# Patient Record
Sex: FEMALE | Race: WHITE | NOT HISPANIC OR LATINO | Employment: UNEMPLOYED | URBAN - NONMETROPOLITAN AREA
[De-identification: names, ages, dates, MRNs, and addresses within clinical notes are randomized per-mention and may not be internally consistent; named-entity substitution may affect disease eponyms.]

---

## 2017-01-04 ENCOUNTER — OFFICE VISIT (OUTPATIENT)
Dept: INTERNAL MEDICINE | Facility: CLINIC | Age: 67
End: 2017-01-04

## 2017-01-04 VITALS
SYSTOLIC BLOOD PRESSURE: 152 MMHG | RESPIRATION RATE: 16 BRPM | TEMPERATURE: 97.8 F | HEIGHT: 64 IN | HEART RATE: 84 BPM | OXYGEN SATURATION: 93 % | DIASTOLIC BLOOD PRESSURE: 92 MMHG | WEIGHT: 156.25 LBS | BODY MASS INDEX: 26.67 KG/M2

## 2017-01-04 DIAGNOSIS — J44.1 CHRONIC OBSTRUCTIVE PULMONARY DISEASE WITH ACUTE EXACERBATION (HCC): Primary | ICD-10-CM

## 2017-01-04 DIAGNOSIS — E11.649 TYPE 2 DIABETES MELLITUS WITH HYPOGLYCEMIA WITHOUT COMA, WITH LONG-TERM CURRENT USE OF INSULIN (HCC): ICD-10-CM

## 2017-01-04 DIAGNOSIS — Z79.4 TYPE 2 DIABETES MELLITUS WITH HYPOGLYCEMIA WITHOUT COMA, WITH LONG-TERM CURRENT USE OF INSULIN (HCC): ICD-10-CM

## 2017-01-04 PROCEDURE — 99213 OFFICE O/P EST LOW 20 MIN: CPT | Performed by: PHYSICIAN ASSISTANT

## 2017-01-04 RX ORDER — AZITHROMYCIN 250 MG/1
TABLET, FILM COATED ORAL
Qty: 6 TABLET | Refills: 0 | Status: SHIPPED | OUTPATIENT
Start: 2017-01-04 | End: 2017-01-20

## 2017-01-04 RX ORDER — IPRATROPIUM BROMIDE AND ALBUTEROL SULFATE 2.5; .5 MG/3ML; MG/3ML
3 SOLUTION RESPIRATORY (INHALATION) EVERY 4 HOURS PRN
Qty: 120 VIAL | Refills: 2 | Status: SHIPPED | OUTPATIENT
Start: 2017-01-04

## 2017-01-04 NOTE — MR AVS SNAPSHOT
"                        Breann Gallegos   1/4/2017 4:30 PM   Office Visit    Provider:  FORD Dubon   Department:  [facility] PRIMARY CARE   Dept Phone:  [phone]                Your Full Care Plan              Today's Medication Changes          These changes are accurate as of: 1/4/17  5:12 PM.  If you have any questions, ask your nurse or doctor.               New Medication(s)Ordered:     azithromycin 250 MG tablet   Commonly known as:  ZITHROMAX Z-SEKOU   Take 2 tablets the first day, then 1 tablet daily for 4 days.       ipratropium-albuterol 0.5-2.5 mg/mL nebulizer   Commonly known as:  DUO-NEB   Take 3 mL by nebulization Every 4 (Four) Hours As Needed for wheezing or shortness of air.            Where to Get Your Medications      These medications were sent to [address], [address] [phone]  - [phone] [address] [address]     Phone:  [phone]     azithromycin 250 MG tablet    ipratropium-albuterol 0.5-2.5 mg/mL nebulizer                  Your Updated Medication List          This list is accurate as of: 1/4/17  5:12 PM.  Always use your most recent med list.                albuterol 108 (90 BASE) MCG/ACT inhaler   Commonly known as:  PROAIR RESPICLICK   Inhale 1 puff every 4 (four) hours as needed for wheezing or shortness of air.       alendronate 70 MG tablet   Commonly known as:  FOSAMAX   Take 1 tablet by mouth every 7 days. Pt is to take on an empty stomach with plenty of water. Stand for 1 hour after taking med do not sit       amLODIPine 5 MG tablet   Commonly known as:  NORVASC   TAKE 1 TABLET BY MOUTH AT BEDTIME       ASPIRIN LOW DOSE 81 MG EC tablet   Generic drug:  aspirin       azithromycin 250 MG tablet   Commonly known as:  ZITHROMAX Z-SEKOU   Take 2 tablets the first day, then 1 tablet daily for 4 days.       * B-D INS SYR ULTRAFINE 1CC/30G 30G X 1/2\" 1 ML misc   Generic " "drug:  Insulin Syringe-Needle U-100       * B-D INS SYR ULTRAFINE .5CC/30G 30G X 1/2\" 0.5 ML misc   Generic drug:  Insulin Syringe-Needle U-100       BREO ELLIPTA 200-25 MCG/INH aerosol powder    Generic drug:  Fluticasone Furoate-Vilanterol   Inhale 1 puff Daily.       buPROPion  MG 24 hr tablet   Commonly known as:  WELLBUTRIN XL   take 1 tablet by mouth once daily       clotrimazole-betamethasone 1-0.05 % cream   Commonly known as:  LOTRISONE   Apply topically twice daily as direted.       colestipol 1 G tablet   Commonly known as:  COLESTID   Take 2 tablets by mouth 2 (Two) Times a Day.       cyclobenzaprine 10 MG tablet   Commonly known as:  FLEXERIL       diphenoxylate-atropine 2.5-0.025 MG per tablet   Commonly known as:  LOMOTIL       flunisolide 25 MCG/ACT (0.025%) solution nasal spray   Commonly known as:  NASALIDE   Inhale 2 sprays Every 12 (Twelve) Hours.       gabapentin 300 MG capsule   Commonly known as:  NEURONTIN   take 1 capsule by mouth twice a day if needed       ipratropium-albuterol 0.5-2.5 mg/mL nebulizer   Commonly known as:  DUO-NEB   Take 3 mL by nebulization Every 4 (Four) Hours As Needed for wheezing or shortness of air.       LANTUS 100 UNIT/ML injection   Generic drug:  insulin glargine       lisinopril 20 MG tablet   Commonly known as:  PRINIVIL,ZESTRIL   take 1 tablet by mouth once daily       metFORMIN 500 MG tablet   Commonly known as:  GLUCOPHAGE   TAKE 2 TABLETS IN THE MORNING, 1 TABLET AT LUNCH AND 2 TABLETS EVERY EVENING       ONE TOUCH ULTRA TEST test strip   Generic drug:  glucose blood   TEST once daily to twice a day       onetouch ultrasoft lancets   USE AS DIRECTED TWICE A DAY       pantoprazole 40 MG EC tablet   Commonly known as:  PROTONIX   take 1 tablet by mouth once daily       sertraline 100 MG tablet   Commonly known as:  ZOLOFT   TAKE 1 1/2 TABLETS BY MOUTH ONCE DAILY       simvastatin 40 MG tablet   Commonly known as:  ZOCOR   TAKE 1 TABLET BY MOUTH ONCE " DAILY       TRADJENTA 5 MG tablet tablet   Generic drug:  linagliptin       traZODone 50 MG tablet   Commonly known as:  DESYREL   TAKE 1 AND 1/2 TABLETS AT BEDTIME       VIGAMOX 0.5 % ophthalmic solution   Generic drug:  moxifloxacin       Vitamin D3 12752 UNITS capsule       * Notice:  This list has 2 medication(s) that are the same as other medications prescribed for you. Read the directions carefully, and ask your doctor or other care provider to review them with you.            You Were Diagnosed With        Codes Comments    Chronic obstructive pulmonary disease with acute exacerbation    -  Primary ICD-10-CM: J44.1  ICD-9-CM: 491.21     Type 2 diabetes mellitus with hypoglycemia without coma, with long-term current use of insulin     ICD-10-CM: E11.649, Z79.4  ICD-9-CM: 250.80, 251.2, V58.67       Medications to be Given to You by a Medical Professional     Due       Frequency    9/21/2016 clotrimazole-betamethasone (LOTRISONE) 1-0.05 % lotion  Every 12 Hours Scheduled      Instructions     None    Patient Instructions History      MyChart Signup     Our records indicate that you have declined [employer] signup. If you would like to sign up for [employer], please email [email or web address] or call [phone] to obtain an activation code.             Other Info from Your Visit           Your Appointments     Jan 20, 2017 10:30 AM EST   Follow Up with Harry Avery MD   [facility] PRIMARY CARE (--)    [address]  [facility] [place] [facility] [place] [address] [phone]           Arrive 15 minutes prior to appointment.            Feb 08, 2017  3:00 PM EST   Follow Up with Molina Dumont MD   [facility] GASTROENTEROLOGY (--)    [address]  [facility] [place] [facility] [place] [address] [phone]           Please bring medications or medication list to appointment along with insurance cards and a photo ID.            Apr 06, 2017  1:30 PM EDT   Follow Up with  "MINISTERIO Leary   [facility] GROUP PULMONARY CRITICAL CARE AND SLEEP (--)    [address] [facility] [address] [address] [phone]           Arrive 15 minutes prior to appointment.              Allergies     Codeine      Penicillins      Terbinafine        Reason for Visit     Fatigue weakness    Fall x 3 , since Jan. 2 (evening), check thumbnail     Cough taking expectorant, temp. 99.8 and 93 o2 this morning.       Vital Signs     Blood Pressure Pulse Temperature Respirations Height Weight    152/92 84 97.8 °F (36.6 °C) 16 64\" (162.6 cm) 156 lb 4 oz (70.9 kg)    Last Menstrual Period Oxygen Saturation Body Mass Index Smoking Status          (Approximate) 93% 26.82 kg/m2 Former Smoker        Problems and Diagnoses Noted     Chronic airway obstruction    Diabetes      "

## 2017-01-04 NOTE — PROGRESS NOTES
Breann Gallegos is a 66 y.o. female.     Subjective   History of Present Illness   Here today with complaint of 2 days of coughing, right ear pain, postnasal drip, rhinorrhea, low grade fever, SOA, headache and fatigue. Has also fallen 3 times in the last 2 days, states she is getting off balance.  Taking Mucinex which is not helping. Chest aching when coughing. Has not been using her inhalers in the last few days. Has not been checking blood sugar the last 2 days but prior to illness had been running in 60-70s fasting. Not eating much since symptom onset.        The following portions of the patient's history were reviewed and updated as appropriate: allergies, current medications, past family history, past medical history, past social history, past surgical history and problem list.    Review of Systems    Constitutional: Fever, fatigue, appetite change. Negative for chills and unexpected weight change.   HENT: postnasal drip, rhinorrhea, congestion, right ear pain.  Negative for hearing loss, nosebleeds, sore throat, tinnitus and trouble swallowing.    Eyes: Negative for photophobia, discharge and visual disturbance.   Respiratory: Cough, chest tightness. Negative for chest tightness and wheezing.    Cardiovascular: Negative for chest pain, palpitations and leg swelling.   Gastrointestinal: Negative for abdominal distention, abdominal pain, blood in stool, constipation, diarrhea, nausea and vomiting.   Endocrine: Negative for cold intolerance, heat intolerance, polydipsia, polyphagia and polyuria.   Musculoskeletal: Negative for arthralgias, back pain, joint swelling, myalgias, neck pain and neck stiffness.   Skin: Negative for color change, pallor, rash and wound.   Allergic/Immunologic: Negative for environmental allergies, food allergies and immunocompromised state.   Neurological: 3 falls in 2 days. Negative for dizziness, tremors, seizures, weakness, numbness and headaches.   Hematological: Negative for  "adenopathy. Does not bruise/bleed easily.   Psychiatric/Behavioral: Negative for agitation, behavioral problems, confusion, hallucinations, self-injury and suicidal ideas. The patient is not nervous/anxious.      Objective    Physical Exam  Constitutional: Oriented to person, place, and time. Appears well-developed and well-nourished.   HENT: right TM erythematous and bulging with effusion.  Head: No sinus tenderness. Normocephalic and atraumatic.   Eyes: EOM are normal. Pupils are equal, round, and reactive to light.   Neck: Normal range of motion. Neck supple.   Cardiovascular: Normal rate, regular rhythm and normal heart sounds.    Pulmonary/Chest: Decreased breath sounds throughout., Scattered rhonchi and wheezing. Effort normal.  No respiratory distress.  Has no rales. Exhibits no chest wall tenderness.   Abdominal: Soft. Bowel sounds are normal. Exhibits no distension and no mass. There is no tenderness.   Musculoskeletal: Normal range of motion. Exhibits no tenderness.   Neurological: Alert and oriented to person, place, and time.   Skin: Skin is warm and dry.   Psychiatric: Has a normal mood and affect. Behavior is normal. Judgment and thought content normal.       Visit Vitals   • /92   • Pulse 84   • Temp 97.8 °F (36.6 °C)   • Resp 16   • Ht 64\" (162.6 cm)   • Wt 156 lb 4 oz (70.9 kg)   • LMP  (Approximate)   • SpO2 93%   • BMI 26.82 kg/m2       Nursing note and vitals reviewed.        Assessment/Plan   Breann was seen today for fatigue, fall and cough.    Diagnoses and all orders for this visit:    Chronic obstructive pulmonary disease with acute exacerbation  -     ipratropium-albuterol (DUO-NEB) 0.5-2.5 mg/mL nebulizer; Take 3 mL by nebulization Every 4 (Four) Hours As Needed for wheezing or shortness of air.  -     azithromycin (ZITHROMAX Z-SEKOU) 250 MG tablet; Take 2 tablets the first day, then 1 tablet daily for 4 days.    Type 2 diabetes mellitus with hypoglycemia without coma, with long-term " current use of insulin         Call in 2 days if not improving. Monitor glucose closely to ensure not overtreating with insulin during decreased appetite.

## 2017-01-17 ENCOUNTER — TELEPHONE (OUTPATIENT)
Dept: INTERNAL MEDICINE | Facility: CLINIC | Age: 67
End: 2017-01-17

## 2017-01-20 ENCOUNTER — OFFICE VISIT (OUTPATIENT)
Dept: INTERNAL MEDICINE | Facility: CLINIC | Age: 67
End: 2017-01-20

## 2017-01-20 VITALS
HEART RATE: 78 BPM | WEIGHT: 157 LBS | BODY MASS INDEX: 26.8 KG/M2 | OXYGEN SATURATION: 98 % | DIASTOLIC BLOOD PRESSURE: 70 MMHG | TEMPERATURE: 97.6 F | RESPIRATION RATE: 14 BRPM | HEIGHT: 64 IN | SYSTOLIC BLOOD PRESSURE: 122 MMHG

## 2017-01-20 DIAGNOSIS — K21.9 GASTROESOPHAGEAL REFLUX DISEASE WITHOUT ESOPHAGITIS: ICD-10-CM

## 2017-01-20 DIAGNOSIS — M17.9 OSTEOARTHRITIS OF KNEE, UNSPECIFIED LATERALITY, UNSPECIFIED OSTEOARTHRITIS TYPE: ICD-10-CM

## 2017-01-20 DIAGNOSIS — J44.1 CHRONIC OBSTRUCTIVE PULMONARY DISEASE WITH ACUTE EXACERBATION (HCC): ICD-10-CM

## 2017-01-20 DIAGNOSIS — R26.81 UNSTEADY GAIT: ICD-10-CM

## 2017-01-20 DIAGNOSIS — Z79.4 TYPE 2 DIABETES MELLITUS WITH HYPOGLYCEMIA WITHOUT COMA, WITH LONG-TERM CURRENT USE OF INSULIN (HCC): ICD-10-CM

## 2017-01-20 DIAGNOSIS — K58.9 IRRITABLE BOWEL SYNDROME WITHOUT DIARRHEA: ICD-10-CM

## 2017-01-20 DIAGNOSIS — R51.9 HEADACHE, UNSPECIFIED HEADACHE TYPE: ICD-10-CM

## 2017-01-20 DIAGNOSIS — I77.9 PERIPHERAL ARTERIAL OCCLUSIVE DISEASE (HCC): ICD-10-CM

## 2017-01-20 DIAGNOSIS — E78.5 HYPERLIPIDEMIA, UNSPECIFIED HYPERLIPIDEMIA TYPE: Primary | ICD-10-CM

## 2017-01-20 DIAGNOSIS — I10 ESSENTIAL HYPERTENSION: ICD-10-CM

## 2017-01-20 DIAGNOSIS — E55.9 VITAMIN D DEFICIENCY: ICD-10-CM

## 2017-01-20 DIAGNOSIS — G25.81 RESTLESS LEGS SYNDROME: ICD-10-CM

## 2017-01-20 DIAGNOSIS — E11.649 TYPE 2 DIABETES MELLITUS WITH HYPOGLYCEMIA WITHOUT COMA, WITH LONG-TERM CURRENT USE OF INSULIN (HCC): ICD-10-CM

## 2017-01-20 DIAGNOSIS — G44.209 TENSION HEADACHE: ICD-10-CM

## 2017-01-20 PROCEDURE — 99214 OFFICE O/P EST MOD 30 MIN: CPT | Performed by: INTERNAL MEDICINE

## 2017-01-20 NOTE — MR AVS SNAPSHOT
"                        Breann Gallegos   1/20/2017 10:30 AM   Office Visit    Provider:  Harry Avery MD   Department:  [facility] PRIMARY CARE   Dept Phone:  [phone]                Your Full Care Plan              Today's Medication Changes          These changes are accurate as of: 1/20/17 11:09 AM.  If you have any questions, ask your nurse or doctor.               Stop taking medication(s)listed here:     azithromycin 250 MG tablet   Commonly known as:  ZITHROMAX Z-SEKOU   Stopped by:  Harry Avery MD                      Your Updated Medication List          This list is accurate as of: 1/20/17 11:09 AM.  Always use your most recent med list.                albuterol 108 (90 BASE) MCG/ACT inhaler   Commonly known as:  PROAIR RESPICLICK   Inhale 1 puff every 4 (four) hours as needed for wheezing or shortness of air.       alendronate 70 MG tablet   Commonly known as:  FOSAMAX   Take 1 tablet by mouth every 7 days. Pt is to take on an empty stomach with plenty of water. Stand for 1 hour after taking med do not sit       amLODIPine 5 MG tablet   Commonly known as:  NORVASC   TAKE 1 TABLET BY MOUTH AT BEDTIME       ASPIRIN LOW DOSE 81 MG EC tablet   Generic drug:  aspirin       * B-D INS SYR ULTRAFINE 1CC/30G 30G X 1/2\" 1 ML misc   Generic drug:  Insulin Syringe-Needle U-100       * B-D INS SYR ULTRAFINE .5CC/30G 30G X 1/2\" 0.5 ML misc   Generic drug:  Insulin Syringe-Needle U-100       BREO ELLIPTA 200-25 MCG/INH aerosol powder    Generic drug:  Fluticasone Furoate-Vilanterol   Inhale 1 puff Daily.       buPROPion  MG 24 hr tablet   Commonly known as:  WELLBUTRIN XL   take 1 tablet by mouth once daily       clotrimazole-betamethasone 1-0.05 % cream   Commonly known as:  LOTRISONE   Apply topically twice daily as direted.       colestipol 1 G tablet   Commonly known as:  COLESTID   Take 2 tablets by mouth 2 (Two) Times a Day.       cyclobenzaprine 10 MG tablet   Commonly known as:  " FLEXERIL       diphenoxylate-atropine 2.5-0.025 MG per tablet   Commonly known as:  LOMOTIL       flunisolide 25 MCG/ACT (0.025%) solution nasal spray   Commonly known as:  NASALIDE   Inhale 2 sprays Every 12 (Twelve) Hours.       gabapentin 300 MG capsule   Commonly known as:  NEURONTIN   take 1 capsule by mouth twice a day if needed       ipratropium-albuterol 0.5-2.5 mg/mL nebulizer   Commonly known as:  DUO-NEB   Take 3 mL by nebulization Every 4 (Four) Hours As Needed for wheezing or shortness of air.       LANTUS 100 UNIT/ML injection   Generic drug:  insulin glargine       lisinopril 20 MG tablet   Commonly known as:  PRINIVIL,ZESTRIL   take 1 tablet by mouth once daily       metFORMIN 500 MG tablet   Commonly known as:  GLUCOPHAGE   TAKE 2 TABLETS IN THE MORNING, 1 TABLET AT LUNCH AND 2 TABLETS EVERY EVENING       ONE TOUCH ULTRA TEST test strip   Generic drug:  glucose blood   TEST once daily to twice a day       onetouch ultrasoft lancets   USE AS DIRECTED TWICE A DAY       pantoprazole 40 MG EC tablet   Commonly known as:  PROTONIX   take 1 tablet by mouth once daily       sertraline 100 MG tablet   Commonly known as:  ZOLOFT   TAKE 1 1/2 TABLETS BY MOUTH ONCE DAILY       simvastatin 40 MG tablet   Commonly known as:  ZOCOR   TAKE 1 TABLET BY MOUTH ONCE DAILY       TRADJENTA 5 MG tablet tablet   Generic drug:  linagliptin       traZODone 50 MG tablet   Commonly known as:  DESYREL   TAKE 1 AND 1/2 TABLETS AT BEDTIME       VIGAMOX 0.5 % ophthalmic solution   Generic drug:  moxifloxacin       Vitamin D3 68547 UNITS capsule       * Notice:  This list has 2 medication(s) that are the same as other medications prescribed for you. Read the directions carefully, and ask your doctor or other care provider to review them with you.            We Performed the Following     Ambulatory Referral to Physical Therapy     Ambulatory Referral to Podiatry       You Were Diagnosed With        Codes Comments     Hyperlipidemia, unspecified hyperlipidemia type    -  Primary ICD-10-CM: E78.5  ICD-9-CM: 272.4     Essential hypertension     ICD-10-CM: I10  ICD-9-CM: 401.9     Peripheral arterial occlusive disease     ICD-10-CM: I77.9  ICD-9-CM: 444.22     Chronic obstructive pulmonary disease with acute exacerbation     ICD-10-CM: J44.1  ICD-9-CM: 491.21     Gastroesophageal reflux disease without esophagitis     ICD-10-CM: K21.9  ICD-9-CM: 530.81     Irritable bowel syndrome without diarrhea     ICD-10-CM: K58.9  ICD-9-CM: 564.1     Vitamin D deficiency     ICD-10-CM: E55.9  ICD-9-CM: 268.9     Type 2 diabetes mellitus with hypoglycemia without coma, with long-term current use of insulin     ICD-10-CM: E11.649, Z79.4  ICD-9-CM: 250.80, 251.2, V58.67     Osteoarthritis of knee, unspecified laterality, unspecified osteoarthritis type     ICD-10-CM: M17.9  ICD-9-CM: 715.36     Restless legs syndrome     ICD-10-CM: G25.81  ICD-9-CM: 333.94     Tension headache     ICD-10-CM: G44.209  ICD-9-CM: 307.81     Unsteady gait     ICD-10-CM: R26.81  ICD-9-CM: 781.2     Headache, unspecified headache type     ICD-10-CM: R51  ICD-9-CM: 784.0       Medications to be Given to You by a Medical Professional     Due       Frequency    9/21/2016 clotrimazole-betamethasone (LOTRISONE) 1-0.05 % lotion  Every 12 Hours Scheduled      Instructions     None    Patient Instructions History      MyChart Signup     Our records indicate that you have declined [employer] signup. If you would like to sign up for [employer], please email [email or web address] or call [phone] to obtain an activation code.             Other Info from Your Visit           Your Appointments     Feb 08, 2017  3:00 PM EST   Follow Up with Molina Dumont MD   [facility] MEDICAL GROUP GASTROENTEROLOGY (--)    [address] [facility] [address] [phone]           Please bring medications or medication list to appointment along with  "insurance cards and a photo ID.            Apr 06, 2017  1:30 PM EDT   Follow Up with MINISTERIO Leary   [facility] MEDICAL GROUP PULMONARY CRITICAL CARE AND SLEEP (--)    [address] [facility] [address] [address] [phone]           Arrive 15 minutes prior to appointment.              Allergies     Codeine      Penicillins      Terbinafine        Reason for Visit     Hypertension Here for follow up      Vital Signs     Blood Pressure Pulse Temperature Respirations Height Weight    122/70 78 97.6 °F (36.4 °C) 14 64\" (162.6 cm) 157 lb (71.2 kg)    Last Menstrual Period Oxygen Saturation Body Mass Index Smoking Status          (Approximate) 98% 26.95 kg/m2 Former Smoker        Problems and Diagnoses Noted     Chronic airway obstruction    Diabetes    Acid reflux disease    Headache    High cholesterol or triglycerides    High blood pressure    Spasmodic colon    Degenerative arthritis of knee    Peripheral arterial occlusive disease    Restless legs syndrome    Tension headache    Unsteady gait    Vitamin D deficiency      "

## 2017-01-20 NOTE — PROGRESS NOTES
Subjective   Breann Gallegos is a 66 y.o. female.     Chief Complaint   Patient presents with   • Hypertension     Here for follow up       History of Present Illness   Low back pain.improved on medicine. left knee joint pain off and on still, seeing ortho. Diabetes is stable on medication. Anxiety stable on medication. Restless leg syndrome stable on medication. Hypertension stable on medication. COPD stable on medication, fatigue and daytime sleepy, dysphagia resolved after dilalation, complain lightheaded and stagger when standing up. HA off and on    Current Outpatient Prescriptions:   •  albuterol (PROAIR RESPICLICK) 108 (90 BASE) MCG/ACT inhaler, Inhale 1 puff every 4 (four) hours as needed for wheezing or shortness of air., Disp: 1 inhaler, Rfl: 0  •  alendronate (FOSAMAX) 70 MG tablet, Take 1 tablet by mouth every 7 days. Pt is to take on an empty stomach with plenty of water. Stand for 1 hour after taking med do not sit, Disp: 12 tablet, Rfl: 3  •  amLODIPine (NORVASC) 5 MG tablet, TAKE 1 TABLET BY MOUTH AT BEDTIME, Disp: 30 tablet, Rfl: 5  •  aspirin (ASPIRIN LOW DOSE) 81 MG EC tablet, Take  by mouth., Disp: , Rfl:   •  BREO ELLIPTA 200-25 MCG/INH aerosol powder , Inhale 1 puff Daily., Disp: 1 each, Rfl: 6  •  buPROPion XL (WELLBUTRIN XL) 150 MG 24 hr tablet, take 1 tablet by mouth once daily, Disp: 30 tablet, Rfl: 4  •  Cholecalciferol (VITAMIN D3) 83459 UNITS capsule, Take 1 capsule by mouth 1 (one) time per week., Disp: , Rfl:   •  clotrimazole-betamethasone (LOTRISONE) 1-0.05 % cream, Apply topically twice daily as direted., Disp: 45 g, Rfl: 0  •  colestipol (COLESTID) 1 G tablet, Take 2 tablets by mouth 2 (Two) Times a Day., Disp: 120 tablet, Rfl: 6  •  cyclobenzaprine (FLEXERIL) 10 MG tablet, Take 1 tablet by mouth at night as needed., Disp: , Rfl:   •  diphenoxylate-atropine (LOMOTIL) 2.5-0.025 MG per tablet, TAKE 1 TABLET BY MOUTH 3 TO 4 TIMES DAILY USE AS DIRECTED, Disp: , Rfl: 0  •  flunisolide  "(NASALIDE) 25 MCG/ACT (0.025%) solution nasal spray, Inhale 2 sprays Every 12 (Twelve) Hours., Disp: 1 bottle, Rfl: 5  •  gabapentin (NEURONTIN) 300 MG capsule, take 1 capsule by mouth twice a day if needed, Disp: 60 capsule, Rfl: 3  •  insulin glargine (LANTUS) 100 UNIT/ML injection, Inject 45 Units under the skin Daily. Inject 90 units twice daily., Disp: , Rfl:   •  Insulin Syringe-Needle U-100 (B-D INS SYR ULTRAFINE .5CC/30G) 30G X 1/2\" 0.5 ML misc, , Disp: , Rfl:   •  Insulin Syringe-Needle U-100 (B-D INS SYR ULTRAFINE 1CC/30G) 30G X 1/2\" 1 ML misc, , Disp: , Rfl:   •  ipratropium-albuterol (DUO-NEB) 0.5-2.5 mg/mL nebulizer, Take 3 mL by nebulization Every 4 (Four) Hours As Needed for wheezing or shortness of air., Disp: 120 vial, Rfl: 2  •  Lancets (ONETOUCH ULTRASOFT) lancets, USE AS DIRECTED TWICE A DAY, Disp: 100 each, Rfl: 5  •  linagliptin (TRADJENTA) 5 MG tablet tablet, Take 1 tablet by mouth 1 (one) time daily., Disp: , Rfl:   •  lisinopril (PRINIVIL,ZESTRIL) 20 MG tablet, take 1 tablet by mouth once daily, Disp: 30 tablet, Rfl: 5  •  metFORMIN (GLUCOPHAGE) 500 MG tablet, TAKE 2 TABLETS IN THE MORNING, 1 TABLET AT LUNCH AND 2 TABLETS EVERY EVENING, Disp: 150 tablet, Rfl: 5  •  moxifloxacin (VIGAMOX) 0.5 % ophthalmic solution, Apply  to eye., Disp: , Rfl:   •  ONE TOUCH ULTRA TEST test strip, TEST once daily to twice a day, Disp: 100 each, Rfl: 5  •  pantoprazole (PROTONIX) 40 MG EC tablet, take 1 tablet by mouth once daily, Disp: 30 tablet, Rfl: 1  •  sertraline (ZOLOFT) 100 MG tablet, TAKE 1 1/2 TABLETS BY MOUTH ONCE DAILY, Disp: 45 tablet, Rfl: 1  •  simvastatin (ZOCOR) 40 MG tablet, TAKE 1 TABLET BY MOUTH ONCE DAILY, Disp: 30 tablet, Rfl: 11  •  traZODone (DESYREL) 50 MG tablet, TAKE 1 AND 1/2 TABLETS AT BEDTIME, Disp: 45 tablet, Rfl: 2    Current Facility-Administered Medications:   •  clotrimazole-betamethasone (LOTRISONE) 1-0.05 % lotion, , Topical, Q12H, Harry Avery MD    The following portions " of the patient's history were reviewed and updated as appropriate: allergies, current medications, past family history, past medical history, past social history, past surgical history and problem list.    Review of Systems   Constitutional: Negative.    Respiratory: Negative.    Cardiovascular: Negative.    Gastrointestinal: Negative.    Musculoskeletal: Negative.    Skin: Negative.    Neurological:        HA  Unsteady gait   Psychiatric/Behavioral: Negative.        Objective   Physical Exam   Constitutional: She is oriented to person, place, and time. She appears well-developed and well-nourished.   Eyes: EOM are normal. Pupils are equal, round, and reactive to light.   Neck: Normal range of motion. Neck supple.   Cardiovascular: Normal rate, regular rhythm and normal heart sounds.    Pulmonary/Chest: Effort normal and breath sounds normal.   Abdominal: Bowel sounds are normal.   Neurological: She is alert and oriented to person, place, and time.   Skin: Skin is warm.   Psychiatric: She has a normal mood and affect.       All tests have been reviewed.    Assessment/Plan   There are no diagnoses linked to this encounter.          Anxiety depression continue Wellbutrin  vaginal angioma reassurance given and continue to watch  Mammogram pending patient to schedule  HA tension type. flexeril avoid computer games.  CT showed right thyroid goiter only, seen by ENT   dysphagia resolved after dilation  knee OA s/p Steroid injection, gel injection and follow up with orhto pending replacement  Lower back pain sacral pain s/p fall on the tub XR mild arthritis, PT helped  allergic to lamisil  Diabetic gastroparesis vs post cholecytectomy. DIARRHEA, helps with questran. Lomotil and LIBRAX no help.   Diabetic neuropathy continue Neurontin to 300 mg 3 times a day  DM 6.9 a1c CONTINUE MEDICINE lantus 45u   hyperlipidemia CONTINUE MEDICINE  Vitamin D , continue vitD3 2000u daily   vitB12 low , vitB12 1mg daily  colonoscopy  :divertic ,polyps and hemorroid  [id number] and pneumonia shot done .decline zostavax, tdap done, prevnar done  Osteoporosis on bone density scan continue fosamax  Restless leg syndrome continue medicine  Hypertension continue medication   rate dependent tacky, stress test normal  COPD continue medication  anisocoria, seen by eye doctor  Bilateral lower extremity claudication negative ultrasound for peripheral vascular disease  MCV low watch  Erythrasma continue clotrimazole   IBS resolved,  seeing GI continue meds now  Daytime sleepy and fatigue , sleep study negative sleep apnea  MIRTA no pap needed  Unsteady gait, do CT, ?relate to neuropathy, need DM shoes  4 mo after lab

## 2017-01-31 ENCOUNTER — HOSPITAL ENCOUNTER (OUTPATIENT)
Dept: MRI IMAGING | Facility: HOSPITAL | Age: 67
Discharge: HOME | End: 2017-01-31
Admitting: INTERNAL MEDICINE

## 2017-01-31 DIAGNOSIS — R26.81 UNSTEADY GAIT: ICD-10-CM

## 2017-01-31 DIAGNOSIS — R51.9 HEADACHE, UNSPECIFIED HEADACHE TYPE: ICD-10-CM

## 2017-01-31 PROCEDURE — 70551 MRI BRAIN STEM W/O DYE: CPT

## 2017-02-14 ENCOUNTER — LAB (OUTPATIENT)
Dept: INTERNAL MEDICINE | Facility: CLINIC | Age: 67
End: 2017-02-14

## 2017-02-14 DIAGNOSIS — E11.649 TYPE 2 DIABETES MELLITUS WITH HYPOGLYCEMIA WITHOUT COMA, WITH LONG-TERM CURRENT USE OF INSULIN (HCC): ICD-10-CM

## 2017-02-14 DIAGNOSIS — Z79.4 TYPE 2 DIABETES MELLITUS WITH HYPOGLYCEMIA WITHOUT COMA, WITH LONG-TERM CURRENT USE OF INSULIN (HCC): ICD-10-CM

## 2017-02-14 DIAGNOSIS — E55.9 VITAMIN D DEFICIENCY: ICD-10-CM

## 2017-02-14 LAB
25(OH)D3 SERPL-MCNC: 15.4 NG/ML
ALBUMIN SERPL-MCNC: 4.4 G/DL (ref 3.2–4.8)
ALBUMIN/GLOB SERPL: 1.7 G/DL (ref 1.5–2.5)
ALP SERPL-CCNC: 60 U/L (ref 25–100)
ALT SERPL W P-5'-P-CCNC: 29 U/L (ref 7–40)
ANION GAP SERPL CALCULATED.3IONS-SCNC: 9 MMOL/L (ref 3–11)
ARTICHOKE IGE QN: 49 MG/DL (ref 0–130)
AST SERPL-CCNC: 34 U/L (ref 0–33)
BASOPHILS # BLD AUTO: 0.03 10*3/MM3 (ref 0–0.2)
BASOPHILS NFR BLD AUTO: 0.5 % (ref 0–1)
BILIRUB SERPL-MCNC: 0.5 MG/DL (ref 0.3–1.2)
BUN BLD-MCNC: 9 MG/DL (ref 9–23)
BUN/CREAT SERPL: 15 (ref 7–25)
CALCIUM SPEC-SCNC: 9.5 MG/DL (ref 8.7–10.4)
CHLORIDE SERPL-SCNC: 104 MMOL/L (ref 99–109)
CHOLEST SERPL-MCNC: 106 MG/DL (ref 0–200)
CK SERPL-CCNC: 104 U/L (ref 26–174)
CO2 SERPL-SCNC: 28 MMOL/L (ref 20–31)
CREAT BLD-MCNC: 0.6 MG/DL (ref 0.6–1.3)
DEPRECATED RDW RBC AUTO: 45.2 FL (ref 37–54)
EOSINOPHIL # BLD AUTO: 0.14 10*3/MM3 (ref 0.1–0.3)
EOSINOPHIL NFR BLD AUTO: 2.2 % (ref 0–3)
ERYTHROCYTE [DISTWIDTH] IN BLOOD BY AUTOMATED COUNT: 14.3 % (ref 11.3–14.5)
GFR SERPL CREATININE-BSD FRML MDRD: 100 ML/MIN/1.73
GLOBULIN UR ELPH-MCNC: 2.6 GM/DL
GLUCOSE BLD-MCNC: 87 MG/DL (ref 70–100)
HBA1C MFR BLD: 7 % (ref 4.8–5.6)
HCT VFR BLD AUTO: 42.8 % (ref 34.5–44)
HDLC SERPL-MCNC: 39 MG/DL (ref 40–60)
HGB BLD-MCNC: 13.9 G/DL (ref 11.5–15.5)
IMM GRANULOCYTES # BLD: 0.01 10*3/MM3 (ref 0–0.03)
IMM GRANULOCYTES NFR BLD: 0.2 % (ref 0–0.6)
LYMPHOCYTES # BLD AUTO: 2.23 10*3/MM3 (ref 0.6–4.8)
LYMPHOCYTES NFR BLD AUTO: 35.6 % (ref 24–44)
MCH RBC QN AUTO: 27.8 PG (ref 27–31)
MCHC RBC AUTO-ENTMCNC: 32.5 G/DL (ref 32–36)
MCV RBC AUTO: 85.6 FL (ref 80–99)
MONOCYTES # BLD AUTO: 0.48 10*3/MM3 (ref 0–1)
MONOCYTES NFR BLD AUTO: 7.7 % (ref 0–12)
NEUTROPHILS # BLD AUTO: 3.37 10*3/MM3 (ref 1.5–8.3)
NEUTROPHILS NFR BLD AUTO: 53.8 % (ref 41–71)
PLATELET # BLD AUTO: 212 10*3/MM3 (ref 150–450)
PMV BLD AUTO: 9.6 FL (ref 6–12)
POTASSIUM BLD-SCNC: 3.9 MMOL/L (ref 3.5–5.5)
PROT SERPL-MCNC: 7 G/DL (ref 5.7–8.2)
RBC # BLD AUTO: 5 10*6/MM3 (ref 3.89–5.14)
SODIUM BLD-SCNC: 141 MMOL/L (ref 132–146)
TRIGL SERPL-MCNC: 183 MG/DL (ref 0–150)
WBC NRBC COR # BLD: 6.26 10*3/MM3 (ref 3.5–10.8)

## 2017-02-14 PROCEDURE — 80053 COMPREHEN METABOLIC PANEL: CPT | Performed by: INTERNAL MEDICINE

## 2017-02-14 PROCEDURE — 82550 ASSAY OF CK (CPK): CPT | Performed by: INTERNAL MEDICINE

## 2017-02-14 PROCEDURE — 80061 LIPID PANEL: CPT | Performed by: INTERNAL MEDICINE

## 2017-02-14 PROCEDURE — 85025 COMPLETE CBC W/AUTO DIFF WBC: CPT | Performed by: INTERNAL MEDICINE

## 2017-02-14 PROCEDURE — 82306 VITAMIN D 25 HYDROXY: CPT | Performed by: INTERNAL MEDICINE

## 2017-02-14 PROCEDURE — 83036 HEMOGLOBIN GLYCOSYLATED A1C: CPT | Performed by: INTERNAL MEDICINE

## 2017-02-15 RX ORDER — INSULIN GLARGINE 100 [IU]/ML
INJECTION, SOLUTION SUBCUTANEOUS
Qty: 15 EACH | Refills: 5 | Status: SHIPPED | OUTPATIENT
Start: 2017-02-15 | End: 2018-04-18 | Stop reason: SDUPTHER

## 2017-02-16 ENCOUNTER — OFFICE VISIT (OUTPATIENT)
Dept: INTERNAL MEDICINE | Facility: CLINIC | Age: 67
End: 2017-02-16

## 2017-02-16 VITALS
WEIGHT: 157 LBS | SYSTOLIC BLOOD PRESSURE: 122 MMHG | RESPIRATION RATE: 14 BRPM | BODY MASS INDEX: 26.8 KG/M2 | HEIGHT: 64 IN | HEART RATE: 88 BPM | TEMPERATURE: 98.5 F | DIASTOLIC BLOOD PRESSURE: 70 MMHG | OXYGEN SATURATION: 96 %

## 2017-02-16 DIAGNOSIS — G44.209 TENSION HEADACHE: ICD-10-CM

## 2017-02-16 DIAGNOSIS — K21.9 GASTROESOPHAGEAL REFLUX DISEASE WITHOUT ESOPHAGITIS: ICD-10-CM

## 2017-02-16 DIAGNOSIS — K22.4 ESOPHAGEAL DYSMOTILITY: ICD-10-CM

## 2017-02-16 DIAGNOSIS — E78.5 HYPERLIPIDEMIA, UNSPECIFIED HYPERLIPIDEMIA TYPE: ICD-10-CM

## 2017-02-16 DIAGNOSIS — Z79.4 TYPE 2 DIABETES MELLITUS WITH HYPOGLYCEMIA WITHOUT COMA, WITH LONG-TERM CURRENT USE OF INSULIN (HCC): ICD-10-CM

## 2017-02-16 DIAGNOSIS — J44.1 CHRONIC OBSTRUCTIVE PULMONARY DISEASE WITH ACUTE EXACERBATION (HCC): ICD-10-CM

## 2017-02-16 DIAGNOSIS — M54.6 ACUTE THORACIC BACK PAIN, UNSPECIFIED BACK PAIN LATERALITY: Primary | ICD-10-CM

## 2017-02-16 DIAGNOSIS — R26.81 UNSTEADY GAIT: ICD-10-CM

## 2017-02-16 DIAGNOSIS — F41.9 ANXIETY: ICD-10-CM

## 2017-02-16 DIAGNOSIS — F32.A DEPRESSION, UNSPECIFIED DEPRESSION TYPE: ICD-10-CM

## 2017-02-16 DIAGNOSIS — E11.649 TYPE 2 DIABETES MELLITUS WITH HYPOGLYCEMIA WITHOUT COMA, WITH LONG-TERM CURRENT USE OF INSULIN (HCC): ICD-10-CM

## 2017-02-16 DIAGNOSIS — I10 ESSENTIAL HYPERTENSION: ICD-10-CM

## 2017-02-16 DIAGNOSIS — E55.9 VITAMIN D DEFICIENCY: ICD-10-CM

## 2017-02-16 PROCEDURE — 99215 OFFICE O/P EST HI 40 MIN: CPT | Performed by: INTERNAL MEDICINE

## 2017-02-16 RX ORDER — AZITHROMYCIN 250 MG/1
TABLET, FILM COATED ORAL
Qty: 6 TABLET | Refills: 0 | Status: SHIPPED | OUTPATIENT
Start: 2017-02-16 | End: 2017-03-07

## 2017-02-27 RX ORDER — BUPROPION HYDROCHLORIDE 150 MG/1
TABLET ORAL
Qty: 30 TABLET | Refills: 4 | Status: SHIPPED | OUTPATIENT
Start: 2017-02-27 | End: 2017-07-18 | Stop reason: SDUPTHER

## 2017-03-06 RX ORDER — SERTRALINE HYDROCHLORIDE 100 MG/1
TABLET, FILM COATED ORAL
Qty: 45 TABLET | Refills: 1 | Status: SHIPPED | OUTPATIENT
Start: 2017-03-06 | End: 2017-05-17 | Stop reason: SDUPTHER

## 2017-03-07 ENCOUNTER — OFFICE VISIT (OUTPATIENT)
Dept: GASTROENTEROLOGY | Facility: CLINIC | Age: 67
End: 2017-03-07

## 2017-03-07 VITALS
TEMPERATURE: 97 F | HEIGHT: 64 IN | RESPIRATION RATE: 14 BRPM | BODY MASS INDEX: 27.31 KG/M2 | WEIGHT: 160 LBS | SYSTOLIC BLOOD PRESSURE: 157 MMHG | DIASTOLIC BLOOD PRESSURE: 82 MMHG | HEART RATE: 84 BPM

## 2017-03-07 DIAGNOSIS — R11.0 NAUSEA: Primary | ICD-10-CM

## 2017-03-07 DIAGNOSIS — R19.7 DIARRHEA, UNSPECIFIED TYPE: ICD-10-CM

## 2017-03-07 DIAGNOSIS — R12 HEARTBURN: ICD-10-CM

## 2017-03-07 PROCEDURE — 99214 OFFICE O/P EST MOD 30 MIN: CPT | Performed by: INTERNAL MEDICINE

## 2017-03-07 RX ORDER — ONDANSETRON 4 MG/1
4 TABLET, FILM COATED ORAL EVERY 8 HOURS PRN
Qty: 30 TABLET | Refills: 1 | Status: SHIPPED | OUTPATIENT
Start: 2017-03-07 | End: 2018-01-09

## 2017-03-07 NOTE — PROGRESS NOTES
[id number]  [facility] [place] [address]    (H) [phone]  (W)     Chief Complaint   Patient presents with   • Follow-up       History of Present Illness     The patient came back for follow visit today.  The patient has history of diarrhea for the last several years.  Severity is described as moderately severe frequency of bowel movements being 4-5 times per day.  Stools are generally watery with a nocturnal element.  There is history of tenesmus.  The patient also has history of occasional fecal incontinence.  The patient has been having these symptoms for the last 40 years.  Currently the patient feels much better.  Now the patient has 2 and occasionally 3 formed stools a day.  She does not get up at night with diarrhea.  There is no history of associated hematochezia.  About a week or so ago the patient took azithromycin for URI symptoms which led to some loose stools that lasted for a day or so.  Otherwise she is quite happy to have significant improvement of her diarrhea and inability to have formed stools for the first time in the last 40 years.    There is history of reflux off and on for the last several years.  Reflux is moderately severe.  Symptoms are described as burning sensation and indigestion.  There is history of occasional regurgitative symptoms in the past.  Frequency being several times per week.  The symptoms are worse at night.  The patient takes acid suppressive therapy with reasonable control of her symptoms.  Lately the patient has some worsening of her symptoms specially regurgitative symptoms.  No associated dysphagia or odynophagia.  The patient unfortunately has gained some weight lately.  Furthermore she drinks soda beverages especially when she is out.  Lately the patient has been traveling and drinking soda beverages more frequently.  There is history of some nausea off and on for the last few weeks.  The nausea is relatively mild and occurs perhaps once a month.  There is no abdominal  pain.  She denies hematemesis, melena, or hematochezia.  There is no dysphagia or odynophagia.  She denies fever or chills.  There is no recent weight loss.  In fact the patient has gained about 3 pounds over the last one month.    Review of Systems   Constitutional: Negative for appetite change, chills, fatigue, fever and unexpected weight change.   HENT: Negative for mouth sores, nosebleeds and trouble swallowing.    Eyes: Negative for discharge and redness.   Respiratory: Negative for apnea, cough and shortness of breath.    Cardiovascular: Negative for chest pain, palpitations and leg swelling.   Gastrointestinal: Negative for abdominal distention, abdominal pain, anal bleeding, blood in stool, constipation, diarrhea, nausea and vomiting.   Endocrine: Negative for cold intolerance, heat intolerance and polydipsia.   Genitourinary: Negative for dysuria, hematuria and urgency.   Musculoskeletal: Positive for arthralgias. Negative for joint swelling and myalgias.   Skin: Negative for rash.   Allergic/Immunologic: Negative for food allergies and immunocompromised state.   Neurological: Negative for dizziness, seizures, syncope and headaches.   Hematological: Negative for adenopathy. Does not bruise/bleed easily.   Psychiatric/Behavioral: Negative for dysphoric mood. The patient is not nervous/anxious and is not hyperactive.      Patient Active Problem List   Diagnosis   • Dysphagia   • Abnormal urinalysis   • Anxiety   • Chronic obstructive pulmonary disease   • Depression   • Diabetes mellitus   • Diabetic peripheral neuropathy   • Diarrhea   • Esophageal dysmotility   • Foot pain   • Gastroesophageal reflux disease without esophagitis   • Hyperlipidemia   • Hypertension   • Insomnia   • Irritable bowel syndrome   • Localized swelling, mass and lump, lower limb   • Low back pain   • Abnormal mammogram   • Enlargement of neck   • Neck pain   • Nontoxic uninodular goiter   • Osteoarthritis of knee   • Osteoarthritis  "  • Osteopenia   • Peripheral arterial occlusive disease   • Nerve root disorder   • Restless legs syndrome   • Tension headache   • Vitamin D deficiency   • Erythrasma   • Unsteady gait   • Headache     Past Medical History   Diagnosis Date   • COPD (chronic obstructive pulmonary disease)    • Diabetes mellitus    • Eustachian tube dysfunction    • GERD (gastroesophageal reflux disease)    • High cholesterol    • Hyperlipidemia    • Hypertension    • Sinus problem    • Skin abscess    • Vision problem    • Vitamin B12 deficiency    • Vitamin D deficiency      Past Surgical History   Procedure Laterality Date   • Other surgical history       Bladder procedures   • Breast surgery     • Cataract extraction     • Hand surgery     • Tonsillectomy     • Appendectomy  1976   • Cholecystectomy  1976   • Hysterectomy  1979     Family History   Problem Relation Age of Onset   • Diabetes Other    • COPD Mother    • Pneumonia Mother    • Kidney failure Father    • Prostate cancer Father    • Colon cancer Neg Hx      Social History   Substance Use Topics   • Smoking status: Former Smoker     Packs/day: 1.00     Years: 30.00     Quit date: 01/2010   • Smokeless tobacco: Never Used   • Alcohol use No       Current Outpatient Prescriptions:   •  albuterol (PROAIR RESPICLICK) 108 (90 BASE) MCG/ACT inhaler, Inhale 1 puff every 4 (four) hours as needed for wheezing or shortness of air., Disp: 1 inhaler, Rfl: 0  •  amLODIPine (NORVASC) 5 MG tablet, TAKE 1 TABLET BY MOUTH AT BEDTIME, Disp: 30 tablet, Rfl: 5  •  aspirin (ASPIRIN LOW DOSE) 81 MG EC tablet, Take  by mouth., Disp: , Rfl:   •  B-D INS SYR ULTRAFINE 1CC/31G 31G X 5/16\" 1 ML misc, , Disp: , Rfl: 0  •  BREO ELLIPTA 200-25 MCG/INH aerosol powder , Inhale 1 puff Daily., Disp: 1 each, Rfl: 6  •  buPROPion XL (WELLBUTRIN XL) 150 MG 24 hr tablet, take 1 tablet by mouth once daily, Disp: 30 tablet, Rfl: 4  •  Cholecalciferol (VITAMIN D3) 95999 UNITS capsule, Take 1 capsule by mouth " "1 (one) time per week., Disp: , Rfl:   •  clotrimazole-betamethasone (LOTRISONE) 1-0.05 % cream, Apply topically twice daily as direted., Disp: 45 g, Rfl: 0  •  colestipol (COLESTID) 1 G tablet, Take 2 tablets by mouth 2 (Two) Times a Day., Disp: 120 tablet, Rfl: 6  •  cyclobenzaprine (FLEXERIL) 10 MG tablet, Take 1 tablet by mouth at night as needed., Disp: , Rfl:   •  diphenoxylate-atropine (LOMOTIL) 2.5-0.025 MG per tablet, TAKE 1 TABLET BY MOUTH 3 TO 4 TIMES DAILY USE AS DIRECTED, Disp: , Rfl: 0  •  flunisolide (NASALIDE) 25 MCG/ACT (0.025%) solution nasal spray, Inhale 2 sprays Every 12 (Twelve) Hours., Disp: 1 bottle, Rfl: 5  •  gabapentin (NEURONTIN) 300 MG capsule, take 1 capsule by mouth twice a day if needed, Disp: 60 capsule, Rfl: 3  •  Insulin Syringe-Needle U-100 (B-D INS SYR ULTRAFINE .5CC/30G) 30G X 1/2\" 0.5 ML misc, , Disp: , Rfl:   •  Insulin Syringe-Needle U-100 (B-D INS SYR ULTRAFINE 1CC/30G) 30G X 1/2\" 1 ML misc, , Disp: , Rfl:   •  ipratropium-albuterol (DUO-NEB) 0.5-2.5 mg/mL nebulizer, Take 3 mL by nebulization Every 4 (Four) Hours As Needed for wheezing or shortness of air., Disp: 120 vial, Rfl: 2  •  Lancets (ONETOUCH ULTRASOFT) lancets, USE AS DIRECTED TWICE A DAY, Disp: 100 each, Rfl: 5  •  LANTUS 100 UNIT/ML injection, INJECT 90 UNITS TWICE DAILY, Disp: 15 each, Rfl: 5  •  linagliptin (TRADJENTA) 5 MG tablet tablet, Take 1 tablet by mouth 1 (one) time daily., Disp: , Rfl:   •  lisinopril (PRINIVIL,ZESTRIL) 20 MG tablet, take 1 tablet by mouth once daily, Disp: 30 tablet, Rfl: 5  •  metFORMIN (GLUCOPHAGE) 500 MG tablet, TAKE 2 TABLETS IN THE MORNING, 1 TABLET AT LUNCH AND 2 TABLETS EVERY EVENING, Disp: 150 tablet, Rfl: 5  •  moxifloxacin (VIGAMOX) 0.5 % ophthalmic solution, Apply  to eye., Disp: , Rfl:   •  ONE TOUCH ULTRA TEST test strip, TEST once daily to twice a day, Disp: 100 each, Rfl: 5  •  pantoprazole (PROTONIX) 40 MG EC tablet, take 1 tablet by mouth once daily, Disp: 30 tablet, " "Rfl: 1  •  sertraline (ZOLOFT) 100 MG tablet, TAKE 1 1/2 TABLETS BY MOUTH ONCE DAILY, Disp: 45 tablet, Rfl: 1  •  simvastatin (ZOCOR) 40 MG tablet, TAKE 1 TABLET BY MOUTH ONCE DAILY, Disp: 30 tablet, Rfl: 11  •  traZODone (DESYREL) 50 MG tablet, TAKE 1 AND 1/2 TABLETS AT BEDTIME, Disp: 45 tablet, Rfl: 2  •  ondansetron (ZOFRAN) 4 MG tablet, Take 1 tablet by mouth Every 8 (Eight) Hours As Needed for Nausea or Vomiting., Disp: 30 tablet, Rfl: 1    Current Facility-Administered Medications:   •  clotrimazole-betamethasone (LOTRISONE) 1-0.05 % lotion, , Topical, Q12H, Harry Avery MD  Allergies   Allergen Reactions   • Codeine    • Penicillins    • Terbinafine      Visit Vitals   • /82   • Pulse 84   • Temp 97 °F (36.1 °C)   • Resp 14   • Ht 64\" (162.6 cm)   • Wt 160 lb (72.6 kg)   • LMP  (Approximate)   • BMI 27.46 kg/m2     Physical Exam   Constitutional: She is oriented to person, place, and time. She appears well-developed and well-nourished. No distress.   HENT:   Head: Normocephalic and atraumatic.   Right Ear: Hearing and external ear normal.   Left Ear: Hearing and external ear normal.   Nose: Nose normal.   Mouth/Throat: Oropharynx is clear and moist and mucous membranes are normal. Mucous membranes are not pale, not dry and not cyanotic. No oral lesions. No oropharyngeal exudate.   Eyes: Conjunctivae and EOM are normal. Right eye exhibits no discharge. Left eye exhibits no discharge. No scleral icterus.   Neck: Trachea normal. Neck supple. No JVD present. No edema present. No thyroid mass and no thyromegaly present.   Cardiovascular: Normal rate, regular rhythm, S2 normal and normal heart sounds.  Exam reveals no gallop, no S3 and no friction rub.    No murmur heard.  Pulmonary/Chest: Effort normal and breath sounds normal. No respiratory distress. She has no wheezes. She has no rales. She exhibits no tenderness.   Abdominal: Soft. Normal appearance and bowel sounds are normal. She exhibits no " distension, no ascites and no mass. There is no splenomegaly or hepatomegaly. There is no tenderness. There is no rigidity, no rebound and no guarding. No hernia.   Musculoskeletal: She exhibits no tenderness or deformity.       Vascular Status -  Her exam exhibits no right foot edema. Her exam exhibits no left foot edema.  Lymphadenopathy:     She has no cervical adenopathy.        Left: No supraclavicular adenopathy present.   Neurological: She is alert and oriented to person, place, and time. She has normal strength. No cranial nerve deficit or sensory deficit. She exhibits normal muscle tone. Coordination normal.   Skin: No rash noted. She is not diaphoretic. No cyanosis. No pallor. Nails show no clubbing.   Psychiatric: She has a normal mood and affect. Her behavior is normal. Judgment and thought content normal.   Nursing note and vitals reviewed.      Laboratory Tests:    Upon review of medical records:    Dated February 14, 2017 sodium 141 potassium 3.9 chloride 104 CO2 28 BUN 9 serum creatinine 0.60 and glucose 87.  Calcium 9.5.  Albumin 4.40.  T bili 0.5 AST 34 ALT 29 alkaline phosphatase 60.  Total cholesterol 106.  Triglycerides 183.  WBC 6.26 hemoglobin 13.9 hematocrit 42.8 MCV 85.6 and platelet count 212.    Procedures:  Upon review of medical records:    Dated February 4, 2014 the patient underwent a colonoscopy to terminal ileum. She was found to have scant left-sided diverticulosis with an occasional diverticulum in the right colon, multiple colon polyps, internal and small external hemorrhoids. No endoscopic evidence of ileitis or colitis was seen. Random biopsies were obtained from the colon upon withdrawal of scope.     Dated June 27, 2016 and upper endoscopy revealed proximal tortuosity and stenosis which was dilated up to 15 mm without difficulty. No Mares’s esophagus was noted. Patient was found to have proximal esophageal stenosis, small sliding hiatal hernia less than 3 cm, and  erythematous gastritis with scant erosions. Biopsies from the colon polyps revealed tubular adenomata. Random biopsies from the colon did not reveal microscopic colitis.     Assessment and Plan:      Breann was seen today for follow-up.    Diagnoses and all orders for this visit:    Nausea  Comments:  Likely secondary to an element of underlying diabetes associated delayed gastric emptying.  Orders:  -     ondansetron (ZOFRAN) 4 MG tablet; Take 1 tablet by mouth Every 8 (Eight) Hours As Needed for Nausea or Vomiting.    Diarrhea, unspecified type  Comments:  Significantly improved.  The patient is very happy to have normal bowel movements for the first time in the last 40 years.    Heartburn  Comments:  Recent worsening of symptoms.  This is related to recent weight gain, as well as significant amount of soda intake.          Discussion:       Plan     Patient Instructions   1. Anti-reflex measures.  2. Low-fat-low lactose diet.  3. Weight reduction.  4. Pantoprazole 40 mg 1 by mouth every morning one half hour before breakfast.  5. Lomotil 2.5-0.25 mg 1 orally  Po twice a day.  6. Colestid.  1 tablet by mouth daily at bedtime.    7. Head end elevation by putting 4-6 inch blocks under the head end.  8.  Avoid soda beverages.  9.  Although the patient may benefit from a short course of low-dose Reglan however this is best held in view of history of significant diarrhea.  A trial of Zofran 4 mg tablet.  1-to 3 times a day by mouth as needed for nausea.  10.  Follow-up: 6 months.  11.Discussed with the patient in detail.  Opportunity was given to ask questions.        Patient Care Team:  Harry Avery MD as PCP - General  Harry Avery MD as PCP - Family Medicine    Molina Dumont MD

## 2017-03-07 NOTE — PATIENT INSTRUCTIONS
1. Anti-reflex measures.  2. Low-fat-low lactose diet.  3. Weight reduction.  4. Pantoprazole 40 mg 1 by mouth every morning one half hour before breakfast.  5. Lomotil 2.5-0.25 mg 1 orally  Po twice a day.  6. Colestid.  1 tablet by mouth daily at bedtime.    7. Head end elevation by putting 4-6 inch blocks under the head end.  8.  Avoid soda beverages.  9.  Although the patient may benefit from a short course of low-dose Reglan however this is best held in view of history of significant diarrhea.  A trial of Zofran 4 mg tablet.  1-to 3 times a day by mouth as needed for nausea.  10.  Follow-up: 6 months.  11.Discussed with the patient in detail.  Opportunity was given to ask questions.

## 2017-03-09 ENCOUNTER — OFFICE VISIT (OUTPATIENT)
Dept: INTERNAL MEDICINE | Facility: CLINIC | Age: 67
End: 2017-03-09

## 2017-03-09 VITALS
BODY MASS INDEX: 26.98 KG/M2 | DIASTOLIC BLOOD PRESSURE: 70 MMHG | HEART RATE: 64 BPM | OXYGEN SATURATION: 98 % | HEIGHT: 64 IN | SYSTOLIC BLOOD PRESSURE: 120 MMHG | WEIGHT: 158 LBS | TEMPERATURE: 98.1 F | RESPIRATION RATE: 14 BRPM

## 2017-03-09 DIAGNOSIS — L08.1 ERYTHRASMA: ICD-10-CM

## 2017-03-09 DIAGNOSIS — I77.9 PERIPHERAL ARTERIAL OCCLUSIVE DISEASE (HCC): ICD-10-CM

## 2017-03-09 DIAGNOSIS — F32.A DEPRESSION, UNSPECIFIED DEPRESSION TYPE: ICD-10-CM

## 2017-03-09 DIAGNOSIS — E11.649 TYPE 2 DIABETES MELLITUS WITH HYPOGLYCEMIA WITHOUT COMA, WITH LONG-TERM CURRENT USE OF INSULIN (HCC): ICD-10-CM

## 2017-03-09 DIAGNOSIS — E55.9 VITAMIN D DEFICIENCY: ICD-10-CM

## 2017-03-09 DIAGNOSIS — F41.9 ANXIETY: ICD-10-CM

## 2017-03-09 DIAGNOSIS — R13.10 DYSPHAGIA, UNSPECIFIED TYPE: ICD-10-CM

## 2017-03-09 DIAGNOSIS — K58.9 IRRITABLE BOWEL SYNDROME WITHOUT DIARRHEA: ICD-10-CM

## 2017-03-09 DIAGNOSIS — Z79.4 TYPE 2 DIABETES MELLITUS WITH HYPOGLYCEMIA WITHOUT COMA, WITH LONG-TERM CURRENT USE OF INSULIN (HCC): ICD-10-CM

## 2017-03-09 DIAGNOSIS — I10 ESSENTIAL HYPERTENSION: ICD-10-CM

## 2017-03-09 DIAGNOSIS — J44.1 CHRONIC OBSTRUCTIVE PULMONARY DISEASE WITH ACUTE EXACERBATION (HCC): ICD-10-CM

## 2017-03-09 DIAGNOSIS — G44.209 TENSION HEADACHE: ICD-10-CM

## 2017-03-09 DIAGNOSIS — K21.9 GASTROESOPHAGEAL REFLUX DISEASE WITHOUT ESOPHAGITIS: ICD-10-CM

## 2017-03-09 DIAGNOSIS — R26.81 UNSTEADY GAIT: ICD-10-CM

## 2017-03-09 DIAGNOSIS — E78.5 HYPERLIPIDEMIA, UNSPECIFIED HYPERLIPIDEMIA TYPE: Primary | ICD-10-CM

## 2017-03-09 PROCEDURE — 99214 OFFICE O/P EST MOD 30 MIN: CPT | Performed by: INTERNAL MEDICINE

## 2017-03-09 RX ORDER — CLOTRIMAZOLE AND BETAMETHASONE DIPROPIONATE 10; .64 MG/G; MG/G
CREAM TOPICAL
Qty: 45 G | Refills: 1 | Status: SHIPPED | OUTPATIENT
Start: 2017-03-09 | End: 2017-12-06 | Stop reason: SDUPTHER

## 2017-03-09 NOTE — PROGRESS NOTES
"Subjective   Breann Gallegos is a 66 y.o. female.     Chief Complaint   Patient presents with   • Diabetes     HERE FOR FOLLOW UP        History of Present Illness   Anxiety depression stable medication.  Headache is still some time.  Dysphagia resolved GERD patient was seen by gastroenterologist on medications.  Diabetes stable on medication A1c 7.0.  Hyperlipidemia stable medication.  Vitamin D low patient is not taking vitamin D3 religiously.  Hypertension stable medication.  COPD stable on medication.  Rash needs refill medication.  IBS follow up with  gastroenterologist    Current Outpatient Prescriptions:   •  albuterol (PROAIR RESPICLICK) 108 (90 BASE) MCG/ACT inhaler, Inhale 1 puff every 4 (four) hours as needed for wheezing or shortness of air., Disp: 1 inhaler, Rfl: 0  •  amLODIPine (NORVASC) 5 MG tablet, TAKE 1 TABLET BY MOUTH AT BEDTIME, Disp: 30 tablet, Rfl: 5  •  aspirin (ASPIRIN LOW DOSE) 81 MG EC tablet, Take  by mouth., Disp: , Rfl:   •  B-D INS SYR ULTRAFINE 1CC/31G 31G X 5/16\" 1 ML misc, , Disp: , Rfl: 0  •  BREO ELLIPTA 200-25 MCG/INH aerosol powder , Inhale 1 puff Daily., Disp: 1 each, Rfl: 6  •  buPROPion XL (WELLBUTRIN XL) 150 MG 24 hr tablet, take 1 tablet by mouth once daily, Disp: 30 tablet, Rfl: 4  •  Cholecalciferol (VITAMIN D3) 85290 UNITS capsule, Take 1 capsule by mouth 1 (one) time per week., Disp: , Rfl:   •  clotrimazole-betamethasone (LOTRISONE) 1-0.05 % cream, Apply topically twice daily as direted., Disp: 45 g, Rfl: 1  •  colestipol (COLESTID) 1 G tablet, Take 2 tablets by mouth 2 (Two) Times a Day., Disp: 120 tablet, Rfl: 6  •  cyclobenzaprine (FLEXERIL) 10 MG tablet, Take 1 tablet by mouth at night as needed., Disp: , Rfl:   •  diphenoxylate-atropine (LOMOTIL) 2.5-0.025 MG per tablet, TAKE 1 TABLET BY MOUTH 3 TO 4 TIMES DAILY USE AS DIRECTED, Disp: , Rfl: 0  •  flunisolide (NASALIDE) 25 MCG/ACT (0.025%) solution nasal spray, Inhale 2 sprays Every 12 (Twelve) Hours., Disp: 1 " "bottle, Rfl: 5  •  gabapentin (NEURONTIN) 300 MG capsule, take 1 capsule by mouth twice a day if needed, Disp: 60 capsule, Rfl: 3  •  Insulin Syringe-Needle U-100 (B-D INS SYR ULTRAFINE .5CC/30G) 30G X 1/2\" 0.5 ML misc, , Disp: , Rfl:   •  Insulin Syringe-Needle U-100 (B-D INS SYR ULTRAFINE 1CC/30G) 30G X 1/2\" 1 ML misc, , Disp: , Rfl:   •  ipratropium-albuterol (DUO-NEB) 0.5-2.5 mg/mL nebulizer, Take 3 mL by nebulization Every 4 (Four) Hours As Needed for wheezing or shortness of air., Disp: 120 vial, Rfl: 2  •  Lancets (ONETOUCH ULTRASOFT) lancets, USE AS DIRECTED TWICE A DAY, Disp: 100 each, Rfl: 5  •  LANTUS 100 UNIT/ML injection, INJECT 90 UNITS TWICE DAILY, Disp: 15 each, Rfl: 5  •  linagliptin (TRADJENTA) 5 MG tablet tablet, Take 1 tablet by mouth 1 (one) time daily., Disp: , Rfl:   •  lisinopril (PRINIVIL,ZESTRIL) 20 MG tablet, take 1 tablet by mouth once daily, Disp: 30 tablet, Rfl: 5  •  metFORMIN (GLUCOPHAGE) 500 MG tablet, TAKE 2 TABLETS IN THE MORNING, 1 TABLET AT LUNCH AND 2 TABLETS EVERY EVENING, Disp: 150 tablet, Rfl: 5  •  moxifloxacin (VIGAMOX) 0.5 % ophthalmic solution, Apply  to eye., Disp: , Rfl:   •  ondansetron (ZOFRAN) 4 MG tablet, Take 1 tablet by mouth Every 8 (Eight) Hours As Needed for Nausea or Vomiting., Disp: 30 tablet, Rfl: 1  •  ONE TOUCH ULTRA TEST test strip, TEST once daily to twice a day, Disp: 100 each, Rfl: 5  •  pantoprazole (PROTONIX) 40 MG EC tablet, take 1 tablet by mouth once daily, Disp: 30 tablet, Rfl: 1  •  sertraline (ZOLOFT) 100 MG tablet, TAKE 1 1/2 TABLETS BY MOUTH ONCE DAILY, Disp: 45 tablet, Rfl: 1  •  simvastatin (ZOCOR) 40 MG tablet, TAKE 1 TABLET BY MOUTH ONCE DAILY, Disp: 30 tablet, Rfl: 11  •  traZODone (DESYREL) 50 MG tablet, TAKE 1 AND 1/2 TABLETS AT BEDTIME, Disp: 45 tablet, Rfl: 2    Current Facility-Administered Medications:   •  clotrimazole-betamethasone (LOTRISONE) 1-0.05 % lotion, , Topical, Q12H, Harry Avery MD    The following portions of the " patient's history were reviewed and updated as appropriate: allergies, current medications, past family history, past medical history, past social history, past surgical history and problem list.    Review of Systems   Constitutional: Negative.    Respiratory: Negative.    Cardiovascular: Negative.    Gastrointestinal: Negative.    Musculoskeletal: Negative.    Skin: Negative.    Neurological: Negative.    Psychiatric/Behavioral: Negative.        Objective   Physical Exam   Constitutional: She is oriented to person, place, and time. She appears well-nourished.   Neck: Neck supple.   Cardiovascular: Normal rate, regular rhythm and normal heart sounds.    Pulmonary/Chest: Effort normal and breath sounds normal.   Abdominal: Bowel sounds are normal.   Neurological: She is alert and oriented to person, place, and time.   Skin: Skin is warm.   Psychiatric: She has a normal mood and affect.       All tests have been reviewed.    Assessment/Plan   There are no diagnoses linked to this encounter.             Anxiety depression continue Wellbutrin  vaginal angioma reassurance given and continue to watch  Mammogram pending patient to schedule--  HA tension type. flexeril avoid computer games.--  CT showed right thyroid goiter only, seen by ENT   dysphagia resolved after dilation, GERD, FOLLOW GI CONTINUE MEDS  knee OA s/p Steroid injection, gel injection and follow up with orhto pending replacement  Lower back pain sacral pain s/p fall on the tub XR mild arthritis, PT helped  allergic to lamisil  Diabetic gastroparesis vs post cholecytectomy. DIARRHEA, helps with questran. Lomotil and LIBRAX no help.   Diabetic neuropathy continue Neurontin to 300 mg 3 times a day  DM 7.0 a1c CONTINUE MEDICINE lantus 45u   hyperlipidemia CONTINUE MEDICINE  Vitamin D , STILL continue vitD3 2000u daily COMPLIANCE  vitB12 low , vitB12 1mg daily  colonoscopy :divertic ,polyps and hemorroid  [id number] and pneumonia shot done .decline zostavax, tdap  done, prevnar done  Osteoporosis on bone density scan continue fosamax  Restless leg syndrome continue medicine  Hypertension continue med  rate dependent tacky, stress test normal  COPD continue medication  anisocoria, seen by eye doctor   Bilateral lower extremity claudication negative ultrasound for peripheral vascular disease  MCV low watch  Erythrasma continue clotrimazole REFILL  IBS resolved, seeing GI continue meds now  Daytime sleepy and fatigue , sleep study negative sleep apnea  MIRTA no pap needed  Unsteady gait, MRI showed ischemic changes,PT continue unable to take ASA ?relate to neuropathy, FILL THE FORM FORM FOR  DM shoes  3 MO AFTER LABS

## 2017-04-05 ENCOUNTER — HOSPITAL ENCOUNTER (OUTPATIENT)
Dept: CT IMAGING | Facility: HOSPITAL | Age: 67
Discharge: HOME | End: 2017-04-05
Attending: INTERNAL MEDICINE | Admitting: INTERNAL MEDICINE

## 2017-04-05 DIAGNOSIS — Z87.891 PERSONAL HISTORY OF TOBACCO USE, PRESENTING HAZARDS TO HEALTH: ICD-10-CM

## 2017-04-05 PROCEDURE — G0297 LDCT FOR LUNG CA SCREEN: HCPCS

## 2017-04-26 RX ORDER — AMLODIPINE BESYLATE 5 MG/1
TABLET ORAL
Qty: 30 TABLET | Refills: 5 | Status: SHIPPED | OUTPATIENT
Start: 2017-04-26 | End: 2017-10-23 | Stop reason: SDUPTHER

## 2017-04-26 RX ORDER — GABAPENTIN 300 MG/1
CAPSULE ORAL
Qty: 60 CAPSULE | Refills: 3 | Status: SHIPPED | OUTPATIENT
Start: 2017-04-26 | End: 2017-09-16 | Stop reason: SDUPTHER

## 2017-05-04 RX ORDER — DIPHENOXYLATE HYDROCHLORIDE AND ATROPINE SULFATE 2.5; .025 MG/1; MG/1
TABLET ORAL
Qty: 120 TABLET | Refills: 3 | OUTPATIENT
Start: 2017-05-04

## 2017-05-17 RX ORDER — TRAZODONE HYDROCHLORIDE 50 MG/1
TABLET ORAL
Qty: 45 TABLET | Refills: 2 | Status: SHIPPED | OUTPATIENT
Start: 2017-05-17 | End: 2017-07-25

## 2017-05-17 RX ORDER — SERTRALINE HYDROCHLORIDE 100 MG/1
TABLET, FILM COATED ORAL
Qty: 45 TABLET | Refills: 1 | Status: SHIPPED | OUTPATIENT
Start: 2017-05-17 | End: 2017-10-27 | Stop reason: SDUPTHER

## 2017-05-25 ENCOUNTER — OFFICE VISIT (OUTPATIENT)
Dept: PULMONOLOGY | Facility: CLINIC | Age: 67
End: 2017-05-25

## 2017-05-25 VITALS
BODY MASS INDEX: 25.57 KG/M2 | OXYGEN SATURATION: 93 % | RESPIRATION RATE: 16 BRPM | HEART RATE: 82 BPM | DIASTOLIC BLOOD PRESSURE: 80 MMHG | SYSTOLIC BLOOD PRESSURE: 122 MMHG | WEIGHT: 149.8 LBS | HEIGHT: 64 IN

## 2017-05-25 DIAGNOSIS — J44.9 CHRONIC OBSTRUCTIVE PULMONARY DISEASE, UNSPECIFIED COPD TYPE (HCC): ICD-10-CM

## 2017-05-25 DIAGNOSIS — R06.02 SHORTNESS OF BREATH: ICD-10-CM

## 2017-05-25 DIAGNOSIS — J30.89 OTHER ALLERGIC RHINITIS: ICD-10-CM

## 2017-05-25 DIAGNOSIS — J44.9 CHRONIC OBSTRUCTIVE PULMONARY DISEASE, UNSPECIFIED COPD TYPE (HCC): Primary | ICD-10-CM

## 2017-05-25 DIAGNOSIS — R91.8 ABNORMAL CT LUNG SCREENING: ICD-10-CM

## 2017-05-25 PROCEDURE — 94060 EVALUATION OF WHEEZING: CPT | Performed by: INTERNAL MEDICINE

## 2017-05-25 PROCEDURE — 94726 PLETHYSMOGRAPHY LUNG VOLUMES: CPT | Performed by: INTERNAL MEDICINE

## 2017-05-25 PROCEDURE — 99214 OFFICE O/P EST MOD 30 MIN: CPT | Performed by: NURSE PRACTITIONER

## 2017-05-25 PROCEDURE — 94729 DIFFUSING CAPACITY: CPT | Performed by: INTERNAL MEDICINE

## 2017-05-25 RX ORDER — BUDESONIDE 0.5 MG/2ML
0.5 INHALANT ORAL
Qty: 2 ML | Refills: 2 | Status: SHIPPED | OUTPATIENT
Start: 2017-05-25 | End: 2017-06-24

## 2017-05-25 RX ORDER — BUDESONIDE 0.5 MG/2ML
0.5 INHALANT ORAL
Start: 2017-05-25 | End: 2017-05-25 | Stop reason: SDUPTHER

## 2017-05-30 RX ORDER — SIMVASTATIN 40 MG
TABLET ORAL
Qty: 30 TABLET | Refills: 11 | Status: SHIPPED | OUTPATIENT
Start: 2017-05-30 | End: 2018-05-13 | Stop reason: SDUPTHER

## 2017-06-12 ENCOUNTER — OFFICE VISIT (OUTPATIENT)
Dept: INTERNAL MEDICINE | Facility: CLINIC | Age: 67
End: 2017-06-12

## 2017-06-12 VITALS
HEART RATE: 84 BPM | TEMPERATURE: 98.4 F | RESPIRATION RATE: 14 BRPM | SYSTOLIC BLOOD PRESSURE: 144 MMHG | HEIGHT: 64 IN | DIASTOLIC BLOOD PRESSURE: 80 MMHG | WEIGHT: 150 LBS | BODY MASS INDEX: 25.61 KG/M2 | OXYGEN SATURATION: 97 %

## 2017-06-12 DIAGNOSIS — F41.9 ANXIETY: ICD-10-CM

## 2017-06-12 DIAGNOSIS — E11.649 TYPE 2 DIABETES MELLITUS WITH HYPOGLYCEMIA WITHOUT COMA, WITH LONG-TERM CURRENT USE OF INSULIN (HCC): ICD-10-CM

## 2017-06-12 DIAGNOSIS — M85.80 OSTEOPENIA: ICD-10-CM

## 2017-06-12 DIAGNOSIS — E55.9 VITAMIN D DEFICIENCY DISEASE: ICD-10-CM

## 2017-06-12 DIAGNOSIS — G25.81 RESTLESS LEGS SYNDROME: ICD-10-CM

## 2017-06-12 DIAGNOSIS — I10 ESSENTIAL HYPERTENSION: ICD-10-CM

## 2017-06-12 DIAGNOSIS — M25.512 CHRONIC LEFT SHOULDER PAIN: ICD-10-CM

## 2017-06-12 DIAGNOSIS — J44.1 CHRONIC OBSTRUCTIVE PULMONARY DISEASE WITH ACUTE EXACERBATION (HCC): ICD-10-CM

## 2017-06-12 DIAGNOSIS — G89.29 CHRONIC LEFT SHOULDER PAIN: ICD-10-CM

## 2017-06-12 DIAGNOSIS — E78.5 HYPERLIPIDEMIA, UNSPECIFIED HYPERLIPIDEMIA TYPE: Primary | ICD-10-CM

## 2017-06-12 DIAGNOSIS — K21.9 GASTROESOPHAGEAL REFLUX DISEASE WITHOUT ESOPHAGITIS: ICD-10-CM

## 2017-06-12 DIAGNOSIS — Z79.4 TYPE 2 DIABETES MELLITUS WITH HYPOGLYCEMIA WITHOUT COMA, WITH LONG-TERM CURRENT USE OF INSULIN (HCC): ICD-10-CM

## 2017-06-12 DIAGNOSIS — E11.42 DIABETIC PERIPHERAL NEUROPATHY (HCC): ICD-10-CM

## 2017-06-12 PROCEDURE — 99214 OFFICE O/P EST MOD 30 MIN: CPT | Performed by: INTERNAL MEDICINE

## 2017-06-12 RX ORDER — CLOPIDOGREL BISULFATE 75 MG/1
75 TABLET ORAL DAILY
Qty: 30 TABLET | Refills: 6 | Status: SHIPPED | OUTPATIENT
Start: 2017-06-12 | End: 2017-11-29 | Stop reason: HOSPADM

## 2017-06-12 NOTE — PROGRESS NOTES
"Subjective   Breann Gallegos is a 67 y.o. female.     Chief Complaint   Patient presents with   • Diabetes     Here for follow up        History of Present Illness   Patient here for follow-up.  Anxiety depression stable medication.  Complaining left shoulder pain for 2-3 weeks achy in nature certain position worse especially abduction.OTC med no help.  Hypertension stable medication.  Diabetes is stable medication.    Current Outpatient Prescriptions:   •  albuterol (PROAIR RESPICLICK) 108 (90 BASE) MCG/ACT inhaler, Inhale 1 puff every 4 (four) hours as needed for wheezing or shortness of air., Disp: 1 inhaler, Rfl: 0  •  amLODIPine (NORVASC) 5 MG tablet, TAKE 1 TABLET BY MOUTH AT BEDTIME, Disp: 30 tablet, Rfl: 5  •  aspirin (ASPIRIN LOW DOSE) 81 MG EC tablet, Take  by mouth., Disp: , Rfl:   •  B-D INS SYR ULTRAFINE 1CC/31G 31G X 5/16\" 1 ML misc, use as directed, Disp: 100 each, Rfl: 0  •  budesonide (PULMICORT) 0.5 MG/2ML nebulizer solution, Take 2 mL by nebulization 2 (Two) Times a Day for 30 days., Disp: 2 mL, Rfl: 2  •  buPROPion XL (WELLBUTRIN XL) 150 MG 24 hr tablet, take 1 tablet by mouth once daily, Disp: 30 tablet, Rfl: 4  •  Cholecalciferol (VITAMIN D3) 38743 UNITS capsule, Take 1 capsule by mouth 1 (one) time per week., Disp: , Rfl:   •  clotrimazole-betamethasone (LOTRISONE) 1-0.05 % cream, Apply topically twice daily as direted., Disp: 45 g, Rfl: 1  •  colestipol (COLESTID) 1 G tablet, Take 2 tablets by mouth 2 (Two) Times a Day., Disp: 120 tablet, Rfl: 6  •  cyclobenzaprine (FLEXERIL) 10 MG tablet, Take 1 tablet by mouth at night as needed., Disp: , Rfl:   •  diphenoxylate-atropine (LOMOTIL) 2.5-0.025 MG per tablet, TAKE 1 TABLET BY MOUTH 3 TO 4 TIMES DAILY USE AS DIRECTED, Disp: , Rfl: 0  •  flunisolide (NASALIDE) 25 MCG/ACT (0.025%) solution nasal spray, Inhale 2 sprays Every 12 (Twelve) Hours., Disp: 1 bottle, Rfl: 5  •  gabapentin (NEURONTIN) 300 MG capsule, take 1 capsule by mouth twice a day, " "Disp: 60 capsule, Rfl: 3  •  Insulin Syringe-Needle U-100 (B-D INS SYR ULTRAFINE .5CC/30G) 30G X 1/2\" 0.5 ML misc, , Disp: , Rfl:   •  Insulin Syringe-Needle U-100 (B-D INS SYR ULTRAFINE 1CC/30G) 30G X 1/2\" 1 ML misc, , Disp: , Rfl:   •  ipratropium-albuterol (DUO-NEB) 0.5-2.5 mg/mL nebulizer, Take 3 mL by nebulization Every 4 (Four) Hours As Needed for wheezing or shortness of air., Disp: 120 vial, Rfl: 2  •  Lancets (ONETOUCH ULTRASOFT) lancets, USE AS DIRECTED TWICE A DAY, Disp: 100 each, Rfl: 5  •  LANTUS 100 UNIT/ML injection, INJECT 90 UNITS TWICE DAILY, Disp: 15 each, Rfl: 5  •  linagliptin (TRADJENTA) 5 MG tablet tablet, Take 1 tablet by mouth 1 (one) time daily., Disp: , Rfl:   •  lisinopril (PRINIVIL,ZESTRIL) 20 MG tablet, take 1 tablet by mouth once daily, Disp: 30 tablet, Rfl: 5  •  metFORMIN (GLUCOPHAGE) 500 MG tablet, TAKE 2 TABLETS IN THE MORNING, 1 TABLET AT LUNCH, AND 2 TABLETS EVERY EVENING, Disp: 150 tablet, Rfl: 5  •  moxifloxacin (VIGAMOX) 0.5 % ophthalmic solution, Apply  to eye., Disp: , Rfl:   •  ondansetron (ZOFRAN) 4 MG tablet, Take 1 tablet by mouth Every 8 (Eight) Hours As Needed for Nausea or Vomiting., Disp: 30 tablet, Rfl: 1  •  ONE TOUCH ULTRA TEST test strip, TEST once daily to twice a day, Disp: 100 each, Rfl: 5  •  pantoprazole (PROTONIX) 40 MG EC tablet, take 1 tablet by mouth once daily, Disp: 30 tablet, Rfl: 1  •  sertraline (ZOLOFT) 100 MG tablet, TAKE 1 1/2 TABLETS BY MOUTH ONCE DAILY, Disp: 45 tablet, Rfl: 1  •  simvastatin (ZOCOR) 40 MG tablet, take 1 tablet by mouth once daily, Disp: 30 tablet, Rfl: 11  •  traZODone (DESYREL) 50 MG tablet, TAKE 1 1/2 TABLETS BY MOUTH AT BEDTIME, Disp: 45 tablet, Rfl: 2    Current Facility-Administered Medications:   •  clotrimazole-betamethasone (LOTRISONE) 1-0.05 % lotion, , Topical, Q12H, Harry Avery MD    The following portions of the patient's history were reviewed and updated as appropriate: allergies, current medications, past " family history, past medical history, past social history, past surgical history and problem list.    Review of Systems   Constitutional: Negative.    Respiratory: Negative.    Cardiovascular: Negative.    Gastrointestinal: Negative.    Musculoskeletal: Negative.    Skin: Negative.    Neurological: Negative.    Psychiatric/Behavioral: Negative.        Objective   Physical Exam   Constitutional: She is oriented to person, place, and time. She appears well-nourished.   Neck: Neck supple.   Cardiovascular: Normal rate, regular rhythm and normal heart sounds.    Pulmonary/Chest: Effort normal and breath sounds normal.   Abdominal: Bowel sounds are normal.   Neurological: She is alert and oriented to person, place, and time.   Skin: Skin is warm.   Psychiatric: She has a normal mood and affect.       All tests have been reviewed.    Assessment/Plan   There are no diagnoses linked to this encounter.          Anxiety depression continue Wellbutrin  vaginal angioma reassurance given and continue to watch  Mammogram pending patient to schedule--  HA tension type. flexeril avoid computer games.--  CT showed right thyroid goiter only, seen by ENT   dysphagia resolved after dilation, GERD, FOLLOW GI CONTINUE MEDS  knee OA s/p Steroid injection, gel injection and follow up with orhto pending replacement  Lower back pain sacral pain s/p fall on the tub XR mild arthritis, PT helped  allergic to lamisil  Diabetic gastroparesis vs post cholecytectomy. DIARRHEA, helps with questran. Lomotil and LIBRAX no help.   Diabetic neuropathy continue Neurontin to 300 mg 3 times a day  DM 7.0 a1c CONTINUE MEDICINE lantus 45u   hyperlipidemia CONTINUE MEDICINE  Vitamin D , STILL continue vitD3 2000u daily COMPLIANCE  vitB12 low , vitB12 1mg daily  colonoscopy :divertic ,polyps and hemorroid  [id number] and pneumonia shot done .decline zostavax, tdap done, prevnar done  Osteoporosis on bone density scan continue fosamax  Restless leg syndrome  continue medicine  Hypertension continue med  rate dependent tacky, stress test normal  COPD continue medication  anisocoria, seen by eye doctor   Bilateral lower extremity claudication negative ultrasound for peripheral vascular disease  MCV low watch  Erythrasma continue clotrimazole REFILL for prn  IBS resolved, seeing GI continue meds now  Daytime sleepy and fatigue , sleep study negative sleep apnea  MIRTA no pap needed  Unsteady gait, MRI showed ischemic changes,PT continue unable to take ASA ?relate to neuropathy, trial of plavix  1 MO AFTER LABS wellnexx

## 2017-06-19 ENCOUNTER — RESULTS ENCOUNTER (OUTPATIENT)
Dept: INTERNAL MEDICINE | Facility: CLINIC | Age: 67
End: 2017-06-19

## 2017-06-19 DIAGNOSIS — E78.5 HYPERLIPIDEMIA, UNSPECIFIED HYPERLIPIDEMIA TYPE: ICD-10-CM

## 2017-06-19 DIAGNOSIS — I10 ESSENTIAL HYPERTENSION: ICD-10-CM

## 2017-06-19 DIAGNOSIS — E11.649 TYPE 2 DIABETES MELLITUS WITH HYPOGLYCEMIA WITHOUT COMA, WITH LONG-TERM CURRENT USE OF INSULIN (HCC): ICD-10-CM

## 2017-06-19 DIAGNOSIS — Z79.4 TYPE 2 DIABETES MELLITUS WITH HYPOGLYCEMIA WITHOUT COMA, WITH LONG-TERM CURRENT USE OF INSULIN (HCC): ICD-10-CM

## 2017-06-19 DIAGNOSIS — E55.9 VITAMIN D DEFICIENCY DISEASE: ICD-10-CM

## 2017-07-10 RX ORDER — LISINOPRIL 20 MG/1
TABLET ORAL
Qty: 30 TABLET | Refills: 5 | Status: SHIPPED | OUTPATIENT
Start: 2017-07-10 | End: 2017-07-18 | Stop reason: SDUPTHER

## 2017-07-13 ENCOUNTER — TELEPHONE (OUTPATIENT)
Dept: PULMONOLOGY | Facility: CLINIC | Age: 67
End: 2017-07-13

## 2017-07-13 NOTE — TELEPHONE ENCOUNTER
I called patient to remind her to call central scheduling to reschedule her ct scan before her follow up visit with Dr Tse.

## 2017-07-18 RX ORDER — BUPROPION HYDROCHLORIDE 150 MG/1
TABLET ORAL
Qty: 30 TABLET | Refills: 4 | Status: SHIPPED | OUTPATIENT
Start: 2017-07-18 | End: 2017-12-19 | Stop reason: SDUPTHER

## 2017-07-18 RX ORDER — LISINOPRIL 20 MG/1
TABLET ORAL
Qty: 30 TABLET | Refills: 5 | Status: SHIPPED | OUTPATIENT
Start: 2017-07-18 | End: 2018-01-14 | Stop reason: SDUPTHER

## 2017-07-21 ENCOUNTER — TELEPHONE (OUTPATIENT)
Dept: PULMONOLOGY | Facility: CLINIC | Age: 67
End: 2017-07-21

## 2017-07-21 DIAGNOSIS — R19.7 DIARRHEA, UNSPECIFIED TYPE: ICD-10-CM

## 2017-07-24 ENCOUNTER — HOSPITAL ENCOUNTER (OUTPATIENT)
Dept: CT IMAGING | Facility: HOSPITAL | Age: 67
Discharge: HOME | End: 2017-07-24
Admitting: NURSE PRACTITIONER

## 2017-07-24 ENCOUNTER — OFFICE VISIT (OUTPATIENT)
Dept: PULMONOLOGY | Facility: CLINIC | Age: 67
End: 2017-07-24

## 2017-07-24 VITALS
RESPIRATION RATE: 16 BRPM | WEIGHT: 151 LBS | HEIGHT: 64 IN | BODY MASS INDEX: 25.78 KG/M2 | SYSTOLIC BLOOD PRESSURE: 120 MMHG | OXYGEN SATURATION: 90 % | HEART RATE: 97 BPM | DIASTOLIC BLOOD PRESSURE: 72 MMHG

## 2017-07-24 DIAGNOSIS — J42 CHRONIC BRONCHITIS, UNSPECIFIED CHRONIC BRONCHITIS TYPE (HCC): ICD-10-CM

## 2017-07-24 DIAGNOSIS — R59.0 MEDIASTINAL LYMPHADENOPATHY: ICD-10-CM

## 2017-07-24 DIAGNOSIS — J44.9 CHRONIC OBSTRUCTIVE PULMONARY DISEASE, UNSPECIFIED COPD TYPE (HCC): ICD-10-CM

## 2017-07-24 DIAGNOSIS — R06.02 SHORTNESS OF BREATH: Primary | ICD-10-CM

## 2017-07-24 DIAGNOSIS — R91.8 ABNORMAL CT LUNG SCREENING: ICD-10-CM

## 2017-07-24 DIAGNOSIS — C38.3: ICD-10-CM

## 2017-07-24 LAB
CREAT BLD-MCNC: 0.8 MG/DL (ref 0.6–1.3)
GFR SERPL CREATININE-BSD FRML MDRD: 72 ML/MIN/1.73

## 2017-07-24 PROCEDURE — 0 IOPAMIDOL 61 % SOLUTION: Performed by: RADIOLOGY

## 2017-07-24 PROCEDURE — 82565 ASSAY OF CREATININE: CPT | Performed by: RADIOLOGY

## 2017-07-24 PROCEDURE — 71260 CT THORAX DX C+: CPT

## 2017-07-24 PROCEDURE — 99214 OFFICE O/P EST MOD 30 MIN: CPT | Performed by: INTERNAL MEDICINE

## 2017-07-24 RX ORDER — MONTELUKAST SODIUM 4 MG/1
TABLET, CHEWABLE ORAL
Qty: 120 TABLET | Refills: 3 | Status: SHIPPED | OUTPATIENT
Start: 2017-07-24 | End: 2017-11-21 | Stop reason: SDUPTHER

## 2017-07-24 RX ADMIN — IOPAMIDOL 100 ML: 612 INJECTION, SOLUTION INTRAVENOUS at 13:45

## 2017-07-24 NOTE — PROGRESS NOTES
"Chief Complaint   Patient presents with   • Follow-up   • Shortness of Breath         Subjective   Breann Gallegos is a 67 y.o. female.     History of Present Illness   Patient comes in today to follow-up on shortness of breath, COPD and abnormal CT of the chest.  Upon questioning the patient says that she has lost about 15 pounds and although she feels that most of it is intentional, she also says that her appetite is not as good as it used to be.    The patient used to smoke having quit 7 years ago.    She is using Pulmicort and Brovana as prescribed and seems to have done fairly well with definite improvement in her cough, phlegm production and shortness of breath.    The following portions of the patient's history were reviewed and updated as appropriate: allergies, current medications, past family history, past medical history, past social history and past surgical history.    Review of Systems   Constitutional: Positive for fatigue. Negative for chills and fever.   HENT: Positive for postnasal drip, sinus pressure, sneezing and trouble swallowing. Negative for congestion, sore throat and voice change.    Respiratory: Negative for cough, chest tightness, shortness of breath and wheezing.        Objective   Visit Vitals   • /72   • Pulse 97   • Resp 16   • Ht 64\" (162.6 cm)   • Wt 151 lb (68.5 kg)   • LMP  (Approximate)   • SpO2 90%   • BMI 25.92 kg/m2       Physical Exam   Constitutional: She is oriented to person, place, and time. She appears well-developed and well-nourished.   HENT:   Head: Atraumatic.   Eyes: EOM are normal.   Neck: Neck supple.   Cardiovascular: Normal rate and regular rhythm.    Pulmonary/Chest: Effort normal. No respiratory distress.   Minimal hyperresonant to percussion  Somewhat decreased A/E with out wheezing noted.   Musculoskeletal: She exhibits no edema.   Neurological: She is alert and oriented to person, place, and time.   Skin: Skin is warm and dry.   Psychiatric: She " has a normal mood and affect.   Vitals reviewed.      Assessment/Plan   Breann was seen today for follow-up and shortness of breath.    Diagnoses and all orders for this visit:    Shortness of breath    Chronic obstructive pulmonary disease, unspecified COPD type    Mediastinal lymphadenopathy  -     NM Pet Tumor Skull To Mid Thigh; Future    Chronic bronchitis, unspecified chronic bronchitis type    Malignant neoplasm of mediastinum   -     NM Pet Tumor Skull To Mid Thigh; Future         Return in about 3 weeks (around 8/14/2017) for [employer].    DISCUSSION (if any):  Her last carbon dioxide in the serum was 32.    I reviewed the CT images with the patient personally and compared them to the last CT scan, which was actually without contrast.    Have spent 25-30 minutes detailing the findings on the CT scan including the mediastinal lymphadenopathy.  I told the patient that the lymph nodes are not accessible via regular bronchoscopy and only option would be either to do a PET scan, especially given the high suspicion of malignancy or the possibility of endobronchial ultrasound-guided needle biopsy.    Given her history of smoking and the fact that she has lost 15 pounds, albeit somewhat intentionally, she will definitely need this pursued further.  After much discussion, the patient has decided to go with PET scan.    I told the patient that if the PET scan is positive, then further workup will definitely be necessary.    I've advised her to continue her current medications.       Errors in dictation may reflect use of voice recognition software and not all errors in transcription may have been detected prior to signing    This document was electronically signed by Yamilex Tse MD July 24, 2017  3:37 PM

## 2017-07-25 ENCOUNTER — OFFICE VISIT (OUTPATIENT)
Dept: INTERNAL MEDICINE | Facility: CLINIC | Age: 67
End: 2017-07-25

## 2017-07-25 VITALS
HEART RATE: 90 BPM | SYSTOLIC BLOOD PRESSURE: 128 MMHG | RESPIRATION RATE: 14 BRPM | BODY MASS INDEX: 25.78 KG/M2 | WEIGHT: 151 LBS | OXYGEN SATURATION: 96 % | DIASTOLIC BLOOD PRESSURE: 80 MMHG | HEIGHT: 64 IN | TEMPERATURE: 97.8 F

## 2017-07-25 DIAGNOSIS — G44.209 TENSION HEADACHE: ICD-10-CM

## 2017-07-25 DIAGNOSIS — Z79.4 TYPE 2 DIABETES MELLITUS WITH HYPOGLYCEMIA WITHOUT COMA, WITH LONG-TERM CURRENT USE OF INSULIN (HCC): ICD-10-CM

## 2017-07-25 DIAGNOSIS — K21.9 GASTROESOPHAGEAL REFLUX DISEASE WITHOUT ESOPHAGITIS: ICD-10-CM

## 2017-07-25 DIAGNOSIS — E55.9 VITAMIN D DEFICIENCY: ICD-10-CM

## 2017-07-25 DIAGNOSIS — J44.1 CHRONIC OBSTRUCTIVE PULMONARY DISEASE WITH ACUTE EXACERBATION (HCC): ICD-10-CM

## 2017-07-25 DIAGNOSIS — G25.81 RESTLESS LEGS SYNDROME: ICD-10-CM

## 2017-07-25 DIAGNOSIS — E11.42 DIABETIC PERIPHERAL NEUROPATHY (HCC): ICD-10-CM

## 2017-07-25 DIAGNOSIS — I10 ESSENTIAL HYPERTENSION: ICD-10-CM

## 2017-07-25 DIAGNOSIS — M85.80 OSTEOPENIA: ICD-10-CM

## 2017-07-25 DIAGNOSIS — Z23 NEED FOR PNEUMOCOCCAL VACCINE: ICD-10-CM

## 2017-07-25 DIAGNOSIS — E11.649 TYPE 2 DIABETES MELLITUS WITH HYPOGLYCEMIA WITHOUT COMA, WITH LONG-TERM CURRENT USE OF INSULIN (HCC): ICD-10-CM

## 2017-07-25 DIAGNOSIS — E78.5 HYPERLIPIDEMIA, UNSPECIFIED HYPERLIPIDEMIA TYPE: Primary | ICD-10-CM

## 2017-07-25 DIAGNOSIS — R26.81 UNSTEADY GAIT: ICD-10-CM

## 2017-07-25 DIAGNOSIS — M17.9 OSTEOARTHRITIS OF KNEE, UNSPECIFIED LATERALITY, UNSPECIFIED OSTEOARTHRITIS TYPE: ICD-10-CM

## 2017-07-25 LAB
25(OH)D3+25(OH)D2 SERPL-MCNC: 22.4 NG/ML
ALBUMIN SERPL-MCNC: 4 G/DL (ref 3.5–5)
ALBUMIN/GLOB SERPL: 1.4 G/DL (ref 1–2)
ALP SERPL-CCNC: 58 U/L (ref 38–126)
ALT SERPL-CCNC: 34 U/L (ref 13–69)
APPEARANCE UR: CLEAR
AST SERPL-CCNC: 29 U/L (ref 15–46)
BACTERIA #/AREA URNS HPF: ABNORMAL /HPF
BASOPHILS # BLD AUTO: 0.06 10*3/MM3 (ref 0–0.2)
BASOPHILS NFR BLD AUTO: 0.9 % (ref 0–2.5)
BILIRUB SERPL-MCNC: 0.7 MG/DL (ref 0.2–1.3)
BILIRUB UR QL STRIP: NEGATIVE
BUN SERPL-MCNC: 9 MG/DL (ref 7–20)
BUN/CREAT SERPL: 11.3 (ref 7.1–23.5)
CALCIUM SERPL-MCNC: 9.3 MG/DL (ref 8.4–10.2)
CHLORIDE SERPL-SCNC: 101 MMOL/L (ref 98–107)
CHOLEST SERPL-MCNC: 93 MG/DL (ref 0–199)
CK SERPL-CCNC: 59 U/L (ref 30–170)
CO2 SERPL-SCNC: 32 MMOL/L (ref 26–30)
COLOR UR: YELLOW
CREAT SERPL-MCNC: 0.8 MG/DL (ref 0.6–1.3)
EOSINOPHIL # BLD AUTO: 0.2 10*3/MM3 (ref 0–0.7)
EOSINOPHIL NFR BLD AUTO: 2.9 % (ref 0–7)
EPI CELLS #/AREA URNS HPF: ABNORMAL /HPF
ERYTHROCYTE [DISTWIDTH] IN BLOOD BY AUTOMATED COUNT: 15.5 % (ref 11.5–14.5)
GLOBULIN SER CALC-MCNC: 2.8 GM/DL
GLUCOSE SERPL-MCNC: 107 MG/DL (ref 74–98)
GLUCOSE UR QL: NEGATIVE
HBA1C MFR BLD: 6.5 %
HCT VFR BLD AUTO: 44.3 % (ref 37–47)
HDLC SERPL-MCNC: 51 MG/DL (ref 40–60)
HGB BLD-MCNC: 14.1 G/DL (ref 12–16)
HGB UR QL STRIP: NEGATIVE
IMM GRANULOCYTES # BLD: 0.02 10*3/MM3 (ref 0–0.06)
IMM GRANULOCYTES NFR BLD: 0.3 % (ref 0–0.6)
KETONES UR QL STRIP: NEGATIVE
LDLC SERPL CALC-MCNC: 19 MG/DL (ref 0–99)
LEUKOCYTE ESTERASE UR QL STRIP: ABNORMAL
LYMPHOCYTES # BLD AUTO: 2.8 10*3/MM3 (ref 0.6–3.4)
LYMPHOCYTES NFR BLD AUTO: 40.6 % (ref 10–50)
MCH RBC QN AUTO: 26.8 PG (ref 27–31)
MCHC RBC AUTO-ENTMCNC: 31.8 G/DL (ref 30–37)
MCV RBC AUTO: 84.2 FL (ref 81–99)
MICROALBUMIN UR-MCNC: <3 UG/ML
MONOCYTES # BLD AUTO: 0.59 10*3/MM3 (ref 0–0.9)
MONOCYTES NFR BLD AUTO: 8.6 % (ref 0–12)
NEUTROPHILS # BLD AUTO: 3.23 10*3/MM3 (ref 2–6.9)
NEUTROPHILS NFR BLD AUTO: 46.7 % (ref 37–80)
NITRITE UR QL STRIP: NEGATIVE
NRBC BLD AUTO-RTO: 0 /100 WBC (ref 0–0)
PH UR STRIP: 6.5 [PH] (ref 5–8)
PLATELET # BLD AUTO: 225 10*3/MM3 (ref 130–400)
POTASSIUM SERPL-SCNC: 4.1 MMOL/L (ref 3.5–5.1)
PROT SERPL-MCNC: 6.8 G/DL (ref 6.3–8.2)
PROT UR QL STRIP: NEGATIVE
RBC # BLD AUTO: 5.26 10*6/MM3 (ref 4.2–5.4)
RBC #/AREA URNS HPF: ABNORMAL /HPF
SODIUM SERPL-SCNC: 143 MMOL/L (ref 137–145)
SP GR UR: 1.01 (ref 1–1.03)
TRIGL SERPL-MCNC: 117 MG/DL
TSH SERPL DL<=0.005 MIU/L-ACNC: 1.39 MIU/ML (ref 0.47–4.68)
UROBILINOGEN UR STRIP-MCNC: ABNORMAL MG/DL
VLDLC SERPL CALC-MCNC: 23.4 MG/DL
WBC # BLD AUTO: 6.9 10*3/MM3 (ref 4.8–10.8)
WBC #/AREA URNS HPF: ABNORMAL /HPF

## 2017-07-25 PROCEDURE — G0439 PPPS, SUBSEQ VISIT: HCPCS | Performed by: INTERNAL MEDICINE

## 2017-07-25 PROCEDURE — 99213 OFFICE O/P EST LOW 20 MIN: CPT | Performed by: INTERNAL MEDICINE

## 2017-07-25 NOTE — PROGRESS NOTES
QUICK REFERENCE INFORMATION:  The ABCs of the Annual Wellness Visit    Subsequent Medicare Wellness Visit    HEALTH RISK ASSESSMENT    1950    Recent Hospitalizations:  No hospitalization(s) within the last year..        Current Medical Providers:  Patient Care Team:  Harry Avery MD as PCP - General  Harry Avery MD as PCP - Family Medicine  Harry Avery MD as PCP - Claims Attributed        Smoking Status:  History   Smoking Status   • Former Smoker   • Packs/day: 1.00   • Years: 30.00   • Quit date: 01/2010   Smokeless Tobacco   • Never Used       Alcohol Consumption:  History   Alcohol Use No       Depression Screen:   No flowsheet data found.    Health Habits and Functional and Cognitive Screening:  No flowsheet data found.           Does the patient have evidence of cognitive impairment? No    Aspirin use counseling: Does not need ASA (and currently is not on it)      Recent Lab Results:  CMP:  Lab Results   Component Value Date     (H) 07/24/2017    BUN 9 07/24/2017    CREATININE 0.80 07/24/2017    EGFRIFNONA 72 07/24/2017    EGFRIFAFRI 87 07/24/2017    BCR 11.3 07/24/2017     07/24/2017    K 4.1 07/24/2017    CO2 32.0 (H) 07/24/2017    CALCIUM 9.3 07/24/2017    PROTENTOTREF 6.8 07/24/2017    ALBUMIN 4.00 07/24/2017    LABGLOBREF 2.8 07/24/2017    LABIL2 1.4 07/24/2017    BILITOT 0.7 07/24/2017    ALKPHOS 58 07/24/2017    AST 29 07/24/2017    ALT 34 07/24/2017     Lipid Panel:  Lab Results   Component Value Date    CHOL 106 02/14/2017    TRIG 117 07/24/2017    HDL 51 07/24/2017    VLDL 23.4 07/24/2017    LDLDIRECT 49 02/14/2017     HbA1c:  Lab Results   Component Value Date    HGBA1C 6.50 07/24/2017       Visual Acuity:  No exam data present    Age-appropriate Screening Schedule:  Refer to the list below for future screening recommendations based on patient's age, sex and/or medical conditions. Orders for these recommended tests are listed in the plan section. The patient has been provided  with a written plan.    Health Maintenance   Topic Date Due   • DIABETIC FOOT EXAM  07/21/2016   • DIABETIC EYE EXAM  07/21/2016   • PNEUMOCOCCAL VACCINES (65+ LOW/MEDIUM RISK) (2 of 2 - PPSV23) 11/24/2016   • MAMMOGRAM  03/12/2017   • INFLUENZA VACCINE  08/01/2017   • HEMOGLOBIN A1C  01/24/2018   • DXA SCAN  05/04/2018   • LIPID PANEL  07/24/2018   • URINE MICROALBUMIN  07/24/2018   • TDAP/TD VACCINES (2 - Td) 12/17/2023   • COLONOSCOPY  02/04/2024   • ZOSTER VACCINE  Completed        Subjective   History of Present Illness    Breann Gallegos is a 67 y.o. female who presents for an Subsequent Wellness Visit.Patient here for annual checkup also has medical problems to be followed up.  Complaining dizziness unsteady gait especially when raising up from bending over position.  MRI in the past that showed no mass only ischemic changes.  Patient is taking Plavix now.  Anxiety depression stable medication.  Diabetes is stable medication.  Patient does have diabetic neuropathy on medication stable.  Patient still has knee joint pain history of a steroid shot helped.  Pain is achy and on certain position worse.  Hypertension stable medication.  COPD stable.  Daytime sleepiness no more insomnia improved on over-the-counter medicine only.  Trazodone was discontinued.  Vitamin D low on supplement.Osteopenia Fosamax discontinued by gastroenterologist due to gastritis.Lung mass pending PET scan of the lung.    The following portions of the patient's history were reviewed and updated as appropriate: allergies, current medications, past family history, past medical history, past social history, past surgical history and problem list.    Outpatient Medications Prior to Visit   Medication Sig Dispense Refill   • albuterol (PROAIR RESPICLICK) 108 (90 BASE) MCG/ACT inhaler Inhale 1 puff every 4 (four) hours as needed for wheezing or shortness of air. 1 inhaler 0   • amLODIPine (NORVASC) 5 MG tablet TAKE 1 TABLET BY MOUTH AT BEDTIME  "30 tablet 5   • aspirin (ASPIRIN LOW DOSE) 81 MG EC tablet Take  by mouth.     • B-D INS SYR ULTRAFINE 1CC/31G 31G X 5/16\" 1 ML misc use as directed 100 each 0   • buPROPion XL (WELLBUTRIN XL) 150 MG 24 hr tablet take 1 tablet by mouth once daily 30 tablet 4   • Cholecalciferol (VITAMIN D3) 62510 UNITS capsule Take 1 capsule by mouth 1 (one) time per week.     • clopidogrel (PLAVIX) 75 MG tablet Take 1 tablet by mouth Daily. 30 tablet 6   • clotrimazole-betamethasone (LOTRISONE) 1-0.05 % cream Apply topically twice daily as direted. 45 g 1   • colestipol (COLESTID) 1 G tablet take 2 tablets by mouth twice a day 120 tablet 3   • cyclobenzaprine (FLEXERIL) 10 MG tablet Take 1 tablet by mouth at night as needed.     • diphenoxylate-atropine (LOMOTIL) 2.5-0.025 MG per tablet TAKE 1 TABLET BY MOUTH 3 TO 4 TIMES DAILY USE AS DIRECTED  0   • flunisolide (NASALIDE) 25 MCG/ACT (0.025%) solution nasal spray Inhale 2 sprays Every 12 (Twelve) Hours. 1 bottle 5   • gabapentin (NEURONTIN) 300 MG capsule take 1 capsule by mouth twice a day 60 capsule 3   • Insulin Syringe-Needle U-100 (B-D INS SYR ULTRAFINE .5CC/30G) 30G X 1/2\" 0.5 ML misc      • Insulin Syringe-Needle U-100 (B-D INS SYR ULTRAFINE 1CC/30G) 30G X 1/2\" 1 ML misc      • ipratropium-albuterol (DUO-NEB) 0.5-2.5 mg/mL nebulizer Take 3 mL by nebulization Every 4 (Four) Hours As Needed for wheezing or shortness of air. 120 vial 2   • Lancets (ONETOUCH ULTRASOFT) lancets USE AS DIRECTED TWICE A  each 5   • LANTUS 100 UNIT/ML injection INJECT 90 UNITS TWICE DAILY 15 each 5   • lisinopril (PRINIVIL,ZESTRIL) 20 MG tablet take 1 tablet by mouth once daily 30 tablet 5   • metFORMIN (GLUCOPHAGE) 500 MG tablet TAKE 2 TABLETS IN THE MORNING, 1 TABLET AT LUNCH, AND 2 TABLETS EVERY EVENING 150 tablet 5   • ondansetron (ZOFRAN) 4 MG tablet Take 1 tablet by mouth Every 8 (Eight) Hours As Needed for Nausea or Vomiting. 30 tablet 1   • ONE TOUCH ULTRA TEST test strip TEST once " daily to twice a day 100 each 5   • pantoprazole (PROTONIX) 40 MG EC tablet take 1 tablet by mouth once daily 30 tablet 1   • sertraline (ZOLOFT) 100 MG tablet TAKE 1 1/2 TABLETS BY MOUTH ONCE DAILY 45 tablet 1   • simvastatin (ZOCOR) 40 MG tablet take 1 tablet by mouth once daily 30 tablet 11   • linagliptin (TRADJENTA) 5 MG tablet tablet Take 1 tablet by mouth 1 (one) time daily.     • moxifloxacin (VIGAMOX) 0.5 % ophthalmic solution Apply  to eye.     • traZODone (DESYREL) 50 MG tablet TAKE 1 1/2 TABLETS BY MOUTH AT BEDTIME 45 tablet 2     Facility-Administered Medications Prior to Visit   Medication Dose Route Frequency Provider Last Rate Last Dose   • clotrimazole-betamethasone (LOTRISONE) 1-0.05 % lotion   Topical Q12H Harry Avery MD           Patient Active Problem List   Diagnosis   • Dysphagia   • Abnormal urinalysis   • Anxiety   • Chronic obstructive pulmonary disease   • Depression   • Diabetes mellitus   • Diabetic peripheral neuropathy   • Diarrhea   • Esophageal dysmotility   • Foot pain   • Gastroesophageal reflux disease without esophagitis   • Hyperlipidemia   • Hypertension   • Insomnia   • Irritable bowel syndrome   • Localized swelling, mass and lump, lower limb   • Low back pain   • Abnormal mammogram   • Enlargement of neck   • Neck pain   • Nontoxic uninodular goiter   • Osteoarthritis of knee   • Osteoarthritis   • Osteopenia   • Peripheral arterial occlusive disease   • Nerve root disorder   • Restless legs syndrome   • Tension headache   • Vitamin D deficiency   • Erythrasma   • Unsteady gait   • Headache   • Chronic left shoulder pain       Advance Care Planning:  has NO advance directive - not interested in additional information    Identification of Risk Factors:  Risk factors include: weight  and unhealthy diet.    Review of Systems   Constitutional: Negative.    HENT: Negative.    Eyes: Negative.    Respiratory: Negative.    Cardiovascular: Negative.    Gastrointestinal: Negative.   "  Endocrine: Negative.    Genitourinary: Negative.    Musculoskeletal: Positive for arthralgias.   Skin: Negative.    Allergic/Immunologic: Negative.    Neurological: Negative.    Hematological: Negative.    Psychiatric/Behavioral: Negative.        Compared to one year ago, the patient feels her physical health is the same.  Compared to one year ago, the patient feels her mental health is the same.    Objective     Physical Exam   Constitutional: She is oriented to person, place, and time. She appears well-developed and well-nourished.   HENT:   Head: Normocephalic and atraumatic.   Right Ear: External ear normal.   Left Ear: External ear normal.   Nose: Nose normal.   Mouth/Throat: Oropharynx is clear and moist.   Eyes: Conjunctivae and EOM are normal. Pupils are equal, round, and reactive to light.   Neck: Normal range of motion. Neck supple.   Cardiovascular: Normal rate, regular rhythm, normal heart sounds and intact distal pulses.    Pulmonary/Chest: Effort normal and breath sounds normal.   Abdominal: Soft. Bowel sounds are normal.   Musculoskeletal: Normal range of motion. She exhibits tenderness.   Neurological: She is alert and oriented to person, place, and time. She has normal reflexes.   Skin: Skin is warm and dry.   Psychiatric: She has a normal mood and affect. Her behavior is normal. Judgment and thought content normal.       Vitals:    07/25/17 1344   BP: 128/80   Pulse: 90   Resp: 14   Temp: 97.8 °F (36.6 °C)   SpO2: 96%   Weight: 151 lb (68.5 kg)   Height: 64\" (162.6 cm)       Body mass index is 25.92 kg/(m^2).  Discussed the patient's BMI with her. The BMI is in the acceptable range.    Assessment/Plan   Patient Self-Management and Personalized Health Advice  The patient has been provided with information about: diet, exercise and weight management and preventive services including:   · Advance directive.    Visit Diagnoses:  No diagnosis found.    No orders of the defined types were placed in " "this encounter.      Outpatient Encounter Prescriptions as of 7/25/2017   Medication Sig Dispense Refill   • albuterol (PROAIR RESPICLICK) 108 (90 BASE) MCG/ACT inhaler Inhale 1 puff every 4 (four) hours as needed for wheezing or shortness of air. 1 inhaler 0   • amLODIPine (NORVASC) 5 MG tablet TAKE 1 TABLET BY MOUTH AT BEDTIME 30 tablet 5   • aspirin (ASPIRIN LOW DOSE) 81 MG EC tablet Take  by mouth.     • B-D INS SYR ULTRAFINE 1CC/31G 31G X 5/16\" 1 ML misc use as directed 100 each 0   • buPROPion XL (WELLBUTRIN XL) 150 MG 24 hr tablet take 1 tablet by mouth once daily 30 tablet 4   • Cholecalciferol (VITAMIN D3) 46027 UNITS capsule Take 1 capsule by mouth 1 (one) time per week.     • clopidogrel (PLAVIX) 75 MG tablet Take 1 tablet by mouth Daily. 30 tablet 6   • clotrimazole-betamethasone (LOTRISONE) 1-0.05 % cream Apply topically twice daily as direted. 45 g 1   • colestipol (COLESTID) 1 G tablet take 2 tablets by mouth twice a day 120 tablet 3   • cyclobenzaprine (FLEXERIL) 10 MG tablet Take 1 tablet by mouth at night as needed.     • diphenoxylate-atropine (LOMOTIL) 2.5-0.025 MG per tablet TAKE 1 TABLET BY MOUTH 3 TO 4 TIMES DAILY USE AS DIRECTED  0   • flunisolide (NASALIDE) 25 MCG/ACT (0.025%) solution nasal spray Inhale 2 sprays Every 12 (Twelve) Hours. 1 bottle 5   • gabapentin (NEURONTIN) 300 MG capsule take 1 capsule by mouth twice a day 60 capsule 3   • Insulin Syringe-Needle U-100 (B-D INS SYR ULTRAFINE .5CC/30G) 30G X 1/2\" 0.5 ML misc      • Insulin Syringe-Needle U-100 (B-D INS SYR ULTRAFINE 1CC/30G) 30G X 1/2\" 1 ML misc      • ipratropium-albuterol (DUO-NEB) 0.5-2.5 mg/mL nebulizer Take 3 mL by nebulization Every 4 (Four) Hours As Needed for wheezing or shortness of air. 120 vial 2   • Lancets (ONETOUCH ULTRASOFT) lancets USE AS DIRECTED TWICE A  each 5   • LANTUS 100 UNIT/ML injection INJECT 90 UNITS TWICE DAILY 15 each 5   • lisinopril (PRINIVIL,ZESTRIL) 20 MG tablet take 1 tablet by mouth " once daily 30 tablet 5   • metFORMIN (GLUCOPHAGE) 500 MG tablet TAKE 2 TABLETS IN THE MORNING, 1 TABLET AT LUNCH, AND 2 TABLETS EVERY EVENING 150 tablet 5   • ondansetron (ZOFRAN) 4 MG tablet Take 1 tablet by mouth Every 8 (Eight) Hours As Needed for Nausea or Vomiting. 30 tablet 1   • ONE TOUCH ULTRA TEST test strip TEST once daily to twice a day 100 each 5   • pantoprazole (PROTONIX) 40 MG EC tablet take 1 tablet by mouth once daily 30 tablet 1   • sertraline (ZOLOFT) 100 MG tablet TAKE 1 1/2 TABLETS BY MOUTH ONCE DAILY 45 tablet 1   • simvastatin (ZOCOR) 40 MG tablet take 1 tablet by mouth once daily 30 tablet 11   • [DISCONTINUED] linagliptin (TRADJENTA) 5 MG tablet tablet Take 1 tablet by mouth 1 (one) time daily.     • [DISCONTINUED] moxifloxacin (VIGAMOX) 0.5 % ophthalmic solution Apply  to eye.     • [DISCONTINUED] traZODone (DESYREL) 50 MG tablet TAKE 1 1/2 TABLETS BY MOUTH AT BEDTIME 45 tablet 2     Facility-Administered Encounter Medications as of 7/25/2017   Medication Dose Route Frequency Provider Last Rate Last Dose   • [COMPLETED] iopamidol (ISOVUE-300) 61 % injection 100 mL  100 mL Intravenous Once in imaging Joseph Ba MD   100 mL at 07/24/17 1345   • [DISCONTINUED] clotrimazole-betamethasone (LOTRISONE) 1-0.05 % lotion   Topical Q12H Harry Avery MD           Reviewed use of high risk medication in the elderly: yes  Reviewed for potential of harmful drug interactions in the elderly: no    Follow Up:  No Follow-up on file.     An After Visit Summary and PPPS with all of these plans were given to the patient.       Anxiety depression continue Wellbutrin  vaginal angioma reassurance given and continue to watch  Mammogram pending patient to schedule--  HA tension type. flexeril avoid computer games.--  CT showed right thyroid goiter only, seen by ENT   dysphagia resolved after dilation, GERD, FOLLOW GI CONTINUE MEDS  knee OA s/p Steroid injection, gel injection and follow up with orhto  pending replacement  Lower back pain sacral pain s/p fall on the tub XR mild arthritis, PT helped  allergic to lamisil  Diabetic gastroparesis vs post cholecytectomy. DIARRHEA, helps with questran. Lomotil and LIBRAX no help.   Diabetic neuropathy continue Neurontin to 300 mg 3 times a day  DM 6.5 a1c CONTINUE MEDICINE lantus 45u   hyperlipidemia CONTINUE MEDICINE  Vitamin D , STILL continue vitD3 2000u daily COMPLIANCE  vitB12 low , vitB12 1mg daily  colonoscopy :divertic ,polyps and hemorroid  [id number] and pneumonia shot done .decline zostavax, tdap done, prevnar done  Osteoporosis on bone density scan GI d/c'd fosamax for GAstritis, start miacalcin   Restless leg syndrome continue medicine  Hypertension continue med  rate dependent tacky, stress test normal  COPD continue medication  anisocoria, seen by eye doctor   Bilateral lower extremity claudication negative ultrasound for peripheral vascular disease  MCV low watch  Erythrasma continue clotrimazole REFILL for prn  IBS resolved, seeing GI continue meds now  Daytime sleepy no more , sleep study negative sleep apnea  MIRTA no pap needed  Unsteady gait, MRI showed ischemic changes,s/p PT.  continue unable to take ASA ?relate to neuropathy, continue  plavix  orthostasis slow to rise.   Do hepC lab  Vit D low continue vitD3 2000iu daily  Lung mass pending PET follow lung doctor      4 mo

## 2017-07-27 ENCOUNTER — APPOINTMENT (OUTPATIENT)
Dept: CT IMAGING | Facility: HOSPITAL | Age: 67
End: 2017-07-27

## 2017-08-14 ENCOUNTER — OFFICE VISIT (OUTPATIENT)
Dept: PULMONOLOGY | Facility: CLINIC | Age: 67
End: 2017-08-14

## 2017-08-14 VITALS
HEART RATE: 77 BPM | BODY MASS INDEX: 26.29 KG/M2 | DIASTOLIC BLOOD PRESSURE: 72 MMHG | WEIGHT: 154 LBS | HEIGHT: 64 IN | OXYGEN SATURATION: 94 % | SYSTOLIC BLOOD PRESSURE: 128 MMHG | RESPIRATION RATE: 16 BRPM

## 2017-08-14 DIAGNOSIS — J30.89 OTHER ALLERGIC RHINITIS: ICD-10-CM

## 2017-08-14 DIAGNOSIS — J44.9 CHRONIC OBSTRUCTIVE PULMONARY DISEASE, UNSPECIFIED COPD TYPE (HCC): ICD-10-CM

## 2017-08-14 DIAGNOSIS — R06.02 SHORTNESS OF BREATH: Primary | ICD-10-CM

## 2017-08-14 DIAGNOSIS — R59.0 MEDIASTINAL LYMPHADENOPATHY: ICD-10-CM

## 2017-08-14 PROCEDURE — 99214 OFFICE O/P EST MOD 30 MIN: CPT | Performed by: INTERNAL MEDICINE

## 2017-08-14 RX ORDER — FLUNISOLIDE 0.25 MG/ML
2 SOLUTION NASAL EVERY 12 HOURS
Qty: 1 BOTTLE | Refills: 5 | Status: SHIPPED | OUTPATIENT
Start: 2017-08-14

## 2017-08-14 NOTE — PROGRESS NOTES
"Chief Complaint   Patient presents with   • Follow-up     PET SCAN   • COPD   • Shortness of Breath         Subjective   Breann Gallegos is a 67 y.o. female.     History of Present Illness   Patient comes in today to discuss the results of PET scan.    He denies any worsening symptoms of shortness of breath, phlegm production or fever.    She also denies any weight loss or hemoptysis.    She is however, complaining of worsening nasal discharge, along with nasal congestion.  She is also complaining of runny nose and dribbling in the back of her throat.  She used to be on a nasal steroid a while ago, although she's not had any refills and more than 3-4 months.    She is using her prescribed medications and is not requiring rescue inhaler use at all.    She denies any recent exacerbations.    The following portions of the patient's history were reviewed and updated as appropriate: allergies, current medications, past family history, past medical history, past social history and past surgical history.    Review of Systems   HENT: Negative for sinus pressure, sneezing and sore throat.    Eyes: Positive for discharge.   Respiratory: Negative for cough, shortness of breath and wheezing.        Objective   Visit Vitals   • /72   • Pulse 77   • Resp 16   • Ht 64\" (162.6 cm)   • Wt 154 lb (69.9 kg)   • LMP  (Approximate)   • SpO2 94%   • BMI 26.43 kg/m2       Physical Exam   Constitutional: She is oriented to person, place, and time. She appears well-developed and well-nourished.   HENT:   Head: Atraumatic.   Sinuses were non tender on palpation.  Nostril showed mild erythema.  Oropharynx was moist.   Eyes: EOM are normal.   Neck: Neck supple.   Cardiovascular: Normal rate and regular rhythm.    Pulmonary/Chest: Effort normal. No respiratory distress.   Somewhat decreased A/E with out wheezing noted.   Musculoskeletal: She exhibits no edema.   Neurological: She is alert and oriented to person, place, and time.   Skin: " Skin is warm and dry.   Psychiatric: She has a normal mood and affect.   Vitals reviewed.        Assessment/Plan   Breann was seen today for follow-up, copd and shortness of breath.    Diagnoses and all orders for this visit:    Shortness of breath  -     BUN; Future  -     Creatinine, serum; Future    Chronic obstructive pulmonary disease, unspecified COPD type    Mediastinal lymphadenopathy  -     CT Chest With Contrast; Future  -     BUN; Future  -     Creatinine, serum; Future    Other allergic rhinitis    Other orders  -     flunisolide (NASALIDE) 25 MCG/ACT (0.025%) solution nasal spray; Inhale 2 sprays Every 12 (Twelve) Hours.           Return in about 4 months (around 12/14/2017) for Recheck, Imaging study.    DISCUSSION (if any):  I reviewed the CT and the PET scan images with the patient and her  in great detail.  The PET scan does not show any uptake in the mediastinal lymph node and the fact that the CT was abnormal could be from prior granulomatous disease.    Nonetheless, given the recent identification of a lymphadenopathy and a history of smoking, she will definitely need a repeat CT scan in 6months to document stability of this lymph node.    As far as her symptoms of allergic rhinitis is concerned, I started her on Nasalide.    She was advised to continue Pulmicort and Brovana  for COPD, especially given good control of her symptoms      Dictated utilizing Dragon dictation.    This document was electronically signed by Yamilex Tse MD August 14, 2017  11:33 AM

## 2017-08-16 RX ORDER — TRAZODONE HYDROCHLORIDE 50 MG/1
TABLET ORAL
Qty: 45 TABLET | Refills: 2 | Status: SHIPPED | OUTPATIENT
Start: 2017-08-16 | End: 2017-11-13 | Stop reason: SDUPTHER

## 2017-09-07 ENCOUNTER — OFFICE VISIT (OUTPATIENT)
Dept: GASTROENTEROLOGY | Facility: CLINIC | Age: 67
End: 2017-09-07

## 2017-09-07 VITALS
SYSTOLIC BLOOD PRESSURE: 163 MMHG | RESPIRATION RATE: 16 BRPM | HEART RATE: 92 BPM | TEMPERATURE: 97.5 F | DIASTOLIC BLOOD PRESSURE: 82 MMHG | WEIGHT: 155 LBS | BODY MASS INDEX: 26.46 KG/M2 | HEIGHT: 64 IN

## 2017-09-07 DIAGNOSIS — R19.7 DIARRHEA, UNSPECIFIED TYPE: ICD-10-CM

## 2017-09-07 DIAGNOSIS — R13.19 OTHER DYSPHAGIA: Primary | ICD-10-CM

## 2017-09-07 DIAGNOSIS — R12 HEARTBURN: ICD-10-CM

## 2017-09-07 DIAGNOSIS — R11.0 NAUSEA: ICD-10-CM

## 2017-09-07 PROCEDURE — 99213 OFFICE O/P EST LOW 20 MIN: CPT | Performed by: INTERNAL MEDICINE

## 2017-09-07 NOTE — PATIENT INSTRUCTIONS
1. Anti-reflex measures.  This includes avoidance of soda beverages.  The patient should also avoid eating late at night.  2. Low-fat-low lactose diet.  3. Weight reduction.  4. Pantoprazole 40 mg 1 by mouth every morning one half hour before breakfast.  5. Lomotil 2.5-0.25 mg 1 orally 3 to 4 times a day.   6. Colestid.  The patient may take 1-2 tablets at night and 1 tablet in the morning.    7. Head end elevation by putting 4-6 inch blocks under the head end.  8. Dietary instructions.  The patient should eat relatively soft diet.  She should eat in upright position and chew well.  The patient should drink water after to 3 bites and take medications with liberal amounts of water.  Generally medications that have a potential to cause pill esophagitis may be avoided or used in an alternative form.  Furthermore after eating, and taking medications the patient should remain in upright position for 5-10 minutes.  9. Upper endoscopy (EGD) counseling:  Description of the procedure, risks, benefits, alternatives and options including nonoperative options were discussed with the patient in detail.  The patient understands and wishes to proceed.  10. Discussed with the patient and her  in detail.  Opportunity was given to ask questions.  11. Of interest that the patient is on Plavix for unclear reasons.  We will get records.      Note:  Total time spent 40 minutes.  More than half of the time was spent face-to-face discussion.

## 2017-09-07 NOTE — PROGRESS NOTES
Chief Complaint   Patient presents with   • Follow-up       History of Present Illness     The patient has difficulty swallowing off and for the last 1 month. The symptom is moderate, occurs on daily basis and is associated mostly with solid foods.  The symptoms are somewhat progressive progressive.  The patient points towards the upper substernal area.  There is no associated weight loss.  Of interest the patient has history of proximal esophageal stenosis which had been dilated in the past to 15 millimeters with significant improvement of her symptoms.    The patient has a history of reflux off and on for the last several years.  The reflux is moderately severe.  Symptoms are described as retrosternal burning sensation, and indigestion.  There is history of occasional regurgitative symptoms.  Frequency being several times per week.  The symptoms are worse at night.  The patient takes acid suppressive therapy with reasonable control of his symptoms.    The patient has history of diarrhea off-and-on for the last several years.  Severity is moderate, frequency of bowel movements being 3-5 times a day.  The stools are described as loose and at times watery.  Occasionally, there is a nocturnal element of diarrhea. The diarrhea is associated with tenesmus at times.  The patient feels much better in terms of diarrhea.  Currently the patient has one formed and occasionally 2 formed stools a day.  There is no history of overt GI bleed (Hematemesis, melena or hematochezia).     The patient has history of nausea off and on for the last year or so . Severity is mild, frequency being 2-3 times a week.  There is no associated vomiting.  There is no association of nausea with eating.  The patient takes Zofran 4 mg tablet perhaps once or twice a week which helps her.  There are no relieving factors of nausea.         About 2-3 weeks ago the patient had some viral syndrome that lasted for a day or so.  Both the patient and her   were sick.  This has resolved.  She denies abdominal pain.      Review of Systems   Constitutional: Negative for appetite change, chills, fatigue, fever and unexpected weight change.   HENT: Positive for trouble swallowing. Negative for mouth sores and nosebleeds.    Eyes: Negative for discharge and redness.   Respiratory: Negative for apnea, cough and shortness of breath.    Cardiovascular: Negative for chest pain, palpitations and leg swelling.   Gastrointestinal: Negative for abdominal distention, abdominal pain, anal bleeding, blood in stool, constipation, diarrhea, nausea and vomiting.   Endocrine: Negative for cold intolerance, heat intolerance and polydipsia.   Genitourinary: Negative for dysuria, hematuria and urgency.   Musculoskeletal: Positive for arthralgias. Negative for joint swelling and myalgias.   Skin: Negative for rash.   Allergic/Immunologic: Negative for food allergies and immunocompromised state.   Neurological: Positive for weakness. Negative for dizziness, seizures, syncope and headaches.   Hematological: Negative for adenopathy. Does not bruise/bleed easily.   Psychiatric/Behavioral: Negative for dysphoric mood. The patient is not nervous/anxious and is not hyperactive.      Patient Active Problem List   Diagnosis   • Dysphagia   • Abnormal urinalysis   • Anxiety   • Chronic obstructive pulmonary disease   • Depression   • Diabetes mellitus   • Diabetic peripheral neuropathy   • Diarrhea   • Esophageal dysmotility   • Foot pain   • Gastroesophageal reflux disease without esophagitis   • Hyperlipidemia   • Hypertension   • Insomnia   • Irritable bowel syndrome   • Localized swelling, mass and lump, lower limb   • Low back pain   • Abnormal mammogram   • Enlargement of neck   • Neck pain   • Nontoxic uninodular goiter   • Osteoarthritis of knee   • Osteoarthritis   • Osteopenia   • Peripheral arterial occlusive disease   • Nerve root disorder   • Restless legs syndrome   • Tension  "headache   • Vitamin D deficiency   • Erythrasma   • Unsteady gait   • Headache   • Chronic left shoulder pain     Past Medical History:   Diagnosis Date   • COPD (chronic obstructive pulmonary disease)    • Diabetes mellitus    • Eustachian tube dysfunction    • GERD (gastroesophageal reflux disease)    • High cholesterol    • Hyperlipidemia    • Hypertension    • Sinus problem    • Skin abscess    • Vision problem    • Vitamin B12 deficiency    • Vitamin D deficiency      Past Surgical History:   Procedure Laterality Date   • APPENDECTOMY  1976   • BREAST SURGERY     • CATARACT EXTRACTION     • CHOLECYSTECTOMY  1976   • HAND SURGERY     • HYSTERECTOMY  1979   • OTHER SURGICAL HISTORY      Bladder procedures   • TONSILLECTOMY       Family History   Problem Relation Age of Onset   • Diabetes Other    • COPD Mother    • Pneumonia Mother    • Kidney failure Father    • Prostate cancer Father    • Colon cancer Neg Hx      Social History   Substance Use Topics   • Smoking status: Former Smoker     Packs/day: 1.00     Years: 30.00     Quit date: 01/2010   • Smokeless tobacco: Never Used   • Alcohol use No       Current Outpatient Prescriptions:   •  albuterol (PROAIR RESPICLICK) 108 (90 BASE) MCG/ACT inhaler, Inhale 1 puff every 4 (four) hours as needed for wheezing or shortness of air., Disp: 1 inhaler, Rfl: 0  •  amLODIPine (NORVASC) 5 MG tablet, TAKE 1 TABLET BY MOUTH AT BEDTIME, Disp: 30 tablet, Rfl: 5  •  aspirin (ASPIRIN LOW DOSE) 81 MG EC tablet, Take  by mouth., Disp: , Rfl:   •  B-D INS SYR ULTRAFINE 1CC/31G 31G X 5/16\" 1 ML misc, use as directed, Disp: 100 each, Rfl: 0  •  buPROPion XL (WELLBUTRIN XL) 150 MG 24 hr tablet, take 1 tablet by mouth once daily, Disp: 30 tablet, Rfl: 4  •  Cholecalciferol (VITAMIN D3) 83985 UNITS capsule, Take 1 capsule by mouth 1 (one) time per week., Disp: , Rfl:   •  clopidogrel (PLAVIX) 75 MG tablet, Take 1 tablet by mouth Daily., Disp: 30 tablet, Rfl: 6  •  " "clotrimazole-betamethasone (LOTRISONE) 1-0.05 % cream, Apply topically twice daily as direted., Disp: 45 g, Rfl: 1  •  colestipol (COLESTID) 1 G tablet, take 2 tablets by mouth twice a day, Disp: 120 tablet, Rfl: 3  •  cyclobenzaprine (FLEXERIL) 10 MG tablet, Take 1 tablet by mouth at night as needed., Disp: , Rfl:   •  diphenoxylate-atropine (LOMOTIL) 2.5-0.025 MG per tablet, TAKE 1 TABLET BY MOUTH 3 TO 4 TIMES DAILY USE AS DIRECTED, Disp: , Rfl: 0  •  flunisolide (NASALIDE) 25 MCG/ACT (0.025%) solution nasal spray, Inhale 2 sprays Every 12 (Twelve) Hours., Disp: 1 bottle, Rfl: 5  •  gabapentin (NEURONTIN) 300 MG capsule, take 1 capsule by mouth twice a day, Disp: 60 capsule, Rfl: 3  •  Insulin Syringe-Needle U-100 (B-D INS SYR ULTRAFINE .5CC/30G) 30G X 1/2\" 0.5 ML misc, , Disp: , Rfl:   •  Insulin Syringe-Needle U-100 (B-D INS SYR ULTRAFINE 1CC/30G) 30G X 1/2\" 1 ML misc, , Disp: , Rfl:   •  ipratropium-albuterol (DUO-NEB) 0.5-2.5 mg/mL nebulizer, Take 3 mL by nebulization Every 4 (Four) Hours As Needed for wheezing or shortness of air., Disp: 120 vial, Rfl: 2  •  Lancets (ONETOUCH ULTRASOFT) lancets, USE AS DIRECTED TWICE A DAY, Disp: 100 each, Rfl: 5  •  LANTUS 100 UNIT/ML injection, INJECT 90 UNITS TWICE DAILY, Disp: 15 each, Rfl: 5  •  lisinopril (PRINIVIL,ZESTRIL) 20 MG tablet, take 1 tablet by mouth once daily, Disp: 30 tablet, Rfl: 5  •  metFORMIN (GLUCOPHAGE) 500 MG tablet, TAKE 2 TABLETS IN THE MORNING, 1 TABLET AT LUNCH, AND 2 TABLETS EVERY EVENING, Disp: 150 tablet, Rfl: 5  •  ondansetron (ZOFRAN) 4 MG tablet, Take 1 tablet by mouth Every 8 (Eight) Hours As Needed for Nausea or Vomiting., Disp: 30 tablet, Rfl: 1  •  ONE TOUCH ULTRA TEST test strip, TEST once daily to twice a day, Disp: 100 each, Rfl: 5  •  pantoprazole (PROTONIX) 40 MG EC tablet, take 1 tablet by mouth once daily, Disp: 30 tablet, Rfl: 1  •  sertraline (ZOLOFT) 100 MG tablet, TAKE 1 1/2 TABLETS BY MOUTH ONCE DAILY, Disp: 45 tablet, Rfl: " "1  •  simvastatin (ZOCOR) 40 MG tablet, take 1 tablet by mouth once daily, Disp: 30 tablet, Rfl: 11  •  traZODone (DESYREL) 50 MG tablet, TAKE 1 1/2 TABLETS BY MOUTH AT BEDTIME, Disp: 45 tablet, Rfl: 2    Allergies   Allergen Reactions   • Codeine    • Penicillins    • Terbinafine        Blood pressure 163/82, pulse 92, temperature 97.5 °F (36.4 °C), resp. rate 16, height 64\" (162.6 cm), weight 155 lb (70.3 kg).    Physical Exam   Constitutional: She is oriented to person, place, and time. She appears well-developed and well-nourished. No distress.   HENT:   Head: Normocephalic and atraumatic.   Right Ear: Hearing and external ear normal.   Left Ear: Hearing and external ear normal.   Nose: Nose normal.   Mouth/Throat: Oropharynx is clear and moist and mucous membranes are normal. Mucous membranes are not pale, not dry and not cyanotic. No oral lesions. No oropharyngeal exudate.   Eyes: Conjunctivae and EOM are normal. Right eye exhibits no discharge. Left eye exhibits no discharge. No scleral icterus.   Neck: Trachea normal. Neck supple. No JVD present. No edema present. No thyroid mass and no thyromegaly present.   Cardiovascular: Normal rate, regular rhythm, S2 normal and normal heart sounds.  Exam reveals no gallop, no S3 and no friction rub.    No murmur heard.  Pulmonary/Chest: Effort normal and breath sounds normal. No respiratory distress. She has no wheezes. She has no rales. She exhibits no tenderness.   Abdominal: Soft. Normal appearance and bowel sounds are normal. She exhibits no distension, no ascites and no mass. There is no splenomegaly or hepatomegaly. There is no tenderness. There is no rigidity, no rebound and no guarding. No hernia.   Musculoskeletal: She exhibits no tenderness or deformity.       Vascular Status -  Her exam exhibits no right foot edema. Her exam exhibits no left foot edema.  Lymphadenopathy:     She has no cervical adenopathy.        Left: No supraclavicular adenopathy present. "   Neurological: She is alert and oriented to person, place, and time. She has normal strength. No cranial nerve deficit or sensory deficit. She exhibits normal muscle tone. Coordination normal.   Skin: No rash noted. She is not diaphoretic. No cyanosis. No pallor. Nails show no clubbing.   Psychiatric: She has a normal mood and affect. Her behavior is normal. Judgment and thought content normal.   Nursing note and vitals reviewed.    Stigmata of chronic liver disease:  None.  Asterixis:  None.    Laboratory Testing:  Upon review of medical records:    Dated February 14, 2017 sodium 141 potassium 3.9 chloride 104 CO2 28 BUN 9 serum creatinine 0.60 and glucose 87.  Calcium 9.5.  Albumin 4.40.  T bili 0.5 AST 34 ALT 29 alkaline phosphatase 60.  Total cholesterol 106.  Triglycerides 183.  WBC 6.26 hemoglobin 13.9 hematocrit 42.8 MCV 85.6 and platelet count 212.    Dated July 24, 2017 sodium 143 potassium 4.1 chloride 101 CO2 32 BUN 9 serum creatinine 0.80 glucose 107.  Calcium 9.3.  Albumin 4.00.  T bili 0.7 AST 29 ALT 34 alkaline phosphatase 58.  Total cholesterol 93.  Triglycerides 117.  TSH 1.390.  WBC 6.90 hemoglobin 14.1 hematocrit 44.3 MCV 84.2 and platelet count 225.     Procedures:  Upon review of medical records:     Dated February 4, 2014 the patient underwent a colonoscopy to terminal ileum. She was found to have scant left-sided diverticulosis with an occasional diverticulum in the right colon, multiple colon polyps, internal and small external hemorrhoids. No endoscopic evidence of ileitis or colitis was seen. Random biopsies were obtained from the colon upon withdrawal of scope.     Dated June 27, 2016 and upper endoscopy revealed proximal tortuosity and stenosis which was dilated up to 15 mm without difficulty. No Mares’s esophagus was noted. Patient was found to have proximal esophageal stenosis, small sliding hiatal hernia less than 3 cm, and erythematous gastritis with scant erosions. Biopsies from  the colon polyps revealed tubular adenomata. Random biopsies from the colon did not reveal microscopic colitis.    Assessment and Plan:      Breann was seen today for follow-up.    Diagnoses and all orders for this visit:    Other dysphagia  Comments:  Likely secondary to proximal esophageal stricture.    Heartburn  Comments:  Stable.    Diarrhea, unspecified type  Comments:  Multifactorial.  Improved.    Nausea  Comments:  Etiology unclear.  Possible underlying element of delayed gastric emptying.  Stable.        Plan  and Patient Instructions:    Patient Instructions   1. Anti-reflex measures.  This includes avoidance of soda beverages.  The patient should also avoid eating late at night.  2. Low-fat-low lactose diet.  3. Weight reduction.  4. Pantoprazole 40 mg 1 by mouth every morning one half hour before breakfast.  5. Lomotil 2.5-0.25 mg 1 orally 3 to 4 times a day.   6. Colestid.  The patient may take 1-2 tablets at night and 1 tablet in the morning.    7. Head end elevation by putting 4-6 inch blocks under the head end.  8. Dietary instructions.  The patient should eat relatively soft diet.  She should eat in upright position and chew well.  The patient should drink water after to 3 bites and take medications with liberal amounts of water.  Generally medications that have a potential to cause pill esophagitis may be avoided or used in an alternative form.  Furthermore after eating, and taking medications the patient should remain in upright position for 5-10 minutes.  9. Upper endoscopy (EGD) counseling:  Description of the procedure, risks, benefits, alternatives and options including nonoperative options were discussed with the patient in detail.  The patient understands and wishes to proceed.  10. Discussed with the patient and her  in detail.  Opportunity was given to ask questions.  11. Of interest that the patient is on Plavix for unclear reasons.  We will get records.      Note:  Total time  spent 40 minutes.  More than half of the time was spent face-to-face discussion.        Molina Dumont MD

## 2017-09-18 RX ORDER — GABAPENTIN 300 MG/1
CAPSULE ORAL
Qty: 60 CAPSULE | Refills: 3 | Status: SHIPPED | OUTPATIENT
Start: 2017-09-18 | End: 2018-01-14 | Stop reason: SDUPTHER

## 2017-10-24 RX ORDER — AMLODIPINE BESYLATE 5 MG/1
TABLET ORAL
Qty: 30 TABLET | Refills: 5 | Status: SHIPPED | OUTPATIENT
Start: 2017-10-24 | End: 2018-04-15 | Stop reason: SDUPTHER

## 2017-10-26 ENCOUNTER — TELEPHONE (OUTPATIENT)
Dept: INTERNAL MEDICINE | Facility: CLINIC | Age: 67
End: 2017-10-26

## 2017-10-26 RX ORDER — DIPHENOXYLATE HYDROCHLORIDE AND ATROPINE SULFATE 2.5; .025 MG/1; MG/1
TABLET ORAL
Qty: 120 TABLET | Refills: 3 | OUTPATIENT
Start: 2017-10-26

## 2017-10-26 NOTE — TELEPHONE ENCOUNTER
Patients  Galindo called requesting a call back. Galindo states that Dr. Bhakta needs permission from Dr. Avery before patient can stop taking Plavix for 3 days before doing her scope.

## 2017-10-27 RX ORDER — SERTRALINE HYDROCHLORIDE 100 MG/1
TABLET, FILM COATED ORAL
Qty: 45 TABLET | Refills: 1 | Status: SHIPPED | OUTPATIENT
Start: 2017-10-27 | End: 2018-05-29 | Stop reason: SDUPTHER

## 2017-11-07 ENCOUNTER — HOSPITAL ENCOUNTER (EMERGENCY)
Facility: HOSPITAL | Age: 67
Discharge: HOME | End: 2017-11-08
Attending: STUDENT IN AN ORGANIZED HEALTH CARE EDUCATION/TRAINING PROGRAM | Admitting: STUDENT IN AN ORGANIZED HEALTH CARE EDUCATION/TRAINING PROGRAM

## 2017-11-07 DIAGNOSIS — K11.20 SIALADENITIS: ICD-10-CM

## 2017-11-07 DIAGNOSIS — L03.221 CELLULITIS OF NECK: Primary | ICD-10-CM

## 2017-11-07 PROCEDURE — 99283 EMERGENCY DEPT VISIT LOW MDM: CPT

## 2017-11-08 ENCOUNTER — APPOINTMENT (OUTPATIENT)
Dept: CT IMAGING | Facility: HOSPITAL | Age: 67
End: 2017-11-08

## 2017-11-08 VITALS
SYSTOLIC BLOOD PRESSURE: 182 MMHG | DIASTOLIC BLOOD PRESSURE: 102 MMHG | HEIGHT: 64 IN | RESPIRATION RATE: 18 BRPM | BODY MASS INDEX: 25.95 KG/M2 | OXYGEN SATURATION: 97 % | WEIGHT: 152 LBS | HEART RATE: 89 BPM | TEMPERATURE: 98.2 F

## 2017-11-08 LAB
ANION GAP SERPL CALCULATED.3IONS-SCNC: 19.4 MMOL/L
BASOPHILS # BLD AUTO: 0.09 10*3/MM3 (ref 0–0.2)
BASOPHILS NFR BLD AUTO: 1 % (ref 0–2.5)
BUN BLD-MCNC: 10 MG/DL (ref 7–20)
BUN/CREAT SERPL: 14.3 (ref 7.1–23.5)
CALCIUM SPEC-SCNC: 9.6 MG/DL (ref 8.4–10.2)
CHLORIDE SERPL-SCNC: 103 MMOL/L (ref 98–107)
CO2 SERPL-SCNC: 25 MMOL/L (ref 26–30)
CREAT BLD-MCNC: 0.7 MG/DL (ref 0.6–1.3)
DEPRECATED RDW RBC AUTO: 43 FL (ref 37–54)
EOSINOPHIL # BLD AUTO: 0.3 10*3/MM3 (ref 0–0.7)
EOSINOPHIL NFR BLD AUTO: 3.5 % (ref 0–7)
ERYTHROCYTE [DISTWIDTH] IN BLOOD BY AUTOMATED COUNT: 13.9 % (ref 11.5–14.5)
GFR SERPL CREATININE-BSD FRML MDRD: 84 ML/MIN/1.73
GLUCOSE BLD-MCNC: 221 MG/DL (ref 74–98)
HCT VFR BLD AUTO: 45 % (ref 37–47)
HGB BLD-MCNC: 14.7 G/DL (ref 12–16)
IMM GRANULOCYTES # BLD: 0.04 10*3/MM3 (ref 0–0.06)
IMM GRANULOCYTES NFR BLD: 0.5 % (ref 0–0.6)
LYMPHOCYTES # BLD AUTO: 2.99 10*3/MM3 (ref 0.6–3.4)
LYMPHOCYTES NFR BLD AUTO: 34.5 % (ref 10–50)
MCH RBC QN AUTO: 27.9 PG (ref 27–31)
MCHC RBC AUTO-ENTMCNC: 32.7 G/DL (ref 30–37)
MCV RBC AUTO: 85.6 FL (ref 81–99)
MONOCYTES # BLD AUTO: 0.74 10*3/MM3 (ref 0–0.9)
MONOCYTES NFR BLD AUTO: 8.5 % (ref 0–12)
NEUTROPHILS # BLD AUTO: 4.5 10*3/MM3 (ref 2–6.9)
NEUTROPHILS NFR BLD AUTO: 52 % (ref 37–80)
NRBC BLD MANUAL-RTO: 0 /100 WBC (ref 0–0)
PLATELET # BLD AUTO: 211 10*3/MM3 (ref 130–400)
PMV BLD AUTO: 9.6 FL (ref 6–12)
POTASSIUM BLD-SCNC: 4.4 MMOL/L (ref 3.5–5.1)
RBC # BLD AUTO: 5.26 10*6/MM3 (ref 4.2–5.4)
SODIUM BLD-SCNC: 143 MMOL/L (ref 137–145)
WBC NRBC COR # BLD: 8.66 10*3/MM3 (ref 4.8–10.8)

## 2017-11-08 PROCEDURE — 0 IOPAMIDOL 61 % SOLUTION: Performed by: STUDENT IN AN ORGANIZED HEALTH CARE EDUCATION/TRAINING PROGRAM

## 2017-11-08 PROCEDURE — 80048 BASIC METABOLIC PNL TOTAL CA: CPT | Performed by: STUDENT IN AN ORGANIZED HEALTH CARE EDUCATION/TRAINING PROGRAM

## 2017-11-08 PROCEDURE — 70491 CT SOFT TISSUE NECK W/DYE: CPT

## 2017-11-08 PROCEDURE — 85025 COMPLETE CBC W/AUTO DIFF WBC: CPT | Performed by: STUDENT IN AN ORGANIZED HEALTH CARE EDUCATION/TRAINING PROGRAM

## 2017-11-08 RX ORDER — CLINDAMYCIN HYDROCHLORIDE 150 MG/1
600 CAPSULE ORAL ONCE
Status: COMPLETED | OUTPATIENT
Start: 2017-11-08 | End: 2017-11-08

## 2017-11-08 RX ORDER — CLINDAMYCIN HYDROCHLORIDE 300 MG/1
300 CAPSULE ORAL 4 TIMES DAILY
Qty: 40 CAPSULE | Refills: 0 | Status: SHIPPED | OUTPATIENT
Start: 2017-11-08 | End: 2017-11-27

## 2017-11-08 RX ADMIN — IOPAMIDOL 100 ML: 612 INJECTION, SOLUTION INTRAVENOUS at 01:35

## 2017-11-08 RX ADMIN — CLINDAMYCIN HYDROCHLORIDE 600 MG: 150 CAPSULE ORAL at 03:03

## 2017-11-08 NOTE — ED PROVIDER NOTES
Subjective   HPI Comments: 67-year-old female with a 2 day history of progressively worsening swelling of the left side of her upper neck and under the left mandible.  Patient states it is a little tender.  Patient's significant other states that he noticed it earlier this evening and looked like it had grown dramatically.  Patient denies fever, chills, sweats, nausea or vomiting.  She does any difficulty breathing at this time.      Review of Systems   All other systems reviewed and are negative.      Past Medical History:   Diagnosis Date   • COPD (chronic obstructive pulmonary disease)    • Diabetes mellitus    • Eustachian tube dysfunction    • GERD (gastroesophageal reflux disease)    • High cholesterol    • Hyperlipidemia    • Hypertension    • Sinus problem    • Skin abscess    • Vision problem    • Vitamin B12 deficiency    • Vitamin D deficiency        Allergies   Allergen Reactions   • Codeine    • Penicillins    • Terbinafine        Past Surgical History:   Procedure Laterality Date   • APPENDECTOMY  1976   • BREAST SURGERY     • CATARACT EXTRACTION     • CHOLECYSTECTOMY  1976   • HAND SURGERY     • HYSTERECTOMY  1979   • OTHER SURGICAL HISTORY      Bladder procedures   • TONSILLECTOMY         Family History   Problem Relation Age of Onset   • Diabetes Other    • COPD Mother    • Pneumonia Mother    • Kidney failure Father    • Prostate cancer Father    • Colon cancer Neg Hx        Social History     Social History   • Marital status:      Spouse name: N/A   • Number of children: N/A   • Years of education: N/A     Social History Main Topics   • Smoking status: Former Smoker     Packs/day: 1.00     Years: 30.00     Quit date: 01/2010   • Smokeless tobacco: Never Used   • Alcohol use No   • Drug use: No   • Sexual activity: Not Asked     Other Topics Concern   • None     Social History Narrative           Objective   Physical Exam   Nursing note and vitals reviewed.    GEN: No acute distress  Head:  Normocephalic, atraumatic  Eyes: Pupils equal round reactive to light  ENT: Posterior pharynx normal in appearance, oral mucosa is moist, patient does have a palpable mass slightly smaller than a golf ball underneath the left mandible as well as erythema and swelling in the neck area underneath the mandible and chin.  Chest: Nontender to palpation  Cardiovascular: Regular rate  Lungs: Clear to auscultation bilaterally  Abdomen: Soft, nontender, nondistended, no peritoneal signs  Extremities: No edema, normal appearance  Neuro: GCS 15  Psych: Mood and affect are appropriate    Procedures         ED Course  ED Course                  MDM  Number of Diagnoses or Management Options  Cellulitis of neck:   Sialadenitis:   Diagnosis management comments: CT scan shows slight left anterior neck soft tissue swelling and subcutaneous edema compatible with cellulitis no abscess.  Left submandibular gland appears slightly enlarged striated suggesting sialadenitis  Initiated antibiotics with clindamycin secondary to patient's penicillin allergy       Amount and/or Complexity of Data Reviewed  Clinical lab tests: ordered and reviewed  Independent visualization of images, tracings, or specimens: yes        Final diagnoses:   Cellulitis of neck   Sialadenitis            Donavan Gomez MD  11/08/17 0333

## 2017-11-13 RX ORDER — TRAZODONE HYDROCHLORIDE 50 MG/1
TABLET ORAL
Qty: 45 TABLET | Refills: 2 | Status: SHIPPED | OUTPATIENT
Start: 2017-11-13 | End: 2017-11-27

## 2017-11-21 DIAGNOSIS — R19.7 DIARRHEA, UNSPECIFIED TYPE: ICD-10-CM

## 2017-11-21 RX ORDER — MONTELUKAST SODIUM 4 MG/1
TABLET, CHEWABLE ORAL
Qty: 120 TABLET | Refills: 3 | Status: SHIPPED | OUTPATIENT
Start: 2017-11-21 | End: 2018-03-18 | Stop reason: SDUPTHER

## 2017-11-22 ENCOUNTER — PREP FOR SURGERY (OUTPATIENT)
Dept: OTHER | Facility: HOSPITAL | Age: 67
End: 2017-11-22

## 2017-11-22 DIAGNOSIS — R12 HEARTBURN: ICD-10-CM

## 2017-11-22 DIAGNOSIS — R11.0 NAUSEA: ICD-10-CM

## 2017-11-22 DIAGNOSIS — R13.10 DYSPHAGIA: Primary | ICD-10-CM

## 2017-11-22 RX ORDER — SODIUM CHLORIDE 9 MG/ML
70 INJECTION, SOLUTION INTRAVENOUS CONTINUOUS PRN
Status: CANCELLED | OUTPATIENT
Start: 2017-11-22

## 2017-11-27 RX ORDER — MELATONIN
2000 DAILY
COMMUNITY

## 2017-11-29 ENCOUNTER — ANESTHESIA EVENT (OUTPATIENT)
Dept: GASTROENTEROLOGY | Facility: HOSPITAL | Age: 67
End: 2017-11-29

## 2017-11-29 ENCOUNTER — ANESTHESIA (OUTPATIENT)
Dept: GASTROENTEROLOGY | Facility: HOSPITAL | Age: 67
End: 2017-11-29

## 2017-11-29 ENCOUNTER — HOSPITAL ENCOUNTER (OUTPATIENT)
Facility: HOSPITAL | Age: 67
Setting detail: HOSPITAL OUTPATIENT SURGERY
Discharge: HOME | End: 2017-11-29
Attending: INTERNAL MEDICINE | Admitting: INTERNAL MEDICINE

## 2017-11-29 VITALS
TEMPERATURE: 97.8 F | HEART RATE: 81 BPM | HEIGHT: 64 IN | RESPIRATION RATE: 18 BRPM | WEIGHT: 152 LBS | OXYGEN SATURATION: 94 % | SYSTOLIC BLOOD PRESSURE: 146 MMHG | DIASTOLIC BLOOD PRESSURE: 89 MMHG | BODY MASS INDEX: 25.95 KG/M2

## 2017-11-29 DIAGNOSIS — R11.0 NAUSEA: ICD-10-CM

## 2017-11-29 DIAGNOSIS — R13.10 DYSPHAGIA: ICD-10-CM

## 2017-11-29 DIAGNOSIS — R12 HEARTBURN: ICD-10-CM

## 2017-11-29 LAB — GLUCOSE BLDC GLUCOMTR-MCNC: 96 MG/DL (ref 70–130)

## 2017-11-29 PROCEDURE — 88305 TISSUE EXAM BY PATHOLOGIST: CPT | Performed by: INTERNAL MEDICINE

## 2017-11-29 PROCEDURE — 43239 EGD BIOPSY SINGLE/MULTIPLE: CPT | Performed by: INTERNAL MEDICINE

## 2017-11-29 PROCEDURE — 25010000002 PROPOFOL 1000 MG/100ML EMULSION: Performed by: NURSE ANESTHETIST, CERTIFIED REGISTERED

## 2017-11-29 PROCEDURE — C1726 CATH, BAL DIL, NON-VASCULAR: HCPCS | Performed by: INTERNAL MEDICINE

## 2017-11-29 PROCEDURE — 43249 ESOPH EGD DILATION <30 MM: CPT | Performed by: INTERNAL MEDICINE

## 2017-11-29 PROCEDURE — S0260 H&P FOR SURGERY: HCPCS | Performed by: INTERNAL MEDICINE

## 2017-11-29 PROCEDURE — 82962 GLUCOSE BLOOD TEST: CPT

## 2017-11-29 RX ORDER — LIDOCAINE HYDROCHLORIDE 20 MG/ML
INJECTION, SOLUTION INFILTRATION; PERINEURAL AS NEEDED
Status: DISCONTINUED | OUTPATIENT
Start: 2017-11-29 | End: 2017-11-29 | Stop reason: SURG

## 2017-11-29 RX ORDER — PANTOPRAZOLE SODIUM 40 MG/1
TABLET, DELAYED RELEASE ORAL
Qty: 30 TABLET | Refills: 5 | Status: SHIPPED | OUTPATIENT
Start: 2017-11-29 | End: 2017-12-06

## 2017-11-29 RX ORDER — ESMOLOL HYDROCHLORIDE 10 MG/ML
INJECTION INTRAVENOUS AS NEEDED
Status: DISCONTINUED | OUTPATIENT
Start: 2017-11-29 | End: 2017-11-29 | Stop reason: SURG

## 2017-11-29 RX ORDER — SODIUM CHLORIDE 0.9 % (FLUSH) 0.9 %
3 SYRINGE (ML) INJECTION AS NEEDED
Status: DISCONTINUED | OUTPATIENT
Start: 2017-11-29 | End: 2017-11-29 | Stop reason: HOSPADM

## 2017-11-29 RX ORDER — PROPOFOL 10 MG/ML
INJECTION, EMULSION INTRAVENOUS AS NEEDED
Status: DISCONTINUED | OUTPATIENT
Start: 2017-11-29 | End: 2017-11-29 | Stop reason: SURG

## 2017-11-29 RX ORDER — SODIUM CHLORIDE 9 MG/ML
70 INJECTION, SOLUTION INTRAVENOUS CONTINUOUS PRN
Status: DISCONTINUED | OUTPATIENT
Start: 2017-11-29 | End: 2017-11-29 | Stop reason: HOSPADM

## 2017-11-29 RX ADMIN — PROPOFOL 50 MG: 10 INJECTION, EMULSION INTRAVENOUS at 07:43

## 2017-11-29 RX ADMIN — PROPOFOL 50 MG: 10 INJECTION, EMULSION INTRAVENOUS at 07:48

## 2017-11-29 RX ADMIN — PROPOFOL 50 MG: 10 INJECTION, EMULSION INTRAVENOUS at 07:45

## 2017-11-29 RX ADMIN — ESMOLOL HYDROCHLORIDE 5 MG: 10 INJECTION, SOLUTION INTRAVENOUS at 07:49

## 2017-11-29 RX ADMIN — LIDOCAINE HYDROCHLORIDE 40 MG: 20 INJECTION, SOLUTION INFILTRATION; PERINEURAL at 07:42

## 2017-11-29 RX ADMIN — ESMOLOL HYDROCHLORIDE 10 MG: 10 INJECTION, SOLUTION INTRAVENOUS at 07:51

## 2017-11-29 RX ADMIN — SODIUM CHLORIDE: 9 INJECTION, SOLUTION INTRAVENOUS at 07:36

## 2017-11-29 RX ADMIN — SODIUM CHLORIDE 70 ML/HR: 9 INJECTION, SOLUTION INTRAVENOUS at 06:50

## 2017-11-29 NOTE — ANESTHESIA POSTPROCEDURE EVALUATION
Patient: Breann Gallegos    Procedure Summary     Date Anesthesia Start Anesthesia Stop Room / Location    11/29/17 0736 [id number] [facility] 2 / [facility]       Procedure Diagnosis Surgeon Provider    ESOPHAGOGASTRODUODENOSCOPY with biopsies and esophageal balloon dilitation (N/A Esophagus) Esophagitis; Hiatal hernia; Erosive gastritis; Esophageal stricture; Duodenitis  (Heartburn [R12]; Nausea [R11.0]; Dysphagia [R13.10]) MD Derik Hicks CRNA          Anesthesia Type: MAC  Last vitals  BP   (!) 190/96 (11/29/17 0645)   Temp   97.7 °F (36.5 °C) (11/29/17 0645)   Pulse   91 (11/29/17 0645)   Resp   18 (11/29/17 0645)     SpO2   97 % (11/29/17 0645)     Post Anesthesia Care and Evaluation    Patient location during evaluation: PACU  Patient participation: complete - patient participated  Level of consciousness: awake and alert  Pain score: 0  Pain management: satisfactory to patient  Airway patency: patent  Anesthetic complications: No anesthetic complications  PONV Status: none  Cardiovascular status: acceptable and hemodynamically stable  Respiratory status: acceptable and nasal cannula  Hydration status: acceptable    Comments: o2 sats 87% on RA.   Plan is to send her to [facility] with NC 2lpm and wean

## 2017-11-29 NOTE — ANESTHESIA PREPROCEDURE EVALUATION
Anesthesia Evaluation     Patient summary reviewed and Nursing notes reviewed   NPO Solid Status: > 8 hours  NPO Liquid Status: > 8 hours     Airway   Mallampati: II  TM distance: >3 FB  Neck ROM: full  no difficulty expected  Dental    (+) poor dentation    Pulmonary - normal exam    breath sounds clear to auscultation  (+) a smoker Former, COPD moderate, shortness of breath,   Cardiovascular - normal exam    Rhythm: regular  Rate: normal    (+) hypertension,       Neuro/Psych  (+) headaches, numbness, psychiatric history Anxiety and Depression,    GI/Hepatic/Renal/Endo    (+)  GERD, diabetes mellitus,     Musculoskeletal     (+) neck pain,   Abdominal    Substance History - negative use     OB/GYN negative ob/gyn ROS         Other   (+) arthritis                                     Anesthesia Plan    ASA 3     MAC     intravenous induction   Anesthetic plan and risks discussed with patient.

## 2017-11-30 ENCOUNTER — HOSPITAL ENCOUNTER (OUTPATIENT)
Dept: CT IMAGING | Facility: HOSPITAL | Age: 67
Discharge: HOME | End: 2017-11-30
Attending: INTERNAL MEDICINE | Admitting: INTERNAL MEDICINE

## 2017-11-30 DIAGNOSIS — R59.0 MEDIASTINAL LYMPHADENOPATHY: ICD-10-CM

## 2017-11-30 PROCEDURE — 0 IOPAMIDOL 61 % SOLUTION: Performed by: INTERNAL MEDICINE

## 2017-11-30 PROCEDURE — 71260 CT THORAX DX C+: CPT

## 2017-11-30 RX ADMIN — IOPAMIDOL 100 ML: 612 INJECTION, SOLUTION INTRAVENOUS at 14:15

## 2017-12-02 LAB
LAB AP CASE REPORT: NORMAL
Lab: NORMAL
PATH REPORT.FINAL DX SPEC: NORMAL

## 2017-12-06 ENCOUNTER — OFFICE VISIT (OUTPATIENT)
Dept: INTERNAL MEDICINE | Facility: CLINIC | Age: 67
End: 2017-12-06

## 2017-12-06 VITALS
SYSTOLIC BLOOD PRESSURE: 120 MMHG | WEIGHT: 161 LBS | RESPIRATION RATE: 14 BRPM | HEART RATE: 84 BPM | TEMPERATURE: 98.4 F | HEIGHT: 64 IN | DIASTOLIC BLOOD PRESSURE: 82 MMHG | BODY MASS INDEX: 27.49 KG/M2 | OXYGEN SATURATION: 96 %

## 2017-12-06 DIAGNOSIS — Z79.4 TYPE 2 DIABETES MELLITUS WITH HYPOGLYCEMIA WITHOUT COMA, WITH LONG-TERM CURRENT USE OF INSULIN (HCC): ICD-10-CM

## 2017-12-06 DIAGNOSIS — K21.9 GASTROESOPHAGEAL REFLUX DISEASE WITHOUT ESOPHAGITIS: ICD-10-CM

## 2017-12-06 DIAGNOSIS — E78.5 HYPERLIPIDEMIA, UNSPECIFIED HYPERLIPIDEMIA TYPE: Primary | ICD-10-CM

## 2017-12-06 DIAGNOSIS — E11.649 TYPE 2 DIABETES MELLITUS WITH HYPOGLYCEMIA WITHOUT COMA, WITH LONG-TERM CURRENT USE OF INSULIN (HCC): ICD-10-CM

## 2017-12-06 DIAGNOSIS — F32.A DEPRESSION, UNSPECIFIED DEPRESSION TYPE: ICD-10-CM

## 2017-12-06 DIAGNOSIS — G25.81 RESTLESS LEGS SYNDROME: ICD-10-CM

## 2017-12-06 DIAGNOSIS — F41.9 ANXIETY: ICD-10-CM

## 2017-12-06 DIAGNOSIS — E55.9 VITAMIN D DEFICIENCY: ICD-10-CM

## 2017-12-06 DIAGNOSIS — E11.42 DIABETIC PERIPHERAL NEUROPATHY (HCC): ICD-10-CM

## 2017-12-06 DIAGNOSIS — I10 ESSENTIAL HYPERTENSION: ICD-10-CM

## 2017-12-06 DIAGNOSIS — J44.1 CHRONIC OBSTRUCTIVE PULMONARY DISEASE WITH ACUTE EXACERBATION (HCC): ICD-10-CM

## 2017-12-06 PROBLEM — R11.0 NAUSEA: Status: RESOLVED | Noted: 2017-11-22 | Resolved: 2017-12-06

## 2017-12-06 PROBLEM — R12 HEARTBURN: Status: RESOLVED | Noted: 2017-11-22 | Resolved: 2017-12-06

## 2017-12-06 PROCEDURE — 99214 OFFICE O/P EST MOD 30 MIN: CPT | Performed by: INTERNAL MEDICINE

## 2017-12-06 RX ORDER — CALCITONIN SALMON 200 [IU]/.09ML
1 SPRAY, METERED NASAL DAILY
Qty: 1 EACH | Refills: 12 | Status: SHIPPED | OUTPATIENT
Start: 2017-12-06

## 2017-12-06 RX ORDER — CLOTRIMAZOLE AND BETAMETHASONE DIPROPIONATE 10; .64 MG/G; MG/G
1 CREAM TOPICAL 2 TIMES DAILY
Qty: 45 G | Refills: 5 | Status: SHIPPED | OUTPATIENT
Start: 2017-12-06

## 2017-12-06 NOTE — PROGRESS NOTES
"Subjective   Breann Gallegos is a 67 y.o. female.     Chief Complaint   Patient presents with   • Follow-up   • Diabetes       History of Present Illness   Patient here for follow-up.  Recent sialoadenitis treated the emergency room with antibiotics doing better now.  Knee joint pain still patient is going to follow-up with orthopedist.  Mammogram patient yet to do.  Diabetes need to blood tests.  Hyperlipidemia stable medication.  Osteoporosis on medication stable.  Blood pressure stable now on medication.  Lung mass patient is seeing lung doctor.  Anxiety depression stable medication.    Current Outpatient Prescriptions:   •  albuterol (PROAIR RESPICLICK) 108 (90 BASE) MCG/ACT inhaler, Inhale 1 puff every 4 (four) hours as needed for wheezing or shortness of air., Disp: 1 inhaler, Rfl: 0  •  amLODIPine (NORVASC) 5 MG tablet, TAKE 1 TABLET BY MOUTH AT BEDTIME, Disp: 30 tablet, Rfl: 5  •  B-D INS SYR ULTRAFINE 1CC/31G 31G X 5/16\" 1 ML misc, use as directed, Disp: 100 each, Rfl: 0  •  buPROPion XL (WELLBUTRIN XL) 150 MG 24 hr tablet, take 1 tablet by mouth once daily, Disp: 30 tablet, Rfl: 4  •  cholecalciferol (VITAMIN D3) 1000 units tablet, Take 2,000 Units by mouth Daily., Disp: , Rfl:   •  clotrimazole-betamethasone (LOTRISONE) 1-0.05 % cream, Apply 1 application topically 2 (Two) Times a Day. Apply topically twice daily as direted., Disp: 45 g, Rfl: 5  •  colestipol (COLESTID) 1 g tablet, TAKE 2 TABLETS BY MOUTH TWICE DAILY, Disp: 120 tablet, Rfl: 3  •  diphenoxylate-atropine (LOMOTIL) 2.5-0.025 MG per tablet, TAKE 1 TABLET BY MOUTH 3 TO 4 TIMES DAILY USE AS DIRECTED, Disp: , Rfl: 0  •  flunisolide (NASALIDE) 25 MCG/ACT (0.025%) solution nasal spray, Inhale 2 sprays Every 12 (Twelve) Hours., Disp: 1 bottle, Rfl: 5  •  gabapentin (NEURONTIN) 300 MG capsule, take 1 capsule by mouth twice a day, Disp: 60 capsule, Rfl: 3  •  Insulin Syringe-Needle U-100 (B-D INS SYR ULTRAFINE .5CC/30G) 30G X 1/2\" 0.5 ML misc, , " "Disp: , Rfl:   •  Insulin Syringe-Needle U-100 (B-D INS SYR ULTRAFINE 1CC/30G) 30G X 1/2\" 1 ML misc, , Disp: , Rfl:   •  ipratropium-albuterol (DUO-NEB) 0.5-2.5 mg/mL nebulizer, Take 3 mL by nebulization Every 4 (Four) Hours As Needed for wheezing or shortness of air., Disp: 120 vial, Rfl: 2  •  Lancets (ONETOUCH ULTRASOFT) lancets, USE AS DIRECTED TWICE A DAY, Disp: 100 each, Rfl: 5  •  LANTUS 100 UNIT/ML injection, INJECT 90 UNITS TWICE DAILY (Patient taking differently: INJECT 45 UNITS DAILY), Disp: 15 each, Rfl: 5  •  lisinopril (PRINIVIL,ZESTRIL) 20 MG tablet, take 1 tablet by mouth once daily, Disp: 30 tablet, Rfl: 5  •  metFORMIN (GLUCOPHAGE) 500 MG tablet, TAKE 2 TABLETS BY MOUTH IN THE MORNING, 1 TABLET AT LUNCH AND 2 TABLETS IN THE EVENING, Disp: 150 tablet, Rfl: 5  •  ondansetron (ZOFRAN) 4 MG tablet, Take 1 tablet by mouth Every 8 (Eight) Hours As Needed for Nausea or Vomiting., Disp: 30 tablet, Rfl: 1  •  ONE TOUCH ULTRA TEST test strip, TEST once daily to twice a day, Disp: 100 each, Rfl: 5  •  sertraline (ZOLOFT) 100 MG tablet, TAKE 1 1/2 TABLETS BY MOUTH ONCE DAILY, Disp: 45 tablet, Rfl: 1  •  simvastatin (ZOCOR) 40 MG tablet, take 1 tablet by mouth once daily, Disp: 30 tablet, Rfl: 11    The following portions of the patient's history were reviewed and updated as appropriate: allergies, current medications, past family history, past medical history, past social history, past surgical history and problem list.    Review of Systems   Constitutional: Negative.    Respiratory: Negative.    Cardiovascular: Negative.    Gastrointestinal: Negative.    Musculoskeletal: Negative.    Skin: Negative.    Neurological: Negative.    Psychiatric/Behavioral: Negative.        Objective   Physical Exam   Constitutional: She is oriented to person, place, and time. She appears well-nourished.   Neck: Neck supple.   Cardiovascular: Normal rate, regular rhythm and normal heart sounds.    Pulmonary/Chest: Effort normal " and breath sounds normal.   Abdominal: Bowel sounds are normal.   Neurological: She is alert and oriented to person, place, and time.   Skin: Skin is warm.   Psychiatric: She has a normal mood and affect.       All tests have been reviewed.    Assessment/Plan   There are no diagnoses linked to this encounter.            Anxiety depression continue Wellbutrin  vaginal angioma reassurance given and continue to watch  Mammogram pending patient yet  to schedule--  HA tension type. flexeril avoid computer games.--  CT showed right thyroid goiter only, seen by ENT   dysphagia resolved after dilation, GERD, FOLLOW GI   knee OA s/p Steroid injection, gel injection and follow up with orhto declines replacement  Lower back pain sacral pain s/p fall on the tub XR mild arthritis, PT helped  Diabetic gastroparesis vs post cholecytectomy. DIARRHEA, helps with questran. Lomotil and LIBRAX no help.   Diabetic neuropathy continue Neurontin to 300 mg 3 times a day  DM 6.5 a1c CONTINUE MEDICINE lantus 45u   hyperlipidemia CONTINUE MEDICINE  Vitamin D , STILL continue vitD3 2000u daily COMPLIANCE  vitB12 low , vitB12 1mg daily  colonoscopy :divertic ,polyps and hemorroid  [id number] and pneumonia shot done . zostavax done , tdap done, prevnar done  Osteoporosis on bone density scan GI d/c'd fosamax for GAstritis, start miacalcin   Restless leg syndrome continue medicine   Hypertension continue med  rate dependent tacky, stress test normal  COPD continue medication  anisocoria, seen by eye doctor   Bilateral lower extremity claudication negative ultrasound for peripheral vascular disease  MCV low watch  Erythrasma improved after med continue clotrimazole REFILL for prn  IBS resolved, seeing GI continue meds now  Daytime sleepy no more , sleep study negative sleep apnea  MIRTA no pap needed  Unsteady gait, MRI showed ischemic changes,s/p PT.  continue unable to take ASA ?relate to neuropathy, continue  plavix  orthostasis slow to rise.   Do  hepC lab  Vit D low continue vitD3 2000iu daily do lab  Lung mass s/p PET follow lung doctor      4 mo

## 2017-12-07 DIAGNOSIS — E01.0 THYROMEGALY: Primary | ICD-10-CM

## 2017-12-12 ENCOUNTER — TRANSCRIBE ORDERS (OUTPATIENT)
Dept: INTERNAL MEDICINE | Facility: CLINIC | Age: 67
End: 2017-12-12

## 2017-12-12 ENCOUNTER — HOSPITAL ENCOUNTER (OUTPATIENT)
Dept: ULTRASOUND IMAGING | Facility: HOSPITAL | Age: 67
Discharge: HOME | End: 2017-12-12
Attending: INTERNAL MEDICINE | Admitting: INTERNAL MEDICINE

## 2017-12-12 DIAGNOSIS — Z12.31 VISIT FOR SCREENING MAMMOGRAM: Primary | ICD-10-CM

## 2017-12-12 DIAGNOSIS — E01.0 THYROMEGALY: ICD-10-CM

## 2017-12-12 PROCEDURE — 76536 US EXAM OF HEAD AND NECK: CPT

## 2017-12-19 ENCOUNTER — TELEPHONE (OUTPATIENT)
Dept: GASTROENTEROLOGY | Facility: CLINIC | Age: 67
End: 2017-12-19

## 2017-12-19 DIAGNOSIS — K29.70 GASTRITIS WITHOUT BLEEDING, UNSPECIFIED CHRONICITY, UNSPECIFIED GASTRITIS TYPE: Primary | ICD-10-CM

## 2017-12-19 RX ORDER — FAMOTIDINE 20 MG/1
20 TABLET, FILM COATED ORAL 2 TIMES DAILY
Qty: 60 TABLET | Refills: 5 | Status: SHIPPED | OUTPATIENT
Start: 2017-12-19 | End: 2018-01-09 | Stop reason: ALTCHOICE

## 2017-12-19 NOTE — TELEPHONE ENCOUNTER
----- Message from Kimi Hill MA sent at 12/19/2017  1:34 PM EST -----  Breann states she is willing to try the Pepcid. Call in to [facility]. Thanks!      ----- Message -----     From: MINISTERIO Soriano     Sent: 12/19/2017  12:58 PM       To: Kimi Hill MA    Tell her to stop the Pantoprazole. She may be allergic to the PPIs. We can send in Pepcid 20 mg 1 tablet twice a day. Keep appointment for follow up to discuss further. If shortness of breath, difficulty breathing, worsening of symptoms, or signs of ananaphylaxis go to the ER or call 911.      ----- Message -----     From: Kimi Hill MA     Sent: 12/19/2017  12:21 PM       To: MINISTERIO Soriano    Pantoprazole causing tingling in the mouth and swelling of the lips. They think in the past she has been on nexium and it caused issues as well. They were wondering what to try next? Thanks!

## 2017-12-20 RX ORDER — BUPROPION HYDROCHLORIDE 150 MG/1
TABLET ORAL
Qty: 30 TABLET | Refills: 4 | Status: SHIPPED | OUTPATIENT
Start: 2017-12-20 | End: 2018-05-13 | Stop reason: SDUPTHER

## 2017-12-22 ENCOUNTER — HOSPITAL ENCOUNTER (OUTPATIENT)
Dept: MAMMOGRAPHY | Facility: HOSPITAL | Age: 67
Discharge: HOME | End: 2017-12-22
Attending: INTERNAL MEDICINE | Admitting: INTERNAL MEDICINE

## 2017-12-22 DIAGNOSIS — Z12.31 VISIT FOR SCREENING MAMMOGRAM: ICD-10-CM

## 2017-12-22 PROCEDURE — G0202 SCR MAMMO BI INCL CAD: HCPCS

## 2017-12-22 PROCEDURE — 77063 BREAST TOMOSYNTHESIS BI: CPT

## 2017-12-22 PROCEDURE — 77063 BREAST TOMOSYNTHESIS BI: CPT | Performed by: RADIOLOGY

## 2017-12-22 PROCEDURE — G0202 SCR MAMMO BI INCL CAD: HCPCS | Performed by: RADIOLOGY

## 2017-12-23 LAB
25(OH)D3+25(OH)D2 SERPL-MCNC: 15.6 NG/ML
ALBUMIN SERPL-MCNC: 4.3 G/DL (ref 3.5–5)
ALBUMIN/GLOB SERPL: 1.5 G/DL (ref 1–2)
ALP SERPL-CCNC: 65 U/L (ref 38–126)
ALT SERPL-CCNC: 27 U/L (ref 13–69)
AST SERPL-CCNC: 23 U/L (ref 15–46)
BILIRUB SERPL-MCNC: 0.6 MG/DL (ref 0.2–1.3)
BUN SERPL-MCNC: 12 MG/DL (ref 7–20)
BUN/CREAT SERPL: 17.1 (ref 7.1–23.5)
CALCIUM SERPL-MCNC: 9.6 MG/DL (ref 8.4–10.2)
CHLORIDE SERPL-SCNC: 105 MMOL/L (ref 98–107)
CHOLEST SERPL-MCNC: 106 MG/DL (ref 0–199)
CK SERPL-CCNC: 84 U/L (ref 30–170)
CO2 SERPL-SCNC: 25 MMOL/L (ref 26–30)
CREAT SERPL-MCNC: 0.7 MG/DL (ref 0.6–1.3)
GLOBULIN SER CALC-MCNC: 2.8 GM/DL
GLUCOSE SERPL-MCNC: 162 MG/DL (ref 74–98)
HBA1C MFR BLD: 6.9 %
HCV AB S/CO SERPL IA: <0.1 S/CO RATIO (ref 0–0.9)
HDLC SERPL-MCNC: 53 MG/DL (ref 40–60)
LDLC SERPL CALC-MCNC: 28 MG/DL (ref 0–99)
POTASSIUM SERPL-SCNC: 4.3 MMOL/L (ref 3.5–5.1)
PROT SERPL-MCNC: 7.1 G/DL (ref 6.3–8.2)
SODIUM SERPL-SCNC: 143 MMOL/L (ref 137–145)
TRIGL SERPL-MCNC: 126 MG/DL
VLDLC SERPL CALC-MCNC: 25.2 MG/DL

## 2018-01-09 ENCOUNTER — OFFICE VISIT (OUTPATIENT)
Dept: GASTROENTEROLOGY | Facility: CLINIC | Age: 68
End: 2018-01-09

## 2018-01-09 VITALS
BODY MASS INDEX: 27.66 KG/M2 | SYSTOLIC BLOOD PRESSURE: 145 MMHG | HEIGHT: 64 IN | HEART RATE: 91 BPM | DIASTOLIC BLOOD PRESSURE: 63 MMHG | WEIGHT: 162 LBS | TEMPERATURE: 96.9 F

## 2018-01-09 DIAGNOSIS — R12 HEARTBURN: ICD-10-CM

## 2018-01-09 DIAGNOSIS — R13.19 OTHER DYSPHAGIA: ICD-10-CM

## 2018-01-09 DIAGNOSIS — R11.0 NAUSEA: Primary | ICD-10-CM

## 2018-01-09 DIAGNOSIS — R19.7 DIARRHEA, UNSPECIFIED TYPE: ICD-10-CM

## 2018-01-09 PROCEDURE — 99214 OFFICE O/P EST MOD 30 MIN: CPT | Performed by: INTERNAL MEDICINE

## 2018-01-09 RX ORDER — ESOMEPRAZOLE MAGNESIUM 40 MG/1
CAPSULE, DELAYED RELEASE ORAL
Qty: 30 CAPSULE | Refills: 5 | Status: SHIPPED | OUTPATIENT
Start: 2018-01-09 | End: 2018-01-15

## 2018-01-09 NOTE — PROGRESS NOTES
Chief Complaint   Patient presents with   • Follow-up     Nausea  History of Present Illness     The patient has history of nausea off and on for the last year or so. Severity is mild, frequency being 2-3 times a week.  There is no associated vomiting.  There is no association of nausea with eating.  The patient takes Zofran 4 mg tablet perhaps once or twice a week which helps her.   Otherwise,  there are no relieving factors of nausea.      The patient has a history of reflux off and on for the last several years.  The reflux is moderately severe.  Symptoms are described as retrosternal burning sensation, and indigestion.  There is history of occasional regurgitative symptoms.  Frequency being several times per week.  The symptoms are worse at night.   the patient took pantoprazole however this caused some swelling of her tongue. The patient quit taking pantoprazole. She has taken Nexium without a problem. Currently the patient is taking Pepcid once 20 mg twice a day with suboptimal control of symptoms. The patient denies further dysphagia.     The patient has history of diarrhea off-and-on for the last several years.  Severity is moderate, frequency of bowel movements being 3-5 times a day.  The stools are described as loose and at times watery.  Occasionally, there is a nocturnal element of diarrhea. The diarrhea is associated with tenesmus at times.  The patient feels much better in terms of diarrhea.  Currently the patient has one and occasionally 2 formed stools a day.  There is no history of overt GI bleed (Hematemesis, melena or hematochezia).  there is no recent weight loss.      Review of Systems   Constitutional: Positive for fatigue. Negative for appetite change, chills, fever and unexpected weight change.   HENT: Negative for mouth sores, nosebleeds and trouble swallowing.    Eyes: Negative for discharge and redness.   Respiratory: Positive for cough. Negative for apnea and shortness of breath.     Cardiovascular: Negative for chest pain, palpitations and leg swelling.   Gastrointestinal: Positive for nausea. Negative for abdominal distention, abdominal pain, anal bleeding, blood in stool, constipation, diarrhea and vomiting.   Endocrine: Negative for cold intolerance, heat intolerance and polydipsia.   Genitourinary: Negative for dysuria, hematuria and urgency.   Musculoskeletal: Positive for joint swelling and myalgias. Negative for arthralgias.   Skin: Negative for rash.   Allergic/Immunologic: Negative for food allergies and immunocompromised state.   Neurological: Negative for dizziness, seizures, syncope and headaches.   Hematological: Negative for adenopathy. Bruises/bleeds easily.   Psychiatric/Behavioral: Negative for dysphoric mood. The patient is not nervous/anxious and is not hyperactive.      Patient Active Problem List   Diagnosis   • Dysphagia   • Abnormal urinalysis   • Anxiety   • Chronic obstructive pulmonary disease   • Depression   • Diabetes mellitus   • Diabetic peripheral neuropathy   • Diarrhea   • Esophageal dysmotility   • Foot pain   • Gastroesophageal reflux disease without esophagitis   • Hyperlipidemia   • Hypertension   • Insomnia   • Irritable bowel syndrome   • Localized swelling, mass and lump, lower limb   • Low back pain   • Abnormal mammogram   • Enlargement of neck   • Neck pain   • Nontoxic uninodular goiter   • Osteoarthritis of knee   • Osteoarthritis   • Osteopenia   • Peripheral arterial occlusive disease   • Nerve root disorder   • Restless legs syndrome   • Tension headache   • Vitamin D deficiency   • Erythrasma   • Unsteady gait   • Headache   • Chronic left shoulder pain     Past Medical History:   Diagnosis Date   • Arthritis    • Body piercing     EARS   • COPD (chronic obstructive pulmonary disease)    • Diabetes mellitus    • Dysphagia     REPORTS SHE FEELS LIKE THINGS ARE GETTING STUCK IN HER THROAT FOR THE PAST SEVERAL MONTHS   • Eustachian tube  "dysfunction    • Fracture     METATRASAL   • Full dentures     INSTRUCTED NO ADHESIVES THE DOS   • GERD (gastroesophageal reflux disease)    • High cholesterol    • History of nuclear stress test     REPORTS 3-4 YEARS AGO AND THAT ALL WAS WNL'S   • Hyperlipidemia    • Hypertension    • Sinus problem    • Skin abscess    • Vision problem    • Vitamin B12 deficiency    • Vitamin D deficiency    • Wears glasses      Past Surgical History:   Procedure Laterality Date   • APPENDECTOMY  1976   • BREAST SURGERY      REDUCTION   • CATARACT EXTRACTION Bilateral    • CHOLECYSTECTOMY  1976   • COLONOSCOPY     • ENDOSCOPY     • ENDOSCOPY N/A 11/29/2017    Procedure: ESOPHAGOGASTRODUODENOSCOPY with biopsies and esophageal balloon dilitation;  Surgeon: Molina Dumont MD;  Location: [facility] ENDOSCOPY;  Service:    • HAND SURGERY Right    • HYSTERECTOMY  1979   • OOPHORECTOMY Bilateral 1979   • OTHER SURGICAL HISTORY      BLADDER TACK   • REDUCTION MAMMAPLASTY Bilateral 1988   • TONSILLECTOMY       Family History   Problem Relation Age of Onset   • Diabetes Other    • COPD Mother    • Pneumonia Mother    • Kidney failure Father    • Prostate cancer Father    • Colon cancer Neg Hx    • Breast cancer Neg Hx    • Ovarian cancer Neg Hx      Social History   Substance Use Topics   • Smoking status: Former Smoker     Packs/day: 1.00     Years: 30.00     Types: Cigarettes     Quit date: 01/2010   • Smokeless tobacco: Never Used   • Alcohol use No       Current Outpatient Prescriptions:   •  albuterol (PROAIR RESPICLICK) 108 (90 BASE) MCG/ACT inhaler, Inhale 1 puff every 4 (four) hours as needed for wheezing or shortness of air., Disp: 1 inhaler, Rfl: 0  •  amLODIPine (NORVASC) 5 MG tablet, TAKE 1 TABLET BY MOUTH AT BEDTIME, Disp: 30 tablet, Rfl: 5  •  B-D INS SYR ULTRAFINE 1CC/31G 31G X 5/16\" 1 ML misc, use as directed, Disp: 100 each, Rfl: 0  •  buPROPion XL (WELLBUTRIN XL) 150 MG 24 hr tablet, take 1 tablet by mouth once daily, Disp: 30 " "tablet, Rfl: 4  •  calcitonin, salmon, (MIACALCIN) 200 UNIT/ACT nasal spray, 1 spray into each nostril Daily., Disp: 1 each, Rfl: 12  •  cholecalciferol (VITAMIN D3) 1000 units tablet, Take 2,000 Units by mouth Daily., Disp: , Rfl:   •  clotrimazole-betamethasone (LOTRISONE) 1-0.05 % cream, Apply 1 application topically 2 (Two) Times a Day. Apply topically twice daily as direted., Disp: 45 g, Rfl: 5  •  colestipol (COLESTID) 1 g tablet, TAKE 2 TABLETS BY MOUTH TWICE DAILY, Disp: 120 tablet, Rfl: 3  •  diphenoxylate-atropine (LOMOTIL) 2.5-0.025 MG per tablet, TAKE 1 TABLET BY MOUTH 3 TO 4 TIMES DAILY USE AS DIRECTED, Disp: , Rfl: 0  •  flunisolide (NASALIDE) 25 MCG/ACT (0.025%) solution nasal spray, Inhale 2 sprays Every 12 (Twelve) Hours., Disp: 1 bottle, Rfl: 5  •  gabapentin (NEURONTIN) 300 MG capsule, take 1 capsule by mouth twice a day, Disp: 60 capsule, Rfl: 3  •  Insulin Syringe-Needle U-100 (B-D INS SYR ULTRAFINE .5CC/30G) 30G X 1/2\" 0.5 ML misc, , Disp: , Rfl:   •  Insulin Syringe-Needle U-100 (B-D INS SYR ULTRAFINE 1CC/30G) 30G X 1/2\" 1 ML misc, , Disp: , Rfl:   •  ipratropium-albuterol (DUO-NEB) 0.5-2.5 mg/mL nebulizer, Take 3 mL by nebulization Every 4 (Four) Hours As Needed for wheezing or shortness of air., Disp: 120 vial, Rfl: 2  •  Lancets (ONETOUCH ULTRASOFT) lancets, USE AS DIRECTED TWICE A DAY, Disp: 100 each, Rfl: 5  •  LANTUS 100 UNIT/ML injection, INJECT 90 UNITS TWICE DAILY (Patient taking differently: INJECT 45 UNITS DAILY), Disp: 15 each, Rfl: 5  •  lisinopril (PRINIVIL,ZESTRIL) 20 MG tablet, take 1 tablet by mouth once daily, Disp: 30 tablet, Rfl: 5  •  metFORMIN (GLUCOPHAGE) 500 MG tablet, TAKE 2 TABLETS BY MOUTH IN THE MORNING, 1 TABLET AT LUNCH AND 2 TABLETS IN THE EVENING, Disp: 150 tablet, Rfl: 5  •  ONE TOUCH ULTRA TEST test strip, TEST once daily to twice a day, Disp: 100 each, Rfl: 5  •  sertraline (ZOLOFT) 100 MG tablet, TAKE 1 1/2 TABLETS BY MOUTH ONCE DAILY, Disp: 45 tablet, Rfl: " "1  •  simvastatin (ZOCOR) 40 MG tablet, take 1 tablet by mouth once daily, Disp: 30 tablet, Rfl: 11  •  esomeprazole (NEXIUM) 40 MG capsule, Take 1 capsule by mouth 30 minutes before breakfast daily., Disp: 30 capsule, Rfl: 5    Allergies   Allergen Reactions   • Codeine Nausea And Vomiting   • Protonix [Pantoprazole]    • Terbinafine Other (See Comments)     PATIENT REPORTS SHE IS UNSURE REACTION     • Penicillins Rash       Blood pressure 145/63, pulse 91, temperature 96.9 °F (36.1 °C), height 162.6 cm (64.02\"), weight 73.5 kg (162 lb).    Physical Exam   Constitutional: She is oriented to person, place, and time. She appears well-developed and well-nourished. No distress.   HENT:   Head: Normocephalic and atraumatic.   Right Ear: Hearing and external ear normal.   Left Ear: Hearing and external ear normal.   Nose: Nose normal.   Mouth/Throat: Oropharynx is clear and moist and mucous membranes are normal. Mucous membranes are not pale, not dry and not cyanotic. No oral lesions. No oropharyngeal exudate.   Eyes: Conjunctivae and EOM are normal. Right eye exhibits no discharge. Left eye exhibits no discharge. No scleral icterus.   Neck: Trachea normal. Neck supple. No JVD present. No edema present. No thyroid mass and no thyromegaly present.   Cardiovascular: Normal rate, regular rhythm, S2 normal and normal heart sounds.  Exam reveals no gallop, no S3 and no friction rub.    No murmur heard.  Pulmonary/Chest: Effort normal and breath sounds normal. No respiratory distress. She has no wheezes. She has no rales. She exhibits no tenderness.   Abdominal: Soft. Normal appearance and bowel sounds are normal. She exhibits no distension, no ascites and no mass. There is no splenomegaly or hepatomegaly. There is no tenderness. There is no rigidity, no rebound and no guarding. No hernia.   Musculoskeletal: She exhibits no tenderness or deformity.       Vascular Status -  Her exam exhibits no right foot edema. Her exam " exhibits no left foot edema.  Lymphadenopathy:     She has no cervical adenopathy.        Left: No supraclavicular adenopathy present.   Neurological: She is alert and oriented to person, place, and time. She has normal strength. No cranial nerve deficit or sensory deficit. She exhibits normal muscle tone. Coordination normal.   Skin: No rash noted. She is not diaphoretic. No cyanosis. No pallor. Nails show no clubbing.   Psychiatric: She has a normal mood and affect. Her behavior is normal. Judgment and thought content normal.   Nursing note and vitals reviewed.    Stigmata of chronic liver disease:  None.  Asterixis:  None.    Laboratory Testing:  Upon review of medical records:    Dated February 14, 2017 sodium 141 potassium 3.9 chloride 104 CO2 28 BUN 9 serum creatinine 0.60 and glucose 87.  Calcium 9.5.  Albumin 4.40.  T bili 0.5 AST 34 ALT 29 alkaline phosphatase 60.  Total cholesterol 106.  Triglycerides 183.  WBC 6.26 hemoglobin 13.9 hematocrit 42.8 MCV 85.6 and platelet count 212.    Dated July 24, 2017 sodium 143 potassium 4.1 chloride 101 CO2 32 BUN 9 serum creatinine 0.80 glucose 107.  Calcium 9.3.  Albumin 4.00.  T bili 0.7 AST 29 ALT 34 alkaline phosphatase 58.  Total cholesterol 93.  Triglycerides 117.  TSH 1.390.  WBC 6.90 hemoglobin 14.1 hematocrit 44.3 MCV 84.2 and platelet count 225.    Dated November 8, 2017 sodium 143 potassium 4.4 chloride 103 CO2 25 BUN 10 serum creatinine 0.70 glucose 221.  Calcium 9.6.  W BC 8.66 hemoglobin 14.7 hematocrit 45.0 MCV 85.6 and platelet count 211.    Dated December 22, 2017 sodium 143 potassium 4.3 chloride 105 CO2 25 BUN 12 serum creatinine 0.70 glucose 162.  Calcium 9.6.  Total protein 7.1.  Albumin 4.30.  T bili 0.6 AST 23 ALT 27 alkaline phosphatase 65.  Total cholesterol 106.  Triglycerides 126.  Hepatitis C virus antibody <0.1.     Procedures:  Upon review of medical records:     Dated February 4, 2014 the patient underwent a colonoscopy to terminal ileum.  She was found to have scant left-sided diverticulosis with an occasional diverticulum in the right colon, multiple colon polyps, internal and small external hemorrhoids. No endoscopic evidence of ileitis or colitis was seen. Random biopsies were obtained from the colon upon withdrawal of scope.     Dated November 29, 2017 the patient underwent an upper endoscopy which revealed: Distal esophageal stricture.  Status post serial dilation to 18 mm.  Erosive distal esophagitis.  LA class B.  Sliding hiatal hernia less than 3 cm. Erythematous-erosive gastritis.  Polypoid area at cardia.  Subtle concentric rings involving the mid and distal esophagus.  No Mares’s esophagus.  Second portion of duodenum, biopsies revealed benign duodenal mucosa with no specific pathologic diagnosis.  No evidence of villous blunting, increased intraepithelial lymphocytes, acute inflammation, dysplasia or malignancy.  Antrum, body, angulus biopsy revealed mild chronic gastritis with intestinal metaplasia and reactive gastropathy changes.  No evidence of significant acute inflammation, dysplasia, malignancy or Helicobacter pylori on H&E.  Stomach, cardia, biopsies revealed minimal chronic carditis with vascular congestion and focal reactive epithelial changes.  No evidence of intestinal metaplasia, dysplasia, or malignancy.  Mid and distal esophagus, biopsies revealed 9 squamous esophageal mucosa with reactive epithelial changes suggestive of reflux.  No evidence of glandular mucosa, dysplasia or malignancy.      Assessment and Plan:      Breann was seen today for follow-up.    Diagnoses and all orders for this visit:    Nausea  Comments:  There is an element of underlying diabetes associated delayed gastric emptying.    Diarrhea, unspecified type  Comments:  Multifactorial. Improved.    Heartburn  Comments:  With esophagitis and stricturing of the distal esophagus. Suboptimal response to H2B.  Orders:  -     esomeprazole (NEXIUM) 40 MG  capsule; Take 1 capsule by mouth 30 minutes before breakfast daily.    Other dysphagia  Comments:  Status post serial dilation to 18 mm. Improved. Secondary to distal esophageal stricture.              Plan  and Patient Instructions:    Patient Instructions   1. Anti-reflex measures.  This includes avoidance of soda beverages.  The patient should also avoid eating late at night.  2. Low-fat-low lactose diet.  3. Weight reduction.  4. Nexium (esomeprazole) capsule 40 mg. Take one capsule orally in the morning half an hour before eating daily. The patient has also been given some samples of Nexium 20 mg for trial basis.  5. Lomotil 2.5-0.25 mg 1 orally 3 to 4 times a day.   6. Colestid.  The patient may take 1 by mouth twice a day.    7. Head end elevation by putting 4-6 inch blocks under the head end.  8. Dietary instructions.  The patient should eat relatively soft diet.  She should eat in upright position and chew well.  The patient should drink water after to 3 bites and take medications with liberal amounts of water.  Generally medications that have a potential to cause pill esophagitis may be avoided or used in an alternative form.  Furthermore after eating, and taking medications the patient should remain in upright position for 5-10 minutes.  9. Follow-up in 3-4 months.          Molina Dumont MD

## 2018-01-09 NOTE — PATIENT INSTRUCTIONS
1. Anti-reflex measures.  This includes avoidance of soda beverages.  The patient should also avoid eating late at night.  2. Low-fat-low lactose diet.  3. Weight reduction.  4. Nexium (esomeprazole) capsule 40 mg. Take one capsule orally in the morning half an hour before eating daily. The patient has also been given some samples of Nexium 20 mg for trial basis.  5. Lomotil 2.5-0.25 mg 1 orally 3 to 4 times a day.   6. Colestid.  The patient may take 1 by mouth twice a day.    7. Head end elevation by putting 4-6 inch blocks under the head end.  8. Dietary instructions.  The patient should eat relatively soft diet.  She should eat in upright position and chew well.  The patient should drink water after to 3 bites and take medications with liberal amounts of water.  Generally medications that have a potential to cause pill esophagitis may be avoided or used in an alternative form.  Furthermore after eating, and taking medications the patient should remain in upright position for 5-10 minutes.  9. Follow-up in 3-4 months.

## 2018-01-10 DIAGNOSIS — R11.0 NAUSEA: Primary | ICD-10-CM

## 2018-01-10 RX ORDER — ONDANSETRON 4 MG/1
4 TABLET, FILM COATED ORAL EVERY 8 HOURS PRN
Qty: 30 TABLET | Refills: 0 | Status: SHIPPED | OUTPATIENT
Start: 2018-01-10

## 2018-01-10 NOTE — TELEPHONE ENCOUNTER
Patient  called. Zofran is not at pharmacy.  However, the note from yesterday does not say anything about Zobobby.  Dr. Dumont gave them samples of Nexium but he states that it is not covered by the insurance.

## 2018-01-11 ENCOUNTER — PRIOR AUTHORIZATION (OUTPATIENT)
Dept: GASTROENTEROLOGY | Facility: CLINIC | Age: 68
End: 2018-01-11

## 2018-01-11 DIAGNOSIS — R12 HEARTBURN: Primary | ICD-10-CM

## 2018-01-11 NOTE — TELEPHONE ENCOUNTER
FILLED PA FOR ESOMEPRAZOLE 40 MG VIA [employer].     SPOKE TO PATIENT'S  WHO STATES THAT SHE HAS TRIED OMEPRAZOLE AND PANTOPRAZOLE IN THE PAST AND IT WORKED PRETTY GOOD FOR HER. IF THE NEXIUM DOES NOT GO THROUGH THEY WOULD BE WILLING TO DO A TRIAL OF THE OMEPRAZOLE.

## 2018-01-15 RX ORDER — LISINOPRIL 20 MG/1
TABLET ORAL
Qty: 30 TABLET | Refills: 5 | Status: ON HOLD | OUTPATIENT
Start: 2018-01-15 | End: 2018-07-15 | Stop reason: SDUPTHER

## 2018-01-15 RX ORDER — CLOPIDOGREL BISULFATE 75 MG/1
TABLET ORAL
Qty: 30 TABLET | Refills: 6 | Status: SHIPPED | OUTPATIENT
Start: 2018-01-15

## 2018-01-15 RX ORDER — DIPHENOXYLATE HYDROCHLORIDE AND ATROPINE SULFATE 2.5; .025 MG/1; MG/1
TABLET ORAL
Qty: 120 TABLET | Refills: 2 | OUTPATIENT
Start: 2018-01-15

## 2018-01-15 RX ORDER — GABAPENTIN 300 MG/1
CAPSULE ORAL
Qty: 60 CAPSULE | Refills: 3 | Status: SHIPPED | OUTPATIENT
Start: 2018-01-15 | End: 2018-05-13 | Stop reason: SDUPTHER

## 2018-01-15 RX ORDER — OMEPRAZOLE 40 MG/1
CAPSULE, DELAYED RELEASE ORAL
Qty: 30 CAPSULE | Refills: 5 | Status: ON HOLD | OUTPATIENT
Start: 2018-01-15 | End: 2018-07-15 | Stop reason: SDUPTHER

## 2018-01-15 RX ORDER — PANTOPRAZOLE SODIUM 40 MG/1
TABLET, DELAYED RELEASE ORAL
Qty: 30 TABLET | Refills: 5 | Status: SHIPPED | OUTPATIENT
Start: 2018-01-15 | End: 2018-01-15

## 2018-01-15 NOTE — TELEPHONE ENCOUNTER
PA FOR NEXIUM HAS BEEN DENIED VIA [employer]    CaseId:[id number];Product Name:ST: Nexium (esomeprazole) - [employer] MEDICARE;Status:Denied;Appeal Information:

## 2018-01-15 NOTE — TELEPHONE ENCOUNTER
PATIENT IS WILLING TO TRY OMEPRAZOLE WHICH SHOULD GO THROUGH THE INSURANCE. THE PANTOPRAZOLE WAS DENIED? IS THIS SOMETHING WE CAN TRY?

## 2018-01-15 NOTE — TELEPHONE ENCOUNTER
CALLED AND SPOKE TO PATIENT'S  WHO STATES THEY WILL  THE OMEPRAZOLE AND KEEP US UPDATED ON HER PROGRESS.

## 2018-01-19 RX ORDER — DIPHENOXYLATE HYDROCHLORIDE AND ATROPINE SULFATE 2.5; .025 MG/1; MG/1
TABLET ORAL
Qty: 120 TABLET | Refills: 2 | OUTPATIENT
Start: 2018-01-19

## 2018-02-12 RX ORDER — TRAZODONE HYDROCHLORIDE 50 MG/1
TABLET ORAL
Qty: 45 TABLET | Refills: 2 | Status: SHIPPED | OUTPATIENT
Start: 2018-02-12 | End: 2018-04-19 | Stop reason: ALTCHOICE

## 2018-02-21 RX ORDER — DIPHENOXYLATE HYDROCHLORIDE AND ATROPINE SULFATE 2.5; .025 MG/1; MG/1
TABLET ORAL
Qty: 120 TABLET | Refills: 2 | OUTPATIENT
Start: 2018-02-21

## 2018-03-15 ENCOUNTER — OFFICE VISIT (OUTPATIENT)
Dept: PULMONOLOGY | Facility: CLINIC | Age: 68
End: 2018-03-15

## 2018-03-15 VITALS
DIASTOLIC BLOOD PRESSURE: 86 MMHG | RESPIRATION RATE: 16 BRPM | SYSTOLIC BLOOD PRESSURE: 140 MMHG | HEIGHT: 64 IN | WEIGHT: 163 LBS | OXYGEN SATURATION: 97 % | BODY MASS INDEX: 27.83 KG/M2 | HEART RATE: 85 BPM

## 2018-03-15 DIAGNOSIS — J30.89 OTHER ALLERGIC RHINITIS: ICD-10-CM

## 2018-03-15 DIAGNOSIS — J44.9 CHRONIC OBSTRUCTIVE PULMONARY DISEASE, UNSPECIFIED COPD TYPE (HCC): ICD-10-CM

## 2018-03-15 DIAGNOSIS — R59.0 MEDIASTINAL LYMPHADENOPATHY: ICD-10-CM

## 2018-03-15 DIAGNOSIS — R06.02 SHORTNESS OF BREATH: Primary | ICD-10-CM

## 2018-03-15 PROCEDURE — 99214 OFFICE O/P EST MOD 30 MIN: CPT | Performed by: INTERNAL MEDICINE

## 2018-03-15 RX ORDER — FAMOTIDINE 20 MG/1
TABLET, FILM COATED ORAL
Refills: 0 | COMMUNITY
Start: 2018-01-16 | End: 2018-04-19 | Stop reason: ALTCHOICE

## 2018-03-15 NOTE — PROGRESS NOTES
"Chief Complaint   Patient presents with   • Follow-up   • Shortness of Breath         Subjective   Breann Gallegos is a 67 y.o. female.     History of Present Illness   Patient comes today for follow up of shortness of breath, abnormal CT of the chest (mediastinal lymphadenopathy), allergic rhinitis and COPD.     Symptoms have been stable since the last clinic visit. Patient reports no recent exacerbations.     Patient is using medications, as prescribed. Exercise tolerance has also remained stable.     She quit smoking 6 years ago.    Her symptoms of allergic rhinitis are under very good control with Nasalide.    The patient denies any weight loss.    The following portions of the patient's history were reviewed and updated as appropriate: allergies, current medications, past family history, past medical history, past social history and past surgical history.    Review of Systems   Constitutional: Negative for chills and fever.   HENT: Negative for sinus pressure, sneezing and sore throat.    Respiratory: Negative for cough, shortness of breath and wheezing.    Psychiatric/Behavioral: Negative for sleep disturbance.       Objective   Visit Vitals  /86   Pulse 85   Resp 16   Ht 162.6 cm (64.02\")   Wt 73.9 kg (163 lb)   LMP  (LMP Unknown)   SpO2 97%   BMI 27.96 kg/m²       Physical Exam   Constitutional: She is oriented to person, place, and time. She appears well-developed and well-nourished.   HENT:   Head: Atraumatic.   Dentures noted.  Nostril showed no erythema.  Oropharynx was moist.   Eyes: EOM are normal.   Neck: Neck supple.   Cardiovascular: Normal rate and regular rhythm.    Pulmonary/Chest: Effort normal. No respiratory distress.   Somewhat decreased A/E with out wheezing noted.   Musculoskeletal: She exhibits no edema.   Neurological: She is alert and oriented to person, place, and time.   Skin: Skin is warm and dry.   Psychiatric: She has a normal mood and affect.   Vitals " reviewed.      Assessment/Plan   Breann was seen today for follow-up and shortness of breath.    Diagnoses and all orders for this visit:    Shortness of breath  -     CT Chest With Contrast; Future  -     BUN; Future  -     Creatinine, serum; Future    Chronic obstructive pulmonary disease, unspecified COPD type  -     CT Chest With Contrast; Future  -     BUN; Future  -     Creatinine, serum; Future    Mediastinal lymphadenopathy  -     CT Chest With Contrast; Future  -     BUN; Future  -     Creatinine, serum; Future    Other allergic rhinitis           Return in about 5 weeks (around 4/19/2018) for Recheck, Imaging study (CT on the same day as follow up), For [place].    DISCUSSION (if any):  I reviewed the images with the patient personally.  The CT scan was actually performed in November but the patient could not keep her appointment due to weather.  I compared the CT images to a prior CT in the system and the mediastinal abnormality has definitely decreased in size.  I also showed the patient to report which also mentions the same along with calcifications within the lesion.    I reviewed her EGD notes which confirmed stricture.  I also reviewed her ER visit note which confirmed cellulitis of the neck.  This visit occurred on November 29, 2017.    No change to the current medications has been made.    Compliance with medications stressed.     Side effects of prescribed medications discussed with the patient    I will order a repeat CT with contrast within the next 5-7 weeks.    If the CT shows stable findings then a further CT can be scheduled in 8-12 months from the next one.    She is up-to-date with Pneumovax and influenza vaccination.  I've asked her to ask her primary care provider about the possibility of Prevnar.      Dictated utilizing Dragon dictation.    This document was electronically signed by Yamilex Tse MD March 15, 2018  12:05 PM

## 2018-03-18 DIAGNOSIS — R19.7 DIARRHEA, UNSPECIFIED TYPE: ICD-10-CM

## 2018-03-19 RX ORDER — MONTELUKAST SODIUM 4 MG/1
TABLET, CHEWABLE ORAL
Qty: 120 TABLET | Refills: 3 | Status: SHIPPED | OUTPATIENT
Start: 2018-03-19

## 2018-03-30 NOTE — PROGRESS NOTES
Subjective   Breann Gallegos is a 66 y.o. female.     Chief Complaint   Patient presents with   • Results     Here for follow up on MRI   • Headache     continues    • Gait Problem     still falling offen        Sinusitis   This is a new problem. The current episode started 1 to 4 weeks ago. The problem has been gradually worsening since onset. There has been no fever. Her pain is at a severity of 3/10. The pain is mild. Associated symptoms include headaches. Past treatments include acetaminophen. The treatment provided no relief.    recent fall a week ago no upper back pain achy in nature bending over worse, otc med no help   Patient also here for follow-up.  Anxiety depression stable medication.  Unsteady gait weakness in lower extremities patient is a having physical therapy MRI showed microvascular ischemic changes.  Patient was told not to take aspirin due to stomach disease heartburn indigestion dysphagia gastritis.  Knee joint still pain history of a steroid shot.DM stable on med    Current Outpatient Prescriptions:   •  albuterol (PROAIR RESPICLICK) 108 (90 BASE) MCG/ACT inhaler, Inhale 1 puff every 4 (four) hours as needed for wheezing or shortness of air., Disp: 1 inhaler, Rfl: 0  •  alendronate (FOSAMAX) 70 MG tablet, Take 1 tablet by mouth every 7 days. Pt is to take on an empty stomach with plenty of water. Stand for 1 hour after taking med do not sit, Disp: 12 tablet, Rfl: 3  •  amLODIPine (NORVASC) 5 MG tablet, TAKE 1 TABLET BY MOUTH AT BEDTIME, Disp: 30 tablet, Rfl: 5  •  aspirin (ASPIRIN LOW DOSE) 81 MG EC tablet, Take  by mouth., Disp: , Rfl:   •  BREO ELLIPTA 200-25 MCG/INH aerosol powder , Inhale 1 puff Daily., Disp: 1 each, Rfl: 6  •  buPROPion XL (WELLBUTRIN XL) 150 MG 24 hr tablet, take 1 tablet by mouth once daily, Disp: 30 tablet, Rfl: 4  •  Cholecalciferol (VITAMIN D3) 44603 UNITS capsule, Take 1 capsule by mouth 1 (one) time per week., Disp: , Rfl:   •  clotrimazole-betamethasone  "(LOTRISONE) 1-0.05 % cream, Apply topically twice daily as direted., Disp: 45 g, Rfl: 0  •  colestipol (COLESTID) 1 G tablet, Take 2 tablets by mouth 2 (Two) Times a Day., Disp: 120 tablet, Rfl: 6  •  cyclobenzaprine (FLEXERIL) 10 MG tablet, Take 1 tablet by mouth at night as needed., Disp: , Rfl:   •  diphenoxylate-atropine (LOMOTIL) 2.5-0.025 MG per tablet, TAKE 1 TABLET BY MOUTH 3 TO 4 TIMES DAILY USE AS DIRECTED, Disp: , Rfl: 0  •  flunisolide (NASALIDE) 25 MCG/ACT (0.025%) solution nasal spray, Inhale 2 sprays Every 12 (Twelve) Hours., Disp: 1 bottle, Rfl: 5  •  gabapentin (NEURONTIN) 300 MG capsule, take 1 capsule by mouth twice a day if needed, Disp: 60 capsule, Rfl: 3  •  Insulin Syringe-Needle U-100 (B-D INS SYR ULTRAFINE .5CC/30G) 30G X 1/2\" 0.5 ML misc, , Disp: , Rfl:   •  Insulin Syringe-Needle U-100 (B-D INS SYR ULTRAFINE 1CC/30G) 30G X 1/2\" 1 ML misc, , Disp: , Rfl:   •  ipratropium-albuterol (DUO-NEB) 0.5-2.5 mg/mL nebulizer, Take 3 mL by nebulization Every 4 (Four) Hours As Needed for wheezing or shortness of air., Disp: 120 vial, Rfl: 2  •  Lancets (ONETOUCH ULTRASOFT) lancets, USE AS DIRECTED TWICE A DAY, Disp: 100 each, Rfl: 5  •  LANTUS 100 UNIT/ML injection, INJECT 90 UNITS TWICE DAILY, Disp: 15 each, Rfl: 5  •  linagliptin (TRADJENTA) 5 MG tablet tablet, Take 1 tablet by mouth 1 (one) time daily., Disp: , Rfl:   •  lisinopril (PRINIVIL,ZESTRIL) 20 MG tablet, take 1 tablet by mouth once daily, Disp: 30 tablet, Rfl: 5  •  metFORMIN (GLUCOPHAGE) 500 MG tablet, TAKE 2 TABLETS IN THE MORNING, 1 TABLET AT LUNCH AND 2 TABLETS EVERY EVENING, Disp: 150 tablet, Rfl: 5  •  moxifloxacin (VIGAMOX) 0.5 % ophthalmic solution, Apply  to eye., Disp: , Rfl:   •  ONE TOUCH ULTRA TEST test strip, TEST once daily to twice a day, Disp: 100 each, Rfl: 5  •  pantoprazole (PROTONIX) 40 MG EC tablet, take 1 tablet by mouth once daily, Disp: 30 tablet, Rfl: 1  •  sertraline (ZOLOFT) 100 MG tablet, TAKE 1 1/2 TABLETS BY " MOUTH ONCE DAILY, Disp: 45 tablet, Rfl: 1  •  simvastatin (ZOCOR) 40 MG tablet, TAKE 1 TABLET BY MOUTH ONCE DAILY, Disp: 30 tablet, Rfl: 11  •  traZODone (DESYREL) 50 MG tablet, TAKE 1 AND 1/2 TABLETS AT BEDTIME, Disp: 45 tablet, Rfl: 2    Current Facility-Administered Medications:   •  clotrimazole-betamethasone (LOTRISONE) 1-0.05 % lotion, , Topical, Q12H, Harry Avery MD    The following portions of the patient's history were reviewed and updated as appropriate: allergies, current medications, past family history, past medical history, past social history, past surgical history and problem list.    Review of Systems   Constitutional: Negative.    HENT: Positive for ear discharge and postnasal drip.    Eyes: Negative.    Respiratory: Negative.    Cardiovascular: Negative.    Gastrointestinal: Negative.    Endocrine: Negative.    Genitourinary: Negative.    Musculoskeletal: Negative.    Skin: Negative.    Allergic/Immunologic: Negative.    Neurological: Positive for headaches.   Hematological: Negative.    Psychiatric/Behavioral: Negative.        Objective   Physical Exam   Constitutional: She is oriented to person, place, and time. She appears well-developed and well-nourished.   HENT:   Head: Normocephalic and atraumatic.   Right Ear: External ear normal.   Left Ear: External ear normal.   Nose: Nose normal.   Tm bulge   Eyes: Conjunctivae and EOM are normal. Pupils are equal, round, and reactive to light.   Neck: Normal range of motion. Neck supple.   Cardiovascular: Normal rate, regular rhythm, normal heart sounds and intact distal pulses.    Pulmonary/Chest: Effort normal and breath sounds normal.   Abdominal: Soft. Bowel sounds are normal.   Musculoskeletal: Normal range of motion.   Neurological: She is alert and oriented to person, place, and time. She has normal reflexes.   Unsteady gait   Skin: Skin is warm and dry.   Psychiatric: She has a normal mood and affect. Her behavior is normal.       All tests  have been reviewed.    Assessment/Plan   There are no diagnoses linked to this encounter.            Anxiety depression continue Wellbutrin  vaginal angioma reassurance given and continue to watch  Mammogram pending patient to schedule  HA tension type. flexeril avoid computer games.  CT showed right thyroid goiter only, seen by ENT   dysphagia resolved after dilation  knee OA s/p Steroid injection, gel injection and follow up with orhto pending replacement  Lower back pain sacral pain s/p fall on the tub XR mild arthritis, PT helped  allergic to lamisil  Diabetic gastroparesis vs post cholecytectomy. DIARRHEA, helps with questran. Lomotil and LIBRAX no help.   Diabetic neuropathy continue Neurontin to 300 mg 3 times a day  DM 7.0 a1c CONTINUE MEDICINE lantus 45u   hyperlipidemia CONTINUE MEDICINE  Vitamin D , continue vitD3 2000u daily   vitB12 low , vitB12 1mg daily  colonoscopy :divertic ,polyps and hemorroid  [id number] and pneumonia shot done .decline zostavax, tdap done, prevnar done  Osteoporosis on bone density scan continue fosamax  Restless leg syndrome continue medicine  Hypertension continue medication   rate dependent tacky, stress test normal  COPD continue medication  anisocoria, seen by eye doctor   Bilateral lower extremity claudication negative ultrasound for peripheral vascular disease  MCV low watch  Erythrasma continue clotrimazole   IBS resolved, seeing GI continue meds now  Daytime sleepy and fatigue , sleep study negative sleep apnea  MIRTA no pap needed  Unsteady gait, MRI showed ischemic changes,PT continue unable to take ASA ?relate to neuropathy, pending DM shoes      3 weeks   SCDs

## 2018-04-05 ENCOUNTER — OFFICE VISIT (OUTPATIENT)
Dept: INTERNAL MEDICINE | Facility: CLINIC | Age: 68
End: 2018-04-05

## 2018-04-05 VITALS
TEMPERATURE: 97.5 F | SYSTOLIC BLOOD PRESSURE: 138 MMHG | DIASTOLIC BLOOD PRESSURE: 64 MMHG | OXYGEN SATURATION: 97 % | BODY MASS INDEX: 28 KG/M2 | HEART RATE: 84 BPM | HEIGHT: 64 IN | RESPIRATION RATE: 14 BRPM | WEIGHT: 164 LBS

## 2018-04-05 DIAGNOSIS — R13.19 OTHER DYSPHAGIA: ICD-10-CM

## 2018-04-05 DIAGNOSIS — I10 ESSENTIAL HYPERTENSION: ICD-10-CM

## 2018-04-05 DIAGNOSIS — F32.A DEPRESSION, UNSPECIFIED DEPRESSION TYPE: ICD-10-CM

## 2018-04-05 DIAGNOSIS — M85.80 OSTEOPENIA, UNSPECIFIED LOCATION: ICD-10-CM

## 2018-04-05 DIAGNOSIS — J44.1 CHRONIC OBSTRUCTIVE PULMONARY DISEASE WITH ACUTE EXACERBATION (HCC): ICD-10-CM

## 2018-04-05 DIAGNOSIS — Z79.4 TYPE 2 DIABETES MELLITUS WITH HYPOGLYCEMIA WITHOUT COMA, WITH LONG-TERM CURRENT USE OF INSULIN (HCC): ICD-10-CM

## 2018-04-05 DIAGNOSIS — G44.209 TENSION HEADACHE: ICD-10-CM

## 2018-04-05 DIAGNOSIS — R19.7 DIARRHEA, UNSPECIFIED TYPE: ICD-10-CM

## 2018-04-05 DIAGNOSIS — M17.12 OSTEOARTHRITIS OF LEFT KNEE, UNSPECIFIED OSTEOARTHRITIS TYPE: ICD-10-CM

## 2018-04-05 DIAGNOSIS — F41.9 ANXIETY: ICD-10-CM

## 2018-04-05 DIAGNOSIS — G25.81 RESTLESS LEGS SYNDROME: ICD-10-CM

## 2018-04-05 DIAGNOSIS — K21.9 GASTROESOPHAGEAL REFLUX DISEASE WITHOUT ESOPHAGITIS: ICD-10-CM

## 2018-04-05 DIAGNOSIS — E11.649 TYPE 2 DIABETES MELLITUS WITH HYPOGLYCEMIA WITHOUT COMA, WITH LONG-TERM CURRENT USE OF INSULIN (HCC): ICD-10-CM

## 2018-04-05 DIAGNOSIS — K58.9 IRRITABLE BOWEL SYNDROME WITHOUT DIARRHEA: ICD-10-CM

## 2018-04-05 DIAGNOSIS — M79.673 PAIN OF FOOT, UNSPECIFIED LATERALITY: Primary | ICD-10-CM

## 2018-04-05 DIAGNOSIS — E78.5 HYPERLIPIDEMIA, UNSPECIFIED HYPERLIPIDEMIA TYPE: ICD-10-CM

## 2018-04-05 DIAGNOSIS — E55.9 VITAMIN D DEFICIENCY: ICD-10-CM

## 2018-04-05 PROCEDURE — 99214 OFFICE O/P EST MOD 30 MIN: CPT | Performed by: INTERNAL MEDICINE

## 2018-04-05 NOTE — PROGRESS NOTES
"Subjective   Breann Gallegos is a 67 y.o. female.     Chief Complaint   Patient presents with   • Follow-up   • Hypertension       History of Present Illness   Patient here for follow-up.  Anxiety depression stable medication.  Headache is stable now.  Diabetes is stable medication.  Diabetic neuropathy stable medication.  Hyperlipidemia stable medication.  Hypertension stable medication.  Left and knee joint osteoarthritis gel injection no help.  Patient requests second opinion.  Lung mass patient is seeing lung doctor    Current Outpatient Prescriptions:   •  albuterol (PROAIR RESPICLICK) 108 (90 BASE) MCG/ACT inhaler, Inhale 1 puff every 4 (four) hours as needed for wheezing or shortness of air., Disp: 1 inhaler, Rfl: 0  •  amLODIPine (NORVASC) 5 MG tablet, TAKE 1 TABLET BY MOUTH AT BEDTIME, Disp: 30 tablet, Rfl: 5  •  B-D INS SYR ULTRAFINE 1CC/31G 31G X 5/16\" 1 ML misc, use as directed, Disp: 100 each, Rfl: 5  •  buPROPion XL (WELLBUTRIN XL) 150 MG 24 hr tablet, take 1 tablet by mouth once daily, Disp: 30 tablet, Rfl: 4  •  calcitonin, salmon, (MIACALCIN) 200 UNIT/ACT nasal spray, 1 spray into each nostril Daily., Disp: 1 each, Rfl: 12  •  cholecalciferol (VITAMIN D3) 1000 units tablet, Take 2,000 Units by mouth Daily., Disp: , Rfl:   •  clopidogrel (PLAVIX) 75 MG tablet, take 1 tablet by mouth once daily, Disp: 30 tablet, Rfl: 6  •  clotrimazole-betamethasone (LOTRISONE) 1-0.05 % cream, Apply 1 application topically 2 (Two) Times a Day. Apply topically twice daily as direted., Disp: 45 g, Rfl: 5  •  colestipol (COLESTID) 1 g tablet, take 2 tablets by mouth twice a day, Disp: 120 tablet, Rfl: 3  •  diphenoxylate-atropine (LOMOTIL) 2.5-0.025 MG per tablet, TAKE 1 TABLET BY MOUTH 3 TO 4 TIMES DAILY USE AS DIRECTED, Disp: , Rfl: 0  •  famotidine (PEPCID) 20 MG tablet, , Disp: , Rfl: 0  •  flunisolide (NASALIDE) 25 MCG/ACT (0.025%) solution nasal spray, Inhale 2 sprays Every 12 (Twelve) Hours., Disp: 1 bottle, " "Rfl: 5  •  gabapentin (NEURONTIN) 300 MG capsule, TAKE 1 CAPSULE BY MOUTH TWICE A DAY, Disp: 60 capsule, Rfl: 3  •  Insulin Syringe-Needle U-100 (B-D INS SYR ULTRAFINE .5CC/30G) 30G X 1/2\" 0.5 ML misc, , Disp: , Rfl:   •  Insulin Syringe-Needle U-100 (B-D INS SYR ULTRAFINE 1CC/30G) 30G X 1/2\" 1 ML misc, , Disp: , Rfl:   •  ipratropium-albuterol (DUO-NEB) 0.5-2.5 mg/mL nebulizer, Take 3 mL by nebulization Every 4 (Four) Hours As Needed for wheezing or shortness of air., Disp: 120 vial, Rfl: 2  •  Lancets (ONETOUCH ULTRASOFT) lancets, USE AS DIRECTED TWICE A DAY, Disp: 100 each, Rfl: 5  •  LANTUS 100 UNIT/ML injection, INJECT 90 UNITS TWICE DAILY (Patient taking differently: INJECT 45 UNITS DAILY), Disp: 15 each, Rfl: 5  •  lisinopril (PRINIVIL,ZESTRIL) 20 MG tablet, take 1 tablet by mouth once daily, Disp: 30 tablet, Rfl: 5  •  metFORMIN (GLUCOPHAGE) 500 MG tablet, TAKE 2 TABLETS BY MOUTH IN THE MORNING, 1 TABLET AT LUNCH AND 2 TABLETS IN THE EVENING, Disp: 150 tablet, Rfl: 5  •  omeprazole (PRILOSEC) 40 MG capsule, 1 po daily in the am 30 minutes before breakfast, Disp: 30 capsule, Rfl: 5  •  ondansetron (ZOFRAN) 4 MG tablet, Take 1 tablet by mouth Every 8 (Eight) Hours As Needed for Nausea or Vomiting., Disp: 30 tablet, Rfl: 0  •  ONE TOUCH ULTRA TEST test strip, TEST once daily to twice a day, Disp: 100 each, Rfl: 5  •  sertraline (ZOLOFT) 100 MG tablet, TAKE 1 1/2 TABLETS BY MOUTH ONCE DAILY, Disp: 45 tablet, Rfl: 1  •  simvastatin (ZOCOR) 40 MG tablet, take 1 tablet by mouth once daily, Disp: 30 tablet, Rfl: 11  •  traZODone (DESYREL) 50 MG tablet, TAKE 1 1/2 TABLETS BY MOUTH AT BEDTIME, Disp: 45 tablet, Rfl: 2    The following portions of the patient's history were reviewed and updated as appropriate: allergies, current medications, past family history, past medical history, past social history, past surgical history and problem list.    Review of Systems   Constitutional: Negative.    Respiratory: Negative.  "   Cardiovascular: Negative.    Gastrointestinal: Negative.    Musculoskeletal: Negative.    Skin: Negative.    Neurological: Negative.    Psychiatric/Behavioral: Negative.        Objective   Physical Exam   Constitutional: She is oriented to person, place, and time. She appears well-nourished.   Neck: Neck supple.   Cardiovascular: Normal rate, regular rhythm and normal heart sounds.    Pulmonary/Chest: Effort normal and breath sounds normal.   Abdominal: Bowel sounds are normal.   Neurological: She is alert and oriented to person, place, and time.   Skin: Skin is warm.   Psychiatric: She has a normal mood and affect.       All tests have been reviewed.    Assessment/Plan   There are no diagnoses linked to this encounter.             Anxiety depression continue Wellbutrin  vaginal angioma reassurance given and continue to watch  Mammogram normal  HA tension type. flexeril avoid computer games.--  CT showed right thyroid goiter only, seen by ENT   dysphagia resolved after dilation, GERD, FOLLOW GI   knee OA s/p Steroid injection, gel injection and follow up with orhto declines replacement  Lower back pain sacral pain s/p fall on the tub XR mild arthritis, PT helped  Diabetic gastroparesis vs post cholecytectomy. DIARRHEA, helps with questran. Lomotil and LIBRAX no help.   Diabetic neuropathy continue Neurontin to 300 mg 3 times a day  DM 6.5 a1c CONTINUE MEDICINE lantus 45u   hyperlipidemia CONTINUE MEDICINE  Vitamin D , STILL continue vitD3 2000u daily COMPLIANCE  vitB12 low , vitB12 1mg daily  colonoscopy :divertic ,polyps and hemorroid  [id number] and pneumonia shot done . zostavax done , tdap done, prevnar done  Osteoporosis on bone density scan GI d/c'd fosamax for GAstritis, continue miacalcin   Restless leg syndrome continue medicine   Hypertension continue med  rate dependent tacky, stress test normal  COPD continue medication  anisocoria, seen by eye doctor   Bilateral lower extremity claudication negative  ultrasound for peripheral vascular disease  MCV low watch  Erythrasma improved after med continue clotrimazole REFILL for prn  IBS resolved, seeing GI continue meds now  Daytime sleepy no more , sleep study negative sleep apnea  MIRTA no pap needed  Unsteady gait, MRI showed ischemic changes,s/p PT.  continue unable to take ASA ?relate to neuropathy, continue  plavix  orthostasis slow to rise.   hepC normal  Vit D low continue vitD3 2000iu daily do lab--  Lung mass s/p PET follow lung doctor      4 mo

## 2018-04-10 ENCOUNTER — OFFICE VISIT (OUTPATIENT)
Dept: GASTROENTEROLOGY | Facility: CLINIC | Age: 68
End: 2018-04-10

## 2018-04-10 VITALS
DIASTOLIC BLOOD PRESSURE: 84 MMHG | HEIGHT: 64 IN | BODY MASS INDEX: 27.83 KG/M2 | RESPIRATION RATE: 14 BRPM | SYSTOLIC BLOOD PRESSURE: 165 MMHG | TEMPERATURE: 97.4 F | HEART RATE: 79 BPM | WEIGHT: 163 LBS

## 2018-04-10 DIAGNOSIS — R13.19 OTHER DYSPHAGIA: ICD-10-CM

## 2018-04-10 DIAGNOSIS — R12 HEARTBURN: ICD-10-CM

## 2018-04-10 DIAGNOSIS — R19.7 DIARRHEA, UNSPECIFIED TYPE: Primary | ICD-10-CM

## 2018-04-10 DIAGNOSIS — K57.30 DIVERTICULOSIS OF COLON WITHOUT HEMORRHAGE: ICD-10-CM

## 2018-04-10 DIAGNOSIS — R11.0 NAUSEA: ICD-10-CM

## 2018-04-10 DIAGNOSIS — R10.13 EPIGASTRIC PAIN: ICD-10-CM

## 2018-04-10 PROCEDURE — 99214 OFFICE O/P EST MOD 30 MIN: CPT | Performed by: INTERNAL MEDICINE

## 2018-04-10 NOTE — PATIENT INSTRUCTIONS
1. Anti-reflex measures.  This includes avoidance of soda beverages.  The patient should also avoid eating late at night.  2. Low-fat-low lactose diet.  3. Weight reduction.  4. Pantoprazole 40 mg 1 by mouth every morning one half hour before breakfast.  5. Lomotil 2.5-0.25 mg 1 orally 3 to 4 times a day.   6. Colestid.  The patient may take 2 tablets at night and 1 tablet in the morning.    7. Head end elevation by putting 4-6 inch blocks under the head end.  8. Dietary instructions.  The patient should eat relatively soft diet.  She should eat in upright position and chew well.  The patient should drink water after to 3 bites and take medications with liberal amounts of water.  Generally medications that have a potential to cause pill esophagitis may be avoided or used in an alternative form.  After eating and taking medications the patient should remain in upright position for 10-15 minutes.  9. Discussed with the patient and her  in detail.  Opportunity was given to ask questions.  10. Of interest that the patient is on Plavix for unclear reasons.    11. Follow-up in 6 months.

## 2018-04-10 NOTE — PROGRESS NOTES
Chief Complaint   Patient presents with   • Diarrhea     History of Present Illness     The patient has history of diarrhea for the last several years.   Diarrhea was moderate to severe to begin with, frequency of bowel movements were 5-6 times a day.  The stools were described as loose and watery.  There was nocturnal element of diarrhea.  The diarrhea was associated with tenesmus and urgency.  Currently, the patient feels better. She has been having one and occasionally 2 formed stools a day. Currently, the patient is taking Colestid 2 tablets at night and one in the morning. She also takes Lomotil 1 tablet 3 times a day.    The patient has history of reflux for the last several years.  The reflux is moderately severe.  Symptoms are described as retrosternal burning sensation, and indigestion.  There is history of occasional regurgitative symptoms.  Frequency being several times per week.  The symptoms are worse at night.  The patient takes acid suppressive therapy with reasonable control of his symptoms.     The patient has history of dysphagia which is under reasonable control. Now the patient has occasional difficulty swallowing.Of interest that the patient had undergone an upper endoscopy with dilation of the distal esophageal stricture to 18 mm in November 2017. There is occasional nausea. The patient also complains of some upper abdominal discomfort especially on bending when she puts on her shoes. Otherwise the patient denies abdominal pain.  There is no history of liver or pancreatic disease.      Review of Systems   Constitutional: Positive for fatigue. Negative for appetite change, chills, fever and unexpected weight change.   HENT: Negative for mouth sores, nosebleeds and trouble swallowing.    Eyes: Negative for discharge and redness.   Respiratory: Negative for apnea, cough and shortness of breath.    Cardiovascular: Negative for chest pain, palpitations and leg swelling.   Gastrointestinal: Negative  for abdominal distention, abdominal pain, anal bleeding, blood in stool, constipation, diarrhea, nausea and vomiting.   Endocrine: Negative for cold intolerance, heat intolerance and polydipsia.   Genitourinary: Negative for dysuria, hematuria and urgency.   Musculoskeletal: Negative for arthralgias, joint swelling and myalgias.   Skin: Negative for rash.   Allergic/Immunologic: Negative for food allergies and immunocompromised state.   Neurological: Negative for dizziness, seizures, syncope and headaches.   Hematological: Negative for adenopathy. Bruises/bleeds easily.   Psychiatric/Behavioral: Negative for dysphoric mood. The patient is not nervous/anxious and is not hyperactive.      Patient Active Problem List   Diagnosis   • Dysphagia   • Abnormal urinalysis   • Anxiety   • Chronic obstructive pulmonary disease   • Depression   • Diabetes mellitus   • Diabetic peripheral neuropathy   • Diarrhea   • Esophageal dysmotility   • Foot pain   • Gastroesophageal reflux disease without esophagitis   • Hyperlipidemia   • Hypertension   • Insomnia   • Irritable bowel syndrome   • Localized swelling, mass and lump, lower limb   • Low back pain   • Abnormal mammogram   • Enlargement of neck   • Neck pain   • Nontoxic uninodular goiter   • Osteoarthritis of knee   • Osteoarthritis   • Osteopenia   • Peripheral arterial occlusive disease   • Nerve root disorder   • Restless legs syndrome   • Tension headache   • Vitamin D deficiency   • Erythrasma   • Unsteady gait   • Headache   • Chronic left shoulder pain     Past Medical History:   Diagnosis Date   • Arthritis    • Body piercing     EARS   • COPD (chronic obstructive pulmonary disease)    • Diabetes mellitus    • Dysphagia     REPORTS SHE FEELS LIKE THINGS ARE GETTING STUCK IN HER THROAT FOR THE PAST SEVERAL MONTHS   • Eustachian tube dysfunction    • Fracture     METATRASAL   • Full dentures     INSTRUCTED NO ADHESIVES THE DOS   • GERD (gastroesophageal reflux disease)   "  • High cholesterol    • History of nuclear stress test     REPORTS 3-4 YEARS AGO AND THAT ALL WAS WNL'S   • Hyperlipidemia    • Hypertension    • Sinus problem    • Skin abscess    • Vision problem    • Vitamin B12 deficiency    • Vitamin D deficiency    • Wears glasses      Past Surgical History:   Procedure Laterality Date   • APPENDECTOMY  1976   • BREAST SURGERY      REDUCTION   • CATARACT EXTRACTION Bilateral    • CHOLECYSTECTOMY  1976   • COLONOSCOPY     • ENDOSCOPY     • ENDOSCOPY N/A 11/29/2017    Procedure: ESOPHAGOGASTRODUODENOSCOPY with biopsies and esophageal balloon dilitation;  Surgeon: Molina Dumont MD;  Location: [facility] ENDOSCOPY;  Service:    • HAND SURGERY Right    • HYSTERECTOMY  1979   • OOPHORECTOMY Bilateral 1979   • OTHER SURGICAL HISTORY      BLADDER TACK   • REDUCTION MAMMAPLASTY Bilateral 1988   • TONSILLECTOMY       Family History   Problem Relation Age of Onset   • Diabetes Other    • COPD Mother    • Pneumonia Mother    • Kidney failure Father    • Prostate cancer Father    • Colon cancer Neg Hx    • Breast cancer Neg Hx    • Ovarian cancer Neg Hx      Social History   Substance Use Topics   • Smoking status: Former Smoker     Packs/day: 1.00     Years: 30.00     Types: Cigarettes     Quit date: 01/2010   • Smokeless tobacco: Never Used   • Alcohol use No       Current Outpatient Prescriptions:   •  albuterol (PROAIR RESPICLICK) 108 (90 BASE) MCG/ACT inhaler, Inhale 1 puff every 4 (four) hours as needed for wheezing or shortness of air., Disp: 1 inhaler, Rfl: 0  •  amLODIPine (NORVASC) 5 MG tablet, TAKE 1 TABLET BY MOUTH AT BEDTIME, Disp: 30 tablet, Rfl: 5  •  B-D INS SYR ULTRAFINE 1CC/31G 31G X 5/16\" 1 ML misc, use as directed, Disp: 100 each, Rfl: 5  •  buPROPion XL (WELLBUTRIN XL) 150 MG 24 hr tablet, take 1 tablet by mouth once daily, Disp: 30 tablet, Rfl: 4  •  calcitonin, salmon, (MIACALCIN) 200 UNIT/ACT nasal spray, 1 spray into each nostril Daily., Disp: 1 each, Rfl: 12  •  " "cholecalciferol (VITAMIN D3) 1000 units tablet, Take 2,000 Units by mouth Daily., Disp: , Rfl:   •  clopidogrel (PLAVIX) 75 MG tablet, take 1 tablet by mouth once daily, Disp: 30 tablet, Rfl: 6  •  clotrimazole-betamethasone (LOTRISONE) 1-0.05 % cream, Apply 1 application topically 2 (Two) Times a Day. Apply topically twice daily as direted., Disp: 45 g, Rfl: 5  •  colestipol (COLESTID) 1 g tablet, take 2 tablets by mouth twice a day, Disp: 120 tablet, Rfl: 3  •  diphenoxylate-atropine (LOMOTIL) 2.5-0.025 MG per tablet, TAKE 1 TABLET BY MOUTH 3 TO 4 TIMES DAILY USE AS DIRECTED, Disp: , Rfl: 0  •  flunisolide (NASALIDE) 25 MCG/ACT (0.025%) solution nasal spray, Inhale 2 sprays Every 12 (Twelve) Hours., Disp: 1 bottle, Rfl: 5  •  gabapentin (NEURONTIN) 300 MG capsule, TAKE 1 CAPSULE BY MOUTH TWICE A DAY, Disp: 60 capsule, Rfl: 3  •  Insulin Syringe-Needle U-100 (B-D INS SYR ULTRAFINE .5CC/30G) 30G X 1/2\" 0.5 ML misc, , Disp: , Rfl:   •  Insulin Syringe-Needle U-100 (B-D INS SYR ULTRAFINE 1CC/30G) 30G X 1/2\" 1 ML misc, , Disp: , Rfl:   •  ipratropium-albuterol (DUO-NEB) 0.5-2.5 mg/mL nebulizer, Take 3 mL by nebulization Every 4 (Four) Hours As Needed for wheezing or shortness of air., Disp: 120 vial, Rfl: 2  •  Lancets (ONETOUCH ULTRASOFT) lancets, USE AS DIRECTED TWICE A DAY, Disp: 100 each, Rfl: 5  •  LANTUS 100 UNIT/ML injection, INJECT 90 UNITS TWICE DAILY (Patient taking differently: INJECT 45 UNITS DAILY), Disp: 15 each, Rfl: 5  •  lisinopril (PRINIVIL,ZESTRIL) 20 MG tablet, take 1 tablet by mouth once daily, Disp: 30 tablet, Rfl: 5  •  metFORMIN (GLUCOPHAGE) 500 MG tablet, TAKE 2 TABLETS BY MOUTH IN THE MORNING, 1 TABLET AT LUNCH AND 2 TABLETS IN THE EVENING, Disp: 150 tablet, Rfl: 5  •  omeprazole (PRILOSEC) 40 MG capsule, 1 po daily in the am 30 minutes before breakfast, Disp: 30 capsule, Rfl: 5  •  ondansetron (ZOFRAN) 4 MG tablet, Take 1 tablet by mouth Every 8 (Eight) Hours As Needed for Nausea or " "Vomiting., Disp: 30 tablet, Rfl: 0  •  ONE TOUCH ULTRA TEST test strip, TEST once daily to twice a day, Disp: 100 each, Rfl: 5  •  sertraline (ZOLOFT) 100 MG tablet, TAKE 1 1/2 TABLETS BY MOUTH ONCE DAILY, Disp: 45 tablet, Rfl: 1  •  simvastatin (ZOCOR) 40 MG tablet, take 1 tablet by mouth once daily, Disp: 30 tablet, Rfl: 11  •  traZODone (DESYREL) 50 MG tablet, TAKE 1 1/2 TABLETS BY MOUTH AT BEDTIME, Disp: 45 tablet, Rfl: 2  •  famotidine (PEPCID) 20 MG tablet, , Disp: , Rfl: 0    Allergies   Allergen Reactions   • Codeine Nausea And Vomiting   • Protonix [Pantoprazole]    • Terbinafine Other (See Comments)     PATIENT REPORTS SHE IS UNSURE REACTION     • Penicillins Rash       Blood pressure 165/84, pulse 79, temperature 97.4 °F (36.3 °C), resp. rate 14, height 162.6 cm (64.02\"), weight 73.9 kg (163 lb).    Physical Exam   Constitutional: She is oriented to person, place, and time. She appears well-developed and well-nourished. No distress.   HENT:   Head: Normocephalic and atraumatic.   Right Ear: Hearing and external ear normal.   Left Ear: Hearing and external ear normal.   Nose: Nose normal.   Mouth/Throat: Oropharynx is clear and moist and mucous membranes are normal. Mucous membranes are not pale, not dry and not cyanotic. No oral lesions. No oropharyngeal exudate.   Eyes: Conjunctivae and EOM are normal. Right eye exhibits no discharge. Left eye exhibits no discharge. No scleral icterus.   Neck: Trachea normal. Neck supple. No JVD present. No edema present. No thyroid mass and no thyromegaly present.   Cardiovascular: Normal rate, regular rhythm, S2 normal and normal heart sounds.  Exam reveals no gallop, no S3 and no friction rub.    No murmur heard.  Pulmonary/Chest: Effort normal and breath sounds normal. No respiratory distress. She has no wheezes. She has no rales. She exhibits no tenderness.   Abdominal: Soft. Normal appearance and bowel sounds are normal. She exhibits no distension, no ascites and " no mass. There is no splenomegaly or hepatomegaly. There is no tenderness. There is no rigidity, no rebound and no guarding. No hernia.   Musculoskeletal: She exhibits no tenderness or deformity.     Vascular Status -  Her right foot exhibits no edema. Her left foot exhibits no edema.  Lymphadenopathy:     She has no cervical adenopathy.        Left: No supraclavicular adenopathy present.   Neurological: She is alert and oriented to person, place, and time. She has normal strength. No cranial nerve deficit or sensory deficit. She exhibits normal muscle tone. Coordination normal.   Skin: No rash noted. She is not diaphoretic. No cyanosis. No pallor. Nails show no clubbing.   Psychiatric: She has a normal mood and affect. Her behavior is normal. Judgment and thought content normal.   Nursing note and vitals reviewed.  Stigmata of chronic liver disease:  None.  Asterixis:  None.    Laboratory Testing:  Upon review of medical records:      Dated February 14, 2017 sodium 141 potassium 3.9 chloride 104 CO2 28 BUN 9 serum creatinine 0.60 and glucose 87.  Calcium 9.5.  Albumin 4.40.  T bili 0.5 AST 34 ALT 29 alkaline phosphatase 60.  Total cholesterol 106.  Triglycerides 183.  WBC 6.26 hemoglobin 13.9 hematocrit 42.8 MCV 85.6 and platelet count 212.     Dated July 24, 2017 sodium 143 potassium 4.1 chloride 101 CO2 32 BUN 9 serum creatinine 0.80 glucose 107.  Calcium 9.3.  Albumin 4.00.  T bili 0.7 AST 29 ALT 34 alkaline phosphatase 58.  Total cholesterol 93.  Triglycerides 117.  TSH 1.390.  WBC 6.90 hemoglobin 14.1 hematocrit 44.3 MCV 84.2 and platelet count 225.     Dated November 8, 2017 sodium 143 potassium 4.4 chloride 103 CO2 25 BUN 10 serum creatinine 0.70 glucose 221.  Calcium 9.6.  W BC 8.66 hemoglobin 14.7 hematocrit 45.0 MCV 85.6 and platelet count 211.     Dated December 22, 2017 sodium 143 potassium 4.3 chloride 105 CO2 25 BUN 12 serum creatinine 0.70 glucose 162.  Calcium 9.6.  Total protein 7.1.  Albumin 4.30.   T bili 0.6 AST 23 ALT 27 alkaline phosphatase 65.  Total cholesterol 106.  Triglycerides 126.  Hepatitis C virus antibody <0.1.     Procedures:  Upon review of medical records:     Dated February 4, 2014 the patient underwent a colonoscopy to terminal ileum. She was found to have scant left-sided diverticulosis with an occasional diverticulum in the right colon, multiple colon polyps, internal and small external hemorrhoids. No endoscopic evidence of ileitis or colitis was seen. Random biopsies were obtained from the colon upon withdrawal of scope.     Dated November 29, 2017 the patient underwent an upper endoscopy which revealed: Distal esophageal stricture.  Status post serial dilation to 18 mm.  Erosive distal esophagitis.  LA class B.  Sliding hiatal hernia less than 3 cm. Erythematous-erosive gastritis.  Polypoid area at cardia.  Subtle concentric rings involving the mid and distal esophagus.  No Mares’s esophagus.  Second portion of duodenum, biopsies revealed benign duodenal mucosa with no specific pathologic diagnosis.  No evidence of villous blunting, increased intraepithelial lymphocytes, acute inflammation, dysplasia or malignancy.  Antrum, body, angulus biopsy revealed mild chronic gastritis with intestinal metaplasia and reactive gastropathy changes.  No evidence of significant acute inflammation, dysplasia, malignancy or Helicobacter pylori on H&E.  Stomach, cardia, biopsies revealed minimal chronic carditis with vascular congestion and focal reactive epithelial changes.  No evidence of intestinal metaplasia, dysplasia, or malignancy.  Mid and distal esophagus, biopsies revealed 9 squamous esophageal mucosa with reactive epithelial changes suggestive of reflux.  No evidence of glandular mucosa, dysplasia or malignancy.    Assessment:      ICD-10-CM ICD-9-CM   1. Diarrhea, unspecified type R19.7 787.91   2. Heartburn R12 787.1   3. Nausea R11.0 787.02   4. Other dysphagia R13.19 787.29   5.  Epigastric pain R10.13 789.06   6. Diverticulosis of colon without hemorrhage K57.30 562.10         Discussion:  1. The likely etiology of upper abdominal discomfort is musculoskeletal.  2. Dysphagia was related to distal esophageal stricture This had been dilated to 18 mm in November 2017 with good results.      Plan/  Patient Instructions   1. Anti-reflex measures.  This includes avoidance of soda beverages.  The patient should also avoid eating late at night.  2. Low-fat-low lactose diet.  3. Weight reduction.  4. Pantoprazole 40 mg 1 by mouth every morning one half hour before breakfast.  5. Lomotil 2.5-0.25 mg 1 orally 3 to 4 times a day.   6. Colestid.  The patient may take 2 tablets at night and 1 tablet in the morning.    7. Head end elevation by putting 4-6 inch blocks under the head end.  8. Dietary instructions.  The patient should eat relatively soft diet.  She should eat in upright position and chew well.  The patient should drink water after to 3 bites and take medications with liberal amounts of water.  Generally medications that have a potential to cause pill esophagitis may be avoided or used in an alternative form.  After eating and taking medications the patient should remain in upright position for 10-15 minutes.  9. Discussed with the patient and her  in detail.  Opportunity was given to ask questions.  10. Of interest that the patient is on Plavix for unclear reasons.    11. Follow-up in 6 months.       Molina Dumont MD

## 2018-04-16 RX ORDER — AMLODIPINE BESYLATE 5 MG/1
TABLET ORAL
Qty: 30 TABLET | Refills: 5 | Status: SHIPPED | OUTPATIENT
Start: 2018-04-16

## 2018-04-18 RX ORDER — INSULIN GLARGINE 100 [IU]/ML
INJECTION, SOLUTION SUBCUTANEOUS
Qty: 50 ML | Refills: 5 | Status: ON HOLD | OUTPATIENT
Start: 2018-04-18 | End: 2018-05-03

## 2018-04-19 ENCOUNTER — OFFICE VISIT (OUTPATIENT)
Dept: PULMONOLOGY | Facility: CLINIC | Age: 68
End: 2018-04-19

## 2018-04-19 ENCOUNTER — LAB (OUTPATIENT)
Dept: LAB | Facility: HOSPITAL | Age: 68
End: 2018-04-19
Attending: INTERNAL MEDICINE

## 2018-04-19 ENCOUNTER — HOSPITAL ENCOUNTER (OUTPATIENT)
Dept: CT IMAGING | Facility: HOSPITAL | Age: 68
Discharge: HOME | End: 2018-04-19
Attending: INTERNAL MEDICINE | Admitting: INTERNAL MEDICINE

## 2018-04-19 VITALS
OXYGEN SATURATION: 97 % | WEIGHT: 165 LBS | RESPIRATION RATE: 17 BRPM | HEART RATE: 76 BPM | SYSTOLIC BLOOD PRESSURE: 130 MMHG | BODY MASS INDEX: 28.17 KG/M2 | HEIGHT: 64 IN | DIASTOLIC BLOOD PRESSURE: 72 MMHG

## 2018-04-19 DIAGNOSIS — J44.9 CHRONIC OBSTRUCTIVE PULMONARY DISEASE, UNSPECIFIED COPD TYPE (HCC): ICD-10-CM

## 2018-04-19 DIAGNOSIS — R06.02 SHORTNESS OF BREATH: ICD-10-CM

## 2018-04-19 DIAGNOSIS — J30.89 OTHER ALLERGIC RHINITIS: ICD-10-CM

## 2018-04-19 DIAGNOSIS — R59.0 MEDIASTINAL LYMPHADENOPATHY: ICD-10-CM

## 2018-04-19 DIAGNOSIS — R06.02 SHORTNESS OF BREATH: Primary | ICD-10-CM

## 2018-04-19 LAB
BUN BLD-MCNC: 15 MG/DL (ref 7–20)
CREAT BLD-MCNC: 0.9 MG/DL (ref 0.6–1.3)
GFR SERPL CREATININE-BSD FRML MDRD: 63 ML/MIN/1.73

## 2018-04-19 PROCEDURE — 82565 ASSAY OF CREATININE: CPT

## 2018-04-19 PROCEDURE — 25010000002 IOPAMIDOL 61 % SOLUTION: Performed by: INTERNAL MEDICINE

## 2018-04-19 PROCEDURE — 71260 CT THORAX DX C+: CPT

## 2018-04-19 PROCEDURE — 84520 ASSAY OF UREA NITROGEN: CPT

## 2018-04-19 PROCEDURE — 99214 OFFICE O/P EST MOD 30 MIN: CPT | Performed by: NURSE PRACTITIONER

## 2018-04-19 PROCEDURE — 36415 COLL VENOUS BLD VENIPUNCTURE: CPT

## 2018-04-19 RX ORDER — ARFORMOTEROL TARTRATE 15 UG/2ML
15 SOLUTION RESPIRATORY (INHALATION)
Qty: 120 ML | Refills: 5
Start: 2018-04-19

## 2018-04-19 RX ORDER — BUDESONIDE 0.5 MG/2ML
0.5 INHALANT ORAL 2 TIMES DAILY
Qty: 120 ML | Refills: 5
Start: 2018-04-19

## 2018-04-19 RX ADMIN — IOPAMIDOL 100 ML: 612 INJECTION, SOLUTION INTRAVENOUS at 13:15

## 2018-05-01 ENCOUNTER — APPOINTMENT (OUTPATIENT)
Dept: CT IMAGING | Facility: HOSPITAL | Age: 68
End: 2018-05-01

## 2018-05-01 ENCOUNTER — HOSPITAL ENCOUNTER (OUTPATIENT)
Facility: HOSPITAL | Age: 68
Discharge: HOME | End: 2018-05-03
Attending: EMERGENCY MEDICINE | Admitting: INTERNAL MEDICINE

## 2018-05-01 ENCOUNTER — TELEPHONE (OUTPATIENT)
Dept: GASTROENTEROLOGY | Facility: CLINIC | Age: 68
End: 2018-05-01

## 2018-05-01 ENCOUNTER — APPOINTMENT (OUTPATIENT)
Dept: GENERAL RADIOLOGY | Facility: HOSPITAL | Age: 68
End: 2018-05-01

## 2018-05-01 DIAGNOSIS — K22.2 ESOPHAGEAL STRICTURE: Primary | ICD-10-CM

## 2018-05-01 DIAGNOSIS — R13.19 OTHER DYSPHAGIA: ICD-10-CM

## 2018-05-01 DIAGNOSIS — R13.10 DYSPHAGIA, UNSPECIFIED TYPE: ICD-10-CM

## 2018-05-01 LAB
ALBUMIN SERPL-MCNC: 4.7 G/DL (ref 3.5–5)
ALBUMIN/GLOB SERPL: 1.3 G/DL (ref 1–2)
ALP SERPL-CCNC: 79 U/L (ref 38–126)
ALT SERPL W P-5'-P-CCNC: 40 U/L (ref 13–69)
ANION GAP SERPL CALCULATED.3IONS-SCNC: 20.4 MMOL/L (ref 10–20)
AST SERPL-CCNC: 43 U/L (ref 15–46)
BASOPHILS # BLD AUTO: 0.07 10*3/MM3 (ref 0–0.2)
BASOPHILS NFR BLD AUTO: 0.7 % (ref 0–2.5)
BILIRUB SERPL-MCNC: 0.9 MG/DL (ref 0.2–1.3)
BUN BLD-MCNC: 14 MG/DL (ref 7–20)
BUN/CREAT SERPL: 17.5 (ref 7.1–23.5)
CALCIUM SPEC-SCNC: 9.8 MG/DL (ref 8.4–10.2)
CHLORIDE SERPL-SCNC: 99 MMOL/L (ref 98–107)
CO2 SERPL-SCNC: 28 MMOL/L (ref 26–30)
CREAT BLD-MCNC: 0.8 MG/DL (ref 0.6–1.3)
DEPRECATED RDW RBC AUTO: 41.6 FL (ref 37–54)
EOSINOPHIL # BLD AUTO: 0.19 10*3/MM3 (ref 0–0.7)
EOSINOPHIL NFR BLD AUTO: 2 % (ref 0–7)
ERYTHROCYTE [DISTWIDTH] IN BLOOD BY AUTOMATED COUNT: 14.1 % (ref 11.5–14.5)
GFR SERPL CREATININE-BSD FRML MDRD: 72 ML/MIN/1.73
GLOBULIN UR ELPH-MCNC: 3.7 GM/DL
GLUCOSE BLD-MCNC: 237 MG/DL (ref 74–98)
GLUCOSE BLDC GLUCOMTR-MCNC: 145 MG/DL (ref 70–130)
HCT VFR BLD AUTO: 45.8 % (ref 37–47)
HGB BLD-MCNC: 15.2 G/DL (ref 12–16)
HOLD SPECIMEN: NORMAL
HOLD SPECIMEN: NORMAL
IMM GRANULOCYTES # BLD: 0.03 10*3/MM3 (ref 0–0.06)
IMM GRANULOCYTES NFR BLD: 0.3 % (ref 0–0.6)
LYMPHOCYTES # BLD AUTO: 2.55 10*3/MM3 (ref 0.6–3.4)
LYMPHOCYTES NFR BLD AUTO: 26.6 % (ref 10–50)
MCH RBC QN AUTO: 27.4 PG (ref 27–31)
MCHC RBC AUTO-ENTMCNC: 33.2 G/DL (ref 30–37)
MCV RBC AUTO: 82.7 FL (ref 81–99)
MONOCYTES # BLD AUTO: 0.74 10*3/MM3 (ref 0–0.9)
MONOCYTES NFR BLD AUTO: 7.7 % (ref 0–12)
NEUTROPHILS # BLD AUTO: 6.01 10*3/MM3 (ref 2–6.9)
NEUTROPHILS NFR BLD AUTO: 62.7 % (ref 37–80)
NRBC BLD MANUAL-RTO: 0 /100 WBC (ref 0–0)
PLATELET # BLD AUTO: 267 10*3/MM3 (ref 130–400)
PMV BLD AUTO: 9.8 FL (ref 6–12)
POTASSIUM BLD-SCNC: 4.4 MMOL/L (ref 3.5–5.1)
PROT SERPL-MCNC: 8.4 G/DL (ref 6.3–8.2)
RBC # BLD AUTO: 5.54 10*6/MM3 (ref 4.2–5.4)
SODIUM BLD-SCNC: 143 MMOL/L (ref 137–145)
TROPONIN I SERPL-MCNC: <0.012 NG/ML (ref 0–0.03)
WBC NRBC COR # BLD: 9.59 10*3/MM3 (ref 4.8–10.8)
WHOLE BLOOD HOLD SPECIMEN: NORMAL
WHOLE BLOOD HOLD SPECIMEN: NORMAL

## 2018-05-01 PROCEDURE — 71045 X-RAY EXAM CHEST 1 VIEW: CPT

## 2018-05-01 PROCEDURE — 80053 COMPREHEN METABOLIC PANEL: CPT | Performed by: PHYSICIAN ASSISTANT

## 2018-05-01 PROCEDURE — 70490 CT SOFT TISSUE NECK W/O DYE: CPT

## 2018-05-01 PROCEDURE — 25010000002 MORPHINE PER 10 MG: Performed by: INTERNAL MEDICINE

## 2018-05-01 PROCEDURE — 94640 AIRWAY INHALATION TREATMENT: CPT

## 2018-05-01 PROCEDURE — 82962 GLUCOSE BLOOD TEST: CPT

## 2018-05-01 PROCEDURE — G0378 HOSPITAL OBSERVATION PER HR: HCPCS

## 2018-05-01 PROCEDURE — 99219 PR INITIAL OBSERVATION CARE/DAY 50 MINUTES: CPT | Performed by: INTERNAL MEDICINE

## 2018-05-01 PROCEDURE — 99284 EMERGENCY DEPT VISIT MOD MDM: CPT

## 2018-05-01 PROCEDURE — 85025 COMPLETE CBC W/AUTO DIFF WBC: CPT | Performed by: PHYSICIAN ASSISTANT

## 2018-05-01 PROCEDURE — 93005 ELECTROCARDIOGRAM TRACING: CPT | Performed by: EMERGENCY MEDICINE

## 2018-05-01 PROCEDURE — 25010000002 ONDANSETRON PER 1 MG: Performed by: INTERNAL MEDICINE

## 2018-05-01 PROCEDURE — 84484 ASSAY OF TROPONIN QUANT: CPT | Performed by: PHYSICIAN ASSISTANT

## 2018-05-01 PROCEDURE — 96375 TX/PRO/DX INJ NEW DRUG ADDON: CPT

## 2018-05-01 PROCEDURE — 99220 PR INITIAL OBSERVATION CARE/DAY 70 MINUTES: CPT | Performed by: INTERNAL MEDICINE

## 2018-05-01 PROCEDURE — 96361 HYDRATE IV INFUSION ADD-ON: CPT

## 2018-05-01 RX ORDER — MORPHINE SULFATE 2 MG/ML
1 INJECTION, SOLUTION INTRAMUSCULAR; INTRAVENOUS EVERY 4 HOURS PRN
Status: DISCONTINUED | OUTPATIENT
Start: 2018-05-01 | End: 2018-05-03 | Stop reason: HOSPADM

## 2018-05-01 RX ORDER — ARFORMOTEROL TARTRATE 15 UG/2ML
15 SOLUTION RESPIRATORY (INHALATION)
Status: DISCONTINUED | OUTPATIENT
Start: 2018-05-01 | End: 2018-05-03 | Stop reason: HOSPADM

## 2018-05-01 RX ORDER — IPRATROPIUM BROMIDE AND ALBUTEROL SULFATE 2.5; .5 MG/3ML; MG/3ML
3 SOLUTION RESPIRATORY (INHALATION) EVERY 4 HOURS PRN
Status: DISCONTINUED | OUTPATIENT
Start: 2018-05-01 | End: 2018-05-03 | Stop reason: HOSPADM

## 2018-05-01 RX ORDER — NALOXONE HCL 0.4 MG/ML
0.4 VIAL (ML) INJECTION
Status: DISCONTINUED | OUTPATIENT
Start: 2018-05-01 | End: 2018-05-03 | Stop reason: HOSPADM

## 2018-05-01 RX ORDER — DEXTROSE MONOHYDRATE 25 G/50ML
25 INJECTION, SOLUTION INTRAVENOUS
Status: DISCONTINUED | OUTPATIENT
Start: 2018-05-01 | End: 2018-05-03 | Stop reason: HOSPADM

## 2018-05-01 RX ORDER — BUDESONIDE 0.5 MG/2ML
0.5 INHALANT ORAL 2 TIMES DAILY
Status: DISCONTINUED | OUTPATIENT
Start: 2018-05-01 | End: 2018-05-03 | Stop reason: HOSPADM

## 2018-05-01 RX ORDER — CALCITONIN SALMON 200 [IU]/.09ML
1 SPRAY, METERED NASAL DAILY
Status: DISCONTINUED | OUTPATIENT
Start: 2018-05-01 | End: 2018-05-03 | Stop reason: HOSPADM

## 2018-05-01 RX ORDER — SODIUM CHLORIDE 0.9 % (FLUSH) 0.9 %
1-10 SYRINGE (ML) INJECTION AS NEEDED
Status: DISCONTINUED | OUTPATIENT
Start: 2018-05-01 | End: 2018-05-03 | Stop reason: HOSPADM

## 2018-05-01 RX ORDER — FLUTICASONE PROPIONATE 50 MCG
2 SPRAY, SUSPENSION (ML) NASAL DAILY
Status: DISCONTINUED | OUTPATIENT
Start: 2018-05-02 | End: 2018-05-03 | Stop reason: HOSPADM

## 2018-05-01 RX ORDER — SODIUM CHLORIDE 0.9 % (FLUSH) 0.9 %
10 SYRINGE (ML) INJECTION AS NEEDED
Status: DISCONTINUED | OUTPATIENT
Start: 2018-05-01 | End: 2018-05-03 | Stop reason: HOSPADM

## 2018-05-01 RX ORDER — HYDRALAZINE HYDROCHLORIDE 20 MG/ML
20 INJECTION INTRAMUSCULAR; INTRAVENOUS EVERY 6 HOURS PRN
Status: DISCONTINUED | OUTPATIENT
Start: 2018-05-01 | End: 2018-05-03 | Stop reason: HOSPADM

## 2018-05-01 RX ORDER — SODIUM CHLORIDE 450 MG/100ML
100 INJECTION, SOLUTION INTRAVENOUS CONTINUOUS
Status: DISCONTINUED | OUTPATIENT
Start: 2018-05-01 | End: 2018-05-02

## 2018-05-01 RX ORDER — ONDANSETRON 2 MG/ML
4 INJECTION INTRAMUSCULAR; INTRAVENOUS EVERY 6 HOURS PRN
Status: DISCONTINUED | OUTPATIENT
Start: 2018-05-01 | End: 2018-05-03 | Stop reason: HOSPADM

## 2018-05-01 RX ORDER — NICOTINE POLACRILEX 4 MG
1 LOZENGE BUCCAL
Status: DISCONTINUED | OUTPATIENT
Start: 2018-05-01 | End: 2018-05-03 | Stop reason: HOSPADM

## 2018-05-01 RX ADMIN — ONDANSETRON 4 MG: 2 INJECTION INTRAMUSCULAR; INTRAVENOUS at 21:38

## 2018-05-01 RX ADMIN — BUDESONIDE 0.5 MG: 0.5 INHALANT RESPIRATORY (INHALATION) at 20:52

## 2018-05-01 RX ADMIN — SODIUM CHLORIDE 100 ML/HR: 4.5 INJECTION, SOLUTION INTRAVENOUS at 20:13

## 2018-05-01 RX ADMIN — MORPHINE SULFATE 1 MG: 2 INJECTION, SOLUTION INTRAMUSCULAR; INTRAVENOUS at 20:12

## 2018-05-01 RX ADMIN — ARFORMOTEROL TARTRATE 15 MCG: 15 SOLUTION RESPIRATORY (INHALATION) at 20:52

## 2018-05-01 NOTE — ED NOTES
Dr. Jenkins called for FORD Cazares with no answer. Voicemail left.     Kimi Jean Baptiste  05/01/18 [id number]

## 2018-05-01 NOTE — TELEPHONE ENCOUNTER
PATIENTS  LEFT MESSAGE DURING LUNCH THAT IS WIFE WAS COUGHING AND FELT LIKE THROAT WAS CLOSING. I RETURNED CALL AND LEFT MESSAGE THAT IF SYMPTOMS WERE THE SAME TO CALL 911 OR GO TO THE NEAREST ER.

## 2018-05-01 NOTE — H&P
[facility]   HISTORY AND PHYSICAL      Name:  Breann Gallegos   Age:  67 y.o.  Sex:  female  :  1950  MRN:  [id number]   Visit Number:  [id number]  Admission Date:  2018  Date Of Service:  18  Primary Care Physician:  Harry Avery MD    History Obtained From:    patient and family    Chief Complaint:     Dysphagia     History Of Presenting Illness:      Patient is a 67-year-old  female with history of diabetes type 2, COPD, GERD, hyperlipidemia, hypertension, B12 and vitamin D deficiency who comes in as she was choking on her own saliva around noon time today.  She's been coughing all day.  She is unable to take her medications.  She could not tolerate her breathing treatment.  Her throat was swollen and painful and she felt like she could not get air in.  She rated the pain as severe.  Nothing was making a better so she came in the emergency room.  She has a history of esophageal stricture and she's been dilated 5 times in the past most recently 2017 by Dr. Dumont.  At that time she had erosive esophagitis as well as a distal esophageal stricture.  It is suspected that she has this again.  CT of the soft tissues of the neck showed thyroid nodules but no stricture in the neck.  No foreign body was noted either.  She does not relate a history of large pills or large bites of food such as steak in the recent past.  She has a headache from coughing so much.  She denies any chest pressure, nausea, vomiting, fever, chills.  She was feeling well prior to this.  Her last meal was a bowl of cereal this morning.    Review Of Systems:     General ROS: negative  Psychological ROS: negative  Ophthalmic ROS: negative  ENT ROS: positive for - headaches and sore throat  Allergy and Immunology ROS: negative  Hematological and Lymphatic ROS: negative  Endocrine ROS: negative  Breast ROS: negative  Respiratory ROS: positive for - shortness of breath  Cardiovascular ROS:  "negative  Gastrointestinal ROS: negative  Genito-Urinary ROS: negative  Musculoskeletal ROS: negative  Neurological ROS: no TIA or stroke symptoms  Dermatological ROS: negative       Past Medical History:    Diabetes type 2, COPD, GERD, hyperlipidemia, hypertension, B12 and vitamin D deficiency    Past Surgical history:    Appendectomy, breast reduction, cataract surgery, cholecystectomy, EGD, colonoscopy, right hand surgery, total abdominal hysterectomy, bladder suspension, tonsillectomy      Social History:    Quit smoking in 2004, has at least 30-pack-year history, denies alcohol or drugs.  Lives with her .  She is a full code.    Family History:    Father had end-stage renal disease and prostate cancer.  Mother had COPD.    Allergies:      Codeine; Protonix [pantoprazole]; Terbinafine; and Penicillins    Home Medications:    Prior to Admission Medications     Prescriptions Last Dose Informant Patient Reported? Taking?    albuterol (PROAIR RESPICLICK) 108 (90 BASE) MCG/ACT inhaler 5/1/2018 Self No Yes    Inhale 1 puff every 4 (four) hours as needed for wheezing or shortness of air.    amLODIPine (NORVASC) 5 MG tablet 4/30/2018  No Yes    TAKE 1 TABLET BY MOUTH AT BEDTIME    arformoterol (BROVANA) 15 MCG/2ML nebulizer solution 4/30/2018  No Yes    Take 2 mL by nebulization 2 (Two) Times a Day.    B-D INS SYR ULTRAFINE 1CC/31G 31G X 5/16\" 1 ML misc 5/1/2018  No Yes    use as directed    budesonide (PULMICORT) 0.5 MG/2ML nebulizer solution 4/30/2018  No Yes    Take 2 mL by nebulization 2 (Two) Times a Day.    buPROPion XL (WELLBUTRIN XL) 150 MG 24 hr tablet 4/30/2018  No Yes    take 1 tablet by mouth once daily    calcitonin, salmon, (MIACALCIN) 200 UNIT/ACT nasal spray 4/30/2018  No Yes    1 spray into each nostril Daily.    cholecalciferol (VITAMIN D3) 1000 units tablet 4/30/2018 Self Yes Yes    Take 2,000 Units by mouth Daily.    clopidogrel (PLAVIX) 75 MG tablet 4/30/2018  No Yes    take 1 tablet by mouth " "once daily    clotrimazole-betamethasone (LOTRISONE) 1-0.05 % cream 4/30/2018  No Yes    Apply 1 application topically 2 (Two) Times a Day. Apply topically twice daily as direted.    colestipol (COLESTID) 1 g tablet 4/30/2018  No Yes    take 2 tablets by mouth twice a day    diphenoxylate-atropine (LOMOTIL) 2.5-0.025 MG per tablet 4/30/2018 Self Yes Yes    TAKE 1 TABLET BY MOUTH 3 TO 4 TIMES DAILY USE AS DIRECTED    flunisolide (NASALIDE) 25 MCG/ACT (0.025%) solution nasal spray 4/30/2018 Self No Yes    Inhale 2 sprays Every 12 (Twelve) Hours.    gabapentin (NEURONTIN) 300 MG capsule 4/30/2018  No Yes    TAKE 1 CAPSULE BY MOUTH TWICE A DAY    Insulin Syringe-Needle U-100 (B-D INS SYR ULTRAFINE .5CC/30G) 30G X 1/2\" 0.5 ML misc 4/30/2018  Yes Yes    Insulin Syringe-Needle U-100 (B-D INS SYR ULTRAFINE 1CC/30G) 30G X 1/2\" 1 ML misc 4/30/2018  Yes Yes    ipratropium-albuterol (DUO-NEB) 0.5-2.5 mg/mL nebulizer 4/30/2018 Self No Yes    Take 3 mL by nebulization Every 4 (Four) Hours As Needed for wheezing or shortness of air.    Lancets (ONETOUCH ULTRASOFT) lancets 4/30/2018  No Yes    USE AS DIRECTED TWICE A DAY    lisinopril (PRINIVIL,ZESTRIL) 20 MG tablet 4/30/2018  No Yes    take 1 tablet by mouth once daily    metFORMIN (GLUCOPHAGE) 500 MG tablet 4/30/2018  No Yes    TAKE 2 TABLETS BY MOUTH IN THE MORNING, 1 TABLET AT LUNCH AND 2 TABLETS IN THE EVENING    omeprazole (PRILOSEC) 40 MG capsule 4/30/2018  No Yes    1 po daily in the am 30 minutes before breakfast    ondansetron (ZOFRAN) 4 MG tablet Past Week  No Yes    Take 1 tablet by mouth Every 8 (Eight) Hours As Needed for Nausea or Vomiting.    ONE TOUCH ULTRA TEST test strip 4/30/2018  No Yes    TEST once daily to twice a day    sertraline (ZOLOFT) 100 MG tablet 4/30/2018 Self No Yes    TAKE 1 1/2 TABLETS BY MOUTH ONCE DAILY    simvastatin (ZOCOR) 40 MG tablet 4/30/2018 Self No Yes    take 1 tablet by mouth once daily    LANTUS 100 UNIT/ML injection   No No    INJECT " 90 UNITS TWICE DAILY    Patient taking differently:  INJECT 45 UNITS TWICE DAILY             Hospital Scheduled Meds:      arformoterol 15 mcg Nebulization BID - RT   budesonide 0.5 mg Nebulization BID   calcitonin (salmon) 1 spray Alternating Nares Daily   enoxaparin 40 mg Subcutaneous Q24H   [START ON 5/2/2018] fluticasone 2 spray Each Nare Daily   [START ON 5/2/2018] insulin detemir 45 Units Subcutaneous Q12H   insulin regular 0-14 Units Subcutaneous Q6H         sodium chloride 100 mL/hr        Vital Signs:    Temp:  [98.1 °F (36.7 °C)-98.2 °F (36.8 °C)] 98.1 °F (36.7 °C)  Heart Rate:  [] 96  Resp:  [18-20] 18  BP: (130-192)/() 165/82    1    05/01/18  1407 05/01/18  1915   Weight: 72.1 kg (159 lb) 70.8 kg (156 lb 1.6 oz)       Body mass index is 26.79 kg/m².    Physical Exam:      General Appearance:    Alert, cooperative, in no acute distress   Head:    Normocephalic, without obvious abnormality, atraumatic   Eyes:            Lids and lashes normal, conjunctivae and sclerae normal, no   icterus, no pallor, corneas clear, PERRLA   Ears:    Ears appear intact with no abnormalities noted   Throat:   No oral lesions, no thrush, oral mucosa moist   Neck:   No adenopathy, supple, trachea midline, no thyromegaly, no     carotid bruit, no JVD   Back:     No kyphosis present, no scoliosis present, no skin lesions,       erythema or scars, no tenderness to percussion or                   palpation,   range of motion normal   Lungs:     Clear to auscultation,respirations regular, even and                   unlabored    Heart:    Regular rhythm and normal rate, normal S1 and S2, no            murmur, no gallop, no rub, no click   Breast Exam:    Deferred   Abdomen:     Normal bowel sounds, no masses, no organomegaly, soft        non-tender, non-distended, no guarding, no rebound                 tenderness   Genitalia:    Deferred   Extremities:   Moves all extremities well, no edema, no cyanosis, no               redness   Pulses:   Pulses palpable and equal bilaterally   Skin:   No bleeding, bruising or rash   Lymph nodes:   No palpable adenopathy   Neurologic:   Cranial nerves 2 - 12 grossly intact, sensation intact, DTR        present and equal bilaterally       EKG:      Sinus tachycardia with no acute ST-T changes.        I have personally looked at  the EKG     Labs:      Results from last 7 days  Lab Units 05/01/18  1413   WBC 10*3/mm3 9.59   HEMOGLOBIN g/dL 15.2   HEMATOCRIT % 45.8   MCV fL 82.7   MCHC g/dL 33.2   PLATELETS 10*3/mm3 267           Results from last 7 days  Lab Units 05/01/18  1413   SODIUM mmol/L 143   POTASSIUM mmol/L 4.4   CHLORIDE mmol/L 99   CO2 mmol/L 28.0   BUN mg/dL 14   CREATININE mg/dL 0.80   EGFR IF NONAFRICN AM mL/min/1.73 72   CALCIUM mg/dL 9.8   GLUCOSE mg/dL 237*   ALBUMIN g/dL 4.70   BILIRUBIN mg/dL 0.9   ALK PHOS U/L 79   AST (SGOT) U/L 43   ALT (SGPT) U/L 40   Estimated Creatinine Clearance: 66.7 mL/min (by C-G formula based on SCr of 0.8 mg/dL).  No results found for: AMMONIA    Results from last 7 days  Lab Units 05/01/18  1413   TROPONIN I ng/mL <0.012         Lab Results   Component Value Date    HGBA1C 6.90 12/22/2017     Lab Results   Component Value Date    TSH 1.390 07/24/2017    FREET4 1.42 01/22/2015     No results found for: PREGTESTUR, PREGSERUM, HCG, HCGQUANT  Pain Management Panel     There is no flowsheet data to display.                          Radiology:    Imaging Results (last 7 days)     Procedure Component Value Units Date/Time    CT Soft Tissue Neck Without Contrast [[id number]] Collected:  05/01/18 1734     Updated:  05/01/18 1735    Narrative:       FINAL REPORT    TECHNIQUE:  Axial images through the neck were obtained without contrast  administration.    CLINICAL HISTORY:  stridor, dysphagia    FINDINGS:  The nasopharynx, pharynx and larynx are normal.  The visualized  trachea is unremarkable.  The visualized esophagus shows no  abnormality.  The parotid,  submandibular and thyroid glands are  normal except for small apparent right lobe thyroid nodules.  Ultrasound is recommended for further evaluation.  There is no  mass or adenopathy.  There is no significant sinus or osseous  abnormality.      Impression:       Apparent right thyroid nodules, otherwise unremarkable.    Authenticated by Juan Luis Jolley M.D. on 05/01/2018 05:34:20 PM    XR Chest 1 View [[id number]] Collected:  05/01/18 1531     Updated:  05/01/18 1535    Narrative:       PROCEDURE: XR CHEST 1 VW-     HISTORY: Shortness of breath         COMPARISON:  None     FINDINGS:  Portable view of the chest demonstrates prominence of  interstitium likely chronic. No acute infiltrate is seen. There is no  evidence of effusion, pneumothorax or other significant pleural disease.  The mediastinum is unremarkable.     The heart size is normal.            Impression:       Chronic interstitial changes      This report was finalized on 5/1/2018 3:32 PM by Nish Smith MD.          Assessment:    1.  Recurrent esophageal stricture  2.  Intractable cough due to #1  3.  Right-sided thyroid nodule requiring ultrasound as outpatient   4.  Diabetes type 2  5.  COPD  6.  GERD  7.  Essential hypertension    Plan:     We'll place the patient on IV fluids.  Morphine for pain.  Check lab work in the a.m. including a TSH.  DuoNeb for her breathing.  Consult Dr. Dumont for endoscopy in the a.m.  Insulin sliding scale.  Will keep nothing by mouth.  Hold her oral medications at this time.  Hydralazine IV when necessary for systolic blood pressure elevated greater than 170.  SCDs only for DVT prophylaxis.  Further recommendations will depend on clinical course.  Hopefully patient can be discharged home tomorrow.    Rock Guzman,   05/01/18  7:48 PM

## 2018-05-01 NOTE — ED PROVIDER NOTES
Subjective   67-year-old female presents with difficulty swallowing cough and feeling short of breath.  She states that she does have a conversation with her  earlier and had a sudden onset of difficulty swallowing.  She states that she now cannot swallow her saliva and feels choked up and short of breath, she also developed coughing shortly after.  She no longer feels short of breath but is having difficulty swallowing.  She has a history of esophageal dysmotility and has had at least 5 scopes with stretching in the past.  She is seeing Dr. Dumont in the past for esophageal dysmotility.  She has history of COPD but she states that she does not feel short of breath at this time.        History provided by:  Patient   used: No        Review of Systems   HENT:        Difficulty swallowing   All other systems reviewed and are negative.      Past Medical History:   Diagnosis Date   • Arthritis    • Body piercing     EARS   • COPD (chronic obstructive pulmonary disease)    • Diabetes mellitus    • Dysphagia     REPORTS SHE FEELS LIKE THINGS ARE GETTING STUCK IN HER THROAT FOR THE PAST SEVERAL MONTHS   • Eustachian tube dysfunction    • Fracture     METATRASAL   • Full dentures     INSTRUCTED NO ADHESIVES THE DOS   • GERD (gastroesophageal reflux disease)    • High cholesterol    • History of nuclear stress test     REPORTS 3-4 YEARS AGO AND THAT ALL WAS WNL'S   • Hyperlipidemia    • Hypertension    • Sinus problem    • Skin abscess    • Vision problem    • Vitamin B12 deficiency    • Vitamin D deficiency    • Wears glasses        Allergies   Allergen Reactions   • Codeine Nausea And Vomiting   • Protonix [Pantoprazole]    • Terbinafine Other (See Comments)     PATIENT REPORTS SHE IS UNSURE REACTION     • Penicillins Rash       Past Surgical History:   Procedure Laterality Date   • APPENDECTOMY  1976   • BREAST SURGERY      REDUCTION   • CATARACT EXTRACTION Bilateral    • CHOLECYSTECTOMY  1976   •  COLONOSCOPY     • ENDOSCOPY     • ENDOSCOPY N/A 11/29/2017    Procedure: ESOPHAGOGASTRODUODENOSCOPY with biopsies and esophageal balloon dilitation;  Surgeon: Molina Dumont MD;  Location: [facility] ENDOSCOPY;  Service:    • HAND SURGERY Right    • HYSTERECTOMY  1979   • OOPHORECTOMY Bilateral 1979   • OTHER SURGICAL HISTORY      BLADDER TACK   • REDUCTION MAMMAPLASTY Bilateral 1988   • TONSILLECTOMY         Family History   Problem Relation Age of Onset   • Diabetes Other    • COPD Mother    • Pneumonia Mother    • Kidney failure Father    • Prostate cancer Father    • Colon cancer Neg Hx    • Breast cancer Neg Hx    • Ovarian cancer Neg Hx        Social History     Social History   • Marital status:      Social History Main Topics   • Smoking status: Former Smoker     Packs/day: 1.00     Years: 30.00     Types: Cigarettes     Quit date: 01/2010   • Smokeless tobacco: Never Used   • Alcohol use No   • Drug use: No   • Sexual activity: Defer     Other Topics Concern   • Not on file           Objective   Physical Exam   Constitutional: She is oriented to person, place, and time. She appears well-developed and well-nourished.   Eyes: EOM are normal.   Neck: Normal range of motion. Neck supple.   Cardiovascular: Normal rate and regular rhythm.    Pulmonary/Chest: Effort normal and breath sounds normal.   Abdominal: Soft. Bowel sounds are normal.   Musculoskeletal: Normal range of motion.   Neurological: She is alert and oriented to person, place, and time. She has normal reflexes.   Skin: Skin is warm and dry.   Psychiatric: She has a normal mood and affect.   Nursing note and vitals reviewed.      Procedures           ED Course  ED Course   Comment By Time   Discussed with Dr. Dumont he is familiar with the patient she's had esophageal strictures in the past with previous EGD with dilatation.  She is unable to swallow liquids or solids he agreed with admission with see her in consultation admit to the hospitalist  Esdras Walker Jr., PA-C 05/01 1546                  [facility]    ED Disposition     None              Final diagnoses:   Esophageal stricture   Dysphagia, unspecified type            Esdras Walker Jr., PA-C  05/01/18 9901

## 2018-05-02 ENCOUNTER — EPISODE CHANGES (OUTPATIENT)
Dept: CASE MANAGEMENT | Facility: OTHER | Age: 68
End: 2018-05-02

## 2018-05-02 ENCOUNTER — ANESTHESIA (OUTPATIENT)
Dept: GASTROENTEROLOGY | Facility: HOSPITAL | Age: 68
End: 2018-05-02

## 2018-05-02 ENCOUNTER — ANESTHESIA EVENT (OUTPATIENT)
Dept: GASTROENTEROLOGY | Facility: HOSPITAL | Age: 68
End: 2018-05-02

## 2018-05-02 PROBLEM — R13.19 OTHER DYSPHAGIA: Status: ACTIVE | Noted: 2018-05-01

## 2018-05-02 LAB
ANION GAP SERPL CALCULATED.3IONS-SCNC: 18.2 MMOL/L (ref 10–20)
BUN BLD-MCNC: 12 MG/DL (ref 7–20)
BUN/CREAT SERPL: 15 (ref 7.1–23.5)
CALCIUM SPEC-SCNC: 9 MG/DL (ref 8.4–10.2)
CHLORIDE SERPL-SCNC: 100 MMOL/L (ref 98–107)
CO2 SERPL-SCNC: 30 MMOL/L (ref 26–30)
CREAT BLD-MCNC: 0.8 MG/DL (ref 0.6–1.3)
DEPRECATED RDW RBC AUTO: 43.5 FL (ref 37–54)
ERYTHROCYTE [DISTWIDTH] IN BLOOD BY AUTOMATED COUNT: 14.5 % (ref 11.5–14.5)
GFR SERPL CREATININE-BSD FRML MDRD: 72 ML/MIN/1.73
GLUCOSE BLD-MCNC: 164 MG/DL (ref 74–98)
GLUCOSE BLDC GLUCOMTR-MCNC: 110 MG/DL (ref 70–130)
GLUCOSE BLDC GLUCOMTR-MCNC: 138 MG/DL (ref 70–130)
GLUCOSE BLDC GLUCOMTR-MCNC: 162 MG/DL (ref 70–130)
GLUCOSE BLDC GLUCOMTR-MCNC: 175 MG/DL (ref 70–130)
GLUCOSE BLDC GLUCOMTR-MCNC: 180 MG/DL (ref 70–130)
GLUCOSE BLDC GLUCOMTR-MCNC: 183 MG/DL (ref 70–130)
HCT VFR BLD AUTO: 42.2 % (ref 37–47)
HGB BLD-MCNC: 14 G/DL (ref 12–16)
MAGNESIUM SERPL-MCNC: 1.5 MG/DL (ref 1.6–2.3)
MCH RBC QN AUTO: 27.8 PG (ref 27–31)
MCHC RBC AUTO-ENTMCNC: 33.2 G/DL (ref 30–37)
MCV RBC AUTO: 83.9 FL (ref 81–99)
PLATELET # BLD AUTO: 222 10*3/MM3 (ref 130–400)
PMV BLD AUTO: 9.5 FL (ref 6–12)
POTASSIUM BLD-SCNC: 4.2 MMOL/L (ref 3.5–5.1)
RBC # BLD AUTO: 5.03 10*6/MM3 (ref 4.2–5.4)
SODIUM BLD-SCNC: 144 MMOL/L (ref 137–145)
TSH SERPL DL<=0.05 MIU/L-ACNC: 2.63 MIU/ML (ref 0.47–4.68)
WBC NRBC COR # BLD: 9.63 10*3/MM3 (ref 4.8–10.8)

## 2018-05-02 PROCEDURE — 94799 UNLISTED PULMONARY SVC/PX: CPT

## 2018-05-02 PROCEDURE — 96365 THER/PROPH/DIAG IV INF INIT: CPT

## 2018-05-02 PROCEDURE — 85027 COMPLETE CBC AUTOMATED: CPT | Performed by: INTERNAL MEDICINE

## 2018-05-02 PROCEDURE — 25010000002 ONDANSETRON PER 1 MG: Performed by: INTERNAL MEDICINE

## 2018-05-02 PROCEDURE — 63710000001 INSULIN REGULAR HUMAN PER 5 UNITS: Performed by: INTERNAL MEDICINE

## 2018-05-02 PROCEDURE — 80048 BASIC METABOLIC PNL TOTAL CA: CPT | Performed by: INTERNAL MEDICINE

## 2018-05-02 PROCEDURE — 84443 ASSAY THYROID STIM HORMONE: CPT | Performed by: INTERNAL MEDICINE

## 2018-05-02 PROCEDURE — 82962 GLUCOSE BLOOD TEST: CPT

## 2018-05-02 PROCEDURE — G0378 HOSPITAL OBSERVATION PER HR: HCPCS

## 2018-05-02 PROCEDURE — 43239 EGD BIOPSY SINGLE/MULTIPLE: CPT | Performed by: INTERNAL MEDICINE

## 2018-05-02 PROCEDURE — 25010000002 MAGNESIUM SULFATE 2 GM/50ML SOLUTION: Performed by: INTERNAL MEDICINE

## 2018-05-02 PROCEDURE — 96361 HYDRATE IV INFUSION ADD-ON: CPT

## 2018-05-02 PROCEDURE — 25010000002 PROPOFOL 200 MG/20ML EMULSION: Performed by: NURSE ANESTHETIST, CERTIFIED REGISTERED

## 2018-05-02 PROCEDURE — C1726 CATH, BAL DIL, NON-VASCULAR: HCPCS | Performed by: INTERNAL MEDICINE

## 2018-05-02 PROCEDURE — 83735 ASSAY OF MAGNESIUM: CPT | Performed by: INTERNAL MEDICINE

## 2018-05-02 PROCEDURE — 43249 ESOPH EGD DILATION <30 MM: CPT | Performed by: INTERNAL MEDICINE

## 2018-05-02 PROCEDURE — S0260 H&P FOR SURGERY: HCPCS | Performed by: INTERNAL MEDICINE

## 2018-05-02 PROCEDURE — 25010000002 MORPHINE PER 10 MG: Performed by: INTERNAL MEDICINE

## 2018-05-02 PROCEDURE — 63710000001 INSULIN DETEMIR PER 5 UNITS: Performed by: INTERNAL MEDICINE

## 2018-05-02 RX ORDER — MAGNESIUM SULFATE HEPTAHYDRATE 40 MG/ML
2 INJECTION, SOLUTION INTRAVENOUS ONCE
Status: COMPLETED | OUTPATIENT
Start: 2018-05-02 | End: 2018-05-02

## 2018-05-02 RX ORDER — PROPOFOL 10 MG/ML
INJECTION, EMULSION INTRAVENOUS AS NEEDED
Status: DISCONTINUED | OUTPATIENT
Start: 2018-05-02 | End: 2018-05-02 | Stop reason: SURG

## 2018-05-02 RX ORDER — SODIUM CHLORIDE 9 MG/ML
70 INJECTION, SOLUTION INTRAVENOUS CONTINUOUS PRN
Status: DISCONTINUED | OUTPATIENT
Start: 2018-05-02 | End: 2018-05-03 | Stop reason: HOSPADM

## 2018-05-02 RX ADMIN — BUDESONIDE 0.5 MG: 0.5 INHALANT RESPIRATORY (INHALATION) at 07:14

## 2018-05-02 RX ADMIN — HUMAN INSULIN 3 UNITS: 100 INJECTION, SOLUTION SUBCUTANEOUS at 00:58

## 2018-05-02 RX ADMIN — FLUTICASONE PROPIONATE 2 SPRAY: 50 SPRAY, METERED NASAL at 08:26

## 2018-05-02 RX ADMIN — INSULIN DETEMIR 45 UNITS: 100 INJECTION, SOLUTION SUBCUTANEOUS at 09:17

## 2018-05-02 RX ADMIN — LIDOCAINE HYDROCHLORIDE 60 MG: 20 INJECTION, SOLUTION INTRAVENOUS at 12:53

## 2018-05-02 RX ADMIN — PROPOFOL 50 MG: 10 INJECTION, EMULSION INTRAVENOUS at 12:58

## 2018-05-02 RX ADMIN — MORPHINE SULFATE 1 MG: 2 INJECTION, SOLUTION INTRAMUSCULAR; INTRAVENOUS at 08:26

## 2018-05-02 RX ADMIN — HUMAN INSULIN 3 UNITS: 100 INJECTION, SOLUTION SUBCUTANEOUS at 18:14

## 2018-05-02 RX ADMIN — INSULIN DETEMIR 45 UNITS: 100 INJECTION, SOLUTION SUBCUTANEOUS at 20:16

## 2018-05-02 RX ADMIN — PROPOFOL 50 MG: 10 INJECTION, EMULSION INTRAVENOUS at 12:52

## 2018-05-02 RX ADMIN — ARFORMOTEROL TARTRATE 15 MCG: 15 SOLUTION RESPIRATORY (INHALATION) at 07:14

## 2018-05-02 RX ADMIN — SODIUM CHLORIDE 70 ML/HR: 9 INJECTION, SOLUTION INTRAVENOUS at 07:22

## 2018-05-02 RX ADMIN — ONDANSETRON 4 MG: 2 INJECTION INTRAMUSCULAR; INTRAVENOUS at 12:53

## 2018-05-02 RX ADMIN — ARFORMOTEROL TARTRATE 15 MCG: 15 SOLUTION RESPIRATORY (INHALATION) at 19:40

## 2018-05-02 RX ADMIN — MORPHINE SULFATE 1 MG: 2 INJECTION, SOLUTION INTRAMUSCULAR; INTRAVENOUS at 01:00

## 2018-05-02 RX ADMIN — MAGNESIUM SULFATE IN WATER 2 G: 40 INJECTION, SOLUTION INTRAVENOUS at 08:38

## 2018-05-02 RX ADMIN — BUDESONIDE 0.5 MG: 0.5 INHALANT RESPIRATORY (INHALATION) at 19:40

## 2018-05-02 RX ADMIN — PROPOFOL 50 MG: 10 INJECTION, EMULSION INTRAVENOUS at 13:02

## 2018-05-02 RX ADMIN — PROPOFOL 50 MG: 10 INJECTION, EMULSION INTRAVENOUS at 12:53

## 2018-05-02 NOTE — OP NOTE
PROCEDURE:  Upper Endoscopy with serial dilation of the esophagus using 15-16.5-18 mm CRE balloon to 18 mm and biopsies.    DATE OF PROCEDURE: May 2, 2018.    REFERRING PROVIDER:  Harry Avery MD.     INSTRUMENT:   Olympus GIF H 190 video endoscope     INDICATIONS OF THE PROCEDURE:  This is a 67-year-old white female with history of dysphagia and cough. Currently undergoing upper endoscopy for further evaluation.  Previously the patient had an esophageal stricture which had been dilated.     BIOPSIES: Mid and distal esophagus.      MEDICATIONS:  MAC.     PHOTOGRAPHS:  Photographs were included in the medical records.     CONSENT/PREPROCEDURE EVALUATION:  Risks, benefits, alternatives and options of the procedure including risks of anesthesia/sedation were discussed and informed consent was obtained prior to the procedure. History and physical examination were performed and nothing precluded the test.     REPORT:  The patient was placed in left lateral decubitus position. Once under the influence of IV sedation, the instrument was inserted into the mouth and esophagus was intubated under direct vision without difficulty.    Visualized portions of the laryngopharyngeal area including vocal cords appeared to be within normal limits.    Esophagus:  Upper esophageal sphincter was noted to be somewhat tortuous. Subtle concentric rings were noted within the upper, mid and distal esophagus.  Z line was noted to be around  35 cm.    A small sliding hiatal hernia less than 3 cm was noted.  No Mares's esophagus was seen.     Stomach:  Antrum:  Erythematous gastritis.  Angulus, lesser and greater curves: Normal.  Retroflex examination: Sliding hiatal hernia.  Cardia and fundus:  Normal.     Body of the stomach: Erythematous gastritis.  Good distensibility of the stomach was achieved no giant folds were noted. Bile reflux was noted in the stomach. This was suctioned.  Biopsies were obtained from the gastric antrum,  body and  fundus.    Pylorus and pyloric channel:  normal.     Duodenum:  Bulb: Normal.  Second portion: normal.  No scalloping was seen in the second portion of duodenum.  Biopsies were obtained from the second portion of duodenum.    Intervention:  Distal esophagus was dilated using 15-16.5-18 mm CRE balloon to 18 mm under vision without difficulty. The scope was then withdrawn into the mid and finally into the proximal esophagus. These areas were serially dilated to 18 mm. Limited dilation closer to the proximal esophageal sphincter was undertaken to 16 mm. Some blood was noted no active bleeding was seen.       The upper GI tract was decompressed and the scope was pulled out of the patient. The patient tolerated the procedure well.     DIAGNOSES:     1. Subtle esophageal rings. Status post serial dilation to 18 mm.  2. Small sliding hiatal hernia less than 3 cm. No Mares's esophagus.  3. Erythematous gastritis.  4. Bile reflux into stomach was seen.  5. Questionable mid esophageal small diverticulum.  6. Limited examination of the laryngopharyngeal area including vocal cords did not reveal significant pathology. Vocal cords were noted to open appropriately.      RECOMMENDATIONS:  1.  Dietary instructions.  2.  Pantoprazole 40 mg 1 p.o. q.a.m. 1/2 hour before breakfast.  3.  Follow biopsies.  4.  Once fully alert and clinically stable may have clear liquids down on temperature. If tolerated may advance to soft diet.       Thank you very much for letting me participate in the care of this patient. Please do not hesitate to call me if you have any questions.

## 2018-05-02 NOTE — PROGRESS NOTES
Discharge Planning Assessment   [facility]     Patient Name: Breann Gallegos  MRN: [id number]  Today's Date: 5/2/2018    Admit Date: 5/1/2018          Discharge Needs Assessment    No documentation.             Discharge Plan     Row Name 05/02/18 6318       Plan    Plan Spoke to patient and .  Was independent prior to admission and plans to return home at discharge.  Denies DME, home health or equipment at home.  Confirmed demos, PCP and contacts as correct.  Denies further needs.     Patient/Family in Agreement with Plan yes    Row Name 05/02/18 4726       Plan    Plan Pt is off the floor, unable to see for DCP.          Destination     No service coordination in this encounter.      Durable Medical Equipment     No service coordination in this encounter.      Dialysis/Infusion     No service coordination in this encounter.      Home Medical Care     No service coordination in this encounter.      Social Care     No service coordination in this encounter.                Demographic Summary    No documentation.           Functional Status    No documentation.           Psychosocial    No documentation.           Abuse/Neglect    No documentation.           Legal    No documentation.           Substance Abuse    No documentation.           Patient Forms    No documentation.         Marlyn Leblanc

## 2018-05-02 NOTE — CONSULTS
Referring provider:  No att. providers found    Primary care provider: Harry Avery MD    Date of consultation:  May 1, 2018.    Reason for consultation : Inability to swallow.  The patient was evaluated in the emergency room.    History:  This is a 67-year-old white female who presented to [facility] emergency room with history of rather acute onset of severe dysphagia and choking feeling.  Around 12 noon today while talking the patient had rather sudden onset of choking sensation with inability to swallow her own saliva. The patient also felt that she could not breathe and properly. She has been coughing since then.The patient feels as if something stuck. She could not get a breathing treatment. The patient also had pain in the throat which was rather severe. The patient denies eating or taking medications prior to the onset of her symptoms today. The patient has been restless. Due to frequent coughing she has been having some headaches.  Previously the patient has history of proximal and distal esophageal strictures requiring serial dilation.. Her last upper endoscopy with dilation of the distal esophagus was in November 2017. At that time, the patient was dilated serially to 18 mm. Since then, the patient was doing much better with occasional mild dysphagic feeling.    The patient has history of diarrhea for the last several years.   Diarrhea was moderate to severe to begin with, frequency of bowel movements were 5-6 times a day.  The stools were described as loose and watery.  There was nocturnal element of diarrhea.  The diarrhea was associated with tenesmus and urgency.  Currently, the patient feels better. She has been having one and occasionally 2 formed stools a day. Currently, the patient is taking Colestid 2 tablets at night and one in the morning. She also takes Lomotil 1 tablet 3 times a day.     The patient has history of reflux for the last several years.  The reflux is moderately  severe.  Symptoms are described as retrosternal burning sensation, and indigestion.  There is history of occasional regurgitative symptoms.  Frequency being several times per week.  The symptoms are worse at night.  The patient takes acid suppressive therapy with reasonable control of his symptoms. The patient has history of erosive esophagitis.     There is occasional nausea. The patient also complains of some upper abdominal discomfort especially on bending when she puts on her shoes. Otherwise the patient denies abdominal pain.  There is no history of liver or pancreatic disease.    There is no history of overt GI bleed (Hematemesis, melena or hematochezia). There is no history of constipation. She denies recent weight loss. There is no fever or chills.     The patient denies recent seizure, stroke or syncopal episode.  There is no history of recent chest pains or MI. There is no history of arrhythmias. The patient denied significant cough or shortness of breath prior to the onset of her symptoms today. She denies hematuria or dysuria. There is no history of anemia or bleeding disorder. She denies history of DVT or PE. No history of blood transfusion. The patient has history of arthritis. She has COPD-emphysema. The patient is a diabetic. There is no history of hypo-or hyperthyroidism.    There is no family history of colon cancer, inflammatory bowel disease or chronic liver disease.    There is 30 pack year history of smoking. She quit smoking in 2010. She denies alcohol use. There is no history of drug use.    The patient is status post cholecystectomy, breast reductive surgery, appendectomy,  hysterectomy and bilateral salpingo-oophorectomy, tonsillectomy , and right hand surgery in the past. The patient had also undergone bladder tack in the past.      Past Medical History:   Diagnosis Date   • Arthritis    • Body piercing     EARS   • COPD (chronic obstructive pulmonary disease)    • Diabetes mellitus    •  Dysphagia     REPORTS SHE FEELS LIKE THINGS ARE GETTING STUCK IN HER THROAT FOR THE PAST SEVERAL MONTHS   • Eustachian tube dysfunction    • Fracture     METATRASAL   • Full dentures     INSTRUCTED NO ADHESIVES THE DOS   • GERD (gastroesophageal reflux disease)    • High cholesterol    • History of nuclear stress test     REPORTS 3-4 YEARS AGO AND THAT ALL WAS WNL'S   • Hyperlipidemia    • Hypertension    • Sinus problem    • Skin abscess    • Vision problem    • Vitamin B12 deficiency    • Vitamin D deficiency    • Wears glasses        Past Surgical History:   Procedure Laterality Date   • APPENDECTOMY  1976   • BREAST SURGERY      REDUCTION   • CATARACT EXTRACTION Bilateral    • CHOLECYSTECTOMY  1976   • COLONOSCOPY     • ENDOSCOPY     • ENDOSCOPY N/A 11/29/2017    Procedure: ESOPHAGOGASTRODUODENOSCOPY with biopsies and esophageal balloon dilitation;  Surgeon: Molina Dumont MD;  Location: [facility] ENDOSCOPY;  Service:    • HAND SURGERY Right    • HYSTERECTOMY  1979   • OOPHORECTOMY Bilateral 1979   • OTHER SURGICAL HISTORY      BLADDER TACK   • REDUCTION MAMMAPLASTY Bilateral 1988   • TONSILLECTOMY         Family History   Problem Relation Age of Onset   • Diabetes Other    • COPD Mother    • Pneumonia Mother    • Kidney failure Father    • Prostate cancer Father    • Colon cancer Neg Hx    • Breast cancer Neg Hx    • Ovarian cancer Neg Hx        Social History   Substance Use Topics   • Smoking status: Former Smoker     Packs/day: 1.00     Years: 30.00     Types: Cigarettes     Quit date: 01/2010   • Smokeless tobacco: Never Used   • Alcohol use No     Review of Systems   Constitutional: Negative for appetite change, chills, fatigue, fever and unexpected weight change.   HENT: Positive for trouble swallowing. Negative for facial swelling, mouth sores, nosebleeds and sore throat.    Eyes: Negative for discharge and redness.   Respiratory: Positive for cough, choking and shortness of breath. Negative for apnea,  "wheezing and stridor.    Cardiovascular: Negative for chest pain, palpitations and leg swelling.   Gastrointestinal: Positive for diarrhea. Negative for abdominal distention, abdominal pain, anal bleeding, blood in stool, constipation, nausea and vomiting.   Endocrine: Negative for cold intolerance, heat intolerance and polydipsia.   Genitourinary: Negative for dysuria, hematuria and urgency.   Musculoskeletal: Negative for arthralgias, joint swelling and myalgias.   Skin: Negative for rash.   Allergic/Immunologic: Negative for food allergies and immunocompromised state.   Neurological: Positive for headaches. Negative for dizziness, seizures, syncope, weakness and light-headedness.   Hematological: Negative for adenopathy. Does not bruise/bleed easily.   Psychiatric/Behavioral: Negative for dysphoric mood. The patient is not nervous/anxious and is not hyperactive.        Allergies   Allergen Reactions   • Codeine Nausea And Vomiting   • Fish-Derived Products Nausea And Vomiting   • Protonix [Pantoprazole]    • Terbinafine Other (See Comments)     PATIENT REPORTS SHE IS UNSURE REACTION     • Penicillins Rash       No current facility-administered medications for this encounter.     Current Outpatient Prescriptions:   •  albuterol (PROAIR RESPICLICK) 108 (90 BASE) MCG/ACT inhaler, Inhale 1 puff every 4 (four) hours as needed for wheezing or shortness of air., Disp: 1 inhaler, Rfl: 0  •  amLODIPine (NORVASC) 5 MG tablet, TAKE 1 TABLET BY MOUTH AT BEDTIME, Disp: 30 tablet, Rfl: 5  •  arformoterol (BROVANA) 15 MCG/2ML nebulizer solution, Take 2 mL by nebulization 2 (Two) Times a Day., Disp: 120 mL, Rfl: 5  •  B-D INS SYR ULTRAFINE 1CC/31G 31G X 5/16\" 1 ML misc, use as directed, Disp: 100 each, Rfl: 5  •  budesonide (PULMICORT) 0.5 MG/2ML nebulizer solution, Take 2 mL by nebulization 2 (Two) Times a Day., Disp: 120 mL, Rfl: 5  •  buPROPion XL (WELLBUTRIN XL) 150 MG 24 hr tablet, take 1 tablet by mouth once daily, Disp: " "30 tablet, Rfl: 4  •  calcitonin, salmon, (MIACALCIN) 200 UNIT/ACT nasal spray, 1 spray into each nostril Daily., Disp: 1 each, Rfl: 12  •  cholecalciferol (VITAMIN D3) 1000 units tablet, Take 2,000 Units by mouth Daily., Disp: , Rfl:   •  clopidogrel (PLAVIX) 75 MG tablet, take 1 tablet by mouth once daily, Disp: 30 tablet, Rfl: 6  •  clotrimazole-betamethasone (LOTRISONE) 1-0.05 % cream, Apply 1 application topically 2 (Two) Times a Day. Apply topically twice daily as direted., Disp: 45 g, Rfl: 5  •  colestipol (COLESTID) 1 g tablet, take 2 tablets by mouth twice a day (Patient taking differently: take 2 tablets by mouth daily), Disp: 120 tablet, Rfl: 3  •  diphenoxylate-atropine (LOMOTIL) 2.5-0.025 MG per tablet, patient states she takes one tablet every day, Disp: , Rfl: 0  •  flunisolide (NASALIDE) 25 MCG/ACT (0.025%) solution nasal spray, Inhale 2 sprays Every 12 (Twelve) Hours., Disp: 1 bottle, Rfl: 5  •  gabapentin (NEURONTIN) 300 MG capsule, TAKE 1 CAPSULE BY MOUTH TWICE A DAY, Disp: 60 capsule, Rfl: 3  •  Insulin Syringe-Needle U-100 (B-D INS SYR ULTRAFINE .5CC/30G) 30G X 1/2\" 0.5 ML misc, , Disp: , Rfl:   •  Insulin Syringe-Needle U-100 (B-D INS SYR ULTRAFINE 1CC/30G) 30G X 1/2\" 1 ML misc, , Disp: , Rfl:   •  ipratropium-albuterol (DUO-NEB) 0.5-2.5 mg/mL nebulizer, Take 3 mL by nebulization Every 4 (Four) Hours As Needed for wheezing or shortness of air., Disp: 120 vial, Rfl: 2  •  Lancets (ONETOUCH ULTRASOFT) lancets, USE AS DIRECTED TWICE A DAY, Disp: 100 each, Rfl: 5  •  lisinopril (PRINIVIL,ZESTRIL) 20 MG tablet, take 1 tablet by mouth once daily, Disp: 30 tablet, Rfl: 5  •  metFORMIN (GLUCOPHAGE) 500 MG tablet, TAKE 2 TABLETS BY MOUTH IN THE MORNING, 1 TABLET AT LUNCH AND 2 TABLETS IN THE EVENING, Disp: 150 tablet, Rfl: 5  •  omeprazole (PRILOSEC) 40 MG capsule, 1 po daily in the am 30 minutes before breakfast, Disp: 30 capsule, Rfl: 5  •  ondansetron (ZOFRAN) 4 MG tablet, Take 1 tablet by mouth " "Every 8 (Eight) Hours As Needed for Nausea or Vomiting., Disp: 30 tablet, Rfl: 0  •  ONE TOUCH ULTRA TEST test strip, TEST once daily to twice a day, Disp: 100 each, Rfl: 5  •  sertraline (ZOLOFT) 100 MG tablet, TAKE 1 1/2 TABLETS BY MOUTH ONCE DAILY, Disp: 45 tablet, Rfl: 1  •  simvastatin (ZOCOR) 40 MG tablet, take 1 tablet by mouth once daily, Disp: 30 tablet, Rfl: 11  •  insulin glargine (LANTUS) 100 UNIT/ML injection, Inject 45 Units under the skin 2 (Two) Times a Day., Disp: 50 mL, Rfl: 5    Blood pressure 157/75, pulse 75, temperature 97.9 °F (36.6 °C), temperature source Oral, resp. rate 18, height 162.6 cm (64\"), weight 72 kg (158 lb 12.8 oz), SpO2 98 %.    Physical Exam   Constitutional: She is oriented to person, place, and time. She appears well-developed and well-nourished. She appears distressed.   HENT:   Head: Normocephalic and atraumatic.   Right Ear: Hearing and external ear normal.   Left Ear: Hearing and external ear normal.   Nose: Nose normal.   Mouth/Throat: Oropharynx is clear and moist and mucous membranes are normal. Mucous membranes are not pale, not dry and not cyanotic. No oral lesions. No oropharyngeal exudate.   Eyes: Conjunctivae and EOM are normal. Right eye exhibits no discharge. Left eye exhibits no discharge. No scleral icterus.   Neck: Trachea normal. Neck supple. No JVD present. No edema present. No thyroid mass and no thyromegaly present.   Cardiovascular: Normal rate, regular rhythm, S2 normal and normal heart sounds.  Exam reveals no gallop, no S3 and no friction rub.    No murmur heard.  Pulmonary/Chest: Effort normal and breath sounds normal. No respiratory distress. She has no wheezes. She has no rales. She exhibits no tenderness.   Abdominal: Soft. Normal appearance and bowel sounds are normal. She exhibits no distension, no ascites and no mass. There is no splenomegaly or hepatomegaly. There is no tenderness. There is no rigidity, no rebound and no guarding. No hernia. "   Musculoskeletal: She exhibits no tenderness or deformity.     Vascular Status -  Her right foot exhibits no edema. Her left foot exhibits no edema.  Lymphadenopathy:     She has no cervical adenopathy.        Left: No supraclavicular adenopathy present.   Neurological: She is alert and oriented to person, place, and time. She has normal strength. No cranial nerve deficit or sensory deficit. She exhibits normal muscle tone. Coordination normal.   Skin: No rash noted. She is not diaphoretic. No cyanosis. No pallor. Nails show no clubbing.   Psychiatric: She has a normal mood and affect. Her behavior is normal. Judgment and thought content normal.   Nursing note and vitals reviewed.  Stigmata of chronic liver disease: None.  Asterixis: None.    Laboratory Tests:      Dated December 22, 2017 sodium 143 potassium 4.3 chloride 105 CO2 25 BUN 12 serum creatinine 0.70 glucose 162.  Calcium 9.6.  Total protein 7.1.  Albumin 4.30.  T bili 0.6 AST 23 ALT 27 alkaline phosphatase 65. Total cholesterol 106.  Triglycerides 126.  Hepatitis C virus antibody <0.1. Hemoglobin A1c 6.90.    Dated May 1, 2018 sodium 143, potassium 4.4, chloride 99, CO2 28 BUN 14, creatinine 0.8 and glucose 237. Calcium 9.8, alkaline phosphatase 79, ALT 40, AST 43 and total bilirubin 0.9.  White count 9.59, hemoglobin 15.2 hematocrit 45.8 and platelet count 267. MCV 82.7    Imaging:  Upon review of medical records:    Dated April 19, 2018 the patient underwent CT of the chest through upper abdomen with contrast which revealed   a left paratracheal soft tissue density with some scattered calcifications inferiorly which measures approximately 3.1 x 1.9 cm and is essentially unchanged when compared to CT of the chest done in November 30 and April 5, 2017. The heart is normal in size. The aorta is normal in caliber.There is no pericardial or pleural effusion. Lung windows reveal emphysematous changes. There are scattered calcified granulomas. No suspicious  pulmonary nodules are identified.  Within the visualized upper abdomen there is a partially imaged left renal cyst. Bone windows reveal no acute osseous abnormalities.    Dated May 1, 2018 the patient had undergone CT of the soft tissue neck without contrast which revealed normal-appearing nasopharynx, pharynx and larynx.  The visualized trachea is unremarkable.  The visualized esophagus shows no abnormality.  The parotid, submandibular and thyroid glands are normal except for small apparent right lobe thyroid nodules.  Ultrasound is recommended for further evaluation.  There is no mass or adenopathy.  There is no significant sinus or osseous abnormality.    Chest x-ray:    Dated May 1, 2018 portable chest x-ray one view reviewed by me revealed chronic appearing interstitial markings. No acute infiltrate. No pneumothorax, pleural effusion. Mediastinum unremarkable. Heart size normal.     Procedures:  Upon review of medical records:     Dated February 4, 2014 the patient underwent a colonoscopy to terminal ileum. She was found to have scant left-sided diverticulosis with an occasional diverticulum in the right colon, multiple colon polyps, internal and small external hemorrhoids. No endoscopic evidence of ileitis or colitis was seen. Random biopsies were obtained from the colon upon withdrawal of scope. Biopsies from the colon polyps revealed multiple tubular adenomata. Random biopsies from the colon were negative for microscopic colitis.     Dated November 29, 2017 the patient underwent an upper endoscopy which revealed: Distal esophageal stricture.  Status post serial dilation to 18 mm.  Erosive distal esophagitis.  LA class B.  Sliding hiatal hernia less than 3 cm. Erythematous-erosive gastritis.  Polypoid area at cardia.  Subtle concentric rings involving the mid and distal esophagus.  No Mares’s esophagus.  Second portion of duodenum, biopsies revealed benign duodenal mucosa with no specific pathologic  diagnosis.  No evidence of villous blunting, increased intraepithelial lymphocytes, acute inflammation, dysplasia or malignancy.  Antrum, body, angulus biopsy revealed mild chronic gastritis with intestinal metaplasia and reactive gastropathy changes.  No evidence of significant acute inflammation, dysplasia, malignancy or Helicobacter pylori on H&E.  Stomach, cardia, biopsies revealed minimal chronic carditis with vascular congestion and focal reactive epithelial changes.  No evidence of intestinal metaplasia, dysplasia, or malignancy.  Mid and distal esophagus, biopsies revealed benign squamous esophageal mucosa with reactive epithelial changes suggestive of reflux.  No evidence of glandular mucosa, dysplasia or malignancy.    12-lead EKG:    Dated May 1, 2018 . A 12-lead EKG reviewed by me revealed sinus tachycardia at 101. Nonspecific T wave abnormality.    Assessment:  1. Dysphagia and choking sensation.  Although, history is not convincing a possible foreign body lodgment at the upper esophageal sphincter area remain a possibility.  2. Significant coughing possibly related to recurrent aspiration of the oropharyngeal secretions. The patient has been moving air well. There is no stridor.  3. Gastroesophageal reflux disease.  Erosive distal esophagitis. LA class B on upper endoscopy in November 2017.  4. History of esophageal rings and strictures. Biopsies had been negative eosinophilic esophagitis.  5. History of long-standing diarrhea. Multifactorial.  6. History of colon polyps on colonoscopy in February 2014 (tubular adenomata).  7. The patient is in moderate distress secondary to #1 and 2.    Recommendations:  1. Nothing by mouth.  2. Upper endoscopy (EGD) counseling:  Description of the procedure, risks, benefits, alternatives and options including nonoperative options were discussed with the patient in detail.  The patient understands and wishes to proceed.  3. The patient may need evaluation by  pulmonology.  4. Discussed with the patient and her  in detail. Opportunity was given to ask questions.    Thank you very much for letting me participate in the care of this patient.  Please do not hesitate to call me if you have any questions.    Molina Dumont MD.

## 2018-05-02 NOTE — PROGRESS NOTES
Continued Stay Note   [facility]     Patient Name: Breann Gallegos  MRN: [id number]  Today's Date: 5/2/2018    Admit Date: 5/1/2018          Discharge Plan     Row Name 05/02/18 1424       Plan    Plan Pt is off the floor, unable to see for DCP.                Discharge Codes    No documentation.           Christal Mayers

## 2018-05-02 NOTE — ANESTHESIA POSTPROCEDURE EVALUATION
Patient: Breann Gallegos    Procedure Summary     Date:  05/02/18 Room / Location:  [facility] ENDOSCOPY 2 / [facility] ENDOSCOPY    Anesthesia Start:  1247 Anesthesia Stop:  1309    Procedure:  ESOPHAGOGASTRODUODENOSCOPY WITH ESOPHAGEAL BALLOON DILITATION (15-16.5-18 MM) (N/A Esophagus) Diagnosis:       Other dysphagia      (Other dysphagia [R13.19])    Surgeon:  Molina Dumont MD Provider:  Mathieu Maldonado CRNA    Anesthesia Type:  MAC ASA Status:  3          Anesthesia Type: MAC  Last vitals  BP   151/92 (05/02/18 0800)   Temp   97.9 °F (36.6 °C) (05/02/18 0800)   Pulse   87 (05/02/18 0800)   Resp   20 (05/02/18 0800)     SpO2   98 % (05/02/18 0800)     Post Anesthesia Care and Evaluation    Patient location during evaluation: PACU  Patient participation: complete - patient participated  Level of consciousness: awake  Pain score: 0  Pain management: adequate  Airway patency: patent  Anesthetic complications: No anesthetic complications  PONV Status: controlled  Cardiovascular status: acceptable and stable  Respiratory status: acceptable and room air  Hydration status: acceptable

## 2018-05-02 NOTE — H&P
Chief complaint:  Dysphagia. Cough.     History of present illness:   History of diarrhea. Reflux.    Past medical history:   Past Medical History:   Diagnosis Date   • Arthritis    • Body piercing     EARS   • COPD (chronic obstructive pulmonary disease)    • Diabetes mellitus    • Dysphagia     REPORTS SHE FEELS LIKE THINGS ARE GETTING STUCK IN HER THROAT FOR THE PAST SEVERAL MONTHS   • Eustachian tube dysfunction    • Fracture     METATRASAL   • Full dentures     INSTRUCTED NO ADHESIVES THE DOS   • GERD (gastroesophageal reflux disease)    • High cholesterol    • History of nuclear stress test     REPORTS 3-4 YEARS AGO AND THAT ALL WAS WNL'S   • Hyperlipidemia    • Hypertension    • Sinus problem    • Skin abscess    • Vision problem    • Vitamin B12 deficiency    • Vitamin D deficiency    • Wears glasses        Surgical history:    Past Surgical History:   Procedure Laterality Date   • APPENDECTOMY  1976   • BREAST SURGERY      REDUCTION   • CATARACT EXTRACTION Bilateral    • CHOLECYSTECTOMY  1976   • COLONOSCOPY     • ENDOSCOPY     • ENDOSCOPY N/A 11/29/2017    Procedure: ESOPHAGOGASTRODUODENOSCOPY with biopsies and esophageal balloon dilitation;  Surgeon: Molina Dumont MD;  Location: [facility];  Service:    • HAND SURGERY Right    • HYSTERECTOMY  1979   • OOPHORECTOMY Bilateral 1979   • OTHER SURGICAL HISTORY      BLADDER TACK   • REDUCTION MAMMAPLASTY Bilateral 1988   • TONSILLECTOMY         Social history:  Social History     Social History   • Marital status:      Spouse name: N/A   • Number of children: N/A   • Years of education: N/A     Occupational History   • Not on file.     Social History Main Topics   • Smoking status: Former Smoker     Packs/day: 1.00     Years: 30.00     Types: Cigarettes     Quit date: 01/2010   • Smokeless tobacco: Never Used   • Alcohol use No   • Drug use: No   • Sexual activity: Defer     Other Topics Concern   • Not on file     Social History Narrative   • No  "narrative on file       Allergies:  Codeine; Protonix [pantoprazole]; Terbinafine; and Penicillins  Latex allergy: None  Contrast allergy: None    Medications:  Prescriptions Prior to Admission   Medication Sig Dispense Refill Last Dose   • albuterol (PROAIR RESPICLICK) 108 (90 BASE) MCG/ACT inhaler Inhale 1 puff every 4 (four) hours as needed for wheezing or shortness of air. 1 inhaler 0 5/1/2018 at Unknown time   • amLODIPine (NORVASC) 5 MG tablet TAKE 1 TABLET BY MOUTH AT BEDTIME 30 tablet 5 4/30/2018 at Unknown time   • arformoterol (BROVANA) 15 MCG/2ML nebulizer solution Take 2 mL by nebulization 2 (Two) Times a Day. 120 mL 5 4/30/2018 at Unknown time   • B-D INS SYR ULTRAFINE 1CC/31G 31G X 5/16\" 1 ML misc use as directed 100 each 5 5/1/2018 at Unknown time   • budesonide (PULMICORT) 0.5 MG/2ML nebulizer solution Take 2 mL by nebulization 2 (Two) Times a Day. 120 mL 5 4/30/2018 at Unknown time   • buPROPion XL (WELLBUTRIN XL) 150 MG 24 hr tablet take 1 tablet by mouth once daily 30 tablet 4 4/30/2018 at Unknown time   • calcitonin, salmon, (MIACALCIN) 200 UNIT/ACT nasal spray 1 spray into each nostril Daily. 1 each 12 4/30/2018 at Unknown time   • cholecalciferol (VITAMIN D3) 1000 units tablet Take 2,000 Units by mouth Daily.   4/30/2018 at Unknown time   • clopidogrel (PLAVIX) 75 MG tablet take 1 tablet by mouth once daily 30 tablet 6 4/30/2018 at Unknown time   • clotrimazole-betamethasone (LOTRISONE) 1-0.05 % cream Apply 1 application topically 2 (Two) Times a Day. Apply topically twice daily as direted. 45 g 5 4/30/2018 at Unknown time   • colestipol (COLESTID) 1 g tablet take 2 tablets by mouth twice a day (Patient taking differently: take 2 tablets by mouth daily) 120 tablet 3 4/30/2018 at Unknown time   • diphenoxylate-atropine (LOMOTIL) 2.5-0.025 MG per tablet patient states she takes one tablet every day  0 4/30/2018 at Unknown time   • flunisolide (NASALIDE) 25 MCG/ACT (0.025%) solution nasal spray " "Inhale 2 sprays Every 12 (Twelve) Hours. 1 bottle 5 4/30/2018 at Unknown time   • gabapentin (NEURONTIN) 300 MG capsule TAKE 1 CAPSULE BY MOUTH TWICE A DAY 60 capsule 3 4/30/2018 at Unknown time   • Insulin Syringe-Needle U-100 (B-D INS SYR ULTRAFINE .5CC/30G) 30G X 1/2\" 0.5 ML misc    4/30/2018 at Unknown time   • Insulin Syringe-Needle U-100 (B-D INS SYR ULTRAFINE 1CC/30G) 30G X 1/2\" 1 ML misc    4/30/2018 at Unknown time   • ipratropium-albuterol (DUO-NEB) 0.5-2.5 mg/mL nebulizer Take 3 mL by nebulization Every 4 (Four) Hours As Needed for wheezing or shortness of air. 120 vial 2 4/30/2018 at Unknown time   • Lancets (ONETOUCH ULTRASOFT) lancets USE AS DIRECTED TWICE A  each 5 4/30/2018 at Unknown time   • lisinopril (PRINIVIL,ZESTRIL) 20 MG tablet take 1 tablet by mouth once daily 30 tablet 5 4/30/2018 at Unknown time   • metFORMIN (GLUCOPHAGE) 500 MG tablet TAKE 2 TABLETS BY MOUTH IN THE MORNING, 1 TABLET AT LUNCH AND 2 TABLETS IN THE EVENING 150 tablet 5 4/30/2018 at Unknown time   • omeprazole (PRILOSEC) 40 MG capsule 1 po daily in the am 30 minutes before breakfast 30 capsule 5 4/30/2018 at Unknown time   • ondansetron (ZOFRAN) 4 MG tablet Take 1 tablet by mouth Every 8 (Eight) Hours As Needed for Nausea or Vomiting. 30 tablet 0 Past Week at Unknown time   • ONE TOUCH ULTRA TEST test strip TEST once daily to twice a day 100 each 5 4/30/2018 at Unknown time   • sertraline (ZOLOFT) 100 MG tablet TAKE 1 1/2 TABLETS BY MOUTH ONCE DAILY 45 tablet 1 Past Month at Unknown time   • simvastatin (ZOCOR) 40 MG tablet take 1 tablet by mouth once daily 30 tablet 11 4/30/2018 at Unknown time   • LANTUS 100 UNIT/ML injection INJECT 90 UNITS TWICE DAILY (Patient taking differently: INJECT 45 UNITS TWICE DAILY) 50 mL 5 5/1/2018       Review of systems:   Constitutional: No recent: Fever, Weight loss,   Respiratory: History of shortness of breath, and cough.  Cardiovascular: No recent: Chest Pains, congestive heart " "failure or arrhythmias.   Neurological: No recent: Seizures, CVA, TIA.   Genitourinary: No recent: Renal Failure, UTI.  Endocrine: No recent: Worsening of diabetes or thyroid disease.  Musculoskeletal: No recent: Joint swelling.  Hem. Oncology: No recent: Anemia or bleeding.  Psychiatric: No recent: Worsening of depression or anxiety.     VITAL SIGNS:    Blood pressure 151/92, pulse 87, temperature 97.9 °F (36.6 °C), temperature source Oral, resp. rate 20, height 162.6 cm (64\"), weight 71.4 kg (157 lb 4.8 oz), SpO2 98 %.    PHYSICAL EXAMINATION:   HEENT: Normal.   Lungs: Clear to auscultation.  Heart: No S3, no murmur.    Abdomen: Soft.  BS+ ND, NT  Extremities: No edema.  No cyanosis.  Neuro: Alert X 3. No focal deficit.    Assessment: Recurrent dysphagia.     Plan:   EGD.  Risks/Benefits:  The potential benefits, risk and/or side effects of the procedure and alternatives have been discussed with the patient/authorized representative and questions were answered.    "

## 2018-05-02 NOTE — PROGRESS NOTES
Adult Nutrition  Assessment/PES    Patient Name:  Breann Gallegos  YOB: 1950  MRN: [id number]  Admit Date:  5/1/2018    Assessment Date:  5/2/2018    Comments:    Recommend:  1. Advance to soft diet with consistent carbohydrate and cardiac modifications as tolerated and medically appropriate.   2. RD to order nutritional supplement, Glucerna, BID once diet advances.   3. Consider adding MVI with minerals daily.   4. Continue to monitor/replace electrolytes PRN.   Will follow patient. Consult RD PRN.           Adult Nutrition Assessment     Row Name 05/02/18 1333       PO Evaluation    Number of Days PO Intake Evaluated (P)  Other (comment)   NPO    Row Name 05/02/18 1331       Nutrition Prescription PO    Current PO Diet (P)  NPO   once fully alert may have clear liquid diet at room temperature. Avoid soda beverages. Once tolerated advance to soft diet.     Row Name 05/02/18 1328       Labs/Procedures/Meds    Lab Results Reviewed (P)  reviewed, pertinent    Lab Results Comments (P)  High: GLUC       Calculation Measurements    Weight Used For Calculations (P)  59 kg (130 lb 0.3 oz)   Adj BW       Estimated/Assessed Needs    Additional Documentation (P)  Fluid Requirements (Group);Sunol-St. Jeor Equation (Group);Protein Requirements (Group);Calorie Requirements (Group)       Calorie Requirements    Estimated Calorie Requirement (kcal/day) (P)  --   AF 1.2    Estimated Calorie Need Method (P)  Sunol-St Jeor    Estimated Calorie Requirement Comment (P)  ~1645-6934       KCAL/KG    14 Kcal/Kg (kcal) (P)  825.68    15 Kcal/Kg (kcal) (P)  884.66    18 Kcal/Kg (kcal) (P)  1061.59    20 Kcal/Kg (kcal) (P)  1179.54    25 Kcal/Kg (kcal) (P)  1474.43    30 Kcal/Kg (kcal) (P)  1769.31    35 Kcal/Kg (kcal) (P)  2064.2    40 Kcal/Kg (kcal) (P)  2359.08    45 Kcal/Kg (kcal) (P)  2653.97    50 Kcal/Kg (kcal) (P)  2948.85       Sunol-St. Jeor Equation    RMR (Sunol-St. Jeor Equation) (P)  1114.77        Protein Requirements    Est Protein Requirement Amount (gms/kg) (P)  1.2 gm protein   ~71-88    Estimated Protein Requirements (gms/day) (P)  70.77       Fluid Requirements    Estimated Fluid Requirement Method (P)  Delphi-Segar Formula    Kasie-Segar Method (over 20 kg) (P)  3989.54    Row Name 05/02/18 1325       Reason for Assessment    Reason For Assessment (P)  diagnosis/disease state    Diagnosis (P)  endocrine conditions;cardiac disease;other (see comments)   DM2, dysphagia, COPD, GERD, hyperlipidemia, B12 & Vit D deficiency, HTN, thyroid nodule, esophageal stricture    Identified At Risk by Screening Criteria (P)  BMI;difficulty chewing/swallowing          Problem/Interventions:        Problem 1     Row Name 05/02/18 1333       Nutrition Diagnoses Problem 1    Problem 1 (P)  Altered Nutrition Related to Labs    Etiology (related to) (P)  Medical Diagnosis    Endocrine (P)  DM2    Signs/Symptoms (evidenced by) (P)  Biochemical    Labs Reviewed (P)  Done    Specific Labs Noted (P)  Glucose            Problem 2     Row Name 05/02/18 1334       Nutrition Diagnoses Problem 2    Problem 2 (P)  Increased Nutrient Needs    Macronutrient (P)  Kcal;Fluid;Carbohydrate;Fat;Protein    Micronutrient (P)  Vitamin;Mineral    Etiology (related to) (P)  Medical Diagnosis    Pulmonary/Critical Care (P)  COPD    Signs/Symptoms (evidenced by) (P)  Report/Observation    Reported/Observed By (P)  RN;MD            Problem 3     Row Name 05/02/18 1334       Nutrition Diagnoses Problem 3    Problem 3 (P)  Swallowing Difficulty    Etiology (related to) (P)  Medical Diagnosis    Neurological (P)  Dysphagia    Signs/Symptoms (evidenced by) (P)  Other (comment)   esophageal strictures            Problem 4     Row Name 05/02/18 1335       Nutrition Diagnoses Problem 4    Problem 4 (P)  Overweight/Obesity    Etiology (related to) (P)  Factors Affecting Nutrition    Food Habit/Preferences (P)  Large Meals    Signs/Symptoms (evidenced  by) (P)  Other (comment)   BMI 27              Intervention Goal     Row Name 05/02/18 4487       Intervention Goal    General (P)  Meet nutritional needs for age/condition;Disease management/therapy;Improved nutrition related lab(s)    PO (P)  Meet estimated needs;Advance diet;Establish PO    Weight (P)  No significant weight loss            Nutrition Intervention     Row Name 05/02/18 1544       Nutrition Intervention    RD/Tech Action (P)  Follow Tx progress;Await begin PO;Recommend/ordered    Recommended/Ordered (P)  Diet;Supplement            Nutrition Prescription     Row Name 05/02/18 4130       Nutrition Prescription PO    PO Prescription (P)  Begin/change diet;Begin/change supplement   advance diet as tolerated and medically appropriate     Begin/Change Diet to (P)  Soft Texture    Supplement (P)  Glucerna Shake    Supplement Frequency (P)  2 times a day    Common Modifiers (P)  Cardiac;Consistent Carbohydrate    New PO Prescription Ordered? (P)  No, recommended       Other Orders    Supplement (P)  Vitamin mineral supplement    Supplement Ordered? (P)  No, recommended            Education/Evaluation     Row Name 05/02/18 6328       Education    Education (P)  Will Instruct as appropriate       Monitor/Evaluation    Monitor (P)  Per protocol;I&O;PO intake;Supplement intake;Pertinent labs;Weight        Electronically signed by:  Cary Grijalva  05/02/18 1:39 PM

## 2018-05-02 NOTE — ANESTHESIA PREPROCEDURE EVALUATION
Anesthesia Evaluation     Patient summary reviewed and Nursing notes reviewed   NPO Solid Status: > 8 hours  NPO Liquid Status: > 8 hours           Airway   Mallampati: II  TM distance: >3 FB  Neck ROM: full  no difficulty expected  Dental    (+) lower dentures and upper dentures    Pulmonary - normal exam    breath sounds clear to auscultation  (+) a smoker (quit in 2010) Former, COPD moderate, shortness of breath,   Cardiovascular - normal exam  Exercise tolerance: good (4-7 METS)    PT is on anticoagulation therapy  Rhythm: regular  Rate: normal    (+) hypertension poorly controlled 2 medications or greater, PVD, hyperlipidemia,     ROS comment: Plavix last dose 4/30/2018    Nuclear stress test negative 3-4 years ago    Neuro/Psych  (+) headaches (tension), numbness, psychiatric history Anxiety and Depression,       ROS Comment: Nerve root disorder  GI/Hepatic/Renal/Endo    (+)  GERD well controlled,  diabetes mellitus (fsbs 183) poorly controlled using insulin,     ROS Comment: Dysphagia: esophageal stricture    Musculoskeletal     (+) neck pain,   Abdominal    Substance History - negative use     OB/GYN negative ob/gyn ROS         Other   (+) arthritis     ROS/Med Hx Other: Came to the ER being unable to swollen, swollen neck and unable to control secretions                  Anesthesia Plan    ASA 3     MAC   (Risks and benefits discussed including risk of aspiration, recall and dental damage. All patient questions answered. Will continue with POC.)  intravenous induction   Anesthetic plan and risks discussed with patient.

## 2018-05-02 NOTE — PLAN OF CARE
Problem: Patient Care Overview  Goal: Plan of Care Review  Outcome: Ongoing (interventions implemented as appropriate)   05/02/18 0420   Coping/Psychosocial   Plan of Care Reviewed With patient   Plan of Care Review   Progress no change   OTHER   Outcome Summary New admit     Goal: Individualization and Mutuality  Outcome: Ongoing (interventions implemented as appropriate)   05/02/18 0420   Individualization   Patient Specific Interventions EGD scheduled for 5/2     Goal: Discharge Needs Assessment  Outcome: Ongoing (interventions implemented as appropriate)      Problem: Pain, Acute (Adult)  Goal: Identify Related Risk Factors and Signs and Symptoms  Outcome: Outcome(s) achieved Date Met: 05/02/18 05/02/18 0420   Pain, Acute (Adult)   Related Risk Factors (Acute Pain) disease process   Signs and Symptoms (Acute Pain) verbalization of pain descriptors;fatigue/weakness;nausea/vomiting/anorexia     Goal: Acceptable Pain Control/Comfort Level  Outcome: Ongoing (interventions implemented as appropriate)   05/02/18 0420   Pain, Acute (Adult)   Acceptable Pain Control/Comfort Level making progress toward outcome       Problem: Fall Risk (Adult)  Goal: Identify Related Risk Factors and Signs and Symptoms  Outcome: Outcome(s) achieved Date Met: 05/02/18 05/02/18 0420   Fall Risk (Adult)   Related Risk Factors (Fall Risk) fatigue/slow reaction;polypharmacy;environment unfamiliar   Signs and Symptoms (Fall Risk) presence of risk factors     Goal: Absence of Fall  Outcome: Ongoing (interventions implemented as appropriate)   05/02/18 0420   Fall Risk (Adult)   Absence of Fall making progress toward outcome       Problem: Dysphagia (Adult)  Goal: Identify Related Risk Factors and Signs and Symptoms  Outcome: Outcome(s) achieved Date Met: 05/02/18 05/02/18 0420   Dysphagia (Adult)   Related Risk Factors (Dysphagia) esophageal defects     Goal: Functional/Safe Swallow  Outcome: Ongoing (interventions implemented as  appropriate)   05/02/18 0420   Dysphagia (Adult)   Functional/Safe Swallow making progress toward outcome     Goal: Compensatory Techniques to Improve Safety/Function with Swallowing  Outcome: Ongoing (interventions implemented as appropriate)   05/02/18 0420   Dysphagia (Adult)   Compensatory Techniques to Improve Safety/Function with Swallowing making progress toward outcome

## 2018-05-02 NOTE — ANESTHESIA POSTPROCEDURE EVALUATION
Patient: Breann Gallegos    Procedure Summary     Date:  05/02/18 Room / Location:  [facility] ENDOSCOPY 2 / [facility] ENDOSCOPY    Anesthesia Start:  1247 Anesthesia Stop:  1309    Procedure:  ESOPHAGOGASTRODUODENOSCOPY WITH ESOPHAGEAL BALLOON DILITATION (15-16.5-18 MM) (N/A Esophagus) Diagnosis:       Other dysphagia      (Other dysphagia [R13.19])    Surgeon:  Molina Dumont MD Provider:  Mathieu Maldonado CRNA    Anesthesia Type:  MAC ASA Status:  3          Anesthesia Type: MAC  Last vitals  BP   150/91 (05/02/18 1330)   Temp   97.2 °F (36.2 °C) (05/02/18 1308)   Pulse   82 (05/02/18 1330)   Resp   12 (05/02/18 1330)     SpO2   99 % (05/02/18 1330)     Post Anesthesia Care and Evaluation    Patient location during evaluation: bedside  Patient participation: complete - patient participated  Level of consciousness: awake  Pain score: 0  Pain management: adequate  Airway patency: patent  Anesthetic complications: No anesthetic complications  PONV Status: controlled  Cardiovascular status: acceptable and stable  Respiratory status: acceptable and room air  Hydration status: acceptable

## 2018-05-03 VITALS
RESPIRATION RATE: 18 BRPM | DIASTOLIC BLOOD PRESSURE: 75 MMHG | HEART RATE: 75 BPM | BODY MASS INDEX: 27.11 KG/M2 | OXYGEN SATURATION: 98 % | WEIGHT: 158.8 LBS | HEIGHT: 64 IN | TEMPERATURE: 97.9 F | SYSTOLIC BLOOD PRESSURE: 157 MMHG

## 2018-05-03 LAB
GLUCOSE BLDC GLUCOMTR-MCNC: 177 MG/DL (ref 70–130)
GLUCOSE BLDC GLUCOMTR-MCNC: 216 MG/DL (ref 70–130)
GLUCOSE BLDC GLUCOMTR-MCNC: 93 MG/DL (ref 70–130)

## 2018-05-03 PROCEDURE — 63710000001 INSULIN DETEMIR PER 5 UNITS: Performed by: INTERNAL MEDICINE

## 2018-05-03 PROCEDURE — 63710000001 INSULIN REGULAR HUMAN PER 5 UNITS: Performed by: INTERNAL MEDICINE

## 2018-05-03 PROCEDURE — G0378 HOSPITAL OBSERVATION PER HR: HCPCS

## 2018-05-03 PROCEDURE — 94799 UNLISTED PULMONARY SVC/PX: CPT

## 2018-05-03 PROCEDURE — 82962 GLUCOSE BLOOD TEST: CPT

## 2018-05-03 PROCEDURE — 99217 PR OBSERVATION CARE DISCHARGE MANAGEMENT: CPT | Performed by: INTERNAL MEDICINE

## 2018-05-03 RX ORDER — INSULIN GLARGINE 100 [IU]/ML
45 INJECTION, SOLUTION SUBCUTANEOUS 2 TIMES DAILY
Qty: 50 ML | Refills: 5 | Status: SHIPPED | OUTPATIENT
Start: 2018-05-03

## 2018-05-03 RX ORDER — ACETAMINOPHEN 325 MG/1
650 TABLET ORAL EVERY 6 HOURS PRN
Status: DISCONTINUED | OUTPATIENT
Start: 2018-05-03 | End: 2018-05-03 | Stop reason: HOSPADM

## 2018-05-03 RX ORDER — FAMOTIDINE 20 MG/1
20 TABLET, FILM COATED ORAL DAILY
Status: DISCONTINUED | OUTPATIENT
Start: 2018-05-03 | End: 2018-05-03 | Stop reason: HOSPADM

## 2018-05-03 RX ADMIN — BUDESONIDE 0.5 MG: 0.5 INHALANT RESPIRATORY (INHALATION) at 08:45

## 2018-05-03 RX ADMIN — ACETAMINOPHEN 650 MG: 325 TABLET, FILM COATED ORAL at 08:10

## 2018-05-03 RX ADMIN — FAMOTIDINE 20 MG: 20 TABLET ORAL at 10:07

## 2018-05-03 RX ADMIN — HUMAN INSULIN 5 UNITS: 100 INJECTION, SOLUTION SUBCUTANEOUS at 11:46

## 2018-05-03 RX ADMIN — INSULIN DETEMIR 45 UNITS: 100 INJECTION, SOLUTION SUBCUTANEOUS at 10:07

## 2018-05-03 RX ADMIN — FLUTICASONE PROPIONATE 2 SPRAY: 50 SPRAY, METERED NASAL at 08:10

## 2018-05-03 RX ADMIN — ARFORMOTEROL TARTRATE 15 MCG: 15 SOLUTION RESPIRATORY (INHALATION) at 08:45

## 2018-05-03 NOTE — DISCHARGE SUMMARY
[facility]   DISCHARGE SUMMARY      Name:  Breann Gallegos   Age:  67 y.o.  Sex:  female  :  1950  MRN:  [id number]   Visit Number:  [id number]    Admission Date:  2018  Date of Discharge:  5/3/2018  Primary Care Physician:  Harry Avery MD    Discharge Diagnoses:   1.  Recurrent esophageal stricture s/p dilatation  2.  Intractable cough due to #1  3.  Right-sided thyroid nodule requiring ultrasound as outpatient, normal TSH   4.  Diabetes type 2  5.  COPD not in exacerbation   6.  GERD  7.  Essential hypertension    Principal Problem:    Other dysphagia  Active Problems:    Chronic obstructive pulmonary disease    Diabetes mellitus    Hypertension    Restless legs syndrome    Tension headache    Esophageal stricture      Presenting Problem:    Esophageal stricture [K22.2]     Consults:     Consults     Date and Time Order Name Status Description    2018 194 Inpatient Gastroenterology Consult          Consulting Physician(s)     Provider Relationship Specialty    Molina Dumont MD Consulting Physician Gastroenterology          Procedures Performed:    Procedure(s):  ESOPHAGOGASTRODUODENOSCOPY WITH ESOPHAGEAL BALLOON DILITATION (15-16.5-18 MM)       History of presenting illness/Hospital Course:  Patient is a 67-year-old  female with history of diabetes type 2, COPD, GERD, hyperlipidemia, hypertension, B12 and vitamin D deficiency who came in to the ER for evaluation of chocking sensation since noon the day of admission. She apparently had been coughing all day and not able to take her medications.  She has a history of esophageal stricture and she's been dilated 5 times in the past most recently 2017 by Dr. Dumont.  At that time she had erosive esophagitis as well as a distal esophageal stricture.  It is suspected that she has this again.  CT of the soft tissues of the neck showed thyroid nodules but no stricture in the neck.  No foreign body was noted  either.      Patient was admitted to [facility] and kept NPO. She was placed on IV fluids and placed on IV pain medications. Dr. Dumont was consulted who performed an EGD the morning after and she was found to have distal esophageal stricture with subtle esophageal rings which was serially dialated to 18mm. She also had a small hiatal hernia but no chicas's esophagus. There was evidence of gastritis. Vocal cords were visualized and were noted to be open appropriately.     Patient was placed on clear liquid diet and this was advanced to full and regular today. Today, She does not report any difficulty with eating her food and has not had any further coughing or choking. She was instructed to avoid fatty, greasy, spicy and hot foods. She was instructed to avoid carbonated drinks. She takes Omeprazole at home and was instructed to continue the same on discharge. She will need to follow up with Dr. Dumont on discharge in 3-4 weeks and her PCP in 1 week of discharge for further work up of thyroid nodule found on CT scan. Her TSH was within normal limits.       Vital Signs:    Temp:  [97.2 °F (36.2 °C)-98.1 °F (36.7 °C)] 97.9 °F (36.6 °C)  Heart Rate:  [74-92] 75  Resp:  [12-18] 18  BP: (132-165)/(67-91) 157/75    Physical Exam:    General Appearance:  Alert and cooperative, not in any acute distress.   Head:  Atraumatic and normocephalic, without obvious abnormality.   Eyes:          PERRLA, conjunctivae and sclerae normal, no Icterus. No pallor. Extra-occular movements are within normal limits.   Ears:  Ears appear intact with no abnormalities noted.   Throat: No oral lesions, no thrush, oral mucosa moist.   Neck: Supple, trachea midline, no thyromegaly, no carotid bruit.   Back:   No kyphoscoliosis present. No tenderness to palpation,   range of motion normal.   Lungs:   Chest shape is normal. Breath sounds heard bilaterally equally.  No crackles or wheezing. No Pleural rub or bronchial breathing.   Heart:  Normal S1 and  S2, no murmur, no gallop, no rub. No JVD.   Abdomen:   Normal bowel sounds, no masses, no organomegaly. Soft, non-tender, non-distended, no guarding, no rebound tenderness.   Extremities: Moves all extremities well, no edema, no cyanosis, no clubbing.   Pulses: Pulses palpable and equal bilaterally.   Skin: No bleeding, bruising or rash.   Lymph nodes: No palpable adenopathy.   Neurologic: Alert and oriented x 3. Moves all four limbs equally. No tremors. No facial asymetry.     Pertinent Lab Results:       Results from last 7 days  Lab Units 05/02/18  0456 05/01/18  1413   SODIUM mmol/L 144 143   POTASSIUM mmol/L 4.2 4.4   CHLORIDE mmol/L 100 99   CO2 mmol/L 30.0 28.0   BUN mg/dL 12 14   CREATININE mg/dL 0.80 0.80   CALCIUM mg/dL 9.0 9.8   BILIRUBIN mg/dL  --  0.9   ALK PHOS U/L  --  79   ALT (SGPT) U/L  --  40   AST (SGOT) U/L  --  43   GLUCOSE mg/dL 164* 237*       Results from last 7 days  Lab Units 05/02/18  0456 05/01/18  1413   WBC 10*3/mm3 9.63 9.59   HEMOGLOBIN g/dL 14.0 15.2   HEMATOCRIT % 42.2 45.8   PLATELETS 10*3/mm3 222 267           Results from last 7 days  Lab Units 05/01/18  1413   TROPONIN I ng/mL <0.012                           Invalid input(s): USDES,  BLOODU, NITRITITE, BACT, EP  Pain Management Panel     There is no flowsheet data to display.              Pertinent Radiology Results:    Imaging Results (all)     Procedure Component Value Units Date/Time    CT Soft Tissue Neck Without Contrast [[id number]] Collected:  05/01/18 1734     Updated:  05/01/18 1735    Narrative:       FINAL REPORT    TECHNIQUE:  Axial images through the neck were obtained without contrast  administration.    CLINICAL HISTORY:  stridor, dysphagia    FINDINGS:  The nasopharynx, pharynx and larynx are normal.  The visualized  trachea is unremarkable.  The visualized esophagus shows no  abnormality.  The parotid, submandibular and thyroid glands are  normal except for small apparent right lobe thyroid nodules.  Ultrasound  "is recommended for further evaluation.  There is no  mass or adenopathy.  There is no significant sinus or osseous  abnormality.      Impression:       Apparent right thyroid nodules, otherwise unremarkable.    Authenticated by Juan Luis Jolley M.D. on 05/01/2018 05:34:20 PM    XR Chest 1 View [[id number]] Collected:  05/01/18 1531     Updated:  05/01/18 1535    Narrative:       PROCEDURE: XR CHEST 1 VW-     HISTORY: Shortness of breath         COMPARISON:  None     FINDINGS:  Portable view of the chest demonstrates prominence of  interstitium likely chronic. No acute infiltrate is seen. There is no  evidence of effusion, pneumothorax or other significant pleural disease.  The mediastinum is unremarkable.     The heart size is normal.            Impression:       Chronic interstitial changes      This report was finalized on 5/1/2018 3:32 PM by Nish Smith MD.          Condition on Discharge:      Stable.    Code status during the hospital stay:    Full Code    Discharge Disposition:    Home or Self Care    Discharge Medications:     Breann Gallegos   Home Medication Instructions KAREN:[id number]    Printed on:05/03/18 1138   Medication Information                      albuterol (PROAIR RESPICLICK) 108 (90 BASE) MCG/ACT inhaler  Inhale 1 puff every 4 (four) hours as needed for wheezing or shortness of air.             amLODIPine (NORVASC) 5 MG tablet  TAKE 1 TABLET BY MOUTH AT BEDTIME             arformoterol (BROVANA) 15 MCG/2ML nebulizer solution  Take 2 mL by nebulization 2 (Two) Times a Day.             B-D INS SYR ULTRAFINE 1CC/31G 31G X 5/16\" 1 ML misc  use as directed             budesonide (PULMICORT) 0.5 MG/2ML nebulizer solution  Take 2 mL by nebulization 2 (Two) Times a Day.             buPROPion XL (WELLBUTRIN XL) 150 MG 24 hr tablet  take 1 tablet by mouth once daily             calcitonin, salmon, (MIACALCIN) 200 UNIT/ACT nasal spray  1 spray into each nostril Daily.             cholecalciferol (VITAMIN " "D3) 1000 units tablet  Take 2,000 Units by mouth Daily.             clopidogrel (PLAVIX) 75 MG tablet  take 1 tablet by mouth once daily             clotrimazole-betamethasone (LOTRISONE) 1-0.05 % cream  Apply 1 application topically 2 (Two) Times a Day. Apply topically twice daily as direted.             colestipol (COLESTID) 1 g tablet  take 2 tablets by mouth twice a day             diphenoxylate-atropine (LOMOTIL) 2.5-0.025 MG per tablet  patient states she takes one tablet every day             flunisolide (NASALIDE) 25 MCG/ACT (0.025%) solution nasal spray  Inhale 2 sprays Every 12 (Twelve) Hours.             gabapentin (NEURONTIN) 300 MG capsule  TAKE 1 CAPSULE BY MOUTH TWICE A DAY             insulin glargine (LANTUS) 100 UNIT/ML injection  Inject 45 Units under the skin 2 (Two) Times a Day.             Insulin Syringe-Needle U-100 (B-D INS SYR ULTRAFINE .5CC/30G) 30G X 1/2\" 0.5 ML misc               Insulin Syringe-Needle U-100 (B-D INS SYR ULTRAFINE 1CC/30G) 30G X 1/2\" 1 ML misc               ipratropium-albuterol (DUO-NEB) 0.5-2.5 mg/mL nebulizer  Take 3 mL by nebulization Every 4 (Four) Hours As Needed for wheezing or shortness of air.             Lancets (ONETOUCH ULTRASOFT) lancets  USE AS DIRECTED TWICE A DAY             lisinopril (PRINIVIL,ZESTRIL) 20 MG tablet  take 1 tablet by mouth once daily             metFORMIN (GLUCOPHAGE) 500 MG tablet  TAKE 2 TABLETS BY MOUTH IN THE MORNING, 1 TABLET AT LUNCH AND 2 TABLETS IN THE EVENING             omeprazole (PRILOSEC) 40 MG capsule  1 po daily in the am 30 minutes before breakfast             ondansetron (ZOFRAN) 4 MG tablet  Take 1 tablet by mouth Every 8 (Eight) Hours As Needed for Nausea or Vomiting.             ONE TOUCH ULTRA TEST test strip  TEST once daily to twice a day             sertraline (ZOLOFT) 100 MG tablet  TAKE 1 1/2 TABLETS BY MOUTH ONCE DAILY             simvastatin (ZOCOR) 40 MG tablet  take 1 tablet by mouth once daily           "       Discharge Diet:     Diet Instructions     Diet: Consistent Carbohydrate, Cardiac       Discharge Diet:   Consistent Carbohydrate  Cardiac       Avoid hot, spicy, greasy and acid foods          Activity at Discharge:     Activity Instructions     Activity as Tolerated             Follow-up Appointments:    Additional Instructions for the Follow-ups that You Need to Schedule     Discharge Follow-up with PCP    As directed      Follow Up Details:  1 week         Discharge Follow-up with Specified Provider: Dr. Dumont; 1 Month    As directed      To:  Dr. Dumont    Follow Up:  1 Month           Follow-up Information     Harry Avery MD .    Specialty:  Internal Medicine  Why:  1 week  Contact information:  [address]  [facility] [address]  [phone]                   Future Appointments  Date Time Provider Department Center   5/8/2018 9:45 AM MD FAITH Garcia [facility] None   7/26/2018 1:15 PM Harry Avery MD [facility] None   8/22/2018 1:30 PM Yamilex Tse MD [facility] None   10/10/2018 3:00 PM Molina Dumont MD [facility]  [facility] None       Additional Instructions for the Follow-ups that You Need to Schedule     Discharge Follow-up with PCP    As directed      Follow Up Details:  1 week         Discharge Follow-up with Specified Provider: Dr. Dumont; 1 Month    As directed      To:  Dr. Dumont    Follow Up:  1 Month               Test Results Pending at Discharge:     Order Current Status    Tissue Pathology Exam In process             Delmi Jenkins MD  05/03/18  11:38 AM    Time spent: 25 minutes    Dictated utilizing Dragon dictation.

## 2018-05-03 NOTE — PLAN OF CARE
Problem: Pain, Acute (Adult)  Goal: Acceptable Pain Control/Comfort Level  Outcome: Ongoing (interventions implemented as appropriate)   05/03/18 0154   Pain, Acute (Adult)   Acceptable Pain Control/Comfort Level making progress toward outcome       Problem: Fall Risk (Adult)  Goal: Absence of Fall  Outcome: Ongoing (interventions implemented as appropriate)   05/03/18 0154   Fall Risk (Adult)   Absence of Fall making progress toward outcome       Problem: Dysphagia (Adult)  Goal: Compensatory Techniques to Improve Safety/Function with Swallowing  Outcome: Ongoing (interventions implemented as appropriate)   05/03/18 0154   Dysphagia (Adult)   Compensatory Techniques to Improve Safety/Function with Swallowing making progress toward outcome

## 2018-05-04 ENCOUNTER — EPISODE CHANGES (OUTPATIENT)
Dept: CASE MANAGEMENT | Facility: OTHER | Age: 68
End: 2018-05-04

## 2018-05-04 ENCOUNTER — TRANSITIONAL CARE MANAGEMENT TELEPHONE ENCOUNTER (OUTPATIENT)
Dept: INTERNAL MEDICINE | Facility: CLINIC | Age: 68
End: 2018-05-04

## 2018-05-08 ENCOUNTER — OFFICE VISIT (OUTPATIENT)
Dept: INTERNAL MEDICINE | Facility: CLINIC | Age: 68
End: 2018-05-08

## 2018-05-08 VITALS
SYSTOLIC BLOOD PRESSURE: 130 MMHG | RESPIRATION RATE: 14 BRPM | WEIGHT: 161 LBS | DIASTOLIC BLOOD PRESSURE: 70 MMHG | OXYGEN SATURATION: 97 % | BODY MASS INDEX: 27.49 KG/M2 | HEIGHT: 64 IN | TEMPERATURE: 97.8 F | HEART RATE: 80 BPM

## 2018-05-08 DIAGNOSIS — E04.1 THYROID NODULE: ICD-10-CM

## 2018-05-08 DIAGNOSIS — J44.1 CHRONIC OBSTRUCTIVE PULMONARY DISEASE WITH ACUTE EXACERBATION (HCC): ICD-10-CM

## 2018-05-08 DIAGNOSIS — E83.42 HYPOMAGNESEMIA: Primary | ICD-10-CM

## 2018-05-08 DIAGNOSIS — E61.2 MAGNESIUM DEFICIENCY: ICD-10-CM

## 2018-05-08 DIAGNOSIS — K22.2 ESOPHAGEAL STRICTURE: Primary | ICD-10-CM

## 2018-05-08 DIAGNOSIS — R13.19 OTHER DYSPHAGIA: ICD-10-CM

## 2018-05-08 LAB
LAB AP CASE REPORT: NORMAL
Lab: NORMAL
MAGNESIUM SERPL-MCNC: 1.4 MG/DL (ref 1.6–2.3)
PATH REPORT.FINAL DX SPEC: NORMAL

## 2018-05-08 PROCEDURE — 99496 TRANSJ CARE MGMT HIGH F2F 7D: CPT | Performed by: INTERNAL MEDICINE

## 2018-05-08 RX ORDER — MAGNESIUM 30 MG
30 TABLET ORAL DAILY
Qty: 30 TABLET | Refills: 1 | Status: SHIPPED | OUTPATIENT
Start: 2018-05-08 | End: 2019-05-08

## 2018-05-08 RX ORDER — MAGNESIUM 30 MG
30 TABLET ORAL DAILY
Qty: 30 TABLET | Refills: 1 | Status: SHIPPED | OUTPATIENT
Start: 2018-05-08 | End: 2018-05-08 | Stop reason: SDUPTHER

## 2018-05-08 NOTE — PROGRESS NOTES
Transitional Care Follow Up Visit  Subjective     Breann Gallegos is a 67 y.o. female who presents for a transitional care management visit.  Within 48 business hours after discharge our office contacted her via telephone to coordinate her care and needs.      I reviewed and discussed the details of that call along with the discharge summary, hospital problems, inpatient lab results, inpatient diagnostic studies, and consultation reports with Breann.     Current outpatient and discharge medications have been reconciled for the patient.    Date of TCM Phone Call 5/4/2018   Psychiatric   Date of Discharge 5/3/2018   Discharge Disposition Home or Self Care     Risk for Readmission (LACE) Score: 8 (5/3/2018  6:00 AM)    History of Present Illness   Course During Hospital Stay:  Patient was recently discharged from hospital due to esophageal stricture patient had them esophageal dilation symptoms resolved.  Patient has no swallow problem now no abdominal pain patient is taking stomach medication.  Biopsy showed no malignancy.  While in the hospital patient's magnesium was normal and a CT scan of the neck again showed a thyroid nodule last thyroid ultrasound was July last year.  Patient had a history of esophageal stricture status post dilation in the past.  COPD stable on medication patient is seeing lung doctor.       The following portions of the patient's history were reviewed and updated as appropriate: allergies, current medications, past family history, past medical history, past social history, past surgical history and problem list.    Review of Systems   Constitutional: Negative.    Respiratory: Negative.    Cardiovascular: Negative.    Gastrointestinal: Negative.    Musculoskeletal: Negative.    Skin: Negative.    Neurological: Negative.    Psychiatric/Behavioral: Negative.        Objective   Physical Exam   Constitutional: She is oriented to person, place, and time. She appears  well-nourished.   Neck: Neck supple.   Cardiovascular: Normal rate, regular rhythm and normal heart sounds.    Pulmonary/Chest: Effort normal and breath sounds normal.   Abdominal: Bowel sounds are normal.   Neurological: She is alert and oriented to person, place, and time.   Skin: Skin is warm.   Psychiatric: She has a normal mood and affect.       Assessment/Plan          esophageal stricture status post dilation patient is doing well  Thyroid nodule needs to be repeated with ultrasound.  GERD continue medication  Low magnesium will repeat.    COPD stable now on medication  As scheduled

## 2018-05-14 RX ORDER — BUPROPION HYDROCHLORIDE 150 MG/1
TABLET ORAL
Qty: 30 TABLET | Refills: 4 | Status: SHIPPED | OUTPATIENT
Start: 2018-05-14

## 2018-05-14 RX ORDER — TRAZODONE HYDROCHLORIDE 50 MG/1
TABLET ORAL
Qty: 45 TABLET | Refills: 2 | Status: SHIPPED | OUTPATIENT
Start: 2018-05-14

## 2018-05-14 RX ORDER — SIMVASTATIN 40 MG
TABLET ORAL
Qty: 30 TABLET | Refills: 11 | Status: SHIPPED | OUTPATIENT
Start: 2018-05-14

## 2018-05-15 RX ORDER — GABAPENTIN 300 MG/1
CAPSULE ORAL
Qty: 60 CAPSULE | Refills: 3 | Status: SHIPPED | OUTPATIENT
Start: 2018-05-15

## 2018-05-22 ENCOUNTER — EPISODE CHANGES (OUTPATIENT)
Dept: CASE MANAGEMENT | Facility: OTHER | Age: 68
End: 2018-05-22

## 2018-05-24 ENCOUNTER — TELEPHONE (OUTPATIENT)
Dept: INTERNAL MEDICINE | Facility: CLINIC | Age: 68
End: 2018-05-24

## 2018-05-24 NOTE — TELEPHONE ENCOUNTER
Patient's  called requesting a paper that was supposed to be filled out on his wife over a month ago for her Diabetic supplies.      Please advise.

## 2018-05-29 RX ORDER — SERTRALINE HYDROCHLORIDE 100 MG/1
TABLET, FILM COATED ORAL
Qty: 45 TABLET | Refills: 1 | Status: SHIPPED | OUTPATIENT
Start: 2018-05-29

## 2018-06-01 ENCOUNTER — TRANSCRIBE ORDERS (OUTPATIENT)
Dept: ADMINISTRATIVE | Facility: HOSPITAL | Age: 68
End: 2018-06-01

## 2018-06-01 DIAGNOSIS — M25.562 LEFT KNEE PAIN, UNSPECIFIED CHRONICITY: Primary | ICD-10-CM

## 2018-06-06 ENCOUNTER — HOSPITAL ENCOUNTER (OUTPATIENT)
Dept: MRI IMAGING | Facility: HOSPITAL | Age: 68
Discharge: HOME | End: 2018-06-06
Admitting: ORTHOPAEDIC SURGERY

## 2018-06-06 DIAGNOSIS — M25.562 LEFT KNEE PAIN, UNSPECIFIED CHRONICITY: ICD-10-CM

## 2018-06-06 PROCEDURE — 73721 MRI JNT OF LWR EXTRE W/O DYE: CPT

## 2018-06-11 ENCOUNTER — OFFICE VISIT (OUTPATIENT)
Dept: GASTROENTEROLOGY | Facility: CLINIC | Age: 68
End: 2018-06-11

## 2018-06-11 VITALS
HEIGHT: 64 IN | DIASTOLIC BLOOD PRESSURE: 81 MMHG | WEIGHT: 161 LBS | SYSTOLIC BLOOD PRESSURE: 158 MMHG | BODY MASS INDEX: 27.49 KG/M2 | TEMPERATURE: 97.7 F | RESPIRATION RATE: 14 BRPM | HEART RATE: 89 BPM

## 2018-06-11 DIAGNOSIS — R13.19 OTHER DYSPHAGIA: Primary | ICD-10-CM

## 2018-06-11 DIAGNOSIS — R19.7 DIARRHEA, UNSPECIFIED TYPE: ICD-10-CM

## 2018-06-11 DIAGNOSIS — R12 HEARTBURN: ICD-10-CM

## 2018-06-11 PROCEDURE — 99214 OFFICE O/P EST MOD 30 MIN: CPT | Performed by: INTERNAL MEDICINE

## 2018-06-11 NOTE — PROGRESS NOTES
Chief Complaint   Patient presents with   • Difficulty Swallowing     History of Present Illness     The patient has difficulty swallowing off and for the last several years. The symptom was rather severe, occured on daily basis and was associated mostly with solid foods.  The symptoms were progressive.  The patient used to point towards the lower substernal area as well as neck area. The patient has striae of esophageal stricture.  Previously the patient had been dilated to 18 mm in the past. The patient developed an acute obstruction on May 1, 2018. This required an upper endoscopy with dilation of the distal esophagus to 18 mm. The patient came back for follow visit today. She feels much better. Her dysphagia has significantly improved.    The patient has a history of reflux off and on for the last several years.  The reflux is moderately severe.  Symptoms are described as retrosternal burning sensation, and indigestion.  There is history of occasional regurgitative symptoms.  Frequency being several times per week.  The symptoms are worse at night.  The patient takes acid suppressive therapy with reasonable control of his symptoms.      The patient has history of diarrhea for the last several years.  Diarrhea was rather severe to begin with, frequency of bowel movements were 6-8 times a day.  The stools were described as loose and watery.  There was nocturnal element of diarrhea.  The diarrhea was associated with tenesmus and urgency. Currently,  diarrhea is good control. The patient has been having one and occasionally 2 formed stools per day.    There is no history of overt GI bleed (Hematemesis, melena or hematochezia). The patient denies nausea or vomiting. There is no recent weight loss. There is no history of liver or pancreatic disease.     Review of Systems   Constitutional: Negative for appetite change, chills, fatigue, fever and unexpected weight change.   HENT: Negative for mouth sores, nosebleeds and  trouble swallowing.    Eyes: Negative for discharge and redness.   Respiratory: Negative for apnea, cough and shortness of breath.    Cardiovascular: Negative for chest pain, palpitations and leg swelling.   Gastrointestinal: Negative for abdominal distention, abdominal pain, anal bleeding, blood in stool, constipation, diarrhea, nausea and vomiting.   Endocrine: Negative for cold intolerance, heat intolerance and polydipsia.   Genitourinary: Negative for dysuria, hematuria and urgency.   Musculoskeletal: Negative for arthralgias, joint swelling and myalgias.   Skin: Negative for rash.   Allergic/Immunologic: Negative for food allergies and immunocompromised state.   Neurological: Negative for dizziness, seizures, syncope and headaches.   Hematological: Negative for adenopathy. Bruises/bleeds easily.   Psychiatric/Behavioral: Negative for dysphoric mood. The patient is not nervous/anxious and is not hyperactive.      Patient Active Problem List   Diagnosis   • Dysphagia   • Abnormal urinalysis   • Anxiety   • Chronic obstructive pulmonary disease   • Depression   • Diabetes mellitus   • Diabetic peripheral neuropathy   • Diarrhea   • Esophageal dysmotility   • Foot pain   • Gastroesophageal reflux disease without esophagitis   • Hyperlipidemia   • Hypertension   • Insomnia   • Irritable bowel syndrome   • Localized swelling, mass and lump, lower limb   • Low back pain   • Abnormal mammogram   • Enlargement of neck   • Neck pain   • Thyroid nodule   • Osteoarthritis of knee   • Osteoarthritis   • Osteopenia   • Peripheral arterial occlusive disease   • Nerve root disorder   • Restless legs syndrome   • Tension headache   • Vitamin D deficiency   • Erythrasma   • Unsteady gait   • Headache   • Chronic left shoulder pain   • Esophageal stricture   • Other dysphagia   • Magnesium deficiency     Past Medical History:   Diagnosis Date   • Arthritis    • Body piercing     EARS   • COPD (chronic obstructive pulmonary  disease)    • Diabetes mellitus    • Dysphagia     REPORTS SHE FEELS LIKE THINGS ARE GETTING STUCK IN HER THROAT FOR THE PAST SEVERAL MONTHS   • Eustachian tube dysfunction    • Fracture     METATRASAL   • Full dentures     INSTRUCTED NO ADHESIVES THE DOS   • GERD (gastroesophageal reflux disease)    • High cholesterol    • History of nuclear stress test     REPORTS 3-4 YEARS AGO AND THAT ALL WAS WNL'S   • Hyperlipidemia    • Hypertension    • Sinus problem    • Skin abscess    • Vision problem    • Vitamin B12 deficiency    • Vitamin D deficiency    • Wears glasses      Past Surgical History:   Procedure Laterality Date   • APPENDECTOMY  1976   • BREAST SURGERY      REDUCTION   • CATARACT EXTRACTION Bilateral    • CHOLECYSTECTOMY  1976   • COLONOSCOPY     • ENDOSCOPY     • ENDOSCOPY N/A 11/29/2017    Procedure: ESOPHAGOGASTRODUODENOSCOPY with biopsies and esophageal balloon dilitation;  Surgeon: Molina Dumont MD;  Location: [facility] ENDOSCOPY;  Service:    • ENDOSCOPY N/A 5/2/2018    Procedure: ESOPHAGOGASTRODUODENOSCOPY WITH ESOPHAGEAL BALLOON DILITATION (15-16.5-18 MM);  Surgeon: Molina Dumont MD;  Location: [facility] ENDOSCOPY;  Service: Gastroenterology   • HAND SURGERY Right    • HYSTERECTOMY  1979   • OOPHORECTOMY Bilateral 1979   • OTHER SURGICAL HISTORY      BLADDER TACK   • REDUCTION MAMMAPLASTY Bilateral 1988   • TONSILLECTOMY       Family History   Problem Relation Age of Onset   • Diabetes Other    • COPD Mother    • Pneumonia Mother    • Kidney failure Father    • Prostate cancer Father    • Colon cancer Neg Hx    • Breast cancer Neg Hx    • Ovarian cancer Neg Hx      Social History   Substance Use Topics   • Smoking status: Former Smoker     Packs/day: 1.00     Years: 30.00     Types: Cigarettes     Quit date: 01/2010   • Smokeless tobacco: Never Used   • Alcohol use No       Current Outpatient Prescriptions:   •  albuterol (PROAIR RESPICLICK) 108 (90 BASE) MCG/ACT inhaler, Inhale 1 puff every 4 (four)  "hours as needed for wheezing or shortness of air., Disp: 1 inhaler, Rfl: 0  •  amLODIPine (NORVASC) 5 MG tablet, TAKE 1 TABLET BY MOUTH AT BEDTIME, Disp: 30 tablet, Rfl: 5  •  arformoterol (BROVANA) 15 MCG/2ML nebulizer solution, Take 2 mL by nebulization 2 (Two) Times a Day., Disp: 120 mL, Rfl: 5  •  B-D INS SYR ULTRAFINE 1CC/31G 31G X 5/16\" 1 ML misc, use as directed, Disp: 100 each, Rfl: 5  •  budesonide (PULMICORT) 0.5 MG/2ML nebulizer solution, Take 2 mL by nebulization 2 (Two) Times a Day., Disp: 120 mL, Rfl: 5  •  buPROPion XL (WELLBUTRIN XL) 150 MG 24 hr tablet, take 1 tablet by mouth once daily, Disp: 30 tablet, Rfl: 4  •  calcitonin, salmon, (MIACALCIN) 200 UNIT/ACT nasal spray, 1 spray into each nostril Daily., Disp: 1 each, Rfl: 12  •  cholecalciferol (VITAMIN D3) 1000 units tablet, Take 2,000 Units by mouth Daily., Disp: , Rfl:   •  clopidogrel (PLAVIX) 75 MG tablet, take 1 tablet by mouth once daily, Disp: 30 tablet, Rfl: 6  •  clotrimazole-betamethasone (LOTRISONE) 1-0.05 % cream, Apply 1 application topically 2 (Two) Times a Day. Apply topically twice daily as direted., Disp: 45 g, Rfl: 5  •  colestipol (COLESTID) 1 g tablet, take 2 tablets by mouth twice a day (Patient taking differently: take 2 tablets by mouth daily), Disp: 120 tablet, Rfl: 3  •  diphenoxylate-atropine (LOMOTIL) 2.5-0.025 MG per tablet, patient states she takes one tablet every day, Disp: , Rfl: 0  •  flunisolide (NASALIDE) 25 MCG/ACT (0.025%) solution nasal spray, Inhale 2 sprays Every 12 (Twelve) Hours., Disp: 1 bottle, Rfl: 5  •  gabapentin (NEURONTIN) 300 MG capsule, take 1 capsule by mouth twice a day, Disp: 60 capsule, Rfl: 3  •  glucose blood (ONE TOUCH ULTRA TEST) test strip, USE TO TEST TWICE DAILY. E11.9, Disp: 100 each, Rfl: 5  •  insulin glargine (LANTUS) 100 UNIT/ML injection, Inject 45 Units under the skin 2 (Two) Times a Day., Disp: 50 mL, Rfl: 5  •  Insulin Syringe-Needle U-100 (B-D INS SYR ULTRAFINE .5CC/30G) 30G " "X 1/2\" 0.5 ML misc, , Disp: , Rfl:   •  Insulin Syringe-Needle U-100 (B-D INS SYR ULTRAFINE 1CC/30G) 30G X 1/2\" 1 ML misc, , Disp: , Rfl:   •  ipratropium-albuterol (DUO-NEB) 0.5-2.5 mg/mL nebulizer, Take 3 mL by nebulization Every 4 (Four) Hours As Needed for wheezing or shortness of air., Disp: 120 vial, Rfl: 2  •  Lancets (ONETOUCH ULTRASOFT) lancets, USE AS DIRECTED TWICE A DAY, Disp: 100 each, Rfl: 5  •  lisinopril (PRINIVIL,ZESTRIL) 20 MG tablet, take 1 tablet by mouth once daily, Disp: 30 tablet, Rfl: 5  •  magnesium 30 MG tablet, Take 1 tablet by mouth Daily., Disp: 30 tablet, Rfl: 1  •  metFORMIN (GLUCOPHAGE) 500 MG tablet, TAKE 2 TABLETS BY MOUTH IN THE MORNING, 1 TABLET AT LUNCH, AND 2 TABLETS IN THE EVENING, Disp: 150 tablet, Rfl: 5  •  omeprazole (PRILOSEC) 40 MG capsule, 1 po daily in the am 30 minutes before breakfast, Disp: 30 capsule, Rfl: 5  •  ondansetron (ZOFRAN) 4 MG tablet, Take 1 tablet by mouth Every 8 (Eight) Hours As Needed for Nausea or Vomiting., Disp: 30 tablet, Rfl: 0  •  sertraline (ZOLOFT) 100 MG tablet, TAKE 1 1/2 TABLETS BY MOUTH ONCE DAILY, Disp: 45 tablet, Rfl: 1  •  simvastatin (ZOCOR) 40 MG tablet, TAKE 1 TABLET BY MOUTH ONCE DAILY, Disp: 30 tablet, Rfl: 11  •  traZODone (DESYREL) 50 MG tablet, TAKE 1 1/2 TABLETS BY MOUTH AT BEDTIME, Disp: 45 tablet, Rfl: 2    Allergies   Allergen Reactions   • Codeine Nausea And Vomiting   • Fish-Derived Products Nausea And Vomiting   • Protonix [Pantoprazole]    • Terbinafine Other (See Comments)     PATIENT REPORTS SHE IS UNSURE REACTION     • Penicillins Rash       Blood pressure 158/81, pulse 89, temperature 97.7 °F (36.5 °C), resp. rate 14, height 162.6 cm (64.02\"), weight 73 kg (161 lb).    Physical Exam   Constitutional: She is oriented to person, place, and time. She appears well-developed and well-nourished. No distress.   HENT:   Head: Normocephalic and atraumatic.   Right Ear: Hearing and external ear normal.   Left Ear: Hearing and " external ear normal.   Nose: Nose normal.   Mouth/Throat: Oropharynx is clear and moist and mucous membranes are normal. Mucous membranes are not pale, not dry and not cyanotic. No oral lesions. No oropharyngeal exudate.   Eyes: Conjunctivae and EOM are normal. Right eye exhibits no discharge. Left eye exhibits no discharge. No scleral icterus.   Neck: Trachea normal. Neck supple. No JVD present. No edema present. No thyroid mass and no thyromegaly present.   Cardiovascular: Normal rate, regular rhythm, S2 normal and normal heart sounds.  Exam reveals no gallop, no S3 and no friction rub.    No murmur heard.  Pulmonary/Chest: Effort normal and breath sounds normal. No respiratory distress. She has no wheezes. She has no rales. She exhibits no tenderness.   Abdominal: Soft. Normal appearance and bowel sounds are normal. She exhibits no distension, no ascites and no mass. There is no splenomegaly or hepatomegaly. There is no tenderness. There is no rigidity, no rebound and no guarding. No hernia.   Musculoskeletal: She exhibits no tenderness or deformity.     Vascular Status -  Her right foot exhibits no edema. Her left foot exhibits no edema.  Lymphadenopathy:     She has no cervical adenopathy.        Left: No supraclavicular adenopathy present.   Neurological: She is alert and oriented to person, place, and time. She has normal strength. No cranial nerve deficit or sensory deficit. She exhibits normal muscle tone. Coordination normal.   Skin: No rash noted. She is not diaphoretic. No cyanosis. No pallor. Nails show no clubbing.   Psychiatric: She has a normal mood and affect. Her behavior is normal. Judgment and thought content normal.   Nursing note and vitals reviewed.  Stigmata of chronic liver disease:  None.  Asterixis:  None.    Laboratory Testing:  Upon review of medical records:      Dated February 14, 2017 sodium 141 potassium 3.9 chloride 104 CO2 28 BUN 9 serum creatinine 0.60 and glucose 87.  Calcium  9.5.  Albumin 4.40.  T bili 0.5 AST 34 ALT 29 alkaline phosphatase 60.  Total cholesterol 106.  Triglycerides 183.  WBC 6.26 hemoglobin 13.9 hematocrit 42.8 MCV 85.6 and platelet count 212.     Dated July 24, 2017 sodium 143 potassium 4.1 chloride 101 CO2 32 BUN 9 serum creatinine 0.80 glucose 107.  Calcium 9.3.  Albumin 4.00.  T bili 0.7 AST 29 ALT 34 alkaline phosphatase 58.  Total cholesterol 93.  Triglycerides 117.  TSH 1.390.  WBC 6.90 hemoglobin 14.1 hematocrit 44.3 MCV 84.2 and platelet count 225.     Dated November 8, 2017 sodium 143 potassium 4.4 chloride 103 CO2 25 BUN 10 serum creatinine 0.70 glucose 221.  Calcium 9.6.  W BC 8.66 hemoglobin 14.7 hematocrit 45.0 MCV 85.6 and platelet count 211.     Dated December 22, 2017 sodium 143 potassium 4.3 chloride 105 CO2 25 BUN 12 serum creatinine 0.70 glucose 162.  Calcium 9.6.  Total protein 7.1.  Albumin 4.30.  T bili 0.6 AST 23 ALT 27 alkaline phosphatase 65.  Total cholesterol 106.  Triglycerides 126.  Hepatitis C virus antibody <0.1.    Dated May 1, 2018 sodium 143 potassium 4.4 chloride 99 CO2 28 BUN 14 serum creatinine 0.80 glucose 237.  Calcium 9.8.  Total protein 8.4.  Albumin 4.70.  T bili 0.9 AST 43 ALT 40 alkaline phosphatase 79.  WBC 9.59 hemoglobin 15.2 hematocrit 45.8 MCV 82.7 and platelet count 267.    Dated May 2, 2018 TSH 2.630.  Magnesium 1.5.  Dated May 8, 2018 Magnesium 1.4.     Procedures:  Upon review of medical records:     Dated February 4, 2014 the patient underwent a colonoscopy to terminal ileum. She was found to have scant left-sided diverticulosis with an occasional diverticulum in the right colon, multiple colon polyps, internal and small external hemorrhoids. No endoscopic evidence of ileitis or colitis was seen. Random biopsies were obtained from the colon upon withdrawal of scope.     Dated November 29, 2017 the patient underwent an upper endoscopy which revealed: Distal esophageal stricture.  Status post serial dilation to 18  mm.  Erosive distal esophagitis.  LA class B.  Sliding hiatal hernia less than 3 cm. Erythematous-erosive gastritis.  Polypoid area at cardia.  Subtle concentric rings involving the mid and distal esophagus.  No Mares’s esophagus.  Second portion of duodenum, biopsies revealed benign duodenal mucosa with no specific pathologic diagnosis.  No evidence of villous blunting, increased intraepithelial lymphocytes, acute inflammation, dysplasia or malignancy.  Antrum, body, angulus biopsy revealed mild chronic gastritis with intestinal metaplasia and reactive gastropathy changes.  No evidence of significant acute inflammation, dysplasia, malignancy or Helicobacter pylori on H&E.  Stomach, cardia, biopsies revealed minimal chronic carditis with vascular congestion and focal reactive epithelial changes.  No evidence of intestinal metaplasia, dysplasia, or malignancy.  Mid and distal esophagus, biopsies revealed 9 squamous esophageal mucosa with reactive epithelial changes suggestive of reflux.  No evidence of glandular mucosa, dysplasia or malignancy.    Dated May 2, 2018 the patient underwent an upper endoscopy which revealed: Subtle esophageal rings.  Status post serial dilation to 18 mm.  Small sliding hiatal hernia less than 3 cm.  No Mares’s esophagus.  Erythematous gastritis.  Bile reflux into the stomach was seen.  Questionable mid esophageal small diverticulum.  Limited examination of the laryngopharyngeal area including vocal cords did not reveal significant pathology.  Vocal chords were noted to open appropriately.  Mid and distal esophagus, biopsies revealed squamous mucosa with mild nonspecific reactive changes.  Negative for glandular type mucosa, intestinal metaplasia or dysplasia.  Negative for specific microorganisms.    Assessment:      ICD-10-CM ICD-9-CM   1. Other dysphagia R13.19 787.29   2. Diarrhea, unspecified type R19.7 787.91   3. Heartburn R12 787.1         Discussion:  1. Esophageal rings.  Status post serial dilation to 18 mm on May 1, 2018.    Plan/  Patient Instructions   1. Anti-reflex measures.  This includes avoidance of soda beverages.  The patient should also avoid eating late at night.  2. Low-fat-low lactose diet.  3. Weight reduction.  4. Pantoprazole 40 mg 1 by mouth every morning one half hour before breakfast.  5. Lomotil 2.5-0.25 mg 1 orally 3 to 4 times a day.   6. Colestid.  The patient may take 2 tablets at night and 1 tablet in the morning.    7. Head end elevation by putting 4-6 inch blocks under the head end.  8. Dietary instructions.  The patient should eat relatively soft diet.  She should eat in upright position and chew well.  The patient should drink water after to 3 bites and take medications with liberal amounts of water.  Generally medications that have a potential to cause pill esophagitis may be avoided or used in an alternative form.  After eating and taking medications the patient should remain in upright position for 10-15 minutes.  9. Discussed with the patient and her  in detail.  Opportunity was given to ask questions.  10. Of interest that the patient is on Plavix for unclear reasons (if necessary the patient may have 81 mg chewable aspirin once a day as an alternative to Plavix)  11. Follow-up in 6 months.       Molina Dumont MD

## 2018-06-11 NOTE — PATIENT INSTRUCTIONS
1. Anti-reflex measures.  This includes avoidance of soda beverages.  The patient should also avoid eating late at night.  2. Low-fat-low lactose diet.  3. Weight reduction.  4. Pantoprazole 40 mg 1 by mouth every morning one half hour before breakfast.  5. Lomotil 2.5-0.25 mg 1 orally 3 to 4 times a day.   6. Colestid.  The patient may take 2 tablets at night and 1 tablet in the morning.    7. Head end elevation by putting 4-6 inch blocks under the head end.  8. Dietary instructions.  The patient should eat relatively soft diet.  She should eat in upright position and chew well.  The patient should drink water after to 3 bites and take medications with liberal amounts of water.  Generally medications that have a potential to cause pill esophagitis may be avoided or used in an alternative form.  After eating and taking medications the patient should remain in upright position for 10-15 minutes.  9. Discussed with the patient and her  in detail.  Opportunity was given to ask questions.  10. Of interest that the patient is on Plavix for unclear reasons (if necessary the patient may have 81 mg chewable aspirin once a day as an alternative to Plavix)  11. Follow-up in 6 months.

## 2018-07-13 ENCOUNTER — HOSPITAL ENCOUNTER (INPATIENT)
Facility: HOSPITAL | Age: 68
LOS: 15 days | Discharge: ANOTHER HOSPITAL | End: 2018-07-28
Attending: EMERGENCY MEDICINE | Admitting: INTERNAL MEDICINE

## 2018-07-13 ENCOUNTER — APPOINTMENT (OUTPATIENT)
Dept: GENERAL RADIOLOGY | Facility: HOSPITAL | Age: 68
End: 2018-07-13

## 2018-07-13 ENCOUNTER — APPOINTMENT (OUTPATIENT)
Dept: CT IMAGING | Facility: HOSPITAL | Age: 68
End: 2018-07-13

## 2018-07-13 DIAGNOSIS — Z74.09 IMPAIRED FUNCTIONAL MOBILITY, BALANCE, GAIT, AND ENDURANCE: ICD-10-CM

## 2018-07-13 DIAGNOSIS — J96.21 ACUTE ON CHRONIC RESPIRATORY FAILURE WITH HYPOXIA (HCC): ICD-10-CM

## 2018-07-13 DIAGNOSIS — J96.01 ACUTE RESPIRATORY FAILURE WITH HYPOXIA (HCC): ICD-10-CM

## 2018-07-13 DIAGNOSIS — J18.9 PNEUMONIA OF BOTH LUNGS DUE TO INFECTIOUS ORGANISM, UNSPECIFIED PART OF LUNG: Primary | ICD-10-CM

## 2018-07-13 DIAGNOSIS — Z78.9 IMPAIRED MOBILITY AND ADLS: ICD-10-CM

## 2018-07-13 DIAGNOSIS — Z74.09 IMPAIRED MOBILITY AND ADLS: ICD-10-CM

## 2018-07-13 PROBLEM — R00.0 SINUS TACHYCARDIA: Status: ACTIVE | Noted: 2018-07-13

## 2018-07-13 LAB
ALBUMIN SERPL-MCNC: 4.3 G/DL (ref 3.5–5)
ALBUMIN/GLOB SERPL: 1.3 G/DL (ref 1–2)
ALP SERPL-CCNC: 85 U/L (ref 38–126)
ALT SERPL W P-5'-P-CCNC: 14 U/L (ref 13–69)
ANION GAP SERPL CALCULATED.3IONS-SCNC: 18.7 MMOL/L (ref 10–20)
ARTERIAL PATENCY WRIST A: POSITIVE
AST SERPL-CCNC: 28 U/L (ref 15–46)
ATMOSPHERIC PRESS: 736 MMHG
BASE EXCESS BLDA CALC-SCNC: -0.6 MMOL/L (ref 0–2)
BASE EXCESS BLDA CALC-SCNC: -1.4 MMOL/L (ref 0–2)
BASE EXCESS BLDA CALC-SCNC: 0.3 MMOL/L (ref 0–2)
BASOPHILS # BLD AUTO: 0.06 10*3/MM3 (ref 0–0.2)
BASOPHILS NFR BLD AUTO: 0.5 % (ref 0–2.5)
BDY SITE: ABNORMAL
BILIRUB SERPL-MCNC: 1.7 MG/DL (ref 0.2–1.3)
BUN BLD-MCNC: 12 MG/DL (ref 7–20)
BUN/CREAT SERPL: 20 (ref 7.1–23.5)
CALCIUM SPEC-SCNC: 9.5 MG/DL (ref 8.4–10.2)
CHLORIDE SERPL-SCNC: 102 MMOL/L (ref 98–107)
CO2 SERPL-SCNC: 25 MMOL/L (ref 26–30)
COHGB MFR BLD: 1.8 % (ref 0–2)
COHGB MFR BLD: 1.9 % (ref 0–2)
CREAT BLD-MCNC: 0.6 MG/DL (ref 0.6–1.3)
D-LACTATE SERPL-SCNC: 2.3 MMOL/L (ref 0.5–2)
D-LACTATE SERPL-SCNC: 2.8 MMOL/L (ref 0.5–2)
DEPRECATED RDW RBC AUTO: 46 FL (ref 37–54)
EOSINOPHIL # BLD AUTO: 0.19 10*3/MM3 (ref 0–0.7)
EOSINOPHIL NFR BLD AUTO: 1.4 % (ref 0–7)
ERYTHROCYTE [DISTWIDTH] IN BLOOD BY AUTOMATED COUNT: 15.1 % (ref 11.5–14.5)
GFR SERPL CREATININE-BSD FRML MDRD: 100 ML/MIN/1.73
GLOBULIN UR ELPH-MCNC: 3.4 GM/DL
GLUCOSE BLD-MCNC: 194 MG/DL (ref 74–98)
GLUCOSE BLDC GLUCOMTR-MCNC: 175 MG/DL (ref 70–130)
GLUCOSE BLDC GLUCOMTR-MCNC: 227 MG/DL (ref 70–130)
GLUCOSE BLDC GLUCOMTR-MCNC: 256 MG/DL (ref 70–130)
GLUCOSE BLDC GLUCOMTR-MCNC: 350 MG/DL (ref 70–130)
HCO3 BLDA-SCNC: 22.4 MMOL/L (ref 22–28)
HCO3 BLDA-SCNC: 22.9 MMOL/L (ref 22–28)
HCO3 BLDA-SCNC: 24.5 MMOL/L (ref 22–28)
HCT VFR BLD AUTO: 38.1 % (ref 37–47)
HGB BLD-MCNC: 12.6 G/DL (ref 12–16)
HGB BLDA-MCNC: 11.7 G/DL (ref 12–18)
HGB BLDA-MCNC: 11.9 G/DL (ref 12–18)
HGB BLDA-MCNC: 12 G/DL (ref 12–18)
HOLD SPECIMEN: NORMAL
HOROWITZ INDEX BLD+IHG-RTO: 38 %
HOROWITZ INDEX BLD+IHG-RTO: 40 %
HOROWITZ INDEX BLD+IHG-RTO: 90 %
IMM GRANULOCYTES # BLD: 0.05 10*3/MM3 (ref 0–0.06)
IMM GRANULOCYTES NFR BLD: 0.4 % (ref 0–0.6)
LYMPHOCYTES # BLD AUTO: 1.5 10*3/MM3 (ref 0.6–3.4)
LYMPHOCYTES NFR BLD AUTO: 11.4 % (ref 10–50)
MCH RBC QN AUTO: 27.8 PG (ref 27–31)
MCHC RBC AUTO-ENTMCNC: 33.1 G/DL (ref 30–37)
MCV RBC AUTO: 84.1 FL (ref 81–99)
METHGB BLD QL: 0.7 % (ref 0–1.5)
METHGB BLD QL: 0.8 % (ref 0–1.5)
MODALITY: ABNORMAL
MONOCYTES # BLD AUTO: 1.04 10*3/MM3 (ref 0–0.9)
MONOCYTES NFR BLD AUTO: 7.9 % (ref 0–12)
NEUTROPHILS # BLD AUTO: 10.36 10*3/MM3 (ref 2–6.9)
NEUTROPHILS NFR BLD AUTO: 78.4 % (ref 37–80)
NRBC BLD MANUAL-RTO: 0 /100 WBC (ref 0–0)
NT-PROBNP SERPL-MCNC: 901 PG/ML (ref 0–125)
OXYHGB MFR BLDV: 84 % (ref 94–99)
OXYHGB MFR BLDV: 88.1 % (ref 94–99)
PCO2 BLDA: 30.5 MM HG (ref 35–45)
PCO2 BLDA: 31.9 MM HG (ref 35–45)
PCO2 BLDA: 36.3 MM HG (ref 35–45)
PCO2 TEMP ADJ BLD: ABNORMAL MM HG (ref 35–45)
PCO2 TEMP ADJ BLD: ABNORMAL MM HG (ref 35–45)
PH BLDA: 7.44 PH UNITS (ref 7.3–7.5)
PH BLDA: 7.46 PH UNITS (ref 7.3–7.5)
PH BLDA: 7.48 PH UNITS (ref 7.3–7.5)
PH, TEMP CORRECTED: ABNORMAL PH UNITS
PH, TEMP CORRECTED: ABNORMAL PH UNITS
PLATELET # BLD AUTO: 289 10*3/MM3 (ref 130–400)
PMV BLD AUTO: 9.9 FL (ref 6–12)
PO2 BLDA: 49.8 MM HG (ref 75–100)
PO2 BLDA: 56.2 MM HG (ref 75–100)
PO2 BLDA: 88.9 MM HG (ref 75–100)
PO2 TEMP ADJ BLD: ABNORMAL MM HG (ref 83–108)
PO2 TEMP ADJ BLD: ABNORMAL MM HG (ref 83–108)
POTASSIUM BLD-SCNC: 3.7 MMOL/L (ref 3.5–5.1)
PROT SERPL-MCNC: 7.7 G/DL (ref 6.3–8.2)
RBC # BLD AUTO: 4.53 10*6/MM3 (ref 4.2–5.4)
SAO2 % BLDCOA: 86.3 % (ref 94–100)
SAO2 % BLDCOA: 90.5 % (ref 94–100)
SAO2 % BLDCOA: 97.5 % (ref 94–100)
SODIUM BLD-SCNC: 142 MMOL/L (ref 137–145)
TROPONIN I SERPL-MCNC: 0.01 NG/ML (ref 0–0.03)
TROPONIN I SERPL-MCNC: 0.01 NG/ML (ref 0–0.03)
TROPONIN I SERPL-MCNC: 0.02 NG/ML (ref 0–0.03)
WBC NRBC COR # BLD: 13.2 10*3/MM3 (ref 4.8–10.8)
WHOLE BLOOD HOLD SPECIMEN: NORMAL
WHOLE BLOOD HOLD SPECIMEN: NORMAL

## 2018-07-13 PROCEDURE — 83880 ASSAY OF NATRIURETIC PEPTIDE: CPT | Performed by: EMERGENCY MEDICINE

## 2018-07-13 PROCEDURE — 87070 CULTURE OTHR SPECIMN AEROBIC: CPT | Performed by: HOSPITALIST

## 2018-07-13 PROCEDURE — 87205 SMEAR GRAM STAIN: CPT | Performed by: HOSPITALIST

## 2018-07-13 PROCEDURE — 25010000002 METHYLPREDNISOLONE PER 125 MG: Performed by: NURSE PRACTITIONER

## 2018-07-13 PROCEDURE — 82375 ASSAY CARBOXYHB QUANT: CPT

## 2018-07-13 PROCEDURE — 99223 1ST HOSP IP/OBS HIGH 75: CPT | Performed by: HOSPITALIST

## 2018-07-13 PROCEDURE — 87040 BLOOD CULTURE FOR BACTERIA: CPT | Performed by: HOSPITALIST

## 2018-07-13 PROCEDURE — 94640 AIRWAY INHALATION TREATMENT: CPT

## 2018-07-13 PROCEDURE — 94799 UNLISTED PULMONARY SVC/PX: CPT

## 2018-07-13 PROCEDURE — 80053 COMPREHEN METABOLIC PANEL: CPT | Performed by: EMERGENCY MEDICINE

## 2018-07-13 PROCEDURE — 25010000002 IOPAMIDOL 61 % SOLUTION: Performed by: EMERGENCY MEDICINE

## 2018-07-13 PROCEDURE — 36600 WITHDRAWAL OF ARTERIAL BLOOD: CPT

## 2018-07-13 PROCEDURE — 87449 NOS EACH ORGANISM AG IA: CPT | Performed by: HOSPITALIST

## 2018-07-13 PROCEDURE — 63710000001 INSULIN DETEMIR PER 5 UNITS: Performed by: HOSPITALIST

## 2018-07-13 PROCEDURE — 82962 GLUCOSE BLOOD TEST: CPT

## 2018-07-13 PROCEDURE — 25010000002 MORPHINE SULFATE (PF) 2 MG/ML SOLUTION: Performed by: INTERNAL MEDICINE

## 2018-07-13 PROCEDURE — 93005 ELECTROCARDIOGRAM TRACING: CPT | Performed by: EMERGENCY MEDICINE

## 2018-07-13 PROCEDURE — 83050 HGB METHEMOGLOBIN QUAN: CPT

## 2018-07-13 PROCEDURE — 25010000002 METHYLPREDNISOLONE PER 40 MG: Performed by: NURSE PRACTITIONER

## 2018-07-13 PROCEDURE — 87186 SC STD MICRODIL/AGAR DIL: CPT | Performed by: HOSPITALIST

## 2018-07-13 PROCEDURE — 25010000002 CEFEPIME PER 500 MG: Performed by: EMERGENCY MEDICINE

## 2018-07-13 PROCEDURE — 94660 CPAP INITIATION&MGMT: CPT

## 2018-07-13 PROCEDURE — 25010000002 VANCOMYCIN PER 500 MG: Performed by: EMERGENCY MEDICINE

## 2018-07-13 PROCEDURE — 87147 CULTURE TYPE IMMUNOLOGIC: CPT | Performed by: HOSPITALIST

## 2018-07-13 PROCEDURE — 25010000002 HEPARIN (PORCINE) PER 1000 UNITS: Performed by: HOSPITALIST

## 2018-07-13 PROCEDURE — 84484 ASSAY OF TROPONIN QUANT: CPT | Performed by: INTERNAL MEDICINE

## 2018-07-13 PROCEDURE — 63710000001 INSULIN ASPART PER 5 UNITS: Performed by: HOSPITALIST

## 2018-07-13 PROCEDURE — 25010000002 METHYLPREDNISOLONE PER 125 MG: Performed by: EMERGENCY MEDICINE

## 2018-07-13 PROCEDURE — 87077 CULTURE AEROBIC IDENTIFY: CPT | Performed by: HOSPITALIST

## 2018-07-13 PROCEDURE — 87899 AGENT NOS ASSAY W/OPTIC: CPT | Performed by: HOSPITALIST

## 2018-07-13 PROCEDURE — 71275 CT ANGIOGRAPHY CHEST: CPT

## 2018-07-13 PROCEDURE — 84484 ASSAY OF TROPONIN QUANT: CPT | Performed by: EMERGENCY MEDICINE

## 2018-07-13 PROCEDURE — 83605 ASSAY OF LACTIC ACID: CPT | Performed by: EMERGENCY MEDICINE

## 2018-07-13 PROCEDURE — 25010000002 CEFEPIME PER 500 MG: Performed by: HOSPITALIST

## 2018-07-13 PROCEDURE — 85025 COMPLETE CBC W/AUTO DIFF WBC: CPT | Performed by: EMERGENCY MEDICINE

## 2018-07-13 PROCEDURE — 93005 ELECTROCARDIOGRAM TRACING: CPT | Performed by: INTERNAL MEDICINE

## 2018-07-13 PROCEDURE — 82805 BLOOD GASES W/O2 SATURATION: CPT

## 2018-07-13 PROCEDURE — 99285 EMERGENCY DEPT VISIT HI MDM: CPT

## 2018-07-13 RX ORDER — FLUTICASONE PROPIONATE 50 MCG
2 SPRAY, SUSPENSION (ML) NASAL DAILY
Status: DISCONTINUED | OUTPATIENT
Start: 2018-07-13 | End: 2018-07-29 | Stop reason: HOSPADM

## 2018-07-13 RX ORDER — FAMOTIDINE 20 MG/1
20 TABLET, FILM COATED ORAL 2 TIMES DAILY
COMMUNITY

## 2018-07-13 RX ORDER — NICOTINE POLACRILEX 4 MG
1 LOZENGE BUCCAL
Status: DISCONTINUED | OUTPATIENT
Start: 2018-07-13 | End: 2018-07-29 | Stop reason: HOSPADM

## 2018-07-13 RX ORDER — MORPHINE SULFATE 2 MG/ML
1 INJECTION, SOLUTION INTRAMUSCULAR; INTRAVENOUS EVERY 6 HOURS PRN
Status: DISCONTINUED | OUTPATIENT
Start: 2018-07-13 | End: 2018-07-20

## 2018-07-13 RX ORDER — HEPARIN SODIUM 5000 [USP'U]/ML
5000 INJECTION, SOLUTION INTRAVENOUS; SUBCUTANEOUS EVERY 8 HOURS SCHEDULED
Status: DISCONTINUED | OUTPATIENT
Start: 2018-07-13 | End: 2018-07-16

## 2018-07-13 RX ORDER — PANTOPRAZOLE SODIUM 40 MG/1
40 TABLET, DELAYED RELEASE ORAL EVERY MORNING
Status: DISCONTINUED | OUTPATIENT
Start: 2018-07-13 | End: 2018-07-14 | Stop reason: SDUPTHER

## 2018-07-13 RX ORDER — BUPROPION HYDROCHLORIDE 150 MG/1
150 TABLET ORAL DAILY
Status: DISCONTINUED | OUTPATIENT
Start: 2018-07-13 | End: 2018-07-18 | Stop reason: SDUPTHER

## 2018-07-13 RX ORDER — BUDESONIDE 0.5 MG/2ML
0.5 INHALANT ORAL 2 TIMES DAILY
Status: DISCONTINUED | OUTPATIENT
Start: 2018-07-13 | End: 2018-07-13

## 2018-07-13 RX ORDER — GABAPENTIN 300 MG/1
300 CAPSULE ORAL 2 TIMES DAILY
Status: DISCONTINUED | OUTPATIENT
Start: 2018-07-13 | End: 2018-07-29 | Stop reason: HOSPADM

## 2018-07-13 RX ORDER — SODIUM CHLORIDE 0.9 % (FLUSH) 0.9 %
10 SYRINGE (ML) INJECTION AS NEEDED
Status: DISCONTINUED | OUTPATIENT
Start: 2018-07-13 | End: 2018-07-15

## 2018-07-13 RX ORDER — SODIUM CHLORIDE 9 MG/ML
100 INJECTION, SOLUTION INTRAVENOUS CONTINUOUS
Status: DISCONTINUED | OUTPATIENT
Start: 2018-07-13 | End: 2018-07-15

## 2018-07-13 RX ORDER — IPRATROPIUM BROMIDE AND ALBUTEROL SULFATE 2.5; .5 MG/3ML; MG/3ML
3 SOLUTION RESPIRATORY (INHALATION) ONCE
Status: COMPLETED | OUTPATIENT
Start: 2018-07-13 | End: 2018-07-13

## 2018-07-13 RX ORDER — NITROGLYCERIN 0.4 MG/1
0.4 TABLET SUBLINGUAL
Status: DISCONTINUED | OUTPATIENT
Start: 2018-07-13 | End: 2018-07-29 | Stop reason: HOSPADM

## 2018-07-13 RX ORDER — ATORVASTATIN CALCIUM 20 MG/1
20 TABLET, FILM COATED ORAL DAILY
Status: DISCONTINUED | OUTPATIENT
Start: 2018-07-13 | End: 2018-07-29 | Stop reason: HOSPADM

## 2018-07-13 RX ORDER — LISINOPRIL 20 MG/1
20 TABLET ORAL DAILY
Status: DISCONTINUED | OUTPATIENT
Start: 2018-07-13 | End: 2018-07-16

## 2018-07-13 RX ORDER — IPRATROPIUM BROMIDE AND ALBUTEROL SULFATE 2.5; .5 MG/3ML; MG/3ML
3 SOLUTION RESPIRATORY (INHALATION) EVERY 4 HOURS PRN
Status: DISCONTINUED | OUTPATIENT
Start: 2018-07-13 | End: 2018-07-29 | Stop reason: HOSPADM

## 2018-07-13 RX ORDER — METHYLPREDNISOLONE SODIUM SUCCINATE 125 MG/2ML
125 INJECTION, POWDER, LYOPHILIZED, FOR SOLUTION INTRAMUSCULAR; INTRAVENOUS ONCE
Status: COMPLETED | OUTPATIENT
Start: 2018-07-13 | End: 2018-07-13

## 2018-07-13 RX ORDER — ONDANSETRON 2 MG/ML
4 INJECTION INTRAMUSCULAR; INTRAVENOUS EVERY 6 HOURS PRN
Status: DISCONTINUED | OUTPATIENT
Start: 2018-07-13 | End: 2018-07-29 | Stop reason: HOSPADM

## 2018-07-13 RX ORDER — TRAZODONE HYDROCHLORIDE 50 MG/1
75 TABLET ORAL NIGHTLY
Status: DISCONTINUED | OUTPATIENT
Start: 2018-07-13 | End: 2018-07-16

## 2018-07-13 RX ORDER — METHYLPREDNISOLONE SODIUM SUCCINATE 125 MG/2ML
60 INJECTION, POWDER, LYOPHILIZED, FOR SOLUTION INTRAMUSCULAR; INTRAVENOUS EVERY 8 HOURS
Status: DISCONTINUED | OUTPATIENT
Start: 2018-07-13 | End: 2018-07-20

## 2018-07-13 RX ORDER — DOCUSATE SODIUM 100 MG/1
100 CAPSULE, LIQUID FILLED ORAL DAILY PRN
Status: DISCONTINUED | OUTPATIENT
Start: 2018-07-13 | End: 2018-07-29 | Stop reason: HOSPADM

## 2018-07-13 RX ORDER — SODIUM CHLORIDE FOR INHALATION 3 %
VIAL, NEBULIZER (ML) INHALATION
Status: COMPLETED
Start: 2018-07-13 | End: 2018-07-13

## 2018-07-13 RX ORDER — DEXTROSE MONOHYDRATE 25 G/50ML
25 INJECTION, SOLUTION INTRAVENOUS
Status: DISCONTINUED | OUTPATIENT
Start: 2018-07-13 | End: 2018-07-29 | Stop reason: HOSPADM

## 2018-07-13 RX ORDER — AMLODIPINE BESYLATE 5 MG/1
5 TABLET ORAL
Status: DISCONTINUED | OUTPATIENT
Start: 2018-07-13 | End: 2018-07-16

## 2018-07-13 RX ORDER — LEVOFLOXACIN 5 MG/ML
750 INJECTION, SOLUTION INTRAVENOUS ONCE
Status: DISCONTINUED | OUTPATIENT
Start: 2018-07-13 | End: 2018-07-14

## 2018-07-13 RX ORDER — ARFORMOTEROL TARTRATE 15 UG/2ML
15 SOLUTION RESPIRATORY (INHALATION)
Status: DISCONTINUED | OUTPATIENT
Start: 2018-07-13 | End: 2018-07-26

## 2018-07-13 RX ORDER — METHYLPREDNISOLONE SODIUM SUCCINATE 40 MG/ML
40 INJECTION, POWDER, LYOPHILIZED, FOR SOLUTION INTRAMUSCULAR; INTRAVENOUS EVERY 8 HOURS
Status: DISCONTINUED | OUTPATIENT
Start: 2018-07-13 | End: 2018-07-13

## 2018-07-13 RX ORDER — CALCITONIN SALMON 200 [IU]/.09ML
1 SPRAY, METERED NASAL DAILY
Status: DISCONTINUED | OUTPATIENT
Start: 2018-07-13 | End: 2018-07-29 | Stop reason: HOSPADM

## 2018-07-13 RX ORDER — ASPIRIN 81 MG/1
324 TABLET, CHEWABLE ORAL ONCE
Status: COMPLETED | OUTPATIENT
Start: 2018-07-13 | End: 2018-07-13

## 2018-07-13 RX ORDER — CLOTRIMAZOLE AND BETAMETHASONE DIPROPIONATE 10; .64 MG/G; MG/G
1 CREAM TOPICAL EVERY 12 HOURS SCHEDULED
Status: DISCONTINUED | OUTPATIENT
Start: 2018-07-13 | End: 2018-07-29 | Stop reason: HOSPADM

## 2018-07-13 RX ORDER — BUDESONIDE 0.5 MG/2ML
1 INHALANT ORAL 2 TIMES DAILY
Status: DISCONTINUED | OUTPATIENT
Start: 2018-07-13 | End: 2018-07-29 | Stop reason: HOSPADM

## 2018-07-13 RX ORDER — SODIUM CHLORIDE FOR INHALATION 3 %
4 VIAL, NEBULIZER (ML) INHALATION AS NEEDED
Status: DISCONTINUED | OUTPATIENT
Start: 2018-07-13 | End: 2018-07-29 | Stop reason: HOSPADM

## 2018-07-13 RX ORDER — DIPHENOXYLATE HYDROCHLORIDE AND ATROPINE SULFATE 2.5; .025 MG/1; MG/1
1 TABLET ORAL 4 TIMES DAILY PRN
Status: DISCONTINUED | OUTPATIENT
Start: 2018-07-13 | End: 2018-07-29 | Stop reason: HOSPADM

## 2018-07-13 RX ORDER — IPRATROPIUM BROMIDE AND ALBUTEROL SULFATE 2.5; .5 MG/3ML; MG/3ML
3 SOLUTION RESPIRATORY (INHALATION)
Status: DISCONTINUED | OUTPATIENT
Start: 2018-07-13 | End: 2018-07-29 | Stop reason: HOSPADM

## 2018-07-13 RX ORDER — CLOPIDOGREL BISULFATE 75 MG/1
75 TABLET ORAL DAILY
Status: DISCONTINUED | OUTPATIENT
Start: 2018-07-13 | End: 2018-07-19

## 2018-07-13 RX ORDER — SODIUM CHLORIDE 0.9 % (FLUSH) 0.9 %
1-10 SYRINGE (ML) INJECTION AS NEEDED
Status: DISCONTINUED | OUTPATIENT
Start: 2018-07-13 | End: 2018-07-15

## 2018-07-13 RX ORDER — ACETAMINOPHEN 325 MG/1
650 TABLET ORAL EVERY 4 HOURS PRN
Status: DISCONTINUED | OUTPATIENT
Start: 2018-07-13 | End: 2018-07-29 | Stop reason: HOSPADM

## 2018-07-13 RX ADMIN — MORPHINE SULFATE 1 MG: 2 INJECTION, SOLUTION INTRAMUSCULAR; INTRAVENOUS at 16:32

## 2018-07-13 RX ADMIN — CLOTRIMAZOLE AND BETAMETHASONE DIPROPIONATE 1 APPLICATION: 10; .5 CREAM TOPICAL at 10:17

## 2018-07-13 RX ADMIN — IPRATROPIUM BROMIDE AND ALBUTEROL SULFATE 3 ML: .5; 3 SOLUTION RESPIRATORY (INHALATION) at 18:50

## 2018-07-13 RX ADMIN — INSULIN DETEMIR 90 UNITS: 100 INJECTION, SOLUTION SUBCUTANEOUS at 09:48

## 2018-07-13 RX ADMIN — FLUTICASONE PROPIONATE 2 SPRAY: 50 SPRAY, METERED NASAL at 09:16

## 2018-07-13 RX ADMIN — METHYLPREDNISOLONE SODIUM SUCCINATE 125 MG: 125 INJECTION, POWDER, FOR SOLUTION INTRAMUSCULAR; INTRAVENOUS at 02:59

## 2018-07-13 RX ADMIN — IOPAMIDOL 100 ML: 612 INJECTION, SOLUTION INTRAVENOUS at 03:17

## 2018-07-13 RX ADMIN — TRAZODONE HYDROCHLORIDE 75 MG: 50 TABLET ORAL at 21:11

## 2018-07-13 RX ADMIN — VITAMIN D, TAB 1000IU (100/BT) 2000 UNITS: 25 TAB at 09:54

## 2018-07-13 RX ADMIN — HEPARIN SODIUM 5000 UNITS: 5000 INJECTION, SOLUTION INTRAVENOUS; SUBCUTANEOUS at 09:51

## 2018-07-13 RX ADMIN — INSULIN ASPART 2 UNITS: 100 INJECTION, SOLUTION INTRAVENOUS; SUBCUTANEOUS at 07:30

## 2018-07-13 RX ADMIN — BUDESONIDE 0.5 MG: 0.5 INHALANT RESPIRATORY (INHALATION) at 07:42

## 2018-07-13 RX ADMIN — SODIUM CHLORIDE SOLN NEBU 3% 4 ML: 3 NEBU SOLN at 07:43

## 2018-07-13 RX ADMIN — VANCOMYCIN HYDROCHLORIDE 1500 MG: 500 INJECTION, POWDER, LYOPHILIZED, FOR SOLUTION INTRAVENOUS at 06:41

## 2018-07-13 RX ADMIN — CEFEPIME HYDROCHLORIDE 2 G: 2 INJECTION, SOLUTION INTRAVENOUS at 05:25

## 2018-07-13 RX ADMIN — METHYLPREDNISOLONE SODIUM SUCCINATE 40 MG: 40 INJECTION, POWDER, FOR SOLUTION INTRAMUSCULAR; INTRAVENOUS at 13:42

## 2018-07-13 RX ADMIN — CLOPIDOGREL BISULFATE 75 MG: 75 TABLET ORAL at 09:53

## 2018-07-13 RX ADMIN — IPRATROPIUM BROMIDE AND ALBUTEROL SULFATE 3 ML: .5; 3 SOLUTION RESPIRATORY (INHALATION) at 02:50

## 2018-07-13 RX ADMIN — GABAPENTIN 300 MG: 300 CAPSULE ORAL at 21:12

## 2018-07-13 RX ADMIN — VANCOMYCIN HYDROCHLORIDE 1250 MG: 500 INJECTION, POWDER, LYOPHILIZED, FOR SOLUTION INTRAVENOUS at 23:42

## 2018-07-13 RX ADMIN — DOXYCYCLINE 100 MG: 100 INJECTION, POWDER, LYOPHILIZED, FOR SOLUTION INTRAVENOUS at 21:11

## 2018-07-13 RX ADMIN — IPRATROPIUM BROMIDE AND ALBUTEROL SULFATE 3 ML: .5; 3 SOLUTION RESPIRATORY (INHALATION) at 22:49

## 2018-07-13 RX ADMIN — CEFEPIME HYDROCHLORIDE 2 G: 2 INJECTION, SOLUTION INTRAVENOUS at 23:40

## 2018-07-13 RX ADMIN — LISINOPRIL 20 MG: 20 TABLET ORAL at 09:52

## 2018-07-13 RX ADMIN — Medication 200 MG: at 09:54

## 2018-07-13 RX ADMIN — CEFEPIME HYDROCHLORIDE 2 G: 2 INJECTION, SOLUTION INTRAVENOUS at 15:45

## 2018-07-13 RX ADMIN — METHYLPREDNISOLONE SODIUM SUCCINATE 60 MG: 125 INJECTION, POWDER, FOR SOLUTION INTRAMUSCULAR; INTRAVENOUS at 21:11

## 2018-07-13 RX ADMIN — CLOTRIMAZOLE AND BETAMETHASONE DIPROPIONATE 1 APPLICATION: 10; .5 CREAM TOPICAL at 21:12

## 2018-07-13 RX ADMIN — ASPIRIN 81 MG 324 MG: 81 TABLET ORAL at 16:32

## 2018-07-13 RX ADMIN — BUPROPION HYDROCHLORIDE 150 MG: 150 TABLET, FILM COATED, EXTENDED RELEASE ORAL at 09:00

## 2018-07-13 RX ADMIN — IPRATROPIUM BROMIDE AND ALBUTEROL SULFATE 3 ML: .5; 3 SOLUTION RESPIRATORY (INHALATION) at 13:17

## 2018-07-13 RX ADMIN — SODIUM CHLORIDE 1000 ML: 9 INJECTION, SOLUTION INTRAVENOUS at 13:42

## 2018-07-13 RX ADMIN — GABAPENTIN 300 MG: 300 CAPSULE ORAL at 09:53

## 2018-07-13 RX ADMIN — HEPARIN SODIUM 5000 UNITS: 5000 INJECTION, SOLUTION INTRAVENOUS; SUBCUTANEOUS at 13:41

## 2018-07-13 RX ADMIN — Medication 10 ML: at 06:41

## 2018-07-13 RX ADMIN — ATORVASTATIN CALCIUM 20 MG: 20 TABLET, FILM COATED ORAL at 09:53

## 2018-07-13 RX ADMIN — DOXYCYCLINE 100 MG: 100 INJECTION, POWDER, LYOPHILIZED, FOR SOLUTION INTRAVENOUS at 07:30

## 2018-07-13 RX ADMIN — PANTOPRAZOLE SODIUM 40 MG: 40 TABLET, DELAYED RELEASE ORAL at 08:30

## 2018-07-13 RX ADMIN — ARFORMOTEROL TARTRATE 15 MCG: 15 SOLUTION RESPIRATORY (INHALATION) at 07:42

## 2018-07-13 RX ADMIN — HEPARIN SODIUM 5000 UNITS: 5000 INJECTION, SOLUTION INTRAVENOUS; SUBCUTANEOUS at 21:12

## 2018-07-13 RX ADMIN — ARFORMOTEROL TARTRATE 15 MCG: 15 SOLUTION RESPIRATORY (INHALATION) at 18:50

## 2018-07-13 RX ADMIN — SERTRALINE HYDROCHLORIDE 100 MG: 50 TABLET ORAL at 09:58

## 2018-07-13 RX ADMIN — IPRATROPIUM BROMIDE AND ALBUTEROL SULFATE 3 ML: .5; 3 SOLUTION RESPIRATORY (INHALATION) at 16:11

## 2018-07-13 RX ADMIN — SODIUM CHLORIDE 100 ML/HR: 9 INJECTION, SOLUTION INTRAVENOUS at 15:41

## 2018-07-13 RX ADMIN — INSULIN DETEMIR 90 UNITS: 100 INJECTION, SOLUTION SUBCUTANEOUS at 21:12

## 2018-07-13 RX ADMIN — CALCITONIN SALMON 1 SPRAY: 200 SPRAY, METERED NASAL at 10:16

## 2018-07-13 RX ADMIN — BUDESONIDE 1 MG: 0.5 INHALANT RESPIRATORY (INHALATION) at 18:50

## 2018-07-13 RX ADMIN — AMLODIPINE BESYLATE 5 MG: 5 TABLET ORAL at 09:53

## 2018-07-13 NOTE — PLAN OF CARE
Problem: Pneumonia (Adult)  Goal: Signs and Symptoms of Listed Potential Problems Will be Absent, Minimized or Managed (Pneumonia)  Outcome: Ongoing (interventions implemented as appropriate)   07/13/18 0883   Goal/Outcome Evaluation   Problems Assessed (Pneumonia) infection progression   Problems Present (Pneumonia) infection progression

## 2018-07-13 NOTE — PROGRESS NOTES
Patient seen and examined multiple times this afternoon in the ICU. She was transferred there earlier today due to progressively worsening hypoxia and respiratory failure.   Patient was admitted with multifocal pneumonia, on broad spectrum IV antibiotics.   She was noted to be hypoxic on NC, requiring 100% NRB during my initial encounter and stated feeling soa but stable. Received a call form her RN around 4PM reporting that the patient was complaining of chest pain and her oxygen saturation was down at 85%. Pain was pressure like, midsternal, non-radiating, 4/10, not associated with nausea or diaphoresis. She has never experienced anything like this before. She does not have any known cardiac disease. STAT EKG shows sinus tachycardia with no acute ST-T changes. I have ordered a troponin level, will place her on BiPAP. Ordered nitrostat prn as well as morphine. Will trend cardiac enzymes.

## 2018-07-13 NOTE — PROGRESS NOTES
PROGRESS NOTE        Date of Admission: 7/13/2018  Length of Stay: 0  Primary Care Physician: Harry Avery MD    Subjective   Chief Complaint: Follow up pneumonia  HPI: This is a 68-year-old female who is admitted last evening for multifocal pneumonia.  She had a CT scan done in the emergency room which showed the following results:  1. No evidence of pulmonary embolism.  2. Extensive multifocal pneumonia.  3. Significant adenopathy, particularly in the AP window.  4. Recommend short-term chest CT follow-up in 2-3 months, if adenopathy  is persistent consider PET/CT.   Patient was admitted to the hospitalist service and started on IV antibiotics in the form of vancomycin, doxycycline, and cefepime. She was placed on oxygen however this morning she did desaturate while eating breakfast.  She oxygen was turned up to 6 liters and she was then saturating in mid 90s.  I did order an ABG which did show a pH 7.4, Pco2 31.9 and pO2 49.8.  She was on 5 L in the emergency room when she was admitted.  According to the patient the day prior to admission she and her  had driven through a fog of dust at a nearby [employer] [place].  She does not have any chickens, birds or any chicken coops.  She denies any exposure to molds.  Reviewing her repeat ABG she was placed on a nonrebreather at 100%.  Her saturations are maintaining in the low 90s at this point.  I have reviewed her CT scan.  We will transfer the patient to the ICU for closer monitoring.  Have discussed with the patient as well as her  the concern for worsening respiratory status and the fact that there is a possibility that she could be intubated.  They are okay with this plan if she does require intubation.  I have discussed patient with attending physician who is also evaluated the patient in the ICU.  At the time of my evaluation of the patient before sending her to the ICU she was sitting in bed.  She did become more short of breath with talking.   She was not in any acute distress at that time.         Review Of Systems:   Review of Systems   General ROS: Patient denies any fevers, chills or loss of consciousness.    Psychological ROS: Denies any hallucinations and delusions.  Ophthalmic ROS: Denies any diplopia or transient loss of vision.  ENT ROS: Denies sore throat, nasal congestion or ear pain.   Allergy and Immunology ROS: Denies rash or itching.  Hematological and Lymphatic ROS: Denies neck swelling or easy bleeding.  Endocrine ROS: Denies any recent unintentional weight gain or loss.  Breast ROS: Denies any pain or swelling.  Respiratory ROS:  Complaints of shortness of breath.  Slight cough  Cardiovascular ROS: Denies chest pain or palpitations. No history of exertional chest pain.   Gastrointestinal ROS: Denies nausea and vomiting. Denies any abdominal pain. No diarrhea.  Genito-Urinary ROS: Denies dysuria or hematuria.  Musculoskeletal ROS: Denies back pain. No muscle pain. No calf pain.   Neurological ROS: Denies any focal weakness. No loss of consciousness. Denies any numbness. Denies neck pain.   Dermatological ROS: Denies any redness or pruritis.    Objective      Temp:  [96.9 °F (36.1 °C)-98.7 °F (37.1 °C)] 98.1 °F (36.7 °C)  Heart Rate:  [] 102  Resp:  [18-22] 22  BP: (142-167)/(64-82) 146/64  Physical Exam    General Appearance:  Alert and cooperative, not in any acute distress.   Head:  Atraumatic and normocephalic, without obvious abnormality.   Eyes:          PERRLA, conjunctivae and sclerae normal, no Icterus. No pallor. Extra-occular movements are within normal limits.   Ears:  Ears appear intact with no abnormalities noted.   Throat: No oral lesions, no thrush, oral mucosa moist.   Neck: Supple, trachea midline, no thyromegaly, no carotid bruit.   Back:   No kyphoscoliosis present. No tenderness to palpation,   range of motion normal.   Lungs:   Chest shape is normal. Breath sounds heard bilaterally equally.  Bilateral crackles  No wheezing. No Pleural rub or bronchial breathing.   Heart:  Normal S1 and S2, no murmur, no gallop, no rub. No JVD   Abdomen:   Normal bowel sounds, no masses, no organomegaly. Soft     non-tender, non-distended, no guarding, no rebound                tenderness   Extremities: Moves all extremities well, no edema, no cyanosis, no clubbing.   Pulses: Pulses palpable and equal bilaterally   Skin: No bleeding, bruising or rash   Lymph nodes: No palpable adenopathy   Neurologic:    Psychiatric/Behavior:     Cranial nerves 2 - 12 grossly intact, sensation intact, Motor power is normal and equal bilaterally.  Mood normal, behavior normal       Results Review:    I have reviewed the labs, radiology results and diagnostic studies.      Results from last 7 days  Lab Units 07/13/18  0235   WBC 10*3/mm3 13.20*   HEMOGLOBIN g/dL 12.6   PLATELETS 10*3/mm3 289       Results from last 7 days  Lab Units 07/13/18  0235   SODIUM mmol/L 142   POTASSIUM mmol/L 3.7   CO2 mmol/L 25.0*   CREATININE mg/dL 0.60   GLUCOSE mg/dL 194*       Culture Data:    Radiology Data:   Cardiology Data:    I have reviewed the medications.    Assessment/Plan     Assessment and Plan:    1.  Multifocal bilateral pneumonia there is concern for acute pneumonitis from environmental exposure.  Patient has been started on IV antibiotics in the form of vancomycin, cefepime, and doxycycline.  Urine strep and Legionella have been sent as well as an MRSA screen.  Sputum culture has been ordered as well as blood cultures.  She is on Ventimask at this point.  She is getting DuoNeb every 4 hours as well as IV steroids.  Will be placed in the ICU for close monitoring.  Have discussed with the patient and her  the possibility of intubation should her respiratory status worsened and she and  are in agreement.  Should patient's respiratory status continued to worsen may need to consider possible transfer for pulmonary evaluation.     2.  Sepsis secondary to  #1-blood cultures have been ordered.    3.  Diabetes mellitus type 2-patient is on insulin protocol as well as Levemir.  We will continue to monitor    4.  Chronic essential hypertension-blood pressure stable continue current antihypertensive medications    5.  Acute hypoxic respiratory failure-patient is on nonrebreather 100%.  Continue to monitor.        DVT prophylaxis:  Heparin  Discharge Planning:   Brandee Gustafson, MINISTERIO 07/13/18 2:43 PM

## 2018-07-13 NOTE — PROGRESS NOTES
Discharge Planning Assessment  [facility]     Patient Name: Breann Gallegos  MRN: [id number]  Today's Date: 7/13/2018    Admit Date: 7/13/2018          Discharge Needs Assessment     Row Name 07/13/18 1400       Living Environment    Primary Care Provided by self    Provides Primary Care For no one    Family Caregiver if Needed spouse    Family Caregiver Names Galindo Gallegos        Discharge Needs Assessment    Readmission Within the Last 30 Days no previous admission in last 30 days            Discharge Plan     Row Name 07/13/18 1401       Plan    Plan Home     Patient/Family in Agreement with Plan yes    Plan Comments Spoke to pt alone She is independent with ADLS  Has a nebulizer no other equipment Does not have home health .Confirmed address and phone number does not have POA or Health surragate Deneis any needs         Destination     No service coordination in this encounter.      Durable Medical Equipment     No service coordination in this encounter.      Dialysis/Infusion     No service coordination in this encounter.      Home Medical Care     No service coordination in this encounter.      Social Care     No service coordination in this encounter.                Demographic Summary     Row Name 07/13/18 1400       General Information    Admission Type inpatient    Referral Source admission list            Functional Status     Row Name 07/13/18 1400       Functional Status    Usual Activity Tolerance good       Functional Status, IADL    Medications independent    Meal Preparation independent    Housekeeping independent    Laundry independent    Shopping independent       Mental Status    General Appearance WDL WDL       Mental Status Summary    Recent Changes in Mental Status/Cognitive Functioning no changes            Psychosocial    No documentation.           Abuse/Neglect    No documentation.           Legal    No documentation.           Substance Abuse    No documentation.            Patient Forms    No documentation.         Kristin Royal

## 2018-07-13 NOTE — H&P
[facility]    Name: Breann Gallegos, 68 y.o. female  MRN: [id number], : 1950   Date of Admission: 2018   PCP: Harry Avery MD     Chief Complaint: shortness of breath     History of Present Illness: Breann Gallegos is a(n) 68 y.o. year old female with a history of COPD, GERD, DM2, HTN, HLD who presents as admission from ER with shortness of breath that increased after going to therapy and productive cough x 1 day.  I am told she was hypoxic on room air on arrival, ~80% in ER, improved on 5LPM NC to mid to low 90s%. Patient tells me that they drove through a \"fog\" of dust from nearby [place] yesterday. Both  and wife noted changes in respiratory status since then. She denies any fevers or chills. Denies any rhinorrhea, headache, sinus drainage, chest pain, abdominal pain, N/V/D, dysuria or urinary frequency. Denies any myalgias or arthralgias.     ER course:   VS: /72   Pulse 97   Temp 98.7 °F (37.1 °C)   Resp 20   Ht 162.6 cm (64\")   Wt 68.7 kg (151 lb 8 oz)   LMP  (LMP Unknown)   SpO2 90%   BMI 26.00 kg/m²    Meds: cefepime; duoneb; levaquin; solumedrol; vancomycin; NS 1L  Abnl Labs: AB.48/30/56/22; Glc 194; CO2 25; ; WBC 13K  Studies:   CT chest: \"no definite filling defect to suggest a pulmonary embolus. No acute aortic abnormality. Mediastinal lymphadenopathy. This could be reactive to infection or inflammation, but malignancy is not excluded. Multifocal air space consolidation diffusely. This may be seen with multifocal pneumonia, but short-term follow-up in 3 months should be considered to exclude underlying malignancy.\"  EKG: (my read), compared to EKG from: 18. Rate: 106bpm / Rhythm: sinus tach with baseline artifact / Axis: nl / ST/T changes: no / Hypertrophy: no / QTc: 373ms     Patient seen at 545am on 2018. History was provided by: chart review, discussion with ER provider (Dr." Price), and patient.     Recent hospitalization?: yes, 5/1-5/3 for recurrent esophageal stricture     ==============================================================================================================================================================================================================   History:   ROS: all other systems reviewed and are negative  PMHx: Patient  has a past medical history of Arthritis; Body piercing; COPD (chronic obstructive pulmonary disease) (CMS/HCC); Diabetes mellitus (CMS/HCC); Dysphagia; Eustachian tube dysfunction; Fracture; Full dentures; GERD (gastroesophageal reflux disease); High cholesterol; History of nuclear stress test; Hyperlipidemia; Hypertension; Sinus problem; Skin abscess; Vision problem; Vitamin B12 deficiency; Vitamin D deficiency; and Wears glasses.   PSHx: Patient  has a past surgical history that includes Other surgical history; Cataract Extraction (Bilateral); Hand surgery (Right); Tonsillectomy; Appendectomy (1976); Cholecystectomy (1976); Hysterectomy (1979); Breast surgery; Esophagogastroduodenoscopy; Colonoscopy; Esophagogastroduodenoscopy (N/A, 11/29/2017); Reduction mammaplasty (Bilateral, 1988); Oophorectomy (Bilateral, 1979); and Esophagogastroduodenoscopy (N/A, 5/2/2018).   FamHx: Patient family history includes COPD in her mother; Diabetes in her other; Kidney failure in her father; Pneumonia in her mother; Prostate cancer in her father.   SocHx: Patient  reports that she quit smoking about 8 years ago. Her smoking use included Cigarettes. She has a 30.00 pack-year smoking history. She has never used smokeless tobacco. She reports that she does not drink alcohol or use drugs.   Allergies: Patient is allergic to codeine; fish-derived products; protonix [pantoprazole]; terbinafine; and penicillins.   Medications:   No current facility-administered medications on file prior to encounter.      Current Outpatient Prescriptions on File Prior to  "Encounter   Medication Sig Dispense Refill   • albuterol (PROAIR RESPICLICK) 108 (90 BASE) MCG/ACT inhaler Inhale 1 puff every 4 (four) hours as needed for wheezing or shortness of air. 1 inhaler 0   • amLODIPine (NORVASC) 5 MG tablet TAKE 1 TABLET BY MOUTH AT BEDTIME 30 tablet 5   • arformoterol (BROVANA) 15 MCG/2ML nebulizer solution Take 2 mL by nebulization 2 (Two) Times a Day. 120 mL 5   • B-D INS SYR ULTRAFINE 1CC/31G 31G X 5/16\" 1 ML misc use as directed 100 each 5   • budesonide (PULMICORT) 0.5 MG/2ML nebulizer solution Take 2 mL by nebulization 2 (Two) Times a Day. 120 mL 5   • buPROPion XL (WELLBUTRIN XL) 150 MG 24 hr tablet take 1 tablet by mouth once daily 30 tablet 4   • calcitonin, salmon, (MIACALCIN) 200 UNIT/ACT nasal spray 1 spray into each nostril Daily. 1 each 12   • cholecalciferol (VITAMIN D3) 1000 units tablet Take 2,000 Units by mouth Daily.     • clopidogrel (PLAVIX) 75 MG tablet take 1 tablet by mouth once daily 30 tablet 6   • clotrimazole-betamethasone (LOTRISONE) 1-0.05 % cream Apply 1 application topically 2 (Two) Times a Day. Apply topically twice daily as direted. 45 g 5   • colestipol (COLESTID) 1 g tablet take 2 tablets by mouth twice a day (Patient taking differently: take 2 tablets by mouth daily) 120 tablet 3   • diphenoxylate-atropine (LOMOTIL) 2.5-0.025 MG per tablet patient states she takes one tablet every day  0   • flunisolide (NASALIDE) 25 MCG/ACT (0.025%) solution nasal spray Inhale 2 sprays Every 12 (Twelve) Hours. 1 bottle 5   • gabapentin (NEURONTIN) 300 MG capsule take 1 capsule by mouth twice a day 60 capsule 3   • glucose blood (ONE TOUCH ULTRA TEST) test strip USE TO TEST TWICE DAILY. E11.9 100 each 5   • insulin glargine (LANTUS) 100 UNIT/ML injection Inject 45 Units under the skin 2 (Two) Times a Day. 50 mL 5   • Insulin Syringe-Needle U-100 (B-D INS SYR ULTRAFINE .5CC/30G) 30G X 1/2\" 0.5 ML misc      • Insulin Syringe-Needle U-100 (B-D INS SYR ULTRAFINE 1CC/30G) " "30G X 1/2\" 1 ML misc      • ipratropium-albuterol (DUO-NEB) 0.5-2.5 mg/mL nebulizer Take 3 mL by nebulization Every 4 (Four) Hours As Needed for wheezing or shortness of air. 120 vial 2   • Lancets (ONETOUCH ULTRASOFT) lancets USE AS DIRECTED TWICE A  each 5   • lisinopril (PRINIVIL,ZESTRIL) 20 MG tablet take 1 tablet by mouth once daily 30 tablet 5   • magnesium 30 MG tablet Take 1 tablet by mouth Daily. 30 tablet 1   • metFORMIN (GLUCOPHAGE) 500 MG tablet TAKE 2 TABLETS BY MOUTH IN THE MORNING, 1 TABLET AT LUNCH, AND 2 TABLETS IN THE EVENING 150 tablet 5   • omeprazole (PRILOSEC) 40 MG capsule 1 po daily in the am 30 minutes before breakfast 30 capsule 5   • ondansetron (ZOFRAN) 4 MG tablet Take 1 tablet by mouth Every 8 (Eight) Hours As Needed for Nausea or Vomiting. 30 tablet 0   • sertraline (ZOLOFT) 100 MG tablet TAKE 1 1/2 TABLETS BY MOUTH ONCE DAILY 45 tablet 1   • simvastatin (ZOCOR) 40 MG tablet TAKE 1 TABLET BY MOUTH ONCE DAILY 30 tablet 11   • traZODone (DESYREL) 50 MG tablet TAKE 1 1/2 TABLETS BY MOUTH AT BEDTIME 45 tablet 2        ==============================================================================================================================================================================================================   Vitals:   /72   Pulse 97   Temp 98.7 °F (37.1 °C)   Resp 20   Ht 162.6 cm (64\")   Wt 68.7 kg (151 lb 8 oz)   LMP  (LMP Unknown)   SpO2 90%   BMI 26.00 kg/m²    SpO2: 90 %   No intake or output data in the 24 hours ending 07/13/18 0518     Physical Exam:   General Appearance:  Alert and cooperative, not in any acute distress. Coughs occasionally, some conversational dyspnea.   Head:  Atraumatic and normocephalic, without obvious abnormality.   Eyes:          PERRL, conjunctivae and sclerae normal, no Icterus. No pallor. Extra-occular movements are within normal limits.   Ears:  Ears appear intact with no abnormalities noted.   Throat: No oral lesions, " no thrush, oral mucosa moist.   Neck: Supple, trachea midline, no thyromegaly, no carotid bruit.   Back:   No kyphoscoliosis present. No tenderness to palpation, range of motion normal.   Lungs:   Chest shape is normal. Breath sounds heard bilaterally equally.  No crackles or wheezing. B/l rhonchi without wheeze.   Heart:  Normal S1 and S2, no murmur, no gallop, no rub.   Abdomen:   Normal bowel sounds, no masses. Soft, non-tender, non-distended, no guarding, no rebound tenderness.   Genitourinary: deferred   Extremities: Moves all extremities well, no edema, no cyanosis, no clubbing.   Pulses: Pulses palpable and equal bilaterally.   Skin: No bleeding, bruising or rash.   Lymph nodes: No palpable adenopathy.   Neurologic: Alert and oriented x 3. Moves all four limbs equally. No tremors. No facial asymetry.       Labs:     Results from last 7 days  Lab Units 07/13/18  0235   SODIUM mmol/L 142   POTASSIUM mmol/L 3.7   CHLORIDE mmol/L 102   CO2 mmol/L 25.0*   BUN mg/dL 12   CREATININE mg/dL 0.60   CALCIUM mg/dL 9.5   BILIRUBIN mg/dL 1.7*   ALK PHOS U/L 85   ALT (SGPT) U/L 14   AST (SGOT) U/L 28   GLUCOSE mg/dL 194*       Results from last 7 days  Lab Units 07/13/18  0235   WBC 10*3/mm3 13.20*   HEMOGLOBIN g/dL 12.6   HEMATOCRIT % 38.1   PLATELETS 10*3/mm3 289       Results from last 7 days  Lab Units 07/13/18  0235   TROPONIN I ng/mL 0.016       Results from last 7 days  Lab Units 07/13/18  0235   PROBNP pg/mL 901.0*     Results from last 7 days  Lab Units 07/13/18  0250   PH, ARTERIAL pH units 7.483   PO2 ART mm Hg 56.2*   PCO2, ARTERIAL mm Hg 30.5*   HCO3 ART mmol/L 22.9     ==============================================================================================================================================================================================================   Assessment/Plan:   Breann Gallegos is a(n) 68 y.o. year old female with:     1. Multifocal pneumonia - concern for acute  pneumonitis from environmental exposure  1. S/p vanc/cefepime/levaquin in ER, continue on vanc/cefepime/doxycycline  2. Check urine strep/legionella, MRSA screen. F/u BCx  3. Supplemental O2 to keep sats 88-92%  4. Duonebs, symbicort  5. Need repeat CT in 3 months to monitor for resolution and status of mediastinal adenopathy  6. Consult PICC nurse for midline, as currently has PIV in thumb and anticipating IV therapy needs  2. Acute respiratory failure with hypoxia. Due to #1. Improved with supplemental O2 via NC.  3. Sinus tachycardia. EKG ok. Monitor on tele. Likely due to #1  4. DM2. hgba1c 6.9%, 12/2017. Fairly well controlled. Hold metformin x 48 hours as received IV contrast. Continue lantus. Start ssi / accuchecks ac and hs.    // F/E/N: diabetic diet  // DVT PPx: SCDs   // GI PPx: not indicated    // Code Status: FULL CODE   // NOK: Galindo Gallegos (spouse) [phone]  // Dispo: admission, inpatient, need for hospitalization: IV therapy    Assessment and plan was discussed with the patient and her . All questions were answered.     Time in: 545am Time out: 636am   Date patient seen: 7/13/2018     Sabra Navarro MD   5:18 AM on 7/13/2018

## 2018-07-13 NOTE — ED PROVIDER NOTES
Subjective   68-year-old female presenting with shortness of breath.  She states that since yesterday she has felt increasingly short of breath.  At therapy yesterday she became very winded and tachycardic and had just quit.  Tonight the symptoms became worse and she presented here for evaluation.  Had a very mild cough.  No fevers, chest pain, nausea, vomiting or other complaints.            Review of Systems   Constitutional: Negative for chills and fever.   HENT: Negative for congestion, rhinorrhea and sore throat.    Eyes: Negative for pain.   Respiratory: Positive for shortness of breath. Negative for cough.    Cardiovascular: Negative for chest pain, palpitations and leg swelling.   Gastrointestinal: Negative for abdominal pain, diarrhea, nausea and vomiting.   Genitourinary: Negative for dysuria.   Musculoskeletal: Negative for arthralgias.   Skin: Negative for rash.   Neurological: Negative for weakness and numbness.   Psychiatric/Behavioral: Negative for behavioral problems.       Past Medical History:   Diagnosis Date   • Arthritis    • Body piercing     EARS   • COPD (chronic obstructive pulmonary disease) (CMS/formerly [facility])    • Diabetes mellitus (CMS/formerly [facility])    • Dysphagia     REPORTS SHE FEELS LIKE THINGS ARE GETTING STUCK IN HER THROAT FOR THE PAST SEVERAL MONTHS   • Eustachian tube dysfunction    • Fracture     METATRASAL   • Full dentures     INSTRUCTED NO ADHESIVES THE DOS   • GERD (gastroesophageal reflux disease)    • High cholesterol    • History of nuclear stress test     REPORTS 3-4 YEARS AGO AND THAT ALL WAS WNL'S   • Hyperlipidemia    • Hypertension    • Sinus problem    • Skin abscess    • Vision problem    • Vitamin B12 deficiency    • Vitamin D deficiency    • Wears glasses        Allergies   Allergen Reactions   • Codeine Nausea And Vomiting   • Fish-Derived Products Nausea And Vomiting   • Protonix [Pantoprazole]    • Terbinafine Other (See Comments)     PATIENT REPORTS SHE IS UNSURE REACTION     •  Penicillins Rash       Past Surgical History:   Procedure Laterality Date   • APPENDECTOMY  1976   • BREAST SURGERY      REDUCTION   • CATARACT EXTRACTION Bilateral    • CHOLECYSTECTOMY  1976   • COLONOSCOPY     • ENDOSCOPY     • ENDOSCOPY N/A 11/29/2017    Procedure: ESOPHAGOGASTRODUODENOSCOPY with biopsies and esophageal balloon dilitation;  Surgeon: Molina Dumont MD;  Location: [facility];  Service:    • ENDOSCOPY N/A 5/2/2018    Procedure: ESOPHAGOGASTRODUODENOSCOPY WITH ESOPHAGEAL BALLOON DILITATION (15-16.5-18 MM);  Surgeon: Molina Dumont MD;  Location: [facility] ENDOSCOPY;  Service: Gastroenterology   • HAND SURGERY Right    • HYSTERECTOMY  1979   • OOPHORECTOMY Bilateral 1979   • OTHER SURGICAL HISTORY      BLADDER TACK   • REDUCTION MAMMAPLASTY Bilateral 1988   • TONSILLECTOMY         Family History   Problem Relation Age of Onset   • Diabetes Other    • COPD Mother    • Pneumonia Mother    • Kidney failure Father    • Prostate cancer Father    • Colon cancer Neg Hx    • Breast cancer Neg Hx    • Ovarian cancer Neg Hx        Social History     Social History   • Marital status:      Social History Main Topics   • Smoking status: Former Smoker     Packs/day: 1.00     Years: 30.00     Types: Cigarettes     Quit date: 01/2010   • Smokeless tobacco: Never Used   • Alcohol use No   • Drug use: No   • Sexual activity: Defer     Other Topics Concern   • Not on file           Objective   Physical Exam   Constitutional: She is oriented to person, place, and time. She appears well-developed and well-nourished.   Mild distress   HENT:   Head: Normocephalic and atraumatic.   Right Ear: External ear normal.   Left Ear: External ear normal.   Nose: Nose normal.   Mouth/Throat: Oropharynx is clear and moist.   Eyes: Conjunctivae and EOM are normal. Pupils are equal, round, and reactive to light.   Neck: Normal range of motion. Neck supple.   Cardiovascular: Regular rhythm, normal heart sounds and intact distal  pulses.    Tachycardic   Pulmonary/Chest: Effort normal and breath sounds normal.   Tachypnea, mild increased work of breathing, lungs are clear   Abdominal: Soft. Bowel sounds are normal. She exhibits no distension. There is no tenderness. There is no rebound and no guarding.   Musculoskeletal: Normal range of motion. She exhibits no edema, tenderness or deformity.   Neurological: She is alert and oriented to person, place, and time.   Skin: Skin is warm and dry. No rash noted.   Psychiatric: She has a normal mood and affect. Her behavior is normal.   Nursing note and vitals reviewed.      Procedures           ED Course                  MDM  Number of Diagnoses or Management Options  Acute on chronic respiratory failure with hypoxia (CMS/HCC):   Pneumonia of both lungs due to infectious organism, unspecified part of lung:   Diagnosis management comments: 68-year-old female with shortness of breath.  Well-developed, well-nourished female in mild distress with exam as above.  We'll check labs, chest imaging and EKG.  Disposition pending workup.    DDX: COPD, pneumonia, ACS, CHF, PE    EKG interpreted by me: poor tracing, sinus tachycardia, nonspecific ST/T changes, this is an abnormal EKG, this appears similar to previous    Workup notable for leukocytosis.  CT scan shows diffuse infiltrates consistent with pneumonia.  Given her oxygen requirement recommended admission, she is agreeable to this.  Discussed with Dr. Navarro for admission.        Final diagnoses:   Pneumonia of both lungs due to infectious organism, unspecified part of lung   Acute on chronic respiratory failure with hypoxia (CMS/HCC)            Jagdish Contreras MD  07/13/18 0850

## 2018-07-13 NOTE — PHARMACY RECOMMENDATION
"Pharmacokinetic Initial Note - Vancomycin    Breann Gallegos is a 68 y.o. female  162.6 cm (64\") 68.7 kg (151 lb 7.3 oz)    Indication for use: Pneumonia      Results from last 7 days     Lab Units 07/13/18  0235   WBC 10*3/mm3 13.20*   CREATININE mg/dL 0.60        Estimated Creatinine Clearance: 65 mL/min (by C-G formula based on SCr of 0.6 mg/dL).  Temp Readings from Last 1 Encounters:   07/13/18 96.9 °F (36.1 °C) (Axillary)       Culture results  Microbiology Results (last 10 days)       ** No results found for the last 240 hours. **            Other Antimicrobials  Cefepime 2 gm IV q 8 hrs;  Doxycycline 100 mg IV q 12 hrs;  Levofloxacin 750 mg IV x 1 (not continued after ED).      Assessment/Plan  Vancomycin 1500 mg IV x 1, followed by 1250 mg IV q 18 hrs. Vancomycin trough due 7/15 1130. Pharmacy will monitor renal function and adjust dose accordingly.    Thank you for the consult.    Jd Temple, Pharm.D.  07/13/18  11:08 AM  "

## 2018-07-13 NOTE — PLAN OF CARE
Problem: Patient Care Overview  Goal: Plan of Care Review  Outcome: Ongoing (interventions implemented as appropriate)   07/13/18 1643   Coping/Psychosocial   Plan of Care Reviewed With patient   Plan of Care Review   Progress no change       Problem: NPPV/CPAP (Adult)  Goal: Signs and Symptoms of Listed Potential Problems Will be Absent, Minimized or Managed (NPPV/CPAP)  Outcome: Ongoing (interventions implemented as appropriate)   07/13/18 1643   Goal/Outcome Evaluation   Problems Assessed (NPPV/CPAP) skin breakdown;hypoxia/hypoxemia;gastric distension leading to aspiration   Problems Present (NPPV/CPAP) none

## 2018-07-14 ENCOUNTER — ANESTHESIA (OUTPATIENT)
Dept: ICU | Facility: HOSPITAL | Age: 68
End: 2018-07-14

## 2018-07-14 ENCOUNTER — APPOINTMENT (OUTPATIENT)
Dept: GENERAL RADIOLOGY | Facility: HOSPITAL | Age: 68
End: 2018-07-14

## 2018-07-14 ENCOUNTER — ANESTHESIA EVENT (OUTPATIENT)
Dept: ICU | Facility: HOSPITAL | Age: 68
End: 2018-07-14

## 2018-07-14 LAB
ALBUMIN SERPL-MCNC: 3.3 G/DL (ref 3.5–5)
ALBUMIN/GLOB SERPL: 1 G/DL (ref 1–2)
ALP SERPL-CCNC: 68 U/L (ref 38–126)
ALT SERPL W P-5'-P-CCNC: 21 U/L (ref 13–69)
ANION GAP SERPL CALCULATED.3IONS-SCNC: 16.1 MMOL/L (ref 10–20)
ARTERIAL PATENCY WRIST A: POSITIVE
AST SERPL-CCNC: 30 U/L (ref 15–46)
ATMOSPHERIC PRESS: 737 MMHG
BASE EXCESS BLDA CALC-SCNC: -3.2 MMOL/L (ref 0–2)
BASOPHILS # BLD AUTO: 0.01 10*3/MM3 (ref 0–0.2)
BASOPHILS NFR BLD AUTO: 0.1 % (ref 0–2.5)
BDY SITE: ABNORMAL
BILIRUB SERPL-MCNC: 1 MG/DL (ref 0.2–1.3)
BUN BLD-MCNC: 24 MG/DL (ref 7–20)
BUN/CREAT SERPL: 40 (ref 7.1–23.5)
CALCIUM SPEC-SCNC: 8.6 MG/DL (ref 8.4–10.2)
CHLORIDE SERPL-SCNC: 111 MMOL/L (ref 98–107)
CO2 SERPL-SCNC: 24 MMOL/L (ref 26–30)
CREAT BLD-MCNC: 0.6 MG/DL (ref 0.6–1.3)
DEPRECATED RDW RBC AUTO: 47.6 FL (ref 37–54)
EOSINOPHIL # BLD AUTO: 0 10*3/MM3 (ref 0–0.7)
EOSINOPHIL NFR BLD AUTO: 0 % (ref 0–7)
ERYTHROCYTE [DISTWIDTH] IN BLOOD BY AUTOMATED COUNT: 15.3 % (ref 11.5–14.5)
GFR SERPL CREATININE-BSD FRML MDRD: 100 ML/MIN/1.73
GLOBULIN UR ELPH-MCNC: 3.2 GM/DL
GLUCOSE BLD-MCNC: 165 MG/DL (ref 74–98)
GLUCOSE BLDC GLUCOMTR-MCNC: 160 MG/DL (ref 70–130)
GLUCOSE BLDC GLUCOMTR-MCNC: 187 MG/DL (ref 70–130)
GLUCOSE BLDC GLUCOMTR-MCNC: 190 MG/DL (ref 70–130)
GLUCOSE BLDC GLUCOMTR-MCNC: 192 MG/DL (ref 70–130)
GLUCOSE BLDC GLUCOMTR-MCNC: 224 MG/DL (ref 70–130)
HCO3 BLDA-SCNC: 24.4 MMOL/L (ref 22–28)
HCT VFR BLD AUTO: 31.5 % (ref 37–47)
HGB BLD-MCNC: 10 G/DL (ref 12–16)
HGB BLDA-MCNC: 12.2 G/DL (ref 12–18)
HOROWITZ INDEX BLD+IHG-RTO: 100 %
IMM GRANULOCYTES # BLD: 0.05 10*3/MM3 (ref 0–0.06)
IMM GRANULOCYTES NFR BLD: 0.4 % (ref 0–0.6)
LYMPHOCYTES # BLD AUTO: 0.72 10*3/MM3 (ref 0.6–3.4)
LYMPHOCYTES NFR BLD AUTO: 5.8 % (ref 10–50)
MAGNESIUM SERPL-MCNC: 1.9 MG/DL (ref 1.6–2.3)
MCH RBC QN AUTO: 27.2 PG (ref 27–31)
MCHC RBC AUTO-ENTMCNC: 31.7 G/DL (ref 30–37)
MCV RBC AUTO: 85.6 FL (ref 81–99)
MODALITY: ABNORMAL
MONOCYTES # BLD AUTO: 0.53 10*3/MM3 (ref 0–0.9)
MONOCYTES NFR BLD AUTO: 4.3 % (ref 0–12)
NEUTROPHILS # BLD AUTO: 11.03 10*3/MM3 (ref 2–6.9)
NEUTROPHILS NFR BLD AUTO: 89.4 % (ref 37–80)
NRBC BLD MANUAL-RTO: 0 /100 WBC (ref 0–0)
PCO2 BLDA: 59 MM HG (ref 35–45)
PH BLDA: 7.22 PH UNITS (ref 7.3–7.5)
PLATELET # BLD AUTO: 260 10*3/MM3 (ref 130–400)
PMV BLD AUTO: 10.6 FL (ref 6–12)
PO2 BLDA: 74.9 MM HG (ref 75–100)
POTASSIUM BLD-SCNC: 4.1 MMOL/L (ref 3.5–5.1)
PROT SERPL-MCNC: 6.5 G/DL (ref 6.3–8.2)
RBC # BLD AUTO: 3.68 10*6/MM3 (ref 4.2–5.4)
SAO2 % BLDCOA: 91.7 % (ref 94–100)
SODIUM BLD-SCNC: 147 MMOL/L (ref 137–145)
TROPONIN I SERPL-MCNC: <0.012 NG/ML (ref 0–0.03)
TROPONIN I SERPL-MCNC: <0.012 NG/ML (ref 0–0.03)
WBC NRBC COR # BLD: 12.34 10*3/MM3 (ref 4.8–10.8)

## 2018-07-14 PROCEDURE — 94799 UNLISTED PULMONARY SVC/PX: CPT

## 2018-07-14 PROCEDURE — 82962 GLUCOSE BLOOD TEST: CPT

## 2018-07-14 PROCEDURE — 25010000002 PROPOFOL 1000 MG/ML EMULSION: Performed by: INTERNAL MEDICINE

## 2018-07-14 PROCEDURE — 02HV33Z INSERTION OF INFUSION DEVICE INTO SUPERIOR VENA CAVA, PERCUTANEOUS APPROACH: ICD-10-PCS | Performed by: INTERNAL MEDICINE

## 2018-07-14 PROCEDURE — 83735 ASSAY OF MAGNESIUM: CPT | Performed by: NURSE PRACTITIONER

## 2018-07-14 PROCEDURE — 25010000002 CEFEPIME PER 500 MG: Performed by: HOSPITALIST

## 2018-07-14 PROCEDURE — 36600 WITHDRAWAL OF ARTERIAL BLOOD: CPT

## 2018-07-14 PROCEDURE — 25010000002 FUROSEMIDE PER 20 MG

## 2018-07-14 PROCEDURE — 84484 ASSAY OF TROPONIN QUANT: CPT | Performed by: INTERNAL MEDICINE

## 2018-07-14 PROCEDURE — 0BH17EZ INSERTION OF ENDOTRACHEAL AIRWAY INTO TRACHEA, VIA NATURAL OR ARTIFICIAL OPENING: ICD-10-PCS | Performed by: INTERNAL MEDICINE

## 2018-07-14 PROCEDURE — 63710000001 INSULIN ASPART PER 5 UNITS: Performed by: HOSPITALIST

## 2018-07-14 PROCEDURE — 25010000002 MORPHINE SULFATE (PF) 2 MG/ML SOLUTION: Performed by: INTERNAL MEDICINE

## 2018-07-14 PROCEDURE — 94660 CPAP INITIATION&MGMT: CPT

## 2018-07-14 PROCEDURE — 85025 COMPLETE CBC W/AUTO DIFF WBC: CPT | Performed by: NURSE PRACTITIONER

## 2018-07-14 PROCEDURE — 5A1945Z RESPIRATORY VENTILATION, 24-96 CONSECUTIVE HOURS: ICD-10-PCS | Performed by: INTERNAL MEDICINE

## 2018-07-14 PROCEDURE — 80053 COMPREHEN METABOLIC PANEL: CPT | Performed by: NURSE PRACTITIONER

## 2018-07-14 PROCEDURE — 31500 INSERT EMERGENCY AIRWAY: CPT | Performed by: NURSE ANESTHETIST, CERTIFIED REGISTERED

## 2018-07-14 PROCEDURE — 74018 RADEX ABDOMEN 1 VIEW: CPT

## 2018-07-14 PROCEDURE — 25010000002 VANCOMYCIN PER 500 MG: Performed by: HOSPITALIST

## 2018-07-14 PROCEDURE — 25010000002 MORPHINE PER 10 MG

## 2018-07-14 PROCEDURE — 63710000001 INSULIN DETEMIR PER 5 UNITS: Performed by: HOSPITALIST

## 2018-07-14 PROCEDURE — 25010000002 HEPARIN (PORCINE) PER 1000 UNITS: Performed by: HOSPITALIST

## 2018-07-14 PROCEDURE — 25010000002 METHYLPREDNISOLONE PER 125 MG: Performed by: NURSE PRACTITIONER

## 2018-07-14 PROCEDURE — 25010000002 LORAZEPAM PER 2 MG

## 2018-07-14 PROCEDURE — 99232 SBSQ HOSP IP/OBS MODERATE 35: CPT | Performed by: INTERNAL MEDICINE

## 2018-07-14 PROCEDURE — 63710000001 INSULIN REGULAR HUMAN PER 5 UNITS: Performed by: HOSPITALIST

## 2018-07-14 PROCEDURE — 82805 BLOOD GASES W/O2 SATURATION: CPT

## 2018-07-14 PROCEDURE — C1894 INTRO/SHEATH, NON-LASER: HCPCS

## 2018-07-14 PROCEDURE — 71045 X-RAY EXAM CHEST 1 VIEW: CPT

## 2018-07-14 PROCEDURE — C1751 CATH, INF, PER/CENT/MIDLINE: HCPCS

## 2018-07-14 RX ORDER — FUROSEMIDE 10 MG/ML
INJECTION INTRAMUSCULAR; INTRAVENOUS
Status: COMPLETED
Start: 2018-07-14 | End: 2018-07-14

## 2018-07-14 RX ORDER — SODIUM CHLORIDE 0.9 % (FLUSH) 0.9 %
10 SYRINGE (ML) INJECTION AS NEEDED
Status: DISCONTINUED | OUTPATIENT
Start: 2018-07-14 | End: 2018-07-29 | Stop reason: HOSPADM

## 2018-07-14 RX ORDER — FUROSEMIDE 10 MG/ML
20 INJECTION INTRAMUSCULAR; INTRAVENOUS ONCE
Status: DISCONTINUED | OUTPATIENT
Start: 2018-07-14 | End: 2018-07-20

## 2018-07-14 RX ORDER — LORAZEPAM 2 MG/ML
2 INJECTION INTRAMUSCULAR EVERY 4 HOURS PRN
Status: DISCONTINUED | OUTPATIENT
Start: 2018-07-14 | End: 2018-07-27

## 2018-07-14 RX ORDER — MORPHINE SULFATE 2 MG/ML
INJECTION, SOLUTION INTRAMUSCULAR; INTRAVENOUS
Status: COMPLETED
Start: 2018-07-14 | End: 2018-07-14

## 2018-07-14 RX ORDER — LORAZEPAM 2 MG/ML
INJECTION INTRAMUSCULAR
Status: COMPLETED
Start: 2018-07-14 | End: 2018-07-14

## 2018-07-14 RX ORDER — MORPHINE SULFATE 2 MG/ML
4 INJECTION, SOLUTION INTRAMUSCULAR; INTRAVENOUS EVERY 4 HOURS PRN
Status: DISCONTINUED | OUTPATIENT
Start: 2018-07-14 | End: 2018-07-29 | Stop reason: HOSPADM

## 2018-07-14 RX ORDER — PANTOPRAZOLE SODIUM 40 MG/10ML
40 INJECTION, POWDER, LYOPHILIZED, FOR SOLUTION INTRAVENOUS
Status: DISCONTINUED | OUTPATIENT
Start: 2018-07-15 | End: 2018-07-20

## 2018-07-14 RX ORDER — ACETYLCYSTEINE 200 MG/ML
3 SOLUTION ORAL; RESPIRATORY (INHALATION)
Status: DISCONTINUED | OUTPATIENT
Start: 2018-07-14 | End: 2018-07-18

## 2018-07-14 RX ORDER — SODIUM CHLORIDE 0.9 % (FLUSH) 0.9 %
20 SYRINGE (ML) INJECTION AS NEEDED
Status: DISCONTINUED | OUTPATIENT
Start: 2018-07-14 | End: 2018-07-29 | Stop reason: HOSPADM

## 2018-07-14 RX ORDER — CHLORHEXIDINE GLUCONATE 0.12 MG/ML
15 RINSE ORAL EVERY 12 HOURS SCHEDULED
Status: DISCONTINUED | OUTPATIENT
Start: 2018-07-14 | End: 2018-07-20

## 2018-07-14 RX ADMIN — METHYLPREDNISOLONE SODIUM SUCCINATE 60 MG: 125 INJECTION, POWDER, FOR SOLUTION INTRAMUSCULAR; INTRAVENOUS at 11:32

## 2018-07-14 RX ADMIN — METHYLPREDNISOLONE SODIUM SUCCINATE 60 MG: 125 INJECTION, POWDER, FOR SOLUTION INTRAMUSCULAR; INTRAVENOUS at 04:51

## 2018-07-14 RX ADMIN — MORPHINE SULFATE 4 MG: 2 INJECTION, SOLUTION INTRAMUSCULAR; INTRAVENOUS at 14:25

## 2018-07-14 RX ADMIN — MORPHINE SULFATE 1 MG: 2 INJECTION, SOLUTION INTRAMUSCULAR; INTRAVENOUS at 11:36

## 2018-07-14 RX ADMIN — ATORVASTATIN CALCIUM 20 MG: 20 TABLET, FILM COATED ORAL at 08:34

## 2018-07-14 RX ADMIN — CLOTRIMAZOLE AND BETAMETHASONE DIPROPIONATE 1 APPLICATION: 10; .5 CREAM TOPICAL at 21:26

## 2018-07-14 RX ADMIN — IPRATROPIUM BROMIDE AND ALBUTEROL SULFATE 3 ML: .5; 3 SOLUTION RESPIRATORY (INHALATION) at 03:13

## 2018-07-14 RX ADMIN — BUDESONIDE 1 MG: 0.5 INHALANT RESPIRATORY (INHALATION) at 19:01

## 2018-07-14 RX ADMIN — SERTRALINE HYDROCHLORIDE 100 MG: 50 TABLET ORAL at 08:34

## 2018-07-14 RX ADMIN — Medication 200 MG: at 08:34

## 2018-07-14 RX ADMIN — IPRATROPIUM BROMIDE AND ALBUTEROL SULFATE 3 ML: .5; 3 SOLUTION RESPIRATORY (INHALATION) at 20:50

## 2018-07-14 RX ADMIN — VITAMIN D, TAB 1000IU (100/BT) 2000 UNITS: 25 TAB at 08:33

## 2018-07-14 RX ADMIN — DOXYCYCLINE 100 MG: 100 INJECTION, POWDER, LYOPHILIZED, FOR SOLUTION INTRAVENOUS at 06:51

## 2018-07-14 RX ADMIN — GABAPENTIN 300 MG: 300 CAPSULE ORAL at 08:34

## 2018-07-14 RX ADMIN — HUMAN INSULIN 2 UNITS: 100 INJECTION, SOLUTION SUBCUTANEOUS at 18:18

## 2018-07-14 RX ADMIN — IPRATROPIUM BROMIDE AND ALBUTEROL SULFATE 3 ML: .5; 3 SOLUTION RESPIRATORY (INHALATION) at 17:09

## 2018-07-14 RX ADMIN — PROPOFOL 50 MCG/KG/MIN: 10 INJECTION, EMULSION INTRAVENOUS at 21:25

## 2018-07-14 RX ADMIN — INSULIN ASPART 3 UNITS: 100 INJECTION, SOLUTION INTRAVENOUS; SUBCUTANEOUS at 11:32

## 2018-07-14 RX ADMIN — CHLORHEXIDINE GLUCONATE 0.12% ORAL RINSE 15 ML: 1.2 LIQUID ORAL at 21:27

## 2018-07-14 RX ADMIN — ARFORMOTEROL TARTRATE 15 MCG: 15 SOLUTION RESPIRATORY (INHALATION) at 19:01

## 2018-07-14 RX ADMIN — CHLORHEXIDINE GLUCONATE 0.12% ORAL RINSE 15 ML: 1.2 LIQUID ORAL at 13:47

## 2018-07-14 RX ADMIN — VANCOMYCIN HYDROCHLORIDE 1250 MG: 500 INJECTION, POWDER, LYOPHILIZED, FOR SOLUTION INTRAVENOUS at 18:18

## 2018-07-14 RX ADMIN — FUROSEMIDE 20 MG: 10 INJECTION, SOLUTION INTRAMUSCULAR; INTRAVENOUS at 13:48

## 2018-07-14 RX ADMIN — PROPOFOL 40 MCG/KG/MIN: 10 INJECTION, EMULSION INTRAVENOUS at 13:48

## 2018-07-14 RX ADMIN — CEFEPIME HYDROCHLORIDE 2 G: 2 INJECTION, SOLUTION INTRAVENOUS at 23:39

## 2018-07-14 RX ADMIN — AMLODIPINE BESYLATE 5 MG: 5 TABLET ORAL at 08:34

## 2018-07-14 RX ADMIN — SODIUM CHLORIDE 100 ML/HR: 9 INJECTION, SOLUTION INTRAVENOUS at 04:51

## 2018-07-14 RX ADMIN — ACETYLCYSTEINE 3 ML: 200 SOLUTION ORAL; RESPIRATORY (INHALATION) at 20:50

## 2018-07-14 RX ADMIN — INSULIN DETEMIR 90 UNITS: 100 INJECTION, SOLUTION SUBCUTANEOUS at 21:27

## 2018-07-14 RX ADMIN — PANTOPRAZOLE SODIUM 40 MG: 40 TABLET, DELAYED RELEASE ORAL at 06:42

## 2018-07-14 RX ADMIN — HEPARIN SODIUM 5000 UNITS: 5000 INJECTION, SOLUTION INTRAVENOUS; SUBCUTANEOUS at 13:47

## 2018-07-14 RX ADMIN — PROPOFOL 50 MCG/KG/MIN: 10 INJECTION, EMULSION INTRAVENOUS at 16:13

## 2018-07-14 RX ADMIN — LISINOPRIL 20 MG: 20 TABLET ORAL at 08:34

## 2018-07-14 RX ADMIN — IPRATROPIUM BROMIDE AND ALBUTEROL SULFATE 3 ML: .5; 3 SOLUTION RESPIRATORY (INHALATION) at 11:52

## 2018-07-14 RX ADMIN — HEPARIN SODIUM 5000 UNITS: 5000 INJECTION, SOLUTION INTRAVENOUS; SUBCUTANEOUS at 21:27

## 2018-07-14 RX ADMIN — HEPARIN SODIUM 5000 UNITS: 5000 INJECTION, SOLUTION INTRAVENOUS; SUBCUTANEOUS at 06:42

## 2018-07-14 RX ADMIN — BUPROPION HYDROCHLORIDE 150 MG: 150 TABLET, FILM COATED, EXTENDED RELEASE ORAL at 08:33

## 2018-07-14 RX ADMIN — IPRATROPIUM BROMIDE AND ALBUTEROL SULFATE 3 ML: .5; 3 SOLUTION RESPIRATORY (INHALATION) at 07:08

## 2018-07-14 RX ADMIN — BUDESONIDE 1 MG: 0.5 INHALANT RESPIRATORY (INHALATION) at 07:08

## 2018-07-14 RX ADMIN — CLOPIDOGREL BISULFATE 75 MG: 75 TABLET ORAL at 08:33

## 2018-07-14 RX ADMIN — GABAPENTIN 300 MG: 300 CAPSULE ORAL at 21:26

## 2018-07-14 RX ADMIN — METHYLPREDNISOLONE SODIUM SUCCINATE 60 MG: 125 INJECTION, POWDER, FOR SOLUTION INTRAMUSCULAR; INTRAVENOUS at 21:26

## 2018-07-14 RX ADMIN — CEFEPIME HYDROCHLORIDE 2 G: 2 INJECTION, SOLUTION INTRAVENOUS at 16:00

## 2018-07-14 RX ADMIN — ACETYLCYSTEINE 3 ML: 200 SOLUTION ORAL; RESPIRATORY (INHALATION) at 17:09

## 2018-07-14 RX ADMIN — ARFORMOTEROL TARTRATE 15 MCG: 15 SOLUTION RESPIRATORY (INHALATION) at 07:08

## 2018-07-14 RX ADMIN — LORAZEPAM 2 MG: 2 INJECTION INTRAMUSCULAR; INTRAVENOUS at 14:25

## 2018-07-14 RX ADMIN — FLUTICASONE PROPIONATE 2 SPRAY: 50 SPRAY, METERED NASAL at 08:33

## 2018-07-14 RX ADMIN — CEFEPIME HYDROCHLORIDE 2 G: 2 INJECTION, SOLUTION INTRAVENOUS at 06:50

## 2018-07-14 RX ADMIN — TRAZODONE HYDROCHLORIDE 75 MG: 50 TABLET ORAL at 21:26

## 2018-07-14 RX ADMIN — CLOTRIMAZOLE AND BETAMETHASONE DIPROPIONATE 1 APPLICATION: 10; .5 CREAM TOPICAL at 08:33

## 2018-07-14 NOTE — PHARMACY RECOMMENDATION
Pharmacy Consult for Dosing, Pharmacokinetics, and Stewardship of Antimicrobial Drugs      Results from last 7 days  Lab Units 07/14/18  0429 07/13/18  0235   WBC 10*3/mm3 12.34* 13.20*   CREATININE mg/dL 0.60 0.60   Estimated Creatinine Clearance: 66.9 mL/min (by C-G formula based on SCr of 0.6 mg/dL).    Indication:  Pneumonia    Micro:  Microbiology Results (last 10 days)     Procedure Component Value - Date/Time    Respiratory Culture - Sputum, Cough [[id number]] Collected:  07/13/18 1142    Lab Status:  Preliminary result Specimen:  Sputum from Cough Updated:  07/13/18 1231     Gram Stain Result Greater than 20 WBCs per low power field      Less than 10 Epithelial cells per low power field      Few (2+) Gram positive cocci in pairs, chains and clusters    Blood Culture With OSMEL - Blood, [[id number]]  (Normal) Collected:  07/13/18 0614    Lab Status:  Preliminary result Specimen:  Blood from Arm, Left Updated:  07/14/18 0715     Blood Culture No growth at 24 hours    Blood Culture With OSMEL - Blood, [[id number]]  (Normal) Collected:  07/13/18 0602    Lab Status:  Preliminary result Specimen:  Blood from Hand, Right Updated:  07/14/18 0715     Blood Culture No growth at 24 hours          Current Abx:  Anti-Infectives     Ordered     Dose/Rate Route Frequency Start Stop    07/13/18 1057  vancomycin 1250 mg in sodium chloride 0.9% 250 mL IVPB     Ordering Provider:  Sabra Navarro MD    1,250 mg  333.3 mL/hr over 90 Minutes Intravenous Every 18 Hours 07/14/18 0000 07/19/18 2359    07/13/18 0645  cefepime (MAXIPIME) IVPB 2 g/50ml D5W (premix)     Ordering Provider:  Sabra Navarro MD    2 g  over 4 Hours Intravenous Every 8 Hours 07/13/18 1515 07/20/18 1514    07/13/18 0645  doxycycline (VIBRAMYCIN) 100 mg in sodium chloride 0.9 % 250 mL IVPB     Ordering Provider:  Sabra Navarro MD    100 mg  250 mL/hr over 60 Minutes Intravenous Every 12 Hours 07/13/18 0730 07/16/18 1914    07/13/18 0645  Pharmacy to  dose vancomycin     Ordering Provider:  Sabra Navarro MD     Does not apply Continuous PRN 07/13/18 0641 07/20/18 0640    07/13/18 0425  vancomycin (VANCOCIN) 1,500 mg in sodium chloride 0.9 % 500 mL IVPB     Ordering Provider:  Jagdish Contreras MD    1,500 mg  over 120 Minutes Intravenous Once 07/13/18 0427 07/13/18 0841    07/13/18 0425  cefepime (MAXIPIME) IVPB 2 g/50ml D5W (premix)     Ordering Provider:  Jagdish Contreras MD    2 g  over 30 Minutes Intravenous Once 07/13/18 0427 07/13/18 0558    07/13/18 0425  levoFLOXacin (LEVAQUIN) 750 mg/150 mL D5W (premix) (LEVAQUIN) 750 mg     Ordering Provider:  Jagdish Contreras MD    750 mg  over 90 Minutes Intravenous Once 07/13/18 0427            Assessment/Plan:  No change in renal function.  Vancomycin trough due 7/15 (tomorrow) 1130.  Consider simplifying antibiotics (dual coverage) by discontinuing Doxycycline. Levofloxacin x 1 has not been given in ED and should be discontinued as well.  Thank you for your consideration.    Jd Temple, Pharm.D.  07/14/18  8:09 AM

## 2018-07-14 NOTE — PLAN OF CARE
Problem: Patient Care Overview  Goal: Plan of Care Review   07/14/18 0557   Coping/Psychosocial   Plan of Care Reviewed With patient;family   Plan of Care Review   Progress no change     Goal: Individualization and Mutuality  Outcome: Ongoing (interventions implemented as appropriate)      Problem: Pneumonia (Adult)  Goal: Signs and Symptoms of Listed Potential Problems Will be Absent, Minimized or Managed (Pneumonia)  Outcome: Ongoing (interventions implemented as appropriate)      Problem: NPPV/CPAP (Adult)  Goal: Signs and Symptoms of Listed Potential Problems Will be Absent, Minimized or Managed (NPPV/CPAP)  Outcome: Ongoing (interventions implemented as appropriate)      Problem: Pain, Acute (Adult)  Goal: Identify Related Risk Factors and Signs and Symptoms  Outcome: Outcome(s) achieved Date Met: 07/14/18    Goal: Acceptable Pain Control/Comfort Level  Outcome: Ongoing (interventions implemented as appropriate)

## 2018-07-14 NOTE — PLAN OF CARE
Problem: Ventilation, Mechanical Invasive (Adult)  Goal: Signs and Symptoms of Listed Potential Problems Will be Absent, Minimized or Managed (Ventilation, Mechanical Invasive)  Outcome: Ongoing (interventions implemented as appropriate)   07/14/18 154   Goal/Outcome Evaluation   Problems Assessed (Mechanical Ventilation, Invasive) all   Problems Present (Mech Vent, Invasive) none

## 2018-07-14 NOTE — CONSULTS
5Fr PICC placed in the right arm by WLID Gloria RN CRNI, 36cm with 0cm exposed. Tip verified by 3CG.

## 2018-07-14 NOTE — PLAN OF CARE
Problem: Restraint, Nonbehavioral (Nonviolent)  Intervention: Protect Skin and Joint Integrity   07/14/18 1700 07/14/18 1741   Positioning   Body Position position maintained --    Prevent Contractures/Hypertrophic Scarring   LUE Range of Motion --  PROM (passive range of motion) performed   RUE Range of Motion --  PROM (passive range of motion) performed   LLE Range of Motion --  PROM (passive range of motion) performed   RLE Range of Motion --  PROM (passive range of motion) performed       Goal: Rationale and Justification  Outcome: Ongoing (interventions implemented as appropriate)

## 2018-07-14 NOTE — PROGRESS NOTES
[facility]    Name:  Breann Gallegos   Age:  68 y.o.  Sex:  female  :  1950  MRN:  [id number]   Visit Number:  [id number]  Admission Date:  2018  Date Of Service:  18  Primary Care Physician:  Harry Avery MD     LOS: 1 day :  Patient Care Team:  Harry Avery MD as PCP - General  Harry Avery MD as PCP - Family Medicine  Harry Avery MD as PCP - Claims Attributed  Tereza Bateman RN as Care Coordinator ([facility]):      Subjective / Interval History:   Patient's chart reviewed and events noted to since hospitalization.  She has been admitted with the multifocal bilateral pneumonia with hypoxic respiratory failure and has been on 100% nonrebreather.  She had been on BiPAP and saturating in the 90s but the nurse just called me stating that she has desaturated in the 80s with the BiPAP on and with the high flow oxygen.  Compared to earlier in the morning the eye reevaluated the patient and patient is having to use necessary muscles of respiration and is more dyspneic due to the hypoxemia.  The discussed with her about intubation and the plan of care and agrees and we'll intubate her at present due to worsening hypoxic respiratory failure.      Vital Signs:    Temp:  [97.7 °F (36.5 °C)-99.4 °F (37.4 °C)] 97.7 °F (36.5 °C)  Heart Rate:  [] 100  Resp:  [22-39] 29  BP: (107-183)/(56-90) 136/69  FiO2 (%):  [80 %-90 %] 90 %    Intake and output:    I/O last 3 completed shifts:  In: 3034 [P.O.:480; I.V.:2504; IV Piggyback:50]  Out: 930 [Urine:930]  No intake/output data recorded.    Physical Examination:    General Appearance:    Alert and cooperative, And patient seems to be in respiratory distress using accessory muscles of respiration    Head:    Atraumatic and normocephalic, without obvious abnormality.   Eyes:            PERRLA,  No pallor. Extra-occular movements are within normal limits.   Neck:   Supple,  No lymphglands, no  bruit   Lungs:     Chest shape is normal. Breath sounds heard bilaterally And she has a poor inspiratory effort with the crepitations     Heart:    Normal S1 and S2, no murmur,  No JVD   Abdomen:     Normal bowel sounds, no masses, no organomegaly. Soft        non-tender, no guarding, no rebound                tenderness   Extremities:   Moves all extremities well, no edema, no cyanosis,    Skin:   No  bruising or rash.   Neurologic:   Grossly non focal and moves all extrimities equally.    Laboratory results:    Results from last 7 days  Lab Units 07/14/18  0429 07/13/18  0235   SODIUM mmol/L 147* 142   POTASSIUM mmol/L 4.1 3.7   CHLORIDE mmol/L 111* 102   CO2 mmol/L 24.0* 25.0*   BUN mg/dL 24* 12   CREATININE mg/dL 0.60 0.60   CALCIUM mg/dL 8.6 9.5   BILIRUBIN mg/dL 1.0 1.7*   ALK PHOS U/L 68 85   ALT (SGPT) U/L 21 14   AST (SGOT) U/L 30 28   GLUCOSE mg/dL 165* 194*       Results from last 7 days  Lab Units 07/14/18  0429 07/13/18  0235   WBC 10*3/mm3 12.34* 13.20*   HEMOGLOBIN g/dL 10.0* 12.6   HEMATOCRIT % 31.5* 38.1   PLATELETS 10*3/mm3 260 289           Results from last 7 days  Lab Units 07/14/18  1000 07/14/18  0429 07/13/18  2213   TROPONIN I ng/mL <0.012 <0.012 0.015       Results from last 7 days  Lab Units 07/13/18  0614 07/13/18  0602   BLOODCX  No growth at 24 hours No growth at 24 hours       Radiology results:    Imaging Results (last 24 hours)     ** No results found for the last 24 hours. **          I have reviewed the patient's radiology reports.    Medication Review:     I have reviewed the patients active and prn medications.     Assessment:    Principal Problem:    Acute respiratory failure with hypoxia (CMS/HCC)  Active Problems:    Diabetes mellitus (CMS/HCC)    Pneumonia of both lungs due to infectious organism    Sinus tachycardia          Plan:  Acute or respiratory failure with hypoxemia and an due to bilateral pneumonia.  The she is worsening with the the BiPAP and supportive care and  will need to be intubated details explained to the patient is agreeable and planned to intubate her with the giving the the other supportive care until she recovers from her pneumonia   Meantime we will feed her through the NG tube and continue with the other medications as per orders.  Critical care time spent with the patient to total 35 minutes  Sergey Gold MD  07/14/18  12:11 PM      Please note that portions of this note may have been completed with a voice recognition program. Efforts were made to edit the dictations, but occasionally words are mistranscribed.

## 2018-07-14 NOTE — PLAN OF CARE
Problem: NPPV/CPAP (Adult)  Intervention: Monitor and Manage Anxiety Related to NPPV/CPAP   07/14/18 0313   Interventions   Trust Relationship/Rapport care explained;choices provided     Intervention: Prevent Aspiration During Therapy   07/14/18 0313   Positioning   Head of Bed (HOB) HOB elevated       Goal: Signs and Symptoms of Listed Potential Problems Will be Absent, Minimized or Managed (NPPV/CPAP)  Outcome: Ongoing (interventions implemented as appropriate)   07/14/18 0313   Goal/Outcome Evaluation   Problems Assessed (NPPV/CPAP) hypoxia/hypoxemia;dry mucous membranes;situational response;skin breakdown   Problems Present (NPPV/CPAP) none

## 2018-07-14 NOTE — PROGRESS NOTES
Adult Nutrition  Assessment/PES    Patient Name:  Breann Gallegos  YOB: 1950  MRN: [id number]  Admit Date:  7/13/2018    Assessment Date:  7/14/2018    Comments:  Rec #1: Continue current diet order; Encouraging intake. Pt receiving 50% PO intake over  1 meal. Nutritional supplement ordered Glucerna BID to promote PO intake. Rec #2: Consider MVI with minerals daily. Rec #3: Continue to monitor/replace electrolytes PRN. Consult RD PRN.             Reason for Assessment     Row Name 07/14/18 0908          Reason for Assessment    Reason For Assessment identified at risk by screening criteria;diagnosis/disease state     Diagnosis diabetes diagnosis/complications;cardiac disease;pulmonary disease;gastrointestinal disease   COPD, GERD, DM2, HTN, HLD, AResp.Fx, Hypoxia               Anthropometrics     Row Name 07/14/18 0500          Anthropometrics    Weight 73.2 kg (161 lb 6.4 oz)   Simultaneous filing. User may be unaware of other data.             Labs/Tests/Procedures/Meds     Row Name 07/14/18 0909          Labs/Procedures/Meds    Lab Results Reviewed reviewed     Lab Results Comments Low: Alb  High: Na, Cl, Glu, BUN, Hgb A1C        Medications    Pertinent Medications Reviewed reviewed               Estimated/Assessed Needs     Row Name 07/14/18 0910          Calculation Measurements    Weight Used For Calculations 73.2 kg (161 lb 6 oz)        Estimated/Assessed Needs    Additional Documentation Protein Requirements (Group);[place] Choi Equation (Group);Fluid Requirements (Group);Calorie Requirements (Group)        Calorie Requirements    Estimated Calorie Requirement Comment ~[id number]        KCAL/KG    14 Kcal/Kg (kcal) 1024.8     15 Kcal/Kg (kcal) 1098     18 Kcal/Kg (kcal) 1317.6     20 Kcal/Kg (kcal) 1464     25 Kcal/Kg (kcal) 1830     30 Kcal/Kg (kcal) 2196     35 Kcal/Kg (kcal) 2562     40 Kcal/Kg (kcal) 2928     45 Kcal/Kg (kcal) 3294     50 Kcal/Kg (kcal) 3660        [place]bo Equation    RMR ([place] Equation) 1252        Protein Requirements    Weight Used For Protein Calculations --        Est Protein Requirement Amount (gms/kg) 1.5 gm protein     Estimated Protein Requirements (gms/day) 109.8        Fluid Requirements    Estimated Fluid Requirement Method [place]-Segar Formula     [place]-Segar Method (over 20 kg) 2964             Nutrition Prescription Ordered     Row Name 07/14/18 0912          Nutrition Prescription PO    Current PO Diet Regular     Common Modifiers Consistent Carbohydrate             Evaluation of Received Nutrient/Fluid Intake     Row Name 07/14/18 0910          Calculation Measurements    Weight Used For Calculations 73.2 kg (161 lb 6 oz)        PO Evaluation    Number of Days PO Intake Evaluated 1 day     Number of Meals 1     % PO Intake 50             Evaluation of Prescribed Nutrient/Fluid Intake     Row Name 07/14/18 0910          Calculation Measurements    Weight Used For Calculations 73.2 kg (161 lb 6 oz)           Problem/Interventions:        Problem 1     Row Name 07/14/18 0912          Nutrition Diagnoses Problem 1    Problem 1 Impaired Nutrient Utilization     Etiology (related to) Medical Diagnosis     Endocrine DM2     Signs/Symptoms (evidenced by) Biochemical     Specific Labs Noted HgbA1C;Glucose;BUN             Problem 2     Row Name 07/14/18 0913          Nutrition Diagnoses Problem 2    Problem 2 Increased Nutrient Needs     Macronutrient Kcal;Fluid;Protein     Micronutrient Vitamin;Mineral     Etiology (related to) Medical Diagnosis     Pulmonary/Critical Care COPD;Acute respiratory failure;Other (comment)   hypoxi     Signs/Symptoms (evidenced by) Other (comment)   Pulmonary dysfunction                   Intervention Goal     Row Name 07/14/18 0913          Intervention Goal    General Meet nutritional needs for age/condition     PO Meet estimated needs;Increase intake;PO intake (%)     PO Intake % 75 %     Weight  Maintain weight             Nutrition Intervention     Row Name 07/14/18 0914          Nutrition Intervention    RD/Tech Action Care plan reviewd;Follow Tx progress;Encourage intake;Recommend/ordered     Recommended/Ordered Supplement             Nutrition Prescription     Row Name 07/14/18 0914          Nutrition Prescription PO    PO Prescription Begin/change supplement     Supplement Glucerna Shake     Supplement Frequency 2 times a day     New PO Prescription Ordered? Yes        Other Orders    Obtain Weight Daily     Obtain Weight Ordered? No, recommended     Supplement Vitamin mineral supplement     Supplement Ordered? No, recommended             Education/Evaluation     Row Name 07/14/18 0915          Education    Education Previous education by RD/MENA        Monitor/Evaluation    Monitor Per protocol;I&O;PO intake;Supplement intake;Pertinent labs;Weight         Electronically signed by:  Alia Cruz RD  07/14/18 9:15 AM

## 2018-07-14 NOTE — PLAN OF CARE
Problem: Ventilation, Mechanical Invasive (Adult)  Intervention: Support Psychosocial Response to Intubation/Mechanical Ventilation   07/14/18 0800 07/14/18 1741   Psychosocial Support   Family/Support System Care --  support provided   Promote Effective Communication   Communication Enhancement Strategies --  family involved in communication plan   Coping/Psychosocial Interventions   Supportive Measures active listening utilized;decision-making supported;positive reinforcement provided;relaxation techniques promoted --        Goal: Signs and Symptoms of Listed Potential Problems Will be Absent, Minimized or Managed (Ventilation, Mechanical Invasive)  Outcome: Ongoing (interventions implemented as appropriate)

## 2018-07-14 NOTE — PROGRESS NOTES
[facility]    Name:  Breann Gallegos   Age:  68 y.o.  Sex:  female  :  1950  MRN:  [id number]   Visit Number:  [id number]  Admission Date:  2018  Date Of Service:  18  Primary Care Physician:  Harry Avery MD     LOS: 1 day :  Patient Care Team:  Harry Avery MD as PCP - General  Harry Avery MD as PCP - Family Medicine  Harry Avery MD as PCP - Claims Attributed  Tereza Bateman RN as Care Coordinator ([facility]):      Subjective / Interval History:   Patient's chart reviewed and events noted to since hospitalization.  She has been admitted with the multifocal bilateral pneumonia with hypoxic respiratory failure and has been on 100% nonrebreather.  She had been on BiPAP and saturating in the 90s but the nurse just called me stating that she has desaturated in the 80s with the BiPAP on and with the high flow oxygen.  Compared to earlier in the morning the eye reevaluated the patient and patient is having to use necessary muscles of respiration and is more dyspneic due to the hypoxemia.  The discussed with her about intubation and the plan of care and agrees and we'll intubate her at present due to worsening hypoxic respiratory failure.      Vital Signs:    Temp:  [97.7 °F (36.5 °C)-99.4 °F (37.4 °C)] 97.7 °F (36.5 °C)  Heart Rate:  [] 100  Resp:  [22-39] 29  BP: (107-183)/(56-90) 136/69  FiO2 (%):  [80 %-90 %] 90 %    Intake and output:    I/O last 3 completed shifts:  In: 3034 [P.O.:480; I.V.:2504; IV Piggyback:50]  Out: 930 [Urine:930]  No intake/output data recorded.    Physical Examination:    General Appearance:    Alert and cooperative, And patient seems to be in respiratory distress using accessory muscles of respiration    Head:    Atraumatic and normocephalic, without obvious abnormality.   Eyes:            PERRLA,  No pallor. Extra-occular movements are within normal limits.   Neck:   Supple,  No lymphglands, no  bruit   Lungs:     Chest shape is normal. Breath sounds heard bilaterally And she has a poor inspiratory effort with the crepitations     Heart:    Normal S1 and S2, no murmur,  No JVD   Abdomen:     Normal bowel sounds, no masses, no organomegaly. Soft        non-tender, no guarding, no rebound                tenderness   Extremities:   Moves all extremities well, no edema, no cyanosis,    Skin:   No  bruising or rash.   Neurologic:   Grossly non focal and moves all extrimities equally.    Laboratory results:    Results from last 7 days  Lab Units 07/14/18  0429 07/13/18  0235   SODIUM mmol/L 147* 142   POTASSIUM mmol/L 4.1 3.7   CHLORIDE mmol/L 111* 102   CO2 mmol/L 24.0* 25.0*   BUN mg/dL 24* 12   CREATININE mg/dL 0.60 0.60   CALCIUM mg/dL 8.6 9.5   BILIRUBIN mg/dL 1.0 1.7*   ALK PHOS U/L 68 85   ALT (SGPT) U/L 21 14   AST (SGOT) U/L 30 28   GLUCOSE mg/dL 165* 194*       Results from last 7 days  Lab Units 07/14/18  0429 07/13/18  0235   WBC 10*3/mm3 12.34* 13.20*   HEMOGLOBIN g/dL 10.0* 12.6   HEMATOCRIT % 31.5* 38.1   PLATELETS 10*3/mm3 260 289           Results from last 7 days  Lab Units 07/14/18  1000 07/14/18  0429 07/13/18  2213   TROPONIN I ng/mL <0.012 <0.012 0.015       Results from last 7 days  Lab Units 07/13/18  0614 07/13/18  0602   BLOODCX  No growth at 24 hours No growth at 24 hours       Radiology results:    Imaging Results (last 24 hours)     ** No results found for the last 24 hours. **          I have reviewed the patient's radiology reports.    Medication Review:     I have reviewed the patients active and prn medications.     Assessment:    Principal Problem:    Acute respiratory failure with hypoxia (CMS/HCC)  Active Problems:    Diabetes mellitus (CMS/HCC)    Pneumonia of both lungs due to infectious organism    Sinus tachycardia          Plan:  Acute or respiratory failure with hypoxemia and an due to bilateral pneumonia.  The she is worsening with the the BiPAP and supportive care and  will need to be intubated details explained to the patient is agreeable and planned to intubate her with the giving the the other supportive care until she recovers from her pneumonia   Meantime we will feed her through the NG tube and continue with the other medications as per orders.    Sergey Gold MD  07/14/18  12:06 PM      Please note that portions of this note may have been completed with a voice recognition program. Efforts were made to edit the dictations, but occasionally words are mistranscribed.

## 2018-07-14 NOTE — ANESTHESIA PROCEDURE NOTES
Airway  Urgency: emergent    Airway not difficult    General Information and Staff    Patient location during procedure: ICU  CRNA: TIFFANIE HAGER    Consent for Airway (if performed for an anesthetic, see related documentation for consents)  Patient identity confirmed: arm band and hospital-assigned identification number  Consent: The procedure was performed in an emergent situation. Verbal consent not obtained. Written consent not obtained.  Risks and benefits: risks, benefits and alternatives were not discussed      Indications and Patient Condition  Indications for airway management: respiratory distress/failure    Preoxygenated: yes  Mask difficulty assessment: 1 - vent by mask    Final Airway Details  Final airway type: endotracheal airway      Successful airway: ETT  Cuffed: yes   Successful intubation technique: direct laryngoscopy  Endotracheal tube insertion site: oral  Blade: Cassy  Blade size: #3  ETT size: 7.5 mm  Cormack-Lehane Classification: grade I - full view of glottis  Placement verified by: chest auscultation and capnometry   Measured from: lips  ETT to lips (cm): 21  Number of attempts at approach: 1    Additional Comments  Placemnet confirmed by co2 sounds and xray sats down MD managing

## 2018-07-15 DIAGNOSIS — R12 HEARTBURN: ICD-10-CM

## 2018-07-15 LAB
ALBUMIN SERPL-MCNC: 2.6 G/DL (ref 3.5–5)
ALBUMIN/GLOB SERPL: 0.9 G/DL (ref 1–2)
ALP SERPL-CCNC: 63 U/L (ref 38–126)
ALT SERPL W P-5'-P-CCNC: 24 U/L (ref 13–69)
ANION GAP SERPL CALCULATED.3IONS-SCNC: 15.1 MMOL/L (ref 10–20)
AST SERPL-CCNC: 22 U/L (ref 15–46)
BILIRUB SERPL-MCNC: 0.6 MG/DL (ref 0.2–1.3)
BUN BLD-MCNC: 41 MG/DL (ref 7–20)
BUN/CREAT SERPL: 45.6 (ref 7.1–23.5)
CALCIUM SPEC-SCNC: 7.7 MG/DL (ref 8.4–10.2)
CHLORIDE SERPL-SCNC: 114 MMOL/L (ref 98–107)
CO2 SERPL-SCNC: 22 MMOL/L (ref 26–30)
CREAT BLD-MCNC: 0.9 MG/DL (ref 0.6–1.3)
DEPRECATED RDW RBC AUTO: 51.4 FL (ref 37–54)
ERYTHROCYTE [DISTWIDTH] IN BLOOD BY AUTOMATED COUNT: 15.7 % (ref 11.5–14.5)
GFR SERPL CREATININE-BSD FRML MDRD: 62 ML/MIN/1.73
GLOBULIN UR ELPH-MCNC: 2.9 GM/DL
GLUCOSE BLD-MCNC: 144 MG/DL (ref 74–98)
GLUCOSE BLDC GLUCOMTR-MCNC: 133 MG/DL (ref 70–130)
GLUCOSE BLDC GLUCOMTR-MCNC: 173 MG/DL (ref 70–130)
GLUCOSE BLDC GLUCOMTR-MCNC: 79 MG/DL (ref 70–130)
GLUCOSE BLDC GLUCOMTR-MCNC: 87 MG/DL (ref 70–130)
HCT VFR BLD AUTO: 27.1 % (ref 37–47)
HGB BLD-MCNC: 8.2 G/DL (ref 12–16)
MCH RBC QN AUTO: 27.1 PG (ref 27–31)
MCHC RBC AUTO-ENTMCNC: 30.3 G/DL (ref 30–37)
MCV RBC AUTO: 89.4 FL (ref 81–99)
PLATELET # BLD AUTO: 238 10*3/MM3 (ref 130–400)
PMV BLD AUTO: 10.8 FL (ref 6–12)
POTASSIUM BLD-SCNC: 4.1 MMOL/L (ref 3.5–5.1)
PROT SERPL-MCNC: 5.5 G/DL (ref 6.3–8.2)
RBC # BLD AUTO: 3.03 10*6/MM3 (ref 4.2–5.4)
SODIUM BLD-SCNC: 147 MMOL/L (ref 137–145)
VANCOMYCIN TROUGH SERPL-MCNC: 9.87 MCG/ML (ref 5–15)
WBC NRBC COR # BLD: 10.04 10*3/MM3 (ref 4.8–10.8)

## 2018-07-15 PROCEDURE — 80202 ASSAY OF VANCOMYCIN: CPT | Performed by: HOSPITALIST

## 2018-07-15 PROCEDURE — 94799 UNLISTED PULMONARY SVC/PX: CPT

## 2018-07-15 PROCEDURE — 80053 COMPREHEN METABOLIC PANEL: CPT | Performed by: INTERNAL MEDICINE

## 2018-07-15 PROCEDURE — 25010000002 CEFEPIME PER 500 MG: Performed by: HOSPITALIST

## 2018-07-15 PROCEDURE — 63710000001 INSULIN REGULAR HUMAN PER 5 UNITS: Performed by: HOSPITALIST

## 2018-07-15 PROCEDURE — 99232 SBSQ HOSP IP/OBS MODERATE 35: CPT | Performed by: INTERNAL MEDICINE

## 2018-07-15 PROCEDURE — 25010000002 PROPOFOL 1000 MG/ML EMULSION: Performed by: INTERNAL MEDICINE

## 2018-07-15 PROCEDURE — 25010000002 METHYLPREDNISOLONE PER 125 MG: Performed by: NURSE PRACTITIONER

## 2018-07-15 PROCEDURE — 82962 GLUCOSE BLOOD TEST: CPT

## 2018-07-15 PROCEDURE — 25010000002 HEPARIN (PORCINE) PER 1000 UNITS: Performed by: HOSPITALIST

## 2018-07-15 PROCEDURE — 85027 COMPLETE CBC AUTOMATED: CPT | Performed by: INTERNAL MEDICINE

## 2018-07-15 PROCEDURE — 93005 ELECTROCARDIOGRAM TRACING: CPT | Performed by: FAMILY MEDICINE

## 2018-07-15 PROCEDURE — 63710000001 INSULIN DETEMIR PER 5 UNITS: Performed by: HOSPITALIST

## 2018-07-15 PROCEDURE — 25010000002 VANCOMYCIN PER 500 MG: Performed by: HOSPITALIST

## 2018-07-15 PROCEDURE — 94003 VENT MGMT INPAT SUBQ DAY: CPT

## 2018-07-15 RX ORDER — SODIUM CHLORIDE 9 MG/ML
100 INJECTION, SOLUTION INTRAVENOUS CONTINUOUS
Status: DISCONTINUED | OUTPATIENT
Start: 2018-07-15 | End: 2018-07-16

## 2018-07-15 RX ORDER — METOPROLOL TARTRATE 5 MG/5ML
5 INJECTION INTRAVENOUS
Status: DISCONTINUED | OUTPATIENT
Start: 2018-07-15 | End: 2018-07-29 | Stop reason: HOSPADM

## 2018-07-15 RX ADMIN — CLOTRIMAZOLE AND BETAMETHASONE DIPROPIONATE 1 APPLICATION: 10; .5 CREAM TOPICAL at 21:30

## 2018-07-15 RX ADMIN — VANCOMYCIN HYDROCHLORIDE 1250 MG: 500 INJECTION, POWDER, LYOPHILIZED, FOR SOLUTION INTRAVENOUS at 12:19

## 2018-07-15 RX ADMIN — ARFORMOTEROL TARTRATE 15 MCG: 15 SOLUTION RESPIRATORY (INHALATION) at 06:40

## 2018-07-15 RX ADMIN — LISINOPRIL 20 MG: 20 TABLET ORAL at 08:56

## 2018-07-15 RX ADMIN — IPRATROPIUM BROMIDE AND ALBUTEROL SULFATE 3 ML: .5; 3 SOLUTION RESPIRATORY (INHALATION) at 01:02

## 2018-07-15 RX ADMIN — METHYLPREDNISOLONE SODIUM SUCCINATE 60 MG: 125 INJECTION, POWDER, FOR SOLUTION INTRAMUSCULAR; INTRAVENOUS at 03:57

## 2018-07-15 RX ADMIN — CEFEPIME HYDROCHLORIDE 2 G: 2 INJECTION, SOLUTION INTRAVENOUS at 06:31

## 2018-07-15 RX ADMIN — IPRATROPIUM BROMIDE AND ALBUTEROL SULFATE 3 ML: .5; 3 SOLUTION RESPIRATORY (INHALATION) at 19:11

## 2018-07-15 RX ADMIN — METHYLPREDNISOLONE SODIUM SUCCINATE 60 MG: 125 INJECTION, POWDER, FOR SOLUTION INTRAMUSCULAR; INTRAVENOUS at 12:22

## 2018-07-15 RX ADMIN — IPRATROPIUM BROMIDE AND ALBUTEROL SULFATE 3 ML: .5; 3 SOLUTION RESPIRATORY (INHALATION) at 23:05

## 2018-07-15 RX ADMIN — IPRATROPIUM BROMIDE AND ALBUTEROL SULFATE 3 ML: .5; 3 SOLUTION RESPIRATORY (INHALATION) at 04:40

## 2018-07-15 RX ADMIN — ARFORMOTEROL TARTRATE 15 MCG: 15 SOLUTION RESPIRATORY (INHALATION) at 21:02

## 2018-07-15 RX ADMIN — IPRATROPIUM BROMIDE AND ALBUTEROL SULFATE 3 ML: .5; 3 SOLUTION RESPIRATORY (INHALATION) at 06:38

## 2018-07-15 RX ADMIN — CEFEPIME HYDROCHLORIDE 2 G: 2 INJECTION, SOLUTION INTRAVENOUS at 23:58

## 2018-07-15 RX ADMIN — INSULIN DETEMIR 90 UNITS: 100 INJECTION, SOLUTION SUBCUTANEOUS at 08:57

## 2018-07-15 RX ADMIN — SODIUM CHLORIDE 75 ML/HR: 9 INJECTION, SOLUTION INTRAVENOUS at 00:38

## 2018-07-15 RX ADMIN — TRAZODONE HYDROCHLORIDE 75 MG: 50 TABLET ORAL at 21:27

## 2018-07-15 RX ADMIN — METOPROLOL TARTRATE 5 MG: 1 INJECTION, SOLUTION INTRAVENOUS at 22:32

## 2018-07-15 RX ADMIN — CHLORHEXIDINE GLUCONATE 0.12% ORAL RINSE 15 ML: 1.2 LIQUID ORAL at 08:57

## 2018-07-15 RX ADMIN — IPRATROPIUM BROMIDE AND ALBUTEROL SULFATE 3 ML: .5; 3 SOLUTION RESPIRATORY (INHALATION) at 16:07

## 2018-07-15 RX ADMIN — CLOTRIMAZOLE AND BETAMETHASONE DIPROPIONATE 1 APPLICATION: 10; .5 CREAM TOPICAL at 09:02

## 2018-07-15 RX ADMIN — IPRATROPIUM BROMIDE AND ALBUTEROL SULFATE 3 ML: .5; 3 SOLUTION RESPIRATORY (INHALATION) at 11:55

## 2018-07-15 RX ADMIN — HEPARIN SODIUM 5000 UNITS: 5000 INJECTION, SOLUTION INTRAVENOUS; SUBCUTANEOUS at 21:27

## 2018-07-15 RX ADMIN — BUDESONIDE 1 MG: 0.5 INHALANT RESPIRATORY (INHALATION) at 19:11

## 2018-07-15 RX ADMIN — HUMAN INSULIN 2 UNITS: 100 INJECTION, SOLUTION SUBCUTANEOUS at 06:30

## 2018-07-15 RX ADMIN — ATORVASTATIN CALCIUM 20 MG: 20 TABLET, FILM COATED ORAL at 08:55

## 2018-07-15 RX ADMIN — AMLODIPINE BESYLATE 5 MG: 5 TABLET ORAL at 08:56

## 2018-07-15 RX ADMIN — PROPOFOL 30 MCG/KG/MIN: 10 INJECTION, EMULSION INTRAVENOUS at 16:32

## 2018-07-15 RX ADMIN — Medication 200 MG: at 08:56

## 2018-07-15 RX ADMIN — VITAMIN D, TAB 1000IU (100/BT) 2000 UNITS: 25 TAB at 08:56

## 2018-07-15 RX ADMIN — BUPROPION HYDROCHLORIDE 150 MG: 150 TABLET, FILM COATED, EXTENDED RELEASE ORAL at 08:57

## 2018-07-15 RX ADMIN — PROPOFOL 30 MCG/KG/MIN: 10 INJECTION, EMULSION INTRAVENOUS at 09:43

## 2018-07-15 RX ADMIN — HUMAN INSULIN 2 UNITS: 100 INJECTION, SOLUTION SUBCUTANEOUS at 00:38

## 2018-07-15 RX ADMIN — GABAPENTIN 300 MG: 300 CAPSULE ORAL at 21:27

## 2018-07-15 RX ADMIN — BUDESONIDE 0.5 MG: 0.5 INHALANT RESPIRATORY (INHALATION) at 06:38

## 2018-07-15 RX ADMIN — CHLORHEXIDINE GLUCONATE 0.12% ORAL RINSE 15 ML: 1.2 LIQUID ORAL at 21:27

## 2018-07-15 RX ADMIN — SODIUM CHLORIDE 100 ML/HR: 9 INJECTION, SOLUTION INTRAVENOUS at 21:28

## 2018-07-15 RX ADMIN — PROPOFOL 35 MCG/KG/MIN: 10 INJECTION, EMULSION INTRAVENOUS at 02:44

## 2018-07-15 RX ADMIN — ACETYLCYSTEINE 3 ML: 200 SOLUTION ORAL; RESPIRATORY (INHALATION) at 21:02

## 2018-07-15 RX ADMIN — HEPARIN SODIUM 5000 UNITS: 5000 INJECTION, SOLUTION INTRAVENOUS; SUBCUTANEOUS at 06:29

## 2018-07-15 RX ADMIN — CLOPIDOGREL BISULFATE 75 MG: 75 TABLET ORAL at 08:55

## 2018-07-15 RX ADMIN — PANTOPRAZOLE SODIUM 40 MG: 40 INJECTION, POWDER, FOR SOLUTION INTRAVENOUS at 08:57

## 2018-07-15 RX ADMIN — SERTRALINE HYDROCHLORIDE 100 MG: 50 TABLET ORAL at 08:55

## 2018-07-15 RX ADMIN — GABAPENTIN 300 MG: 300 CAPSULE ORAL at 08:56

## 2018-07-15 RX ADMIN — METHYLPREDNISOLONE SODIUM SUCCINATE 60 MG: 125 INJECTION, POWDER, FOR SOLUTION INTRAMUSCULAR; INTRAVENOUS at 21:27

## 2018-07-15 RX ADMIN — ACETYLCYSTEINE 4 ML: 200 SOLUTION ORAL; RESPIRATORY (INHALATION) at 06:39

## 2018-07-15 RX ADMIN — CEFEPIME HYDROCHLORIDE 2 G: 2 INJECTION, SOLUTION INTRAVENOUS at 14:47

## 2018-07-15 RX ADMIN — HEPARIN SODIUM 5000 UNITS: 5000 INJECTION, SOLUTION INTRAVENOUS; SUBCUTANEOUS at 13:46

## 2018-07-15 RX ADMIN — ACETYLCYSTEINE 3 ML: 200 SOLUTION ORAL; RESPIRATORY (INHALATION) at 16:07

## 2018-07-15 RX ADMIN — PROPOFOL 40 MCG/KG/MIN: 10 INJECTION, EMULSION INTRAVENOUS at 21:27

## 2018-07-15 NOTE — PLAN OF CARE
Problem: Patient Care Overview  Goal: Plan of Care Review  Outcome: Ongoing (interventions implemented as appropriate)   07/15/18 1403   Coping/Psychosocial   Plan of Care Reviewed With patient   Plan of Care Review   Progress no change     Goal: Individualization and Mutuality  Outcome: Ongoing (interventions implemented as appropriate)    Goal: Discharge Needs Assessment  Outcome: Ongoing (interventions implemented as appropriate)    Goal: Interprofessional Rounds/Family Conf  Outcome: Ongoing (interventions implemented as appropriate)      Problem: Pneumonia (Adult)  Goal: Signs and Symptoms of Listed Potential Problems Will be Absent, Minimized or Managed (Pneumonia)  Outcome: Ongoing (interventions implemented as appropriate)      Problem: NPPV/CPAP (Adult)  Goal: Signs and Symptoms of Listed Potential Problems Will be Absent, Minimized or Managed (NPPV/CPAP)  Outcome: Ongoing (interventions implemented as appropriate)      Problem: Pain, Acute (Adult)  Goal: Acceptable Pain Control/Comfort Level  Outcome: Ongoing (interventions implemented as appropriate)      Problem: Restraint, Nonbehavioral (Nonviolent)  Goal: Nonbehavioral (Nonviolent) Restraint: Absence of Injury/Harm  Outcome: Ongoing (interventions implemented as appropriate)    Goal: Nonbehavioral (Nonviolent) Restraint: Achievement of Discontinuation Criteria  Outcome: Ongoing (interventions implemented as appropriate)    Goal: Nonbehavioral (Nonviolent) Restraint: Preservation of Dignity and Wellbeing  Outcome: Ongoing (interventions implemented as appropriate)      Problem: Ventilation, Mechanical Invasive (Adult)  Goal: Signs and Symptoms of Listed Potential Problems Will be Absent, Minimized or Managed (Ventilation, Mechanical Invasive)  Outcome: Ongoing (interventions implemented as appropriate)      Problem: Skin Injury Risk (Adult)  Goal: Skin Health and Integrity  Outcome: Ongoing (interventions implemented as appropriate)

## 2018-07-15 NOTE — PROGRESS NOTES
[facility]    Name:  Breann Gallegos   Age:  68 y.o.  Sex:  female  :  1950  MRN:  [id number]   Visit Number:  [id number]  Admission Date:  2018  Date Of Service:  07/15/18  Primary Care Physician:  Harry Avery MD     LOS: 2 days :  Patient Care Team:  Harry Avery MD as PCP - General  Harry Avery MD as PCP - Family Medicine  Harry Avery MD as PCP - Claims Attributed  Tereza Bateman RN as Care Coordinator ([facility]):      Subjective / Interval History:     68-year-old patient with the hypoxic respiratory failure secondary to bilateral pneumonia was intubated yesterday.  The she has been sedated the and the has a been still needing 100% FiO2 to maintain her saturation in the mid 90s.  Patient has pain now hemodynamically stable the last night there was a drop in her blood pressures so the fluids have been increased and the urine output has been poor.      Vital Signs:    Temp:  [97.2 °F (36.2 °C)-100.1 °F (37.8 °C)] 98 °F (36.7 °C)  Heart Rate:  [] 72  Resp:  [14-34] 20  BP: ()/(50-98) 95/57  FiO2 (%):  [100 %] 100 %    Intake and output:    I/O last 3 completed shifts:  In: 1821 [P.O.:240; I.V.:1521; Other:60]  Out: 1460 [Urine:1460]  No intake/output data recorded.    Physical Examination:    General Appearance:    Sedated and intubated at present, not in any acute distress.   Head:    Atraumatic and normocephalic, without obvious abnormality.   Eyes:            PERRLA,Extraocular movements cannot be evaluated    Neck:   Supple,  No lymphglands, no bruit   Lungs:     Chest shape is normal. Breath sounds heard bilaterally equally.  Fine crepitations but the improved air entry compared to yesterday     Heart:    Normal S1 and S2, no murmur,  No JVD   Abdomen:     Normal bowel sounds, no masses, no organomegaly. Soft        non-tender, no guarding, no rebound                tenderness   Extremities:   Moves all  extremities well, no edema, no cyanosis,    Skin:   No  bruising or rash.   Neurologic:   She is sedated and so evaluation is difficult to    Laboratory results:    Results from last 7 days  Lab Units 07/15/18  0403 07/14/18  0429 07/13/18  0235   SODIUM mmol/L 147* 147* 142   POTASSIUM mmol/L 4.1 4.1 3.7   CHLORIDE mmol/L 114* 111* 102   CO2 mmol/L 22.0* 24.0* 25.0*   BUN mg/dL 41* 24* 12   CREATININE mg/dL 0.90 0.60 0.60   CALCIUM mg/dL 7.7* 8.6 9.5   BILIRUBIN mg/dL 0.6 1.0 1.7*   ALK PHOS U/L 63 68 85   ALT (SGPT) U/L 24 21 14   AST (SGOT) U/L 22 30 28   GLUCOSE mg/dL 144* 165* 194*       Results from last 7 days  Lab Units 07/15/18  0403 07/14/18  0429 07/13/18  0235   WBC 10*3/mm3 10.04 12.34* 13.20*   HEMOGLOBIN g/dL 8.2* 10.0* 12.6   HEMATOCRIT % 27.1* 31.5* 38.1   PLATELETS 10*3/mm3 238 260 289           Results from last 7 days  Lab Units 07/14/18  1000 07/14/18  0429 07/13/18  2213   TROPONIN I ng/mL <0.012 <0.012 0.015       Results from last 7 days  Lab Units 07/13/18  0614 07/13/18  0602   BLOODCX  No growth at 2 days No growth at 2 days       Radiology results:    Imaging Results (last 24 hours)     Procedure Component Value Units Date/Time    XR Abdomen KUB [[id number]] Collected:  07/14/18 1443     Updated:  07/14/18 1446    Narrative:       ABDOMEN     INDICATION: Tube placement.     FINDINGS: Single portable view of the lower chest and abdomen  demonstrates a nasogastric tube terminating near the distal stomach.  Partial visualization of apparent central venous catheter terminating  near the superior cavoatrial junction. Extensive bilateral parenchymal  airspace infiltrates in the lungs. Nonspecific nonobstructive bowel gas  pattern. The lateral right abdomen was partially excluded from the  field-of-view.       Impression:       1. Nasogastric tube terminates in the distal stomach.  2. Nonspecific abdomen.  3. Extensive lung infiltrates.     This report was finalized on 7/14/2018 2:44 PM by Tone  MD Dayday.    XR Chest 1 View [[id number]] Collected:  07/14/18 1243     Updated:  07/14/18 1252    Narrative:       PORTABLE CHEST     INDICATION: Intubation.     FINDINGS: Single frontal portable chest. Correlation is made with CT  performed one day prior. EKG leads overlie the chest. Interval  intubation with endotracheal tube terminating above the arelis near the  level of the aortic arch. Heart size is normal. No pneumothorax.  Extensive diffuse airspace infiltrates appear to have likely worsened,  especially on the right. No obvious significant effusion.       Impression:       1. Interval intubation.  2. Worsening of extensive bilateral infiltrates. These are nonspecific  and could be related to edema or multifocal pneumonia. Developing ARDS  not excluded. Follow-up to resolution recommended.     This report was finalized on 7/14/2018 12:49 PM by Tone Naylor MD.          I have reviewed the patient's radiology reports.    Medication Review:     I have reviewed the patients active and prn medications.     Assessment:    Principal Problem:    Acute respiratory failure with hypoxia (CMS/HCC)  Active Problems:    Diabetes mellitus (CMS/HCC)    Pneumonia of both lungs due to infectious organism    Sinus tachycardia        Plan:  Patient with acute hypoxic respiratory failure status post intubation.  She was extensive infiltrates bilaterally with her pneumonia and a possible developing ARDS.  Would start her on steroids along with antibiotic and IV fluids and continue with the current supportive care with pressure support.    Dr. Tse consult will be done for tomorrow as he is a not available today.  We'll start with the NG tube feeding as she would not be able to be extubated in the next 2-3 days at least.  See rest as per orders.  Total critical care time spend the 25 minutes.    Sergey Gold MD  07/15/18  7:55 AM      Please note that portions of this note may have been completed with a voice recognition  program. Efforts were made to edit the dictations, but occasionally words are mistranscribed.

## 2018-07-15 NOTE — PLAN OF CARE
Problem: Patient Care Overview  Goal: Plan of Care Review  Outcome: Ongoing (interventions implemented as appropriate)   07/15/18 0530   Coping/Psychosocial   Plan of Care Reviewed With patient;family   Plan of Care Review   Progress no change     Goal: Individualization and Mutuality  Outcome: Ongoing (interventions implemented as appropriate)    Goal: Discharge Needs Assessment  Outcome: Ongoing (interventions implemented as appropriate)    Goal: Interprofessional Rounds/Family Conf  Outcome: Ongoing (interventions implemented as appropriate)      Problem: Pneumonia (Adult)  Goal: Signs and Symptoms of Listed Potential Problems Will be Absent, Minimized or Managed (Pneumonia)  Outcome: Ongoing (interventions implemented as appropriate)      Problem: NPPV/CPAP (Adult)  Goal: Signs and Symptoms of Listed Potential Problems Will be Absent, Minimized or Managed (NPPV/CPAP)  Outcome: Ongoing (interventions implemented as appropriate)      Problem: Pain, Acute (Adult)  Goal: Acceptable Pain Control/Comfort Level  Outcome: Ongoing (interventions implemented as appropriate)      Problem: Restraint, Nonbehavioral (Nonviolent)  Goal: Rationale and Justification  Outcome: Ongoing (interventions implemented as appropriate)    Goal: Nonbehavioral (Nonviolent) Restraint: Absence of Injury/Harm  Outcome: Ongoing (interventions implemented as appropriate)    Goal: Nonbehavioral (Nonviolent) Restraint: Achievement of Discontinuation Criteria  Outcome: Ongoing (interventions implemented as appropriate)    Goal: Nonbehavioral (Nonviolent) Restraint: Preservation of Dignity and Wellbeing  Outcome: Ongoing (interventions implemented as appropriate)      Problem: Ventilation, Mechanical Invasive (Adult)  Goal: Signs and Symptoms of Listed Potential Problems Will be Absent, Minimized or Managed (Ventilation, Mechanical Invasive)  Outcome: Ongoing (interventions implemented as appropriate)      Problem: Skin Injury Risk (Adult)  Goal:  Identify Related Risk Factors and Signs and Symptoms  Outcome: Outcome(s) achieved Date Met: 07/15/18    Goal: Skin Health and Integrity  Outcome: Ongoing (interventions implemented as appropriate)

## 2018-07-15 NOTE — PROGRESS NOTES
Adult Nutrition  Assessment/PES    Patient Name:  Breann Gallegos  YOB: 1950  MRN: [id number]  Admit Date:  7/13/2018    Assessment Date:  7/15/2018    Comments:  RD consulted for TF recs. Initiate TF once medically feasible. Rec #1:Glucerna 1.2 @25mL/hr advancing 10mL/hr every 8 hours to goal rate of 50mL/hr. TF to provide ~1390 kcals, ~69 grams protein, and ~925 mL tube feeding water. Rec #2: Free water flushes 60 mL Q6 or four times daily. Pt receiving Propofol @ 13 mL/hr, providing ~343 kcals. RD to follow pt. Consult RD PRN.           Reason for Assessment     Row Name 07/15/18 1037          Reason for Assessment    Reason For Assessment TF/PN;physician consult     Diagnosis diabetes diagnosis/complications;cardiac disease;pulmonary disease;gastrointestinal disease   COPD, GERD, DM2, HTN, HLD, AResp.Fx, Hypoxia               Anthropometrics     Row Name 07/15/18 0500          Anthropometrics    Weight 73.7 kg (162 lb 8 oz)             Labs/Tests/Procedures/Meds     Row Name 07/15/18 1038          Labs/Procedures/Meds    Lab Results Reviewed reviewed     Lab Results Comments Low: Alb  High: Na, CL, GLu, BUN        Medications    Pertinent Medications Reviewed reviewed             Physical Findings     Row Name 07/15/18 1048          Physical Findings    Overall Physical Appearance on ventilator support     Tubes nasogastric tube               Nutrition Prescription Ordered     Row Name 07/15/18 1043          Nutrition Prescription PO    Current PO Diet Other (comment)   Pt awaiting TF        Propofol Considerations    Propofol (mL/hr) 13 mL/hr     Propofol (Kcal/day) 343 Kcal/day               Problem/Interventions:        Problem 1     Row Name 07/15/18 1045          Nutrition Diagnoses Problem 1    Problem 1 Impaired Nutrient Utilization     Etiology (related to) Medical Diagnosis     Endocrine DM2     Signs/Symptoms (evidenced by) Biochemical     Specific Labs Noted  HgbA1C;Glucose;BUN;Na+;Chloride             Problem 2     Row Name 07/15/18 1045          Nutrition Diagnoses Problem 2    Problem 2 Increased Nutrient Needs     Macronutrient Kcal;Fluid;Protein     Micronutrient Vitamin;Mineral     Etiology (related to) Medical Diagnosis     Pulmonary/Critical Care COPD;Acute respiratory failure;Other (comment)   hypoxi     Signs/Symptoms (evidenced by) Other (comment)   Pulmonary dysfunction             Problem 3     Row Name 07/15/18 1045          Nutrition Diagnoses Problem 3    Problem 3 Needs Alternate Route     Etiology (related to) Medical Diagnosis     Pulmonary/Critical Care Ventilator     Signs/Symptoms (evidenced by) PO diet not tolerated                 Intervention Goal     Row Name 07/15/18 1049          Intervention Goal    General Meet nutritional needs for age/condition;Nutrition support treatment     PO Other (comment)   Remain NPO while ventillated     TF/PN Establish TF tolerance     Transition TF to PO     Weight Maintain weight             Nutrition Intervention     Row Name 07/15/18 1043          Nutrition Intervention    RD/Tech Action Care plan reviewd;Follow Tx progress;Recommend/ordered     Recommended/Ordered EN             Nutrition Prescription     Row Name 07/15/18 1049          Nutrition Prescription PO    PO Prescription Discontinue supplement        Nutrition Prescription EN    Enteral Prescription Enteral begin/change     Enteral Route NG     Product Glucerna 1.2 cyrus     TF Delivery Method Continuous     Continuous TF Goal Rate (mL/hr) 50 mL/hr     Continuous TF Starting Rate (mL/hr) 25 mL/hr     Continuous TF Goal Volume (mL) 1100 mL     Continuous TF Starting Volume (mL) 550 mL     Water flush (mL)  60 mL     Water Flush Frequency Other (comment)   Q6 or 4x daily     New EN Prescription Ordered? Yes        Other Orders    Obtain Weight Daily     Obtain Weight Ordered? No, recommended     Supplement Vitamin mineral supplement     Supplement  Ordered? No, recommended     Labs Mg++;K+;Phos             Education/Evaluation     Row Name 07/15/18 1057          Education    Education Education not appropriate at this time     Please explain Patient intubated        Monitor/Evaluation    Monitor Per protocol;I&O;Pertinent labs;TF delivery/tolerance;Weight         Electronically signed by:  Alia Cruz RD  07/15/18 11:03 AM

## 2018-07-15 NOTE — NURSING NOTE
Jewlery was removed from patient and locked up with security for family to retrieved.  The following items were sent:    8 rings:     Grey band with blue stone in center  Yellow band with red, blue and pink stones  Grey band with clear stones in a square shape  Yellow and with clear stones in a [place] shape  Grey band with Blue and clear stones in diagonal rows  Yellow band with clear stones in she shape of a flower  Plain white metal band  Grey metal wrap band     1 Bracelet:    Grey metal bracelet with blue and pink beads and feather charms    Items locked up with security, ticket in patient chart.

## 2018-07-15 NOTE — PLAN OF CARE
Problem: Patient Care Overview  Goal: Plan of Care Review  Outcome: Ongoing (interventions implemented as appropriate)   07/15/18 6021   Coping/Psychosocial   Plan of Care Reviewed With patient   Plan of Care Review   Progress no change       Problem: Ventilation, Mechanical Invasive (Adult)  Goal: Signs and Symptoms of Listed Potential Problems Will be Absent, Minimized or Managed (Ventilation, Mechanical Invasive)  Outcome: Ongoing (interventions implemented as appropriate)   07/15/18 0459   Goal/Outcome Evaluation   Problems Assessed (Mechanical Ventilation, Invasive) gastritis/stress ulcer   Problems Present (Mech Vent, Invasive) gastritis/stress ulcer

## 2018-07-15 NOTE — CONSULTS
Adult Nutrition  Assessment/PES    Patient Name:  Breann Gallegos  YOB: 1950  MRN: [id number]  Admit Date:  7/13/2018    Assessment Date:  7/15/2018    Comments:  RD consulted for TF recs. Initiate TF once medically feasible. Rec #1:Glucerna 1.2 @25mL/hr advancing 10mL/hr every 8 hours to goal rate of 50mL/hr. TF to provide ~1390 kcals, ~69 grams protein, and ~925 mL tube feeding water. Rec #2: Free water flushes 60 mL Q6 or four times daily. Pt receiving Propofol @ 13 mL/hr, providing ~343 kcals. RD to follow pt. Consult RD PRN.           Reason for Assessment     Row Name 07/15/18 1037          Reason for Assessment    Reason For Assessment TF/PN;physician consult     Diagnosis diabetes diagnosis/complications;cardiac disease;pulmonary disease;gastrointestinal disease   COPD, GERD, DM2, HTN, HLD, AResp.Fx, Hypoxia               Anthropometrics     Row Name 07/15/18 0500          Anthropometrics    Weight 73.7 kg (162 lb 8 oz)             Labs/Tests/Procedures/Meds     Row Name 07/15/18 1038          Labs/Procedures/Meds    Lab Results Reviewed reviewed     Lab Results Comments Low: Alb  High: Na, CL, GLu, BUN        Medications    Pertinent Medications Reviewed reviewed             Physical Findings     Row Name 07/15/18 1048          Physical Findings    Overall Physical Appearance on ventilator support     Tubes nasogastric tube               Nutrition Prescription Ordered     Row Name 07/15/18 1043          Nutrition Prescription PO    Current PO Diet Other (comment)   Pt awaiting TF        Propofol Considerations    Propofol (mL/hr) 13 mL/hr     Propofol (Kcal/day) 343 Kcal/day               Problem/Interventions:        Problem 1     Row Name 07/15/18 1045          Nutrition Diagnoses Problem 1    Problem 1 Impaired Nutrient Utilization     Etiology (related to) Medical Diagnosis     Endocrine DM2     Signs/Symptoms (evidenced by) Biochemical     Specific Labs Noted  HgbA1C;Glucose;BUN;Na+;Chloride             Problem 2     Row Name 07/15/18 1045          Nutrition Diagnoses Problem 2    Problem 2 Increased Nutrient Needs     Macronutrient Kcal;Fluid;Protein     Micronutrient Vitamin;Mineral     Etiology (related to) Medical Diagnosis     Pulmonary/Critical Care COPD;Acute respiratory failure;Other (comment)   hypoxi     Signs/Symptoms (evidenced by) Other (comment)   Pulmonary dysfunction             Problem 3     Row Name 07/15/18 1045          Nutrition Diagnoses Problem 3    Problem 3 Needs Alternate Route     Etiology (related to) Medical Diagnosis     Pulmonary/Critical Care Ventilator     Signs/Symptoms (evidenced by) PO diet not tolerated                 Intervention Goal     Row Name 07/15/18 1049          Intervention Goal    General Meet nutritional needs for age/condition;Nutrition support treatment     PO Other (comment)   Remain NPO while ventillated     TF/PN Establish TF tolerance     Transition TF to PO     Weight Maintain weight             Nutrition Intervention     Row Name 07/15/18 104          Nutrition Intervention    RD/Tech Action Care plan reviewd;Follow Tx progress;Recommend/ordered     Recommended/Ordered EN             Nutrition Prescription     Row Name 07/15/18 1049          Nutrition Prescription PO    PO Prescription Discontinue supplement        Nutrition Prescription EN    Enteral Prescription Enteral begin/change     Enteral Route NG     Product Glucerna 1.2 cyrus     TF Delivery Method Continuous     Continuous TF Goal Rate (mL/hr) 50 mL/hr     Continuous TF Starting Rate (mL/hr) 25 mL/hr     Continuous TF Goal Volume (mL) 1100 mL     Continuous TF Starting Volume (mL) 550 mL     Water flush (mL)  60 mL     Water Flush Frequency Other (comment)   Q6 or 4x daily     New EN Prescription Ordered? Yes        Other Orders    Obtain Weight Daily     Obtain Weight Ordered? No, recommended     Supplement Vitamin mineral supplement     Supplement  Ordered? No, recommended     Labs Mg++;K+;Phos             Education/Evaluation     Row Name 07/15/18 1057          Education    Education Education not appropriate at this time     Please explain Patient intubated        Monitor/Evaluation    Monitor Per protocol;I&O;Pertinent labs;TF delivery/tolerance;Weight         Electronically signed by:  Alia Cruz RD  07/15/18 11:05 AM

## 2018-07-16 ENCOUNTER — EPISODE CHANGES (OUTPATIENT)
Dept: CASE MANAGEMENT | Facility: OTHER | Age: 68
End: 2018-07-16

## 2018-07-16 ENCOUNTER — APPOINTMENT (OUTPATIENT)
Dept: GENERAL RADIOLOGY | Facility: HOSPITAL | Age: 68
End: 2018-07-16
Attending: FAMILY MEDICINE

## 2018-07-16 ENCOUNTER — APPOINTMENT (OUTPATIENT)
Dept: CARDIOLOGY | Facility: HOSPITAL | Age: 68
End: 2018-07-16
Attending: FAMILY MEDICINE

## 2018-07-16 LAB
ALBUMIN SERPL-MCNC: 2.9 G/DL (ref 3.5–5)
ALBUMIN/GLOB SERPL: 1 G/DL (ref 1–2)
ALP SERPL-CCNC: 100 U/L (ref 38–126)
ALT SERPL W P-5'-P-CCNC: 46 U/L (ref 13–69)
ANION GAP SERPL CALCULATED.3IONS-SCNC: 11.7 MMOL/L (ref 10–20)
ARTERIAL PATENCY WRIST A: ABNORMAL
ARTERIAL PATENCY WRIST A: POSITIVE
AST SERPL-CCNC: 78 U/L (ref 15–46)
ATMOSPHERIC PRESS: 734 MMHG
BACTERIA SPEC RESP CULT: ABNORMAL
BASE EXCESS BLDA CALC-SCNC: -3.8 MMOL/L (ref 0–2)
BASE EXCESS BLDA CALC-SCNC: -4.3 MMOL/L (ref 0–2)
BDY SITE: ABNORMAL
BDY SITE: ABNORMAL
BILIRUB SERPL-MCNC: 0.5 MG/DL (ref 0.2–1.3)
BUN BLD-MCNC: 41 MG/DL (ref 7–20)
BUN/CREAT SERPL: 58.6 (ref 7.1–23.5)
CALCIUM SPEC-SCNC: 8.4 MG/DL (ref 8.4–10.2)
CHLORIDE SERPL-SCNC: 116 MMOL/L (ref 98–107)
CO2 SERPL-SCNC: 21 MMOL/L (ref 26–30)
COHGB MFR BLD: 1.2 % (ref 0–2)
COHGB MFR BLD: 1.2 % (ref 0–2)
CREAT BLD-MCNC: 0.7 MG/DL (ref 0.6–1.3)
DEPRECATED RDW RBC AUTO: 50 FL (ref 37–54)
ERYTHROCYTE [DISTWIDTH] IN BLOOD BY AUTOMATED COUNT: 15.6 % (ref 11.5–14.5)
GFR SERPL CREATININE-BSD FRML MDRD: 83 ML/MIN/1.73
GLOBULIN UR ELPH-MCNC: 3 GM/DL
GLUCOSE BLD-MCNC: 77 MG/DL (ref 74–98)
GLUCOSE BLDC GLUCOMTR-MCNC: 111 MG/DL (ref 70–130)
GLUCOSE BLDC GLUCOMTR-MCNC: 129 MG/DL (ref 70–130)
GLUCOSE BLDC GLUCOMTR-MCNC: 155 MG/DL (ref 70–130)
GLUCOSE BLDC GLUCOMTR-MCNC: 158 MG/DL (ref 70–130)
GLUCOSE BLDC GLUCOMTR-MCNC: 70 MG/DL (ref 70–130)
GLUCOSE BLDC GLUCOMTR-MCNC: 80 MG/DL (ref 70–130)
GLUCOSE BLDC GLUCOMTR-MCNC: 82 MG/DL (ref 70–130)
GRAM STN SPEC: ABNORMAL
HCO3 BLDA-SCNC: 20.7 MMOL/L (ref 22–28)
HCO3 BLDA-SCNC: 20.8 MMOL/L (ref 22–28)
HCT VFR BLD AUTO: 31.1 % (ref 37–47)
HCT VFR BLD CALC: 29.3 %
HGB BLD-MCNC: 9.8 G/DL (ref 12–16)
HGB BLDA-MCNC: 9.6 G/DL (ref 12–18)
HGB BLDA-MCNC: 9.9 G/DL (ref 12–18)
HOROWITZ INDEX BLD+IHG-RTO: 100 %
HOROWITZ INDEX BLD+IHG-RTO: 95 %
Lab: ABNORMAL
MAGNESIUM SERPL-MCNC: 2.4 MG/DL (ref 1.6–2.3)
MCH RBC QN AUTO: 27.7 PG (ref 27–31)
MCHC RBC AUTO-ENTMCNC: 31.5 G/DL (ref 30–37)
MCV RBC AUTO: 87.9 FL (ref 81–99)
METHGB BLD QL: 0.6 % (ref 0–1.5)
METHGB BLD QL: 0.8 % (ref 0–1.5)
MODALITY: ABNORMAL
MODALITY: ABNORMAL
OXYHGB MFR BLDV: 91.6 % (ref 94–99)
OXYHGB MFR BLDV: 91.8 % (ref 94–99)
PCO2 BLDA: 34.5 MM HG (ref 35–45)
PCO2 BLDA: 35.5 MM HG (ref 35–45)
PCO2 TEMP ADJ BLD: ABNORMAL MM HG (ref 35–45)
PCO2 TEMP ADJ BLD: ABNORMAL MM HG (ref 35–45)
PEEP RESPIRATORY: 10 CM[H2O]
PH BLDA: 7.38 PH UNITS (ref 7.3–7.5)
PH BLDA: 7.39 PH UNITS (ref 7.3–7.5)
PH, TEMP CORRECTED: ABNORMAL PH UNITS
PH, TEMP CORRECTED: ABNORMAL PH UNITS
PLATELET # BLD AUTO: 240 10*3/MM3 (ref 130–400)
PMV BLD AUTO: 10.9 FL (ref 6–12)
PO2 BLDA: 67.2 MM HG (ref 75–100)
PO2 BLDA: 67.8 MM HG (ref 75–100)
PO2 TEMP ADJ BLD: ABNORMAL MM HG (ref 83–108)
PO2 TEMP ADJ BLD: ABNORMAL MM HG (ref 83–108)
POTASSIUM BLD-SCNC: 4.7 MMOL/L (ref 3.5–5.1)
PROT SERPL-MCNC: 5.9 G/DL (ref 6.3–8.2)
RBC # BLD AUTO: 3.54 10*6/MM3 (ref 4.2–5.4)
SAO2 % BLDCOA: 93.5 % (ref 94–100)
SAO2 % BLDCOA: 93.6 % (ref 94–100)
SET MECH RESP RATE: 20
SODIUM BLD-SCNC: 144 MMOL/L (ref 137–145)
TRIGL SERPL-MCNC: 190 MG/DL
VENTILATOR MODE: ABNORMAL
WBC NRBC COR # BLD: 14 10*3/MM3 (ref 4.8–10.8)

## 2018-07-16 PROCEDURE — 93306 TTE W/DOPPLER COMPLETE: CPT

## 2018-07-16 PROCEDURE — 99223 1ST HOSP IP/OBS HIGH 75: CPT | Performed by: INTERNAL MEDICINE

## 2018-07-16 PROCEDURE — 84478 ASSAY OF TRIGLYCERIDES: CPT | Performed by: FAMILY MEDICINE

## 2018-07-16 PROCEDURE — 93306 TTE W/DOPPLER COMPLETE: CPT | Performed by: INTERNAL MEDICINE

## 2018-07-16 PROCEDURE — 82805 BLOOD GASES W/O2 SATURATION: CPT

## 2018-07-16 PROCEDURE — 25010000002 PROPOFOL 1000 MG/ML EMULSION: Performed by: INTERNAL MEDICINE

## 2018-07-16 PROCEDURE — 25010000002 VANCOMYCIN PER 500 MG: Performed by: HOSPITALIST

## 2018-07-16 PROCEDURE — 25010000002 METHYLPREDNISOLONE PER 125 MG: Performed by: NURSE PRACTITIONER

## 2018-07-16 PROCEDURE — 25010000002 DIGOXIN PER 500 MCG: Performed by: FAMILY MEDICINE

## 2018-07-16 PROCEDURE — 82962 GLUCOSE BLOOD TEST: CPT

## 2018-07-16 PROCEDURE — 94799 UNLISTED PULMONARY SVC/PX: CPT

## 2018-07-16 PROCEDURE — 83050 HGB METHEMOGLOBIN QUAN: CPT

## 2018-07-16 PROCEDURE — 82375 ASSAY CARBOXYHB QUANT: CPT

## 2018-07-16 PROCEDURE — 85027 COMPLETE CBC AUTOMATED: CPT | Performed by: INTERNAL MEDICINE

## 2018-07-16 PROCEDURE — 93005 ELECTROCARDIOGRAM TRACING: CPT | Performed by: HOSPITALIST

## 2018-07-16 PROCEDURE — 25010000002 MORPHINE PER 10 MG: Performed by: INTERNAL MEDICINE

## 2018-07-16 PROCEDURE — 25010000002 AZITHROMYCIN: Performed by: FAMILY MEDICINE

## 2018-07-16 PROCEDURE — 36600 WITHDRAWAL OF ARTERIAL BLOOD: CPT

## 2018-07-16 PROCEDURE — 94770: CPT

## 2018-07-16 PROCEDURE — 63710000001 INSULIN DETEMIR PER 5 UNITS: Performed by: FAMILY MEDICINE

## 2018-07-16 PROCEDURE — 83735 ASSAY OF MAGNESIUM: CPT | Performed by: FAMILY MEDICINE

## 2018-07-16 PROCEDURE — 25010000002 ENOXAPARIN PER 10 MG: Performed by: INTERNAL MEDICINE

## 2018-07-16 PROCEDURE — 99232 SBSQ HOSP IP/OBS MODERATE 35: CPT | Performed by: FAMILY MEDICINE

## 2018-07-16 PROCEDURE — 25010000002 LEVOFLOXACIN PER 250 MG: Performed by: INTERNAL MEDICINE

## 2018-07-16 PROCEDURE — 94003 VENT MGMT INPAT SUBQ DAY: CPT

## 2018-07-16 PROCEDURE — 25010000002 CEFEPIME PER 500 MG: Performed by: HOSPITALIST

## 2018-07-16 PROCEDURE — 25010000002 FUROSEMIDE PER 20 MG: Performed by: INTERNAL MEDICINE

## 2018-07-16 PROCEDURE — 25010000002 HEPARIN (PORCINE) PER 1000 UNITS: Performed by: HOSPITALIST

## 2018-07-16 PROCEDURE — 25010000002 LORAZEPAM PER 2 MG: Performed by: INTERNAL MEDICINE

## 2018-07-16 PROCEDURE — 71045 X-RAY EXAM CHEST 1 VIEW: CPT

## 2018-07-16 PROCEDURE — 80053 COMPREHEN METABOLIC PANEL: CPT | Performed by: INTERNAL MEDICINE

## 2018-07-16 RX ORDER — DILTIAZEM HYDROCHLORIDE 5 MG/ML
10 INJECTION INTRAVENOUS ONCE
Status: COMPLETED | OUTPATIENT
Start: 2018-07-16 | End: 2018-07-16

## 2018-07-16 RX ORDER — LEVOFLOXACIN 5 MG/ML
500 INJECTION, SOLUTION INTRAVENOUS EVERY 24 HOURS
Status: DISCONTINUED | OUTPATIENT
Start: 2018-07-16 | End: 2018-07-18

## 2018-07-16 RX ORDER — FAMOTIDINE 10 MG/ML
20 INJECTION, SOLUTION INTRAVENOUS EVERY 12 HOURS SCHEDULED
Status: DISCONTINUED | OUTPATIENT
Start: 2018-07-16 | End: 2018-07-20

## 2018-07-16 RX ORDER — DIGOXIN 0.25 MG/ML
125 INJECTION INTRAMUSCULAR; INTRAVENOUS EVERY 8 HOURS
Status: DISCONTINUED | OUTPATIENT
Start: 2018-07-16 | End: 2018-07-19

## 2018-07-16 RX ORDER — FUROSEMIDE 10 MG/ML
40 INJECTION INTRAMUSCULAR; INTRAVENOUS ONCE
Status: COMPLETED | OUTPATIENT
Start: 2018-07-16 | End: 2018-07-16

## 2018-07-16 RX ORDER — LISINOPRIL 5 MG/1
5 TABLET ORAL DAILY
Status: DISCONTINUED | OUTPATIENT
Start: 2018-07-16 | End: 2018-07-19

## 2018-07-16 RX ORDER — OMEPRAZOLE 40 MG/1
CAPSULE, DELAYED RELEASE ORAL
Qty: 30 CAPSULE | Refills: 5 | Status: SHIPPED | OUTPATIENT
Start: 2018-07-16

## 2018-07-16 RX ORDER — LISINOPRIL 20 MG/1
TABLET ORAL
Qty: 30 TABLET | Refills: 5 | Status: SHIPPED | OUTPATIENT
Start: 2018-07-16

## 2018-07-16 RX ADMIN — CHLORHEXIDINE GLUCONATE 0.12% ORAL RINSE 15 ML: 1.2 LIQUID ORAL at 22:20

## 2018-07-16 RX ADMIN — IPRATROPIUM BROMIDE AND ALBUTEROL SULFATE 3 ML: .5; 3 SOLUTION RESPIRATORY (INHALATION) at 12:11

## 2018-07-16 RX ADMIN — AZITHROMYCIN 500 MG: 500 INJECTION, POWDER, LYOPHILIZED, FOR SOLUTION INTRAVENOUS at 08:01

## 2018-07-16 RX ADMIN — ACETYLCYSTEINE 3 ML: 200 SOLUTION ORAL; RESPIRATORY (INHALATION) at 06:46

## 2018-07-16 RX ADMIN — FUROSEMIDE 40 MG: 10 INJECTION, SOLUTION INTRAMUSCULAR; INTRAVENOUS at 22:19

## 2018-07-16 RX ADMIN — ACETYLCYSTEINE 3 ML: 200 SOLUTION ORAL; RESPIRATORY (INHALATION) at 16:11

## 2018-07-16 RX ADMIN — CEFEPIME HYDROCHLORIDE 2 G: 2 INJECTION, SOLUTION INTRAVENOUS at 06:20

## 2018-07-16 RX ADMIN — SERTRALINE HYDROCHLORIDE 100 MG: 50 TABLET ORAL at 08:44

## 2018-07-16 RX ADMIN — METHYLPREDNISOLONE SODIUM SUCCINATE 60 MG: 125 INJECTION, POWDER, FOR SOLUTION INTRAMUSCULAR; INTRAVENOUS at 11:57

## 2018-07-16 RX ADMIN — BUDESONIDE 1 MG: 0.5 INHALANT RESPIRATORY (INHALATION) at 19:53

## 2018-07-16 RX ADMIN — PROPOFOL 40 MCG/KG/MIN: 10 INJECTION, EMULSION INTRAVENOUS at 03:52

## 2018-07-16 RX ADMIN — IPRATROPIUM BROMIDE AND ALBUTEROL SULFATE 3 ML: .5; 3 SOLUTION RESPIRATORY (INHALATION) at 06:46

## 2018-07-16 RX ADMIN — DIGOXIN 125 MCG: 0.25 INJECTION INTRAMUSCULAR; INTRAVENOUS at 02:20

## 2018-07-16 RX ADMIN — GABAPENTIN 300 MG: 300 CAPSULE ORAL at 22:20

## 2018-07-16 RX ADMIN — LEVOFLOXACIN 500 MG: 5 INJECTION, SOLUTION INTRAVENOUS at 09:29

## 2018-07-16 RX ADMIN — MUPIROCIN 1 APPLICATION: 20 OINTMENT TOPICAL at 10:27

## 2018-07-16 RX ADMIN — BUPROPION HYDROCHLORIDE 150 MG: 150 TABLET, FILM COATED, EXTENDED RELEASE ORAL at 08:44

## 2018-07-16 RX ADMIN — VANCOMYCIN HYDROCHLORIDE 1250 MG: 500 INJECTION, POWDER, LYOPHILIZED, FOR SOLUTION INTRAVENOUS at 17:28

## 2018-07-16 RX ADMIN — INSULIN DETEMIR 25 UNITS: 100 INJECTION, SOLUTION SUBCUTANEOUS at 08:44

## 2018-07-16 RX ADMIN — PROPOFOL 45 MCG/KG/MIN: 10 INJECTION, EMULSION INTRAVENOUS at 17:28

## 2018-07-16 RX ADMIN — LISINOPRIL 5 MG: 5 TABLET ORAL at 08:44

## 2018-07-16 RX ADMIN — DILTIAZEM HYDROCHLORIDE 10 MG: 5 INJECTION INTRAVENOUS at 06:23

## 2018-07-16 RX ADMIN — IPRATROPIUM BROMIDE AND ALBUTEROL SULFATE 3 ML: .5; 3 SOLUTION RESPIRATORY (INHALATION) at 19:53

## 2018-07-16 RX ADMIN — LORAZEPAM 2 MG: 2 INJECTION INTRAMUSCULAR; INTRAVENOUS at 19:27

## 2018-07-16 RX ADMIN — BUDESONIDE 1 MG: 0.5 INHALANT RESPIRATORY (INHALATION) at 12:11

## 2018-07-16 RX ADMIN — PROPOFOL 45 MCG/KG/MIN: 10 INJECTION, EMULSION INTRAVENOUS at 22:20

## 2018-07-16 RX ADMIN — CEFEPIME HYDROCHLORIDE 2 G: 2 INJECTION, SOLUTION INTRAVENOUS at 14:58

## 2018-07-16 RX ADMIN — MUPIROCIN: 20 OINTMENT TOPICAL at 22:21

## 2018-07-16 RX ADMIN — PROPOFOL 45 MCG/KG/MIN: 10 INJECTION, EMULSION INTRAVENOUS at 08:45

## 2018-07-16 RX ADMIN — LORAZEPAM 2 MG: 2 INJECTION INTRAMUSCULAR; INTRAVENOUS at 06:06

## 2018-07-16 RX ADMIN — FAMOTIDINE 20 MG: 10 INJECTION, SOLUTION INTRAVENOUS at 12:45

## 2018-07-16 RX ADMIN — METHYLPREDNISOLONE SODIUM SUCCINATE 60 MG: 125 INJECTION, POWDER, FOR SOLUTION INTRAMUSCULAR; INTRAVENOUS at 22:19

## 2018-07-16 RX ADMIN — INSULIN DETEMIR 25 UNITS: 100 INJECTION, SOLUTION SUBCUTANEOUS at 22:19

## 2018-07-16 RX ADMIN — IPRATROPIUM BROMIDE AND ALBUTEROL SULFATE 3 ML: .5; 3 SOLUTION RESPIRATORY (INHALATION) at 16:10

## 2018-07-16 RX ADMIN — CEFEPIME HYDROCHLORIDE 2 G: 2 INJECTION, SOLUTION INTRAVENOUS at 22:20

## 2018-07-16 RX ADMIN — FAMOTIDINE 20 MG: 10 INJECTION, SOLUTION INTRAVENOUS at 22:19

## 2018-07-16 RX ADMIN — ARFORMOTEROL TARTRATE 15 MCG: 15 SOLUTION RESPIRATORY (INHALATION) at 06:47

## 2018-07-16 RX ADMIN — METHYLPREDNISOLONE SODIUM SUCCINATE 60 MG: 125 INJECTION, POWDER, FOR SOLUTION INTRAMUSCULAR; INTRAVENOUS at 03:52

## 2018-07-16 RX ADMIN — DIGOXIN 125 MCG: 0.25 INJECTION INTRAMUSCULAR; INTRAVENOUS at 10:27

## 2018-07-16 RX ADMIN — VITAMIN D, TAB 1000IU (100/BT) 2000 UNITS: 25 TAB at 08:44

## 2018-07-16 RX ADMIN — CHLORHEXIDINE GLUCONATE 0.12% ORAL RINSE 15 ML: 1.2 LIQUID ORAL at 08:47

## 2018-07-16 RX ADMIN — CLOTRIMAZOLE AND BETAMETHASONE DIPROPIONATE 1 APPLICATION: 10; .5 CREAM TOPICAL at 22:20

## 2018-07-16 RX ADMIN — DILTIAZEM HYDROCHLORIDE 10 MG/HR: 100 INJECTION, POWDER, LYOPHILIZED, FOR SOLUTION INTRAVENOUS at 08:01

## 2018-07-16 RX ADMIN — METOPROLOL TARTRATE 5 MG: 1 INJECTION, SOLUTION INTRAVENOUS at 01:27

## 2018-07-16 RX ADMIN — ACETYLCYSTEINE 3 ML: 200 SOLUTION ORAL; RESPIRATORY (INHALATION) at 12:11

## 2018-07-16 RX ADMIN — AMLODIPINE BESYLATE 5 MG: 5 TABLET ORAL at 08:44

## 2018-07-16 RX ADMIN — PANTOPRAZOLE SODIUM 40 MG: 40 INJECTION, POWDER, FOR SOLUTION INTRAVENOUS at 08:43

## 2018-07-16 RX ADMIN — DILTIAZEM HYDROCHLORIDE 15 MG/HR: 100 INJECTION, POWDER, LYOPHILIZED, FOR SOLUTION INTRAVENOUS at 22:24

## 2018-07-16 RX ADMIN — METOPROLOL TARTRATE 5 MG: 1 INJECTION, SOLUTION INTRAVENOUS at 00:54

## 2018-07-16 RX ADMIN — CLOPIDOGREL BISULFATE 75 MG: 75 TABLET ORAL at 08:44

## 2018-07-16 RX ADMIN — MORPHINE SULFATE 4 MG: 2 INJECTION, SOLUTION INTRAMUSCULAR; INTRAVENOUS at 06:07

## 2018-07-16 RX ADMIN — CLOTRIMAZOLE AND BETAMETHASONE DIPROPIONATE 1 APPLICATION: 10; .5 CREAM TOPICAL at 08:47

## 2018-07-16 RX ADMIN — IPRATROPIUM BROMIDE AND ALBUTEROL SULFATE 3 ML: .5; 3 SOLUTION RESPIRATORY (INHALATION) at 23:42

## 2018-07-16 RX ADMIN — ATORVASTATIN CALCIUM 20 MG: 20 TABLET, FILM COATED ORAL at 08:57

## 2018-07-16 RX ADMIN — DIGOXIN 125 MCG: 0.25 INJECTION INTRAMUSCULAR; INTRAVENOUS at 17:32

## 2018-07-16 RX ADMIN — HEPARIN SODIUM 5000 UNITS: 5000 INJECTION, SOLUTION INTRAVENOUS; SUBCUTANEOUS at 06:06

## 2018-07-16 RX ADMIN — GABAPENTIN 300 MG: 300 CAPSULE ORAL at 08:44

## 2018-07-16 RX ADMIN — PROPOFOL 45 MCG/KG/MIN: 10 INJECTION, EMULSION INTRAVENOUS at 13:50

## 2018-07-16 RX ADMIN — MORPHINE SULFATE 4 MG: 2 INJECTION, SOLUTION INTRAMUSCULAR; INTRAVENOUS at 19:27

## 2018-07-16 RX ADMIN — Medication 200 MG: at 08:43

## 2018-07-16 RX ADMIN — ARFORMOTEROL TARTRATE 15 MCG: 15 SOLUTION RESPIRATORY (INHALATION) at 19:53

## 2018-07-16 RX ADMIN — ENOXAPARIN SODIUM 70 MG: 80 INJECTION SUBCUTANEOUS at 11:57

## 2018-07-16 RX ADMIN — VANCOMYCIN HYDROCHLORIDE 1250 MG: 500 INJECTION, POWDER, LYOPHILIZED, FOR SOLUTION INTRAVENOUS at 06:05

## 2018-07-16 NOTE — PLAN OF CARE
Problem: Patient Care Overview  Goal: Plan of Care Review  Outcome: Ongoing (interventions implemented as appropriate)   07/16/18 1420   Coping/Psychosocial   Plan of Care Reviewed With patient   Plan of Care Review   Progress improving     Goal: Individualization and Mutuality  Outcome: Ongoing (interventions implemented as appropriate)    Goal: Discharge Needs Assessment  Outcome: Ongoing (interventions implemented as appropriate)      Problem: Restraint, Nonbehavioral (Nonviolent)  Goal: Nonbehavioral (Nonviolent) Restraint: Absence of Injury/Harm  Outcome: Ongoing (interventions implemented as appropriate)    Goal: Nonbehavioral (Nonviolent) Restraint: Achievement of Discontinuation Criteria  Outcome: Ongoing (interventions implemented as appropriate)    Goal: Nonbehavioral (Nonviolent) Restraint: Preservation of Dignity and Wellbeing  Outcome: Ongoing (interventions implemented as appropriate)      Problem: Ventilation, Mechanical Invasive (Adult)  Goal: Signs and Symptoms of Listed Potential Problems Will be Absent, Minimized or Managed (Ventilation, Mechanical Invasive)  Outcome: Ongoing (interventions implemented as appropriate)      Problem: Skin Injury Risk (Adult)  Goal: Skin Health and Integrity  Outcome: Ongoing (interventions implemented as appropriate)      Problem: Arrhythmia/Dysrhythmia (Symptomatic) (Adult)  Goal: Signs and Symptoms of Listed Potential Problems Will be Absent, Minimized or Managed (Arrhythmia/Dysrhythmia)  Outcome: Ongoing (interventions implemented as appropriate)      Problem: Nutrition, Enteral (Adult)  Goal: Signs and Symptoms of Listed Potential Problems Will be Absent, Minimized or Managed (Nutrition, Enteral)  Outcome: Ongoing (interventions implemented as appropriate)

## 2018-07-16 NOTE — PLAN OF CARE
Problem: Patient Care Overview  Goal: Plan of Care Review  Outcome: Ongoing (interventions implemented as appropriate)   07/16/18 1353   Coping/Psychosocial   Plan of Care Reviewed With caregiver   Plan of Care Review   Progress no change       Problem: Ventilation, Mechanical Invasive (Adult)  Goal: Signs and Symptoms of Listed Potential Problems Will be Absent, Minimized or Managed (Ventilation, Mechanical Invasive)  Outcome: Ongoing (interventions implemented as appropriate)   07/16/18 0923   Goal/Outcome Evaluation   Problems Assessed (Mechanical Ventilation, Invasive) artificial airway-induced skin/tissue breakdown;gastritis/stress ulcer;ventilator-induced lung injury;situational response;mechanical dysfunction   Problems Present (Mech Vent, Invasive) none

## 2018-07-16 NOTE — PROGRESS NOTES
Case Management/Social Work    Patient Name:  Breann Gallegos  YOB: 1950  MRN: [id number]  Admit Date:  7/13/2018        Pt currently on the vent at this time. Will continue to follow for dcp.      Electronically signed by:  RODNEY Gaitan  07/16/18 11:13 AM

## 2018-07-16 NOTE — PLAN OF CARE
Problem: Patient Care Overview  Goal: Plan of Care Review  Outcome: Ongoing (interventions implemented as appropriate)   07/16/18 0453   Coping/Psychosocial   Plan of Care Reviewed With patient   Plan of Care Review   Progress no change       Problem: Pneumonia (Adult)  Goal: Signs and Symptoms of Listed Potential Problems Will be Absent, Minimized or Managed (Pneumonia)  Outcome: Ongoing (interventions implemented as appropriate)      Problem: Pain, Acute (Adult)  Goal: Acceptable Pain Control/Comfort Level  Outcome: Ongoing (interventions implemented as appropriate)      Problem: Restraint, Nonbehavioral (Nonviolent)  Goal: Rationale and Justification  Outcome: Ongoing (interventions implemented as appropriate)    Goal: Nonbehavioral (Nonviolent) Restraint: Absence of Injury/Harm  Outcome: Ongoing (interventions implemented as appropriate)    Goal: Nonbehavioral (Nonviolent) Restraint: Achievement of Discontinuation Criteria  Outcome: Ongoing (interventions implemented as appropriate)    Goal: Nonbehavioral (Nonviolent) Restraint: Preservation of Dignity and Wellbeing  Outcome: Ongoing (interventions implemented as appropriate)      Problem: Ventilation, Mechanical Invasive (Adult)  Goal: Signs and Symptoms of Listed Potential Problems Will be Absent, Minimized or Managed (Ventilation, Mechanical Invasive)  Outcome: Ongoing (interventions implemented as appropriate)      Problem: Skin Injury Risk (Adult)  Goal: Skin Health and Integrity  Outcome: Ongoing (interventions implemented as appropriate)      Problem: Arrhythmia/Dysrhythmia (Symptomatic) (Adult)  Goal: Signs and Symptoms of Listed Potential Problems Will be Absent, Minimized or Managed (Arrhythmia/Dysrhythmia)  Outcome: Ongoing (interventions implemented as appropriate)

## 2018-07-16 NOTE — CONSULTS
[facility]-Cardiology Consult Note    Referring Provider: Mirta  Reason for Consultation: Atrial fibrillation with rapid ventricular response    Patient Care Team:  Harry Avery MD as PCP - General  Harry Avery MD as PCP - Family Medicine  Harry Avery MD as PCP - Claims Attributed  Tereza Bateman RN as Care Coordinator ([facility])    Chief complaint : Shortness of breath    Subjective .     History of present illness:  This is a 68-year-old female patient who is admitted with bilateral pneumonia and severe hypoxemia requiring intubation and mechanical ventilation.  The patient was noted to be in atrial fibrillation with rapid ventricular response on the telemetry monitor.  She has been rate controlled with IV diltiazem and IV digoxin.  The patient is currently intubated and sedated and can provide no history.  There are no family members available to interview.  The patient had apparently been experiencing progressively worsening shortness of breath subjective fever and cough prompting her visit to the emergency room.    Review of Systems   ROS- not obtainable from the patient    History  Past Medical History:   Diagnosis Date   • Arthritis    • Body piercing     EARS   • COPD (chronic obstructive pulmonary disease) (CMS/HCC)    • Diabetes mellitus (CMS/[facility])    • Dysphagia     REPORTS SHE FEELS LIKE THINGS ARE GETTING STUCK IN HER THROAT FOR THE PAST SEVERAL MONTHS   • Eustachian tube dysfunction    • Fracture     METATRASAL   • Full dentures     INSTRUCTED NO ADHESIVES THE DOS   • GERD (gastroesophageal reflux disease)    • High cholesterol    • History of nuclear stress test     REPORTS 3-4 YEARS AGO AND THAT ALL WAS WNL'S   • Hyperlipidemia    • Hypertension    • Sinus problem    • Skin abscess    • Vision problem    • Vitamin B12 deficiency    • Vitamin D deficiency    • Wears glasses    , Past Surgical History:   Procedure Laterality Date   • APPENDECTOMY  1976   • BREAST SURGERY      REDUCTION   •  "CATARACT EXTRACTION Bilateral    • CHOLECYSTECTOMY  1976   • COLONOSCOPY     • ENDOSCOPY     • ENDOSCOPY N/A 11/29/2017    Procedure: ESOPHAGOGASTRODUODENOSCOPY with biopsies and esophageal balloon dilitation;  Surgeon: Molina Dumont MD;  Location: [facility] ENDOSCOPY;  Service:    • ENDOSCOPY N/A 5/2/2018    Procedure: ESOPHAGOGASTRODUODENOSCOPY WITH ESOPHAGEAL BALLOON DILITATION (15-16.5-18 MM);  Surgeon: Molina Dumont MD;  Location: [facility] ENDOSCOPY;  Service: Gastroenterology   • HAND SURGERY Right    • HYSTERECTOMY  1979   • OOPHORECTOMY Bilateral 1979   • OTHER SURGICAL HISTORY      BLADDER TACK   • REDUCTION MAMMAPLASTY Bilateral 1988   • TONSILLECTOMY     , Family History   Problem Relation Age of Onset   • Diabetes Other    • COPD Mother    • Pneumonia Mother    • Kidney failure Father    • Prostate cancer Father    • Colon cancer Neg Hx    • Breast cancer Neg Hx    • Ovarian cancer Neg Hx    , Social History   Substance Use Topics   • Smoking status: Former Smoker     Packs/day: 1.00     Years: 30.00     Types: Cigarettes     Quit date: 01/2010   • Smokeless tobacco: Never Used   • Alcohol use No   , Prescriptions Prior to Admission   Medication Sig Dispense Refill Last Dose   • famotidine (PEPCID) 20 MG tablet Take 20 mg by mouth 2 (Two) Times a Day.      • albuterol (PROAIR RESPICLICK) 108 (90 BASE) MCG/ACT inhaler Inhale 1 puff every 4 (four) hours as needed for wheezing or shortness of air. 1 inhaler 0 Taking   • amLODIPine (NORVASC) 5 MG tablet TAKE 1 TABLET BY MOUTH AT BEDTIME 30 tablet 5 Taking   • arformoterol (BROVANA) 15 MCG/2ML nebulizer solution Take 2 mL by nebulization 2 (Two) Times a Day. 120 mL 5 Taking   • B-D INS SYR ULTRAFINE 1CC/31G 31G X 5/16\" 1 ML misc use as directed 100 each 5 Taking   • budesonide (PULMICORT) 0.5 MG/2ML nebulizer solution Take 2 mL by nebulization 2 (Two) Times a Day. 120 mL 5 Taking   • buPROPion XL (WELLBUTRIN XL) 150 MG 24 hr tablet take 1 tablet by mouth once " "daily 30 tablet 4 Taking   • calcitonin, salmon, (MIACALCIN) 200 UNIT/ACT nasal spray 1 spray into each nostril Daily. 1 each 12 Taking   • cholecalciferol (VITAMIN D3) 1000 units tablet Take 2,000 Units by mouth Daily.   Taking   • clopidogrel (PLAVIX) 75 MG tablet take 1 tablet by mouth once daily 30 tablet 6 Taking   • clotrimazole-betamethasone (LOTRISONE) 1-0.05 % cream Apply 1 application topically 2 (Two) Times a Day. Apply topically twice daily as direted. 45 g 5 Taking   • colestipol (COLESTID) 1 g tablet take 2 tablets by mouth twice a day (Patient taking differently: take 2 tablets by mouth daily) 120 tablet 3 Taking   • diphenoxylate-atropine (LOMOTIL) 2.5-0.025 MG per tablet patient states she takes one tablet every day  0 Taking   • flunisolide (NASALIDE) 25 MCG/ACT (0.025%) solution nasal spray Inhale 2 sprays Every 12 (Twelve) Hours. 1 bottle 5 Taking   • gabapentin (NEURONTIN) 300 MG capsule take 1 capsule by mouth twice a day 60 capsule 3 Taking   • glucose blood (ONE TOUCH ULTRA TEST) test strip USE TO TEST TWICE DAILY. E11.9 100 each 5 Taking   • insulin glargine (LANTUS) 100 UNIT/ML injection Inject 45 Units under the skin 2 (Two) Times a Day. (Patient taking differently: Inject 45 Units under the skin Daily.) 50 mL 5 Taking   • Insulin Syringe-Needle U-100 (B-D INS SYR ULTRAFINE .5CC/30G) 30G X 1/2\" 0.5 ML misc    Taking   • Insulin Syringe-Needle U-100 (B-D INS SYR ULTRAFINE 1CC/30G) 30G X 1/2\" 1 ML misc    Taking   • ipratropium-albuterol (DUO-NEB) 0.5-2.5 mg/mL nebulizer Take 3 mL by nebulization Every 4 (Four) Hours As Needed for wheezing or shortness of air. 120 vial 2 Taking   • Lancets (ONETOUCH ULTRASOFT) lancets USE AS DIRECTED TWICE A  each 5 Taking   • magnesium 30 MG tablet Take 1 tablet by mouth Daily. 30 tablet 1 Taking   • metFORMIN (GLUCOPHAGE) 500 MG tablet TAKE 2 TABLETS BY MOUTH IN THE MORNING, 1 TABLET AT LUNCH, AND 2 TABLETS IN THE EVENING 150 tablet 5 Taking   • " "ondansetron (ZOFRAN) 4 MG tablet Take 1 tablet by mouth Every 8 (Eight) Hours As Needed for Nausea or Vomiting. 30 tablet 0 Taking   • sertraline (ZOLOFT) 100 MG tablet TAKE 1 1/2 TABLETS BY MOUTH ONCE DAILY 45 tablet 1 Taking   • simvastatin (ZOCOR) 40 MG tablet TAKE 1 TABLET BY MOUTH ONCE DAILY 30 tablet 11 Taking   • traZODone (DESYREL) 50 MG tablet TAKE 1 1/2 TABLETS BY MOUTH AT BEDTIME 45 tablet 2 Taking    and Allergies:  Codeine; Fish-derived products; Terbinafine; and Penicillins    Objective     Vital Sign Min/Max for last 24 hours  Temp  Min: 97.9 °F (36.6 °C)  Max: 99 °F (37.2 °C)   BP  Min: 87/71  Max: 140/85   Pulse  Min: 73  Max: 158   Resp  Min: 20  Max: 23   SpO2  Min: 89 %  Max: 98 %   No Data Recorded   Weight  Min: 74.7 kg (164 lb 11.2 oz)  Max: 74.7 kg (164 lb 11.2 oz)     Flowsheet Rows      First Filed Value   Admission Height  162.6 cm (64\") Documented at 07/13/2018 0205   Admission Weight  68.7 kg (151 lb 8 oz) Documented at 07/13/2018 0441             Physical Exam:   Physical Exam   Constitutional: She is oriented to person, place, and time. She appears well-developed and well-nourished.   HENT:   Head: Normocephalic and atraumatic.   Mouth/Throat: Oropharynx is clear and moist.   Eyes: Conjunctivae and EOM are normal. Pupils are equal, round, and reactive to light. No scleral icterus.   Neck: Normal range of motion. Neck supple. No JVD present. No tracheal deviation present. No thyromegaly present.   Cardiovascular: Normal rate, S1 normal, S2 normal, normal heart sounds, intact distal pulses and normal pulses.  An irregularly irregular rhythm present. PMI is not displaced.  Exam reveals no gallop and no friction rub.    No murmur heard.  Pulmonary/Chest: Effort normal and breath sounds normal. No respiratory distress. She has no wheezes. She has no rales.   Abdominal: Soft. Bowel sounds are normal. She exhibits no distension and no mass. There is no tenderness. There is no rebound and no " guarding.   Musculoskeletal: Normal range of motion. She exhibits no edema or deformity.   Neurological: She is alert and oriented to person, place, and time. She displays normal reflexes. No cranial nerve deficit. Coordination normal.   Skin: Skin is warm and dry. No rash noted. No erythema.   Psychiatric: She has a normal mood and affect. Her behavior is normal. Thought content normal.       Results Review:   I reviewed the patient's new clinical results.    Results from last 7 days  Lab Units 07/16/18  0408 07/15/18  0403 07/14/18  0429   WBC 10*3/mm3 14.00* 10.04 12.34*   HEMOGLOBIN g/dL 9.8* 8.2* 10.0*   HEMATOCRIT % 31.1* 27.1* 31.5*   PLATELETS 10*3/mm3 240 238 260       Results from last 7 days  Lab Units 07/16/18  0408 07/15/18  0403 07/14/18  0429   SODIUM mmol/L 144 147* 147*   POTASSIUM mmol/L 4.7 4.1 4.1   CHLORIDE mmol/L 116* 114* 111*   CO2 mmol/L 21.0* 22.0* 24.0*   BUN mg/dL 41* 41* 24*   CREATININE mg/dL 0.70 0.90 0.60   GLUCOSE mg/dL 77 144* 165*   CALCIUM mg/dL 8.4 7.7* 8.6      TROPONINT:N/A          Assessment/Plan     Principal Problem:    Acute respiratory failure with hypoxia (CMS/HCC)  Active Problems:    Diabetes mellitus (CMS/HCC)    Pneumonia of both lungs due to infectious organism    Sinus tachycardia      New-onset atrial fibrillation with rapid ventricular response.  The patient has a chads 2 vascular score of 4.  I agree with rate control measures using IV diltiazem.  Once she is extubated this can be changed over to oral therapy.  I would recommend changing from subcutaneous unfractionated heparin to full dose Lovenox 1 mg/kg subcutaneously every 12 hours.  I will review the results of her echocardiogram.  No additional cardiovascular testing is necessary from my standpoint.    I discussed the patients findings and my recommendations with patient and nursing staff    Gulshan Farias MD  07/16/18  11:23 AM

## 2018-07-16 NOTE — PROGRESS NOTES
Adult Nutrition  Assessment/PES    Patient Name:  Breann Gallegos  YOB: 1950  MRN: [id number]  Admit Date:  7/13/2018    Assessment Date:  7/16/2018    Comments:  Rec. #1: Decrease tube feeding rate of Glucerna 1.2 to 39 ml/hr goal rate. Tube feeding to provide: ~1123 Kcal, 56 gm Protein, 753 ml tube feeding water. Rec. #2: Increase free water flushes to 93 ml Q 6 hours. Pt. Receiving ~512 Kcal from Propofol at current rate of 19.4 ml/hr. RD to follow pt. And recommend tube feeding rate changes based on propofol rate changes. Consult RD PRN.           Reason for Assessment     Row Name 07/16/18 1250          Reason for Assessment    Reason For Assessment follow-up protocol;TF/PN     Diagnosis diabetes diagnosis/complications;cardiac disease;pulmonary disease;gastrointestinal disease   COPD, DM type 2, Acute respiratory failure requiring intubation                 Labs/Tests/Procedures/Meds     Row Name 07/16/18 1252          Labs/Procedures/Meds    Lab Results Reviewed reviewed, pertinent     Lab Results Comments High: Cl, BUN, AST, Magnesium   Low: Albumin, Total Protein        Medications    Pertinent Medications Reviewed reviewed             Physical Findings     Row Name 07/16/18 1252          Physical Findings    Overall Physical Appearance on ventilator support     Tubes orogastric tube               Nutrition Prescription Ordered     Row Name 07/16/18 1253          Nutrition Prescription PO    Current PO Diet NPO        Nutrition Prescription EN    Enteral Route OG     Product Glucerna 1.2 cyrus     TF Delivery Method Continuous     Continuous TF Goal Rate (mL/hr) 50 mL/hr     Continuous TF Current Rate (mL/hr) 45 mL/hr     Continuous TF Goal Volume (mL) 1100 mL     Continuous TF Current Volume (mL) 990 mL     Water flush (mL)  60 mL     Water Flush Frequency Other (comment)   Q 6 hours             Evaluation of Received Nutrient/Fluid Intake     Row Name 07/16/18 1254          PO  Evaluation    Number of Days PO Intake Evaluated Insufficient Data   NPO        EN Evaluation    Number of Days EN Intake Evaluated 2 days     EN Average Volume Delivered (mL/day) 192 mL/day     % Goal Volume  17.45 %             Problem/Interventions:        Problem 1     Row Name 07/16/18 1255          Nutrition Diagnoses Problem 1    Problem 1 Impaired Nutrient Utilization     Etiology (related to) Medical Diagnosis     Endocrine DM2     Signs/Symptoms (evidenced by) Biochemical     Specific Labs Noted Glucose     Resolved? Yes             Problem 2     Row Name 07/16/18 1255          Nutrition Diagnoses Problem 2    Problem 2 Increased Nutrient Needs     Macronutrient Kcal;Fluid;Protein     Micronutrient Vitamin;Mineral     Etiology (related to) Medical Diagnosis     Pulmonary/Critical Care COPD;Acute respiratory failure;Other (comment)   hypoxia     Signs/Symptoms (evidenced by) Other (comment)   Pulmonary dysfunction             Problem 3     Row Name 07/16/18 1256          Nutrition Diagnoses Problem 3    Problem 3 Needs Alternate Route     Etiology (related to) Medical Diagnosis     Pulmonary/Critical Care Ventilator     Signs/Symptoms (evidenced by) PO diet not tolerated     Resolved? Yes                 Intervention Goal     Row Name 07/16/18 1256          Intervention Goal    General Meet nutritional needs for age/condition     PO --   Continue NPO     TF/PN Adjust TF/PN     Transition TF to PO     Weight No significant weight loss             Nutrition Intervention     Row Name 07/16/18 1256          Nutrition Intervention    RD/Tech Action Follow Tx progress;Recommend/ordered     Recommended/Ordered EN             Nutrition Prescription     Row Name 07/16/18 1257          Nutrition Prescription PO    PO Prescription --   continue NPO pt. receiving tube feeding and intubated        Nutrition Prescription EN    Enteral Prescription Enteral begin/change     Enteral Route OG     Product Glucerna 1.2 cyrus      TF Delivery Method Continuous     Continuous TF Goal Rate (mL/hr) 39 mL/hr     Continuous TF Starting Rate (mL/hr) 25 mL/hr     Continuous TF Goal Volume (mL) 858 mL     Continuous TF Starting Volume (mL) 550 mL     Water flush (mL)  93 mL     Water Flush Frequency Other (comment)   Q 6 hours     New EN Prescription Ordered? Yes        Other Orders    Obtain Weight Daily     Obtain Weight Ordered? No, recommended     Supplement Vitamin mineral supplement     Supplement Ordered? No, recommended     Labs Mg++;K+;Phos             Education/Evaluation     Row Name 07/16/18 5862          Education    Education Education not appropriate at this time     Please explain Patient intubated        Monitor/Evaluation    Monitor Per protocol;I&O;Pertinent labs;TF delivery/tolerance;Weight         Electronically signed by:  Aleta Watts RD  07/16/18 1:00 PM

## 2018-07-16 NOTE — CONSULTS
Date of consultation:   July 16, 2018    Requested by:   Hospitalist Service.     PCP: Harry Avery MD      Reason:  Acute hypoxic respiratory failure.     History of Present Illness:  68 y.o. female  with past medical history significant for COPD who was admitted with shortness of breath.  The patient was found to have multifocal pneumonia and was started on antibiotics.    Antigens were sent and blood cultures obtained.  The patient actually developed worsening symptoms and was intubated and pulmonary consultation was requested.    As per the history the patient apparently [place] and then developed worsening symptoms.  She denied any fever or chills at that time.      Review of System: Could not be obtained, as the patient is on Ventilator.    Past Medical History:  Past Medical History:   Diagnosis Date   • Arthritis    • Body piercing     EARS   • COPD (chronic obstructive pulmonary disease) (CMS/HCC)    • Diabetes mellitus (CMS/HCC)    • Dysphagia     REPORTS SHE FEELS LIKE THINGS ARE GETTING STUCK IN HER THROAT FOR THE PAST SEVERAL MONTHS   • Eustachian tube dysfunction    • Fracture     METATRASAL   • Full dentures     INSTRUCTED NO ADHESIVES THE DOS   • GERD (gastroesophageal reflux disease)    • High cholesterol    • History of nuclear stress test     REPORTS 3-4 YEARS AGO AND THAT ALL WAS WNL'S   • Hyperlipidemia    • Hypertension    • Sinus problem    • Skin abscess    • Vision problem    • Vitamin B12 deficiency    • Vitamin D deficiency    • Wears glasses          Past Surgical History:  Past Surgical History:   Procedure Laterality Date   • APPENDECTOMY  1976   • BREAST SURGERY      REDUCTION   • CATARACT EXTRACTION Bilateral    • CHOLECYSTECTOMY  1976   • COLONOSCOPY     • ENDOSCOPY     • ENDOSCOPY N/A 11/29/2017    Procedure: ESOPHAGOGASTRODUODENOSCOPY with biopsies and esophageal balloon dilitation;  Surgeon: Molina Dumont MD;  Location: [facility]  "ENDOSCOPY;  Service:    • ENDOSCOPY N/A 5/2/2018    Procedure: ESOPHAGOGASTRODUODENOSCOPY WITH ESOPHAGEAL BALLOON DILITATION (15-16.5-18 MM);  Surgeon: Molina Dumont MD;  Location: [facility] ENDOSCOPY;  Service: Gastroenterology   • HAND SURGERY Right    • HYSTERECTOMY  1979   • OOPHORECTOMY Bilateral 1979   • OTHER SURGICAL HISTORY      BLADDER TACK   • REDUCTION MAMMAPLASTY Bilateral 1988   • TONSILLECTOMY           Family History:  Family History   Problem Relation Age of Onset   • Diabetes Other    • COPD Mother    • Pneumonia Mother    • Kidney failure Father    • Prostate cancer Father    • Colon cancer Neg Hx    • Breast cancer Neg Hx    • Ovarian cancer Neg Hx          Social History:  Social History     Social History   • Marital status:      Social History Main Topics   • Smoking status: Former Smoker     Packs/day: 1.00     Years: 30.00     Types: Cigarettes     Quit date: 01/2010   • Smokeless tobacco: Never Used   • Alcohol use No   • Drug use: No   • Sexual activity: Defer     Other Topics Concern   • Not on file         Physical Exam:  /87   Pulse 104   Temp 99 °F (37.2 °C) (Oral)   Resp 20   Ht 162.6 cm (64\")   Wt 74.7 kg (164 lb 11.2 oz)   LMP  (LMP Unknown)   SpO2 95%   BMI 28.27 kg/m²     Constitutional:            Vital signs reviewed            Patient is intubated and sedated    Head/Face/Eyes:            Pupils appeared equal and reactive to light     ENT:             Patient was intubated with endotracheal tube in place.     Neck:             Supple. No JVD noted.     Cardiovascular:              S1 + S2. Irregular. Right PICC line in place.     Respiratory:            Transmitted breath sounds bilaterally with good air entry.             Percussion could not be performed at this time.    Abdomen:            Soft.  Bowel sounds sluggishly positive. No obvious organomegaly noted.    Musculoskeletal/Extremities:             Gait could not be assessed at this time.             "  No clubbing, cyanosis noted in the upper extremities.             No edema noted in the lower extremities bilaterally.    Neurologic/Psychiatric:             Intubated and sedated    Skin:             No rash noted.             Warm and dry.      Labs:   Reviewed. Pertinent labs were noted.     Lab Results   Component Value Date    WBC 14.00 (H) 07/16/2018    HGB 9.8 (L) 07/16/2018    HCT 31.1 (L) 07/16/2018    MCV 87.9 07/16/2018     07/16/2018       Lab Results   Component Value Date    GLUCOSE 77 07/16/2018    CALCIUM 8.4 07/16/2018     07/16/2018    K 4.7 07/16/2018    CO2 21.0 (L) 07/16/2018     (H) 07/16/2018    BUN 41 (H) 07/16/2018    CREATININE 0.70 07/16/2018    EGFRIFAFRI 101 12/22/2017    EGFRIFNONA 83 07/16/2018    BCR 58.6 (H) 07/16/2018    ANIONGAP 11.7 07/16/2018         ABG:  Lab Results   Component Value Date    PHART 7.377 07/16/2018    VSW1HTD 35.5 07/16/2018    PO2ART 67.8 (L) 07/16/2018    HGBBG 9.9 (L) 07/16/2018    P5UITTCG 93.6 (L) 07/16/2018    CARBOXYHGB 1.2 07/16/2018           Imaging Study: Images reviewed personally     Imaging Results (last 72 hours)     Procedure Component Value Units Date/Time    XR Chest 1 View [[id number]] Collected:  07/16/18 0817     Updated:  07/16/18 0825    Narrative:       PORTABLE CHEST     INDICATION: Respiratory failure. Pneumonia.     FINDINGS: Single frontal portable chest, compared with 07/14/2018.  Patient is rotated towards the right. EKG leads overlie the chest.  Endotracheal tube remains above the arelis. Interval placement of a  right-sided PICC line which terminates near the superior cavoatrial  junction, possibly extending into the right atrium. If indicated, this  could be retracted approximately 2 cm. No pneumothorax. Extensive  bilateral infiltrates are again seen throughout both lungs, nonspecific  but could represent edema, pneumonia or ARDS. These findings are overall  relatively similar to previous. Nasogastric tube  extends below the  diaphragm with the tip out of the field-of-view.       Impression:       1. Persistence of extensive bilateral infiltrates.  2. Interval placement of right-sided PICC line as described.     This report was finalized on 7/16/2018 8:23 AM by Tnoe Naylor MD.    XR Abdomen KUB [[id number]] Collected:  07/14/18 1443     Updated:  07/14/18 1446    Narrative:       ABDOMEN     INDICATION: Tube placement.     FINDINGS: Single portable view of the lower chest and abdomen  demonstrates a nasogastric tube terminating near the distal stomach.  Partial visualization of apparent central venous catheter terminating  near the superior cavoatrial junction. Extensive bilateral parenchymal  airspace infiltrates in the lungs. Nonspecific nonobstructive bowel gas  pattern. The lateral right abdomen was partially excluded from the  field-of-view.       Impression:       1. Nasogastric tube terminates in the distal stomach.  2. Nonspecific abdomen.  3. Extensive lung infiltrates.     This report was finalized on 7/14/2018 2:44 PM by Tone Naylor MD.    XR Chest 1 View [[id number]] Collected:  07/14/18 1243     Updated:  07/14/18 1252    Narrative:       PORTABLE CHEST     INDICATION: Intubation.     FINDINGS: Single frontal portable chest. Correlation is made with CT  performed one day prior. EKG leads overlie the chest. Interval  intubation with endotracheal tube terminating above the arelis near the  level of the aortic arch. Heart size is normal. No pneumothorax.  Extensive diffuse airspace infiltrates appear to have likely worsened,  especially on the right. No obvious significant effusion.       Impression:       1. Interval intubation.  2. Worsening of extensive bilateral infiltrates. These are nonspecific  and could be related to edema or multifocal pneumonia. Developing ARDS  not excluded. Follow-up to resolution recommended.     This report was finalized on 7/14/2018 12:49 PM by Tone Naylor MD.    CT Chest  Pulmonary Embolism With Contrast [[id number]] Collected:  07/13/18 1048     Updated:  07/13/18 1131    Narrative:       CT CHEST PULMONARY EMBOLISM WITH CONTRAST     HISTORY: SOB, hypoxia.      TECHNIQUE: Thin section axial CT with IV contrast supplemented with3D  reconstructed MIP images.     COMPARISON: 04/19/2018     FINDINGS:     Pulmonary vessels enhance in a normal fashion without evidence of  embolic disease. Thoracic aorta is normal in caliber without evidence of  aneurysm or dissection.     Multifocal areas of alveolar disease are noted in a multilobar  distribution, left slightly worse from right. Findings are most  compatible with multifocal pneumonia..  Tiny bilateral pleural effusions  are present.. Moderate adenopathy is seen with abnormal soft tissue in  the AP window measuring 4.3 x 3.5 cm. Mild subcarinal adenopathy is  present.           Impression:       1. No evidence of pulmonary embolism.  2. Extensive multifocal pneumonia.  3. Significant adenopathy, particularly in the AP window.  4. Recommend short-term chest CT follow-up in 2-3 months, if adenopathy  is persistent consider PET/CT.            This study was performed with techniques to keep radiation doses as low  as reasonably achievable (ALARA). Individualized dose reduction  techniques using automated exposure control or adjustment of vA and/or  kV according to the patient size were employed.      This report was finalized on 7/13/2018 11:28 AM by Nish Smith MD.          Assessment:  1.  Acute hypoxemic respiratory failure  2.  Multifocal pneumonia  3.  Likely ARDS versus hypersensitivity pneumonitis  4.  Diabetes  5.  Lymphadenopathy  6.  COPD    Discussion/Recommendations:   The patient clearly has acute hypoxemic respiratory failure secondary to extensive multifocal pneumonia.    There may be a component of ARDS as well, although her initial CT scan did show bilateral infiltrates.    I agree with broad-spectrum antibiotics especially  given her underlying diabetes.    I will also continue steroids at this time, given the severity of respiratory failure and the likelihood of ARDS/hypersensitivity pneumonitis.    I will actually change her azithromycin to Levaquin as she will need double gram-negative coverage and Levaquin will provide both atypical as well as gram-negative coverage.    I will also await the results of cultures    I've made adjustments to the ventilator and will obtain a blood gas over the next 4-5 hours.    ABG and chest x-ray will be requested for tomorrow morning as well.    As far as lymphadenopathy is concerned, this will need to be followed on an outpatient basis.  She was being followed with CT imaging for left paratracheal soft tissue density which has remained unchanged over the course of 1 year, beginning April 2017.    Her PFTs in 2017 confirm moderate COPD as well.    The plan was discussed with the nursing staff.    Recommendations were also discussed with the referring provider.     I would like to thank you for the opportunity to participate in the care of this patient.  We will communicate changes and recommendations, if and when necessary.      Yamilex Tse MD  07/16/18  8:32 AM    Dictated utilizing Dragon dictation.

## 2018-07-16 NOTE — PROGRESS NOTES
[facility]    Name:  Breann Gallegos   Age:  68 y.o.  Sex:  female  :  1950  MRN:  [id number]   Visit Number:  [id number]  Admission Date:  2018  Date Of Service:  18  Primary Care Physician:  Harry Avery MD     LOS: 3 days :      Chief Complaint:  Follow up pneumonia and respiratory failure on ventilator.         Subjective / Interval History: The patient is a 68 yr old lady admitted with multifocal pneumonia, with acute hypoxic respiratory failure requiring intubation and placement on ventilator on 18. The patient is sedated, requiring 100% FiO2 for pneumonia. She appears slightly improved today. She awakened earlier with weaning trials. She is unable to provide chief complaint or review of systems due to sedation and being on ventilator.     Respiratory culture today is growing MRSA. Azithromycin had been initiated earlier today changed to levaquin later by Dr Tse.       Review of Systems:  Unable to obtain due to sedation       Vital Signs:    Temp:  [97.9 °F (36.6 °C)-99 °F (37.2 °C)] 99 °F (37.2 °C)  Heart Rate:  [] 104  Resp:  [20-23] 20  BP: ()/(55-87) 101/87  FiO2 (%):  [100 %] 100 %    Intake and output:    I/O last 3 completed shifts:  In: 5706 [I.V.:5022; Other:300; NG/GT:384]  Out: 1180 [Urine:1180]  I/O this shift:  In: 60 [Other:60]  Out: -     Physical Examination:    General Appearance:   Sedated on ventilator. No acute distress.   Head:    Atraumatic and normocephalic, without obvious abnormality.   Eyes:            Pupils reactive bilaterally pinpoint, irregular left pupil at baseline, conjunctivae and sclerae normal, no Icterus. No pallor.    Throat:   ET tube in place, no thrush, oral mucosa dry.   Neck:   Supple, trachea midline, no thyromegaly   Lungs:     Chest shape is normal. Breath sounds heard bilaterally equally.  Few right sided crackles without wheezing. No Pleural rub or bronchial  breathing.    Heart:    Normal S1 and S2, no murmur, no gallop   Abdomen:     Normal bowel sounds, no masses, no organomegaly. Soft        non-tender, non-distended, no guarding, no rebound                tenderness   Extremities:   Reduced movement currently while sedated on medication with bilateral soft wrist restraints, no edema, no cyanosis, no             clubbing   Skin:   No bleeding, bruising or rash.   Neurologic:   gross sensation intact, Moves all four extremities       Laboratory results:    Lab Results (last 24 hours)     Procedure Component Value Units Date/Time    Respiratory Culture - Sputum, Cough [[id number]]  (Abnormal)  (Susceptibility) Collected:  07/13/18 1142    Specimen:  Sputum from Cough Updated:  07/16/18 0901     Respiratory Culture Light growth (2+) Staphylococcus aureus, MRSA (A)     Comment: Methicillin resistant Staphylococcus aureus, Patient may be an isolation risk.        Gram Stain Result Greater than 20 WBCs per low power field      Less than 10 Epithelial cells per low power field      Few (2+) Gram positive cocci in pairs, chains and clusters    Susceptibility      Staphylococcus aureus, MRSA     SERGO     Amoxicillin + Clavulanate >4/2 ug/ml Resistant     Ampicillin >8 ug/ml Resistant     Ampicillin + Sulbactam <=8/4 ug/ml Resistant     Cefazolin 8 ug/ml Resistant     Ciprofloxacin <=1 ug/ml Susceptible     Clindamycin <=0.5 ug/ml Susceptible     Erythromycin >4 ug/ml Resistant     Gentamicin <=4 ug/ml Susceptible     Levofloxacin <=1 ug/ml Susceptible  [1]      Linezolid 2 ug/ml Susceptible     Moxifloxacin <=0.5 ug/ml Susceptible     Oxacillin >2 ug/ml Resistant     Penicillin G >8 ug/ml Resistant     Quinupristin + Dalfopristin <=1 ug/ml Susceptible     Rifampin <=1 ug/ml Susceptible     Tetracycline <=4 ug/ml Susceptible     Trimethoprim + Sulfamethoxazole <=0.5/9.5 ug/ml Susceptible     Vancomycin 1 ug/ml Susceptible            [1]   Staphylococcus species may develop  resistance during prolonged therapy with quinolones.  Isolates that are initially susceptible may become resistant within three to four days after initiation of therapy. Testing of repeat isolates may be warranted.                   Magnesium [[id number]]  (Abnormal) Collected:  07/16/18 0408    Specimen:  Blood Updated:  07/16/18 0851     Magnesium 2.4 (H) mg/dL     Blood Gas, Arterial With Co-Ox [[id number]]  (Abnormal) Collected:  07/16/18 0805    Specimen:  Arterial Blood Updated:  07/16/18 0804     Site Arterial: right radial     Toy's Test Positive     pH, Arterial 7.377 pH units      pCO2, Arterial 35.5 mm Hg      pO2, Arterial 67.8 (L) mm Hg      HCO3, Arterial 20.8 (L) mmol/L      Base Excess, Arterial -4.3 (L) mmol/L      O2 Saturation, Arterial 93.6 (L) %      Hemoglobin, Blood Gas 9.9 (L) g/dL      Oxyhemoglobin 91.8 (L) %      Methemoglobin 0.60 %      Carboxyhemoglobin 1.2 %      Modality Ventilator     FIO2 100 %      pH, Temp Corrected -- pH Units      pCO2, Temperature Corrected -- mm Hg      pO2, Temperature Corrected -- mm Hg     Blood Culture With OSMEL - Blood, [[id number]]  (Normal) Collected:  07/13/18 0602    Specimen:  Blood from Hand, Right Updated:  07/16/18 0715     Blood Culture No growth at 3 days    Blood Culture With OSMEL - Blood, [[id number]]  (Normal) Collected:  07/13/18 0614    Specimen:  Blood from Arm, Left Updated:  07/16/18 0715     Blood Culture No growth at 3 days    POC Glucose Once [[id number]]  (Normal) Collected:  07/16/18 0546    Specimen:  Blood Updated:  07/16/18 0550     Glucose 82 mg/dL      Comment: Serial Number: [id number]:  [id number]       Comprehensive Metabolic Panel [[id number]]  (Abnormal) Collected:  07/16/18 0408    Specimen:  Blood Updated:  07/16/18 0510     Glucose 77 mg/dL      BUN 41 (H) mg/dL      Creatinine 0.70 mg/dL      Sodium 144 mmol/L      Potassium 4.7 mmol/L      Chloride 116 (H) mmol/L      CO2 21.0 (L) mmol/L      Calcium 8.4 mg/dL       Total Protein 5.9 (L) g/dL      Albumin 2.90 (L) g/dL      ALT (SGPT) 46 U/L      AST (SGOT) 78 (H) U/L      Alkaline Phosphatase 100 U/L      Total Bilirubin 0.5 mg/dL      eGFR Non African Amer 83 mL/min/1.73      Globulin 3.0 gm/dL      A/G Ratio 1.0 g/dL      BUN/Creatinine Ratio 58.6 (H)     Anion Gap 11.7 mmol/L     Narrative:       GFR Normal >60  Chronic Kidney Disease <60  Kidney Failure <15    CBC (No Diff) [[id number]]  (Abnormal) Collected:  07/16/18 0408    Specimen:  Blood Updated:  07/16/18 0459     WBC 14.00 (H) 10*3/mm3      RBC 3.54 (L) 10*6/mm3      Hemoglobin 9.8 (L) g/dL      Hematocrit 31.1 (L) %      MCV 87.9 fL      MCH 27.7 pg      MCHC 31.5 g/dL      RDW 15.6 (H) %      RDW-SD 50.0 fl      MPV 10.9 fL      Platelets 240 10*3/mm3     POC Glucose Once [[id number]]  (Normal) Collected:  07/16/18 0309    Specimen:  Blood Updated:  07/16/18 0316     Glucose 80 mg/dL      Comment: Serial Number: [id number]:  [id number]       POC Glucose Once [[id number]]  (Normal) Collected:  07/16/18 0007    Specimen:  Blood Updated:  07/16/18 0025     Glucose 70 mg/dL      Comment: Serial Number: [id number]:  [id number]       POC Glucose Once [[id number]]  (Normal) Collected:  07/15/18 2056    Specimen:  Blood Updated:  07/15/18 2100     Glucose 79 mg/dL      Comment: Serial Number: [id number]:  [id number]       POC Glucose Once [[id number]]  (Normal) Collected:  07/15/18 1804    Specimen:  Blood Updated:  07/15/18 1810     Glucose 87 mg/dL      Comment: Serial Number: [id number]:  [id number]       Vancomycin, Trough [[id number]]  (Normal) Collected:  07/15/18 1123    Specimen:  Blood Updated:  07/15/18 1217     Vancomycin Trough 9.87 mcg/mL     POC Glucose Once [[id number]]  (Abnormal) Collected:  07/15/18 1157    Specimen:  Blood Updated:  07/15/18 1201     Glucose 133 (H) mg/dL      Comment: Serial Number: [id number]:  [id number]             I have reviewed the patient's laboratory  results.    Radiology results:    Imaging Results (last 24 hours)     Procedure Component Value Units Date/Time    XR Chest 1 View [[id number]] Collected:  07/16/18 0817     Updated:  07/16/18 0825    Narrative:       PORTABLE CHEST     INDICATION: Respiratory failure. Pneumonia.     FINDINGS: Single frontal portable chest, compared with 07/14/2018.  Patient is rotated towards the right. EKG leads overlie the chest.  Endotracheal tube remains above the arelis. Interval placement of a  right-sided PICC line which terminates near the superior cavoatrial  junction, possibly extending into the right atrium. If indicated, this  could be retracted approximately 2 cm. No pneumothorax. Extensive  bilateral infiltrates are again seen throughout both lungs, nonspecific  but could represent edema, pneumonia or ARDS. These findings are overall  relatively similar to previous. Nasogastric tube extends below the  diaphragm with the tip out of the field-of-view.       Impression:       1. Persistence of extensive bilateral infiltrates.  2. Interval placement of right-sided PICC line as described.     This report was finalized on 7/16/2018 8:23 AM by Tone Naylor MD.          I have reviewed the patient's radiology reports.    Medication Review:     I have reviewed the patients active and prn medications.     Assessment:    1. Multifocal bilateral pneumonia, prior to admission, with MRSA so far growing in sputum  2. Acute hypoxic respiratory failure severe, requiring intubation and placement on mechanical ventilator 7-14-18 with suspected ARDS  3. New onset Afib with RVR, previous sinus tachycardia, improved on diltiazem drip and given digoxin  4. Sepsis, secondary to pneumonia, prior to admission, with MRSA  5. Diabetes mellitus type 2 insulin dependent  6. Chronic essential hypertension  7. COPD moderate  8. Abnormal CT chest with lymphadenopathy, repeat outpatient CT scan chest       Plan:  Continue to monitor in ICU, with iain  catheter and bilateral wrist restraints and sedation with propofol.     Antibiotics adjusted today by Dr Tse to Vancomycin, Cefepime, and levaquin. Mupirocin added. Tomorrow, may consider stopping the Cefepime. Today the patient is still requiring 100% FiO2 on ventilator. Continue to treat and wean oxygen accordingly. Continue Duonebs. Continue Solumedrol. Continue diltiazem drip and will convert to oral therapy once extubated. Discussed the case with Dr Tse. Anticipate she will still require intubation for a couple more days. Discussed the case with night hospitalist.     Reviewed Dr Farias consultation note. Lovenox increased.     Medication risks and benefits were discussed in detail. Patient reported satisfaction with care delivered and treatment plan.     Lovenox. Pepcid. Full code.  Tube feeding and fluids.    Critical status. Prognosis guarded. Anticipate she will still require more than 2-3 more hospitalized days.    Ese Kirby DO  07/16/18  9:32 AM

## 2018-07-17 ENCOUNTER — APPOINTMENT (OUTPATIENT)
Dept: GENERAL RADIOLOGY | Facility: HOSPITAL | Age: 68
End: 2018-07-17

## 2018-07-17 LAB
ALBUMIN SERPL-MCNC: 3.2 G/DL (ref 3.5–5)
ALBUMIN/GLOB SERPL: 1 G/DL (ref 1–2)
ALP SERPL-CCNC: 130 U/L (ref 38–126)
ALT SERPL W P-5'-P-CCNC: 62 U/L (ref 13–69)
ANION GAP SERPL CALCULATED.3IONS-SCNC: 14 MMOL/L (ref 10–20)
ARTERIAL PATENCY WRIST A: POSITIVE
AST SERPL-CCNC: 57 U/L (ref 15–46)
ATMOSPHERIC PRESS: 732 MMHG
BACTERIA FLD CULT: NORMAL
BASE EXCESS BLDA CALC-SCNC: -1.6 MMOL/L (ref 0–2)
BDY SITE: ABNORMAL
BILIRUB SERPL-MCNC: 0.5 MG/DL (ref 0.2–1.3)
BUN BLD-MCNC: 37 MG/DL (ref 7–20)
BUN/CREAT SERPL: 46.3 (ref 7.1–23.5)
CALCIUM SPEC-SCNC: 8.4 MG/DL (ref 8.4–10.2)
CHLORIDE SERPL-SCNC: 113 MMOL/L (ref 98–107)
CO2 SERPL-SCNC: 21 MMOL/L (ref 26–30)
COHGB MFR BLD: 1 % (ref 0–2)
CREAT BLD-MCNC: 0.8 MG/DL (ref 0.6–1.3)
DEPRECATED RDW RBC AUTO: 50.9 FL (ref 37–54)
ERYTHROCYTE [DISTWIDTH] IN BLOOD BY AUTOMATED COUNT: 15.7 % (ref 11.5–14.5)
GFR SERPL CREATININE-BSD FRML MDRD: 72 ML/MIN/1.73
GLOBULIN UR ELPH-MCNC: 3.1 GM/DL
GLUCOSE BLD-MCNC: 231 MG/DL (ref 74–98)
GLUCOSE BLDC GLUCOMTR-MCNC: 229 MG/DL (ref 70–130)
GLUCOSE BLDC GLUCOMTR-MCNC: 258 MG/DL (ref 70–130)
GLUCOSE BLDC GLUCOMTR-MCNC: 272 MG/DL (ref 70–130)
GLUCOSE BLDC GLUCOMTR-MCNC: 321 MG/DL (ref 70–130)
HCO3 BLDA-SCNC: 23.6 MMOL/L (ref 22–28)
HCT VFR BLD AUTO: 33.5 % (ref 37–47)
HCT VFR BLD CALC: 31.7 %
HGB BLD-MCNC: 10.2 G/DL (ref 12–16)
HGB BLDA-MCNC: 10.3 G/DL (ref 12–18)
HOROWITZ INDEX BLD+IHG-RTO: 100 %
Lab: ABNORMAL
Lab: NORMAL
MCH RBC QN AUTO: 26.9 PG (ref 27–31)
MCHC RBC AUTO-ENTMCNC: 30.4 G/DL (ref 30–37)
MCV RBC AUTO: 88.4 FL (ref 81–99)
METHGB BLD QL: 1 % (ref 0–1.5)
MODALITY: ABNORMAL
ORGANISM ID: NORMAL
OXYHGB MFR BLDV: 98.2 % (ref 94–99)
PCO2 BLDA: 40.7 MM HG (ref 35–45)
PCO2 TEMP ADJ BLD: ABNORMAL MM HG (ref 35–45)
PEEP RESPIRATORY: 10 CM[H2O]
PH BLDA: 7.37 PH UNITS (ref 7.3–7.5)
PH, TEMP CORRECTED: ABNORMAL PH UNITS
PLATELET # BLD AUTO: 307 10*3/MM3 (ref 130–400)
PMV BLD AUTO: 10.9 FL (ref 6–12)
PO2 BLDA: 267 MM HG (ref 75–100)
PO2 TEMP ADJ BLD: ABNORMAL MM HG (ref 83–108)
POTASSIUM BLD-SCNC: 5 MMOL/L (ref 3.5–5.1)
PROT SERPL-MCNC: 6.3 G/DL (ref 6.3–8.2)
RBC # BLD AUTO: 3.79 10*6/MM3 (ref 4.2–5.4)
S PNEUM AG SPEC QL LA: NEGATIVE
SAO2 % BLDCOA: >99.6 % (ref 94–100)
SET MECH RESP RATE: 20
SODIUM BLD-SCNC: 143 MMOL/L (ref 137–145)
SPECIMEN SOURCE: NORMAL
VENTILATOR MODE: ABNORMAL
WBC NRBC COR # BLD: 14 10*3/MM3 (ref 4.8–10.8)

## 2018-07-17 PROCEDURE — 82375 ASSAY CARBOXYHB QUANT: CPT

## 2018-07-17 PROCEDURE — 25010000002 PROPOFOL 1000 MG/ML EMULSION: Performed by: INTERNAL MEDICINE

## 2018-07-17 PROCEDURE — 99232 SBSQ HOSP IP/OBS MODERATE 35: CPT | Performed by: FAMILY MEDICINE

## 2018-07-17 PROCEDURE — 25010000002 ENOXAPARIN PER 10 MG: Performed by: INTERNAL MEDICINE

## 2018-07-17 PROCEDURE — 82805 BLOOD GASES W/O2 SATURATION: CPT

## 2018-07-17 PROCEDURE — 94799 UNLISTED PULMONARY SVC/PX: CPT

## 2018-07-17 PROCEDURE — 85027 COMPLETE CBC AUTOMATED: CPT | Performed by: INTERNAL MEDICINE

## 2018-07-17 PROCEDURE — 80053 COMPREHEN METABOLIC PANEL: CPT | Performed by: INTERNAL MEDICINE

## 2018-07-17 PROCEDURE — 71045 X-RAY EXAM CHEST 1 VIEW: CPT

## 2018-07-17 PROCEDURE — 83050 HGB METHEMOGLOBIN QUAN: CPT

## 2018-07-17 PROCEDURE — 25010000002 METHYLPREDNISOLONE PER 125 MG: Performed by: NURSE PRACTITIONER

## 2018-07-17 PROCEDURE — 25010000002 LEVOFLOXACIN PER 250 MG: Performed by: INTERNAL MEDICINE

## 2018-07-17 PROCEDURE — 63710000001 INSULIN DETEMIR PER 5 UNITS: Performed by: FAMILY MEDICINE

## 2018-07-17 PROCEDURE — 36600 WITHDRAWAL OF ARTERIAL BLOOD: CPT

## 2018-07-17 PROCEDURE — 82962 GLUCOSE BLOOD TEST: CPT

## 2018-07-17 PROCEDURE — 94770: CPT

## 2018-07-17 PROCEDURE — 25010000002 CEFEPIME PER 500 MG: Performed by: HOSPITALIST

## 2018-07-17 PROCEDURE — 25010000002 CHLOROTHIAZIDE PER 500 MG: Performed by: INTERNAL MEDICINE

## 2018-07-17 PROCEDURE — 25010000002 VANCOMYCIN PER 500 MG: Performed by: HOSPITALIST

## 2018-07-17 PROCEDURE — 99291 CRITICAL CARE FIRST HOUR: CPT | Performed by: INTERNAL MEDICINE

## 2018-07-17 PROCEDURE — 94003 VENT MGMT INPAT SUBQ DAY: CPT

## 2018-07-17 PROCEDURE — 25010000002 DIGOXIN PER 500 MCG: Performed by: FAMILY MEDICINE

## 2018-07-17 PROCEDURE — 63710000001 INSULIN REGULAR HUMAN PER 5 UNITS: Performed by: HOSPITALIST

## 2018-07-17 RX ORDER — CHLOROTHIAZIDE SODIUM 500 MG/1
250 INJECTION INTRAVENOUS ONCE
Status: COMPLETED | OUTPATIENT
Start: 2018-07-17 | End: 2018-07-17

## 2018-07-17 RX ADMIN — ACETYLCYSTEINE 3 ML: 200 SOLUTION ORAL; RESPIRATORY (INHALATION) at 19:06

## 2018-07-17 RX ADMIN — LEVOFLOXACIN 500 MG: 5 INJECTION, SOLUTION INTRAVENOUS at 10:06

## 2018-07-17 RX ADMIN — GABAPENTIN 300 MG: 300 CAPSULE ORAL at 21:22

## 2018-07-17 RX ADMIN — IPRATROPIUM BROMIDE AND ALBUTEROL SULFATE 3 ML: .5; 3 SOLUTION RESPIRATORY (INHALATION) at 23:01

## 2018-07-17 RX ADMIN — SERTRALINE HYDROCHLORIDE 100 MG: 50 TABLET ORAL at 09:08

## 2018-07-17 RX ADMIN — METHYLPREDNISOLONE SODIUM SUCCINATE 60 MG: 125 INJECTION, POWDER, FOR SOLUTION INTRAMUSCULAR; INTRAVENOUS at 03:48

## 2018-07-17 RX ADMIN — LISINOPRIL 5 MG: 5 TABLET ORAL at 09:14

## 2018-07-17 RX ADMIN — ENOXAPARIN SODIUM 70 MG: 80 INJECTION SUBCUTANEOUS at 12:35

## 2018-07-17 RX ADMIN — FAMOTIDINE 20 MG: 10 INJECTION, SOLUTION INTRAVENOUS at 21:22

## 2018-07-17 RX ADMIN — FLUTICASONE PROPIONATE 2 SPRAY: 50 SPRAY, METERED NASAL at 10:06

## 2018-07-17 RX ADMIN — PROPOFOL 40 MCG/KG/MIN: 10 INJECTION, EMULSION INTRAVENOUS at 04:05

## 2018-07-17 RX ADMIN — ACETYLCYSTEINE 3 ML: 200 SOLUTION ORAL; RESPIRATORY (INHALATION) at 06:44

## 2018-07-17 RX ADMIN — ACETYLCYSTEINE 3 ML: 200 SOLUTION ORAL; RESPIRATORY (INHALATION) at 16:28

## 2018-07-17 RX ADMIN — VANCOMYCIN HYDROCHLORIDE 1250 MG: 500 INJECTION, POWDER, LYOPHILIZED, FOR SOLUTION INTRAVENOUS at 17:53

## 2018-07-17 RX ADMIN — ATORVASTATIN CALCIUM 20 MG: 20 TABLET, FILM COATED ORAL at 09:08

## 2018-07-17 RX ADMIN — INSULIN DETEMIR 25 UNITS: 100 INJECTION, SOLUTION SUBCUTANEOUS at 21:22

## 2018-07-17 RX ADMIN — IPRATROPIUM BROMIDE AND ALBUTEROL SULFATE 3 ML: .5; 3 SOLUTION RESPIRATORY (INHALATION) at 06:43

## 2018-07-17 RX ADMIN — HUMAN INSULIN 3 UNITS: 100 INJECTION, SOLUTION SUBCUTANEOUS at 06:16

## 2018-07-17 RX ADMIN — CHLOROTHIAZIDE SODIUM 250 MG: 500 INJECTION, POWDER, LYOPHILIZED, FOR SOLUTION INTRAVENOUS at 10:06

## 2018-07-17 RX ADMIN — METHYLPREDNISOLONE SODIUM SUCCINATE 60 MG: 125 INJECTION, POWDER, FOR SOLUTION INTRAMUSCULAR; INTRAVENOUS at 21:21

## 2018-07-17 RX ADMIN — ARFORMOTEROL TARTRATE 15 MCG: 15 SOLUTION RESPIRATORY (INHALATION) at 19:06

## 2018-07-17 RX ADMIN — MUPIROCIN: 20 OINTMENT TOPICAL at 21:26

## 2018-07-17 RX ADMIN — DIGOXIN 125 MCG: 0.25 INJECTION INTRAMUSCULAR; INTRAVENOUS at 10:07

## 2018-07-17 RX ADMIN — HUMAN INSULIN 4 UNITS: 100 INJECTION, SOLUTION SUBCUTANEOUS at 17:53

## 2018-07-17 RX ADMIN — CEFEPIME HYDROCHLORIDE 2 G: 2 INJECTION, SOLUTION INTRAVENOUS at 06:16

## 2018-07-17 RX ADMIN — ARFORMOTEROL TARTRATE 15 MCG: 15 SOLUTION RESPIRATORY (INHALATION) at 06:44

## 2018-07-17 RX ADMIN — HUMAN INSULIN 5 UNITS: 100 INJECTION, SOLUTION SUBCUTANEOUS at 12:35

## 2018-07-17 RX ADMIN — METHYLPREDNISOLONE SODIUM SUCCINATE 60 MG: 125 INJECTION, POWDER, FOR SOLUTION INTRAMUSCULAR; INTRAVENOUS at 12:34

## 2018-07-17 RX ADMIN — CLOTRIMAZOLE AND BETAMETHASONE DIPROPIONATE 1 APPLICATION: 10; .5 CREAM TOPICAL at 21:26

## 2018-07-17 RX ADMIN — PROPOFOL 25 MCG/KG/MIN: 10 INJECTION, EMULSION INTRAVENOUS at 09:31

## 2018-07-17 RX ADMIN — GABAPENTIN 300 MG: 300 CAPSULE ORAL at 09:09

## 2018-07-17 RX ADMIN — PANTOPRAZOLE SODIUM 40 MG: 40 INJECTION, POWDER, FOR SOLUTION INTRAVENOUS at 09:07

## 2018-07-17 RX ADMIN — VITAMIN D, TAB 1000IU (100/BT) 2000 UNITS: 25 TAB at 09:08

## 2018-07-17 RX ADMIN — DIGOXIN 125 MCG: 0.25 INJECTION INTRAMUSCULAR; INTRAVENOUS at 17:52

## 2018-07-17 RX ADMIN — CHLORHEXIDINE GLUCONATE 0.12% ORAL RINSE 15 ML: 1.2 LIQUID ORAL at 09:07

## 2018-07-17 RX ADMIN — HUMAN INSULIN 2 UNITS: 100 INJECTION, SOLUTION SUBCUTANEOUS at 01:12

## 2018-07-17 RX ADMIN — BUDESONIDE 1 MG: 0.5 INHALANT RESPIRATORY (INHALATION) at 19:06

## 2018-07-17 RX ADMIN — BUDESONIDE 1 MG: 0.5 INHALANT RESPIRATORY (INHALATION) at 06:44

## 2018-07-17 RX ADMIN — Medication 200 MG: at 09:09

## 2018-07-17 RX ADMIN — PROPOFOL 35 MCG/KG/MIN: 10 INJECTION, EMULSION INTRAVENOUS at 21:21

## 2018-07-17 RX ADMIN — PROPOFOL 35 MCG/KG/MIN: 10 INJECTION, EMULSION INTRAVENOUS at 16:49

## 2018-07-17 RX ADMIN — ENOXAPARIN SODIUM 70 MG: 80 INJECTION SUBCUTANEOUS at 01:13

## 2018-07-17 RX ADMIN — DILTIAZEM HYDROCHLORIDE 15 MG/HR: 100 INJECTION, POWDER, LYOPHILIZED, FOR SOLUTION INTRAVENOUS at 04:05

## 2018-07-17 RX ADMIN — MUPIROCIN: 20 OINTMENT TOPICAL at 09:30

## 2018-07-17 RX ADMIN — IPRATROPIUM BROMIDE AND ALBUTEROL SULFATE 3 ML: .5; 3 SOLUTION RESPIRATORY (INHALATION) at 11:55

## 2018-07-17 RX ADMIN — IPRATROPIUM BROMIDE AND ALBUTEROL SULFATE 3 ML: .5; 3 SOLUTION RESPIRATORY (INHALATION) at 16:28

## 2018-07-17 RX ADMIN — DILTIAZEM HYDROCHLORIDE 10 MG/HR: 5 INJECTION INTRAVENOUS at 11:26

## 2018-07-17 RX ADMIN — IPRATROPIUM BROMIDE AND ALBUTEROL SULFATE 3 ML: .5; 3 SOLUTION RESPIRATORY (INHALATION) at 19:06

## 2018-07-17 RX ADMIN — ACETYLCYSTEINE 3 ML: 200 SOLUTION ORAL; RESPIRATORY (INHALATION) at 11:56

## 2018-07-17 RX ADMIN — CHLORHEXIDINE GLUCONATE 0.12% ORAL RINSE 15 ML: 1.2 LIQUID ORAL at 21:28

## 2018-07-17 RX ADMIN — BUPROPION HYDROCHLORIDE 150 MG: 150 TABLET, FILM COATED, EXTENDED RELEASE ORAL at 09:08

## 2018-07-17 RX ADMIN — DIGOXIN 125 MCG: 0.25 INJECTION INTRAMUSCULAR; INTRAVENOUS at 03:48

## 2018-07-17 RX ADMIN — INSULIN DETEMIR 25 UNITS: 100 INJECTION, SOLUTION SUBCUTANEOUS at 09:06

## 2018-07-17 RX ADMIN — IPRATROPIUM BROMIDE AND ALBUTEROL SULFATE 3 ML: .5; 3 SOLUTION RESPIRATORY (INHALATION) at 04:38

## 2018-07-17 RX ADMIN — CLOPIDOGREL BISULFATE 75 MG: 75 TABLET ORAL at 09:08

## 2018-07-17 RX ADMIN — FAMOTIDINE 20 MG: 10 INJECTION, SOLUTION INTRAVENOUS at 09:08

## 2018-07-17 RX ADMIN — CLOTRIMAZOLE AND BETAMETHASONE DIPROPIONATE 1 APPLICATION: 10; .5 CREAM TOPICAL at 09:12

## 2018-07-17 RX ADMIN — VANCOMYCIN HYDROCHLORIDE 1250 MG: 500 INJECTION, POWDER, LYOPHILIZED, FOR SOLUTION INTRAVENOUS at 06:16

## 2018-07-17 NOTE — PROGRESS NOTES
"  CC: Acute respiratory failure    S: Intubated and sedated.    O:Vital signs reviewed. O2Sat: 100 % on 90%.  Picc Line. Day # 3. Vent Day # 3  /67   Pulse 117   Temp 99.3 °F (37.4 °C) (Oral)   Resp 26   Ht 162.6 cm (64\")   Wt 74.6 kg (164 lb 8 oz)   LMP  (LMP Unknown)   SpO2 100%   BMI 28.24 kg/m²     Temp (24hrs), Av.4 °F (37.4 °C), Min:99.1 °F (37.3 °C), Max:100.2 °F (37.9 °C)        I & Os reviewed.   Intake/Output       18 0700 - 18 0659 18 07 - 18 0659    Intake (ml) 4873 0    Output (ml) 625 --    Net (ml) 4248 0    Last Weight  74.6 kg (164 lb 8 oz)  --          General: Intubated. Sedated  Eyes: PERRL.   Neck: Supple. +ve ? JVD  Cardiovascular: S1 + S2. Irregular.    Respiratory: Transmitted Breath sounds noted. No wheezing heard. Min b/l crackles noted  Abdomen: Soft. Bowel sounds positive   Extremities: Upper extremity edema noted.  Neurologic: Sedated. Detailed exam couldn't be performed due to sedation.   Skin: Appeared without any overt rashes    Labs: Reviewed.     Results from last 7 days  Lab Units 18  0354 18  0408 07/15/18  0403   SODIUM mmol/L 143 144 147*   POTASSIUM mmol/L 5.0 4.7 4.1   CHLORIDE mmol/L 113* 116* 114*   CO2 mmol/L 21.0* 21.0* 22.0*   BUN mg/dL 37* 41* 41*   CREATININE mg/dL 0.80 0.70 0.90   CALCIUM mg/dL 8.4 8.4 7.7*   BILIRUBIN mg/dL 0.5 0.5 0.6   ALK PHOS U/L 130* 100 63   ALT (SGPT) U/L 62 46 24   AST (SGOT) U/L 57* 78* 22   GLUCOSE mg/dL 231* 77 144*         Results from last 7 days  Lab Units 18  0408 18  0429   MAGNESIUM mg/dL 2.4* 1.9           Results from last 7 days  Lab Units 18  0354 18  0408 07/15/18  0403 18  0429 18  0235   WBC 10*3/mm3 14.00* 14.00* 10.04 12.34* 13.20*   HEMOGLOBIN g/dL 10.2* 9.8* 8.2* 10.0* 12.6   PLATELETS 10*3/mm3 307 240 238 260 289       diltiaZEM 5-15 mg/hr Last Rate: 15 mg/hr (18)   Pharmacy to dose vancomycin     propofol 5-50 mcg/kg/min " Last Rate: 40 mcg/kg/min (07/17/18 0405)       ABG: Reviewed  Lab Results   Component Value Date    PHART 7.371 07/17/2018    PGS9QDT 40.7 07/17/2018    PO2ART 267.0 (H) 07/17/2018    HGBBG 10.3 (L) 07/17/2018    I2SFURIW >99.6 07/17/2018    CARBOXYHGB 1.0 07/17/2018         CXRay: Reviewed personally.  Definite improvement noted.   Imaging Results (last 24 hours)     Procedure Component Value Units Date/Time    XR Chest 1 View [[id number]] Updated:  07/17/18 0520            Assessment & Recommendations/Plan:   1.  Acute hypoxemic respiratory failure  - Continue to require mechanical ventilation  - We will adjust the ventilator settings  - I asked the nursing staff to keep her oxygen saturation more than 92% and to titrate FiO2 down as much as tolerated.    2.  Multifocal pneumonia  - On broad-spectrum antibiotics.  - Sputum cultures have revealed MRSA  - We'll continue IV vancomycin and Levaquin for now  - We will discontinue cefepime.    3.  Likely ARDS versus hypersensitivity pneumonitis  - We'll continue IV steroids.  - I will not change the dose of steroids.  - We'll recommend continuation of steroids for 7-10 days at least and only slow de-escalation of therapy.    4.  Diabetes  - Be monitored closely    5.  Lymphadenopathy  - Likely reactive  - We will need outpatient follow-up    6.  COPD  - On nebulized treatments    7.  Possible component of pulmonary edema/Fluid overload?  - Had a very good response to IV Lasix last night  - We will administer Diuril today  - Echo pending.   - Will monitor strict I's and O's    Her condition is critical and prognosis guarded at this time.    Critical Care time spent in direct patient care: 40 minutes (excluding procedure time, if applicable) including high complexity decision making to assess, manipulate, and support vital organ system failure in this individual who has impairment of one or more vital organ systems such that there is a high probability of imminent or life  threatening deterioration in the patient’s condition.    We have reviewed patient's current orders and changes, if any, have been suggested to primary care team. Plan was also discussed with nursing staff, as necessary.     D/W Dr. Kirby.       Yamilex Tse MD  07/17/18  8:29 AM    Dictated utilizing Dragon dictation.

## 2018-07-17 NOTE — PROGRESS NOTES
Adult Nutrition  Assessment/PES    Patient Name:  Breann Gallegos  YOB: 1950  MRN: [id number]  Admit Date:  7/13/2018    Assessment Date:  7/17/2018    Comments:  Rec. #1: Continue tube feeding rate of Glucerna 1.2  @ 39 ml/hr goal rate. Tube feeding to provide: ~1123 Kcal, 56 gm Protein, 753 ml tube feeding water. Rec. #2: Increase free water flushes to 93 ml Q 6 hours. Pt. Receiving ~348 Kcal from Propofol at current rate of 13.4 mL/hr. RD to follow pt. And recommend tube feeding rate changes based on propofol rate changes. Consult RD PRN.              Reason for Assessment     Row Name 07/17/18 1129          Reason for Assessment    Reason For Assessment follow-up protocol;TF/PN     Diagnosis diabetes diagnosis/complications;cardiac disease;pulmonary disease;gastrointestinal disease   COPD, DM type 2, Acute respiratory failure requiring intubation               Anthropometrics     Row Name 07/17/18 0500          Anthropometrics    Weight 74.6 kg (164 lb 8 oz)             Labs/Tests/Procedures/Meds     Row Name 07/17/18 1130          Labs/Procedures/Meds    Lab Results Reviewed reviewed, pertinent     Lab Results Comments Low: Alb  High: Cl, Glu, BUN, Tg        Medications    Pertinent Medications Reviewed reviewed             Physical Findings     Row Name 07/17/18 1130          Physical Findings    Overall Physical Appearance on ventilator support     Tubes orogastric tube               Nutrition Prescription Ordered     Row Name 07/17/18 1131          Nutrition Prescription PO    Current PO Diet NPO        Nutrition Prescription EN    Enteral Route OG     Product Glucerna 1.2 cyrus     TF Delivery Method Continuous     Continuous TF Goal Rate (mL/hr) 39 mL/hr     Continuous TF Current Rate (mL/hr) 39 mL/hr     Continuous TF Goal Volume (mL) 858 mL     Continuous TF Current Volume (mL) 858 mL     Water flush (mL)  93 mL     Water Flush Frequency Every 4 hours   Q 6 hours        Propofol  Considerations    Propofol (mL/hr) 13 mL/hr     Propofol (Kcal/day) 348 Kcal/day             Evaluation of Received Nutrient/Fluid Intake     Row Name 07/17/18 1134          PO Evaluation    Number of Days PO Intake Evaluated Insufficient Data   NPO        EN Evaluation    Number of Days EN Intake Evaluated 3 days     EN Average Volume Delivered (mL/day) 384 mL/day     % Goal Volume  44 %             Problem/Interventions:          Problem 2     Row Name 07/17/18 1138          Nutrition Diagnoses Problem 2    Problem 2 Increased Nutrient Needs     Macronutrient Kcal;Fluid;Protein     Micronutrient Vitamin;Mineral     Etiology (related to) Medical Diagnosis     Pulmonary/Critical Care COPD;Acute respiratory failure;Other (comment)   hypoxia     Signs/Symptoms (evidenced by) Other (comment)   Pulmonary dysfunction                   Intervention Goal     Row Name 07/17/18 1142          Intervention Goal    General Meet nutritional needs for age/condition;Nutrition support treatment     PO Other (comment)   Continue NPO while intubated     TF/PN Maintain TF/PN     Transition TF to PO     Weight No significant weight loss             Nutrition Intervention     Row Name 07/17/18 1143          Nutrition Intervention    RD/Tech Action Follow Tx progress;Recommend/ordered     Recommended/Ordered EN             Nutrition Prescription     Row Name 07/17/18 1143          Nutrition Prescription PO    PO Prescription Other (comment)   Remain NPO        Nutrition Prescription EN    Enteral Prescription Continue same protocol     Enteral Route OG     Product Glucerna 1.2 cyrus     TF Delivery Method Continuous     Continuous TF Goal Rate (mL/hr) 39 mL/hr     Continuous TF Starting Rate (mL/hr) 25 mL/hr     Continuous TF Goal Volume (mL) 858 mL     Continuous TF Starting Volume (mL) 550 mL     Water flush (mL)  93 mL     Water Flush Frequency Other (comment)   Q6        Other Orders    Obtain Weight Daily     Obtain Weight Ordered?  No, recommended     Supplement Vitamin mineral supplement     Supplement Ordered? No, recommended     Labs Mg++;K+;Phos     Labs Ordered? No, recommended             Education/Evaluation     Row Name 07/17/18 1144          Education    Education Education not appropriate at this time     Please explain Patient intubated        Monitor/Evaluation    Monitor I&O;Per protocol;Pertinent labs;TF delivery/tolerance;Weight         Electronically signed by:  Alia Cruz RD  07/17/18 11:45 AM

## 2018-07-17 NOTE — PLAN OF CARE
Problem: Patient Care Overview  Goal: Plan of Care Review  Outcome: Ongoing (interventions implemented as appropriate)   07/17/18 1811   Coping/Psychosocial   Plan of Care Reviewed With caregiver   Plan of Care Review   Progress no change       Problem: Ventilation, Mechanical Invasive (Adult)  Goal: Signs and Symptoms of Listed Potential Problems Will be Absent, Minimized or Managed (Ventilation, Mechanical Invasive)  Outcome: Ongoing (interventions implemented as appropriate)   07/17/18 1811   Goal/Outcome Evaluation   Problems Assessed (Mechanical Ventilation, Invasive) all   Problems Present (Mech Vent, Invasive) none

## 2018-07-17 NOTE — PLAN OF CARE
Problem: Patient Care Overview  Goal: Plan of Care Review  Outcome: Ongoing (interventions implemented as appropriate)   07/17/18 0653   Coping/Psychosocial   Plan of Care Reviewed With patient;spouse   Plan of Care Review   Progress no change   OTHER   Outcome Summary Pt vent settings changed per MD. Vital signs stable, will continue to monitor. MARK Perry     Goal: Individualization and Mutuality  Outcome: Ongoing (interventions implemented as appropriate)    Goal: Discharge Needs Assessment  Outcome: Ongoing (interventions implemented as appropriate)    Goal: Interprofessional Rounds/Family Conf  Outcome: Ongoing (interventions implemented as appropriate)      Problem: Pneumonia (Adult)  Goal: Signs and Symptoms of Listed Potential Problems Will be Absent, Minimized or Managed (Pneumonia)  Outcome: Ongoing (interventions implemented as appropriate)      Problem: NPPV/CPAP (Adult)  Goal: Signs and Symptoms of Listed Potential Problems Will be Absent, Minimized or Managed (NPPV/CPAP)  Outcome: Ongoing (interventions implemented as appropriate)      Problem: Pain, Acute (Adult)  Goal: Acceptable Pain Control/Comfort Level  Outcome: Ongoing (interventions implemented as appropriate)      Problem: Restraint, Nonbehavioral (Nonviolent)  Goal: Rationale and Justification  Outcome: Ongoing (interventions implemented as appropriate)    Goal: Nonbehavioral (Nonviolent) Restraint: Absence of Injury/Harm  Outcome: Ongoing (interventions implemented as appropriate)    Goal: Nonbehavioral (Nonviolent) Restraint: Achievement of Discontinuation Criteria  Outcome: Ongoing (interventions implemented as appropriate)    Goal: Nonbehavioral (Nonviolent) Restraint: Preservation of Dignity and Wellbeing  Outcome: Ongoing (interventions implemented as appropriate)      Problem: Ventilation, Mechanical Invasive (Adult)  Goal: Signs and Symptoms of Listed Potential Problems Will be Absent, Minimized or Managed (Ventilation, Mechanical  Invasive)  Outcome: Ongoing (interventions implemented as appropriate)      Problem: Skin Injury Risk (Adult)  Goal: Skin Health and Integrity  Outcome: Ongoing (interventions implemented as appropriate)      Problem: Arrhythmia/Dysrhythmia (Symptomatic) (Adult)  Goal: Signs and Symptoms of Listed Potential Problems Will be Absent, Minimized or Managed (Arrhythmia/Dysrhythmia)  Outcome: Ongoing (interventions implemented as appropriate)      Problem: Nutrition, Enteral (Adult)  Goal: Signs and Symptoms of Listed Potential Problems Will be Absent, Minimized or Managed (Nutrition, Enteral)  Outcome: Ongoing (interventions implemented as appropriate)      Problem: Fall Risk (Adult)  Goal: Identify Related Risk Factors and Signs and Symptoms  Outcome: Outcome(s) achieved Date Met: 07/17/18    Goal: Absence of Fall  Outcome: Ongoing (interventions implemented as appropriate)

## 2018-07-17 NOTE — SIGNIFICANT NOTE
07/17/18 0930   Rehab Time/Intention   Evaluation Not Performed patient unavailable for evaluation  (Hold per Dr. Kirby. Pt intubated and sedated. PT will f/u tomorrow with eval as safe and appropriate.)   Rehab Treatment   Discipline physical therapist

## 2018-07-17 NOTE — PROGRESS NOTES
[facility]    Name:  Breann Gallegos   Age:  68 y.o.  Sex:  female  :  1950  MRN:  [id number]   Visit Number:  [id number]  Admission Date:  2018  Date Of Service:  18  Primary Care Physician:  Harry Avery MD     LOS: 4 days :      Chief Complaint:  Follow up pneumonia and respiratory failure still on ventilator        Subjective / Interval History:   The patient was seen again this morning.  She is still sedated on mechanical ventilator.  No acute events occurred overnight.  The patient is sedated on the ventilator and unable to provide adequate chief complaint or review of systems.  Her chest x-ray looks improved.  Her FiO2 has been weaned from 100% to 60%.  She appears to be resting comfortably and appears in no pain or distress.  She does not appear to be in any pain and appears properly sedated with bilateral wrist restraints and Mariee catheter continued.      Review of Systems:  Unable to obtain due to sedation still.      Vital Signs:    Temp:  [98.1 °F (36.7 °C)-99.4 °F (37.4 °C)] 98.1 °F (36.7 °C)  Heart Rate:  [] 99  Resp:  [13-26] 20  BP: (110-145)/(55-81) 145/64  FiO2 (%):  [60 %-100 %] 60 %    Intake and output:    I/O last 3 completed shifts:  In: 8375 [I.V.:4462; Other:2803; NG/GT:1110]  Out: 1225 [Urine:1225]  I/O this shift:  In: 60 [Other:60]  Out: 1000 [Urine:1000]    Physical Examination:    General Appearance:   still sedated on ventilator.  No acute distress resting comfortably in bed    Head:   Cephalic and atraumatic .   Eyes:            Reactive pupils bilaterally but pinpoint with stable chronic irregular left pupil.  No icterus or conjunctivitis    Throat:   ET tube Still in place, no thrush, oral mucosa moistened.  Edentulous    Neck:   Supple, No lymphadenopathy    Lungs:     Left lower lobe crackle.  No wheeze.  No rhonchi.  No retractions     Heart:    Regular rate and rhythm no murmur or gallop     Abdomen:     Soft nontender nondistended positive bowel sounds    Extremities:   No clubbing cyanosis or edema    Skin:   No bleeding, bruising or rash.   Neurologic:   Able to move all four extremities, no tremor        Laboratory results:    Lab Results (last 24 hours)     Procedure Component Value Units Date/Time    S. Pneumo Ag Urine or CSF - Urine, Urine, Clean Catch [[id number]] Collected:  07/13/18 0925    Specimen:  Urine from Urine, Clean Catch Updated:  07/17/18 1412     Specimen Source Urine     STREP PNEUMONIAE ANTIGEN Negative     Body Fluid Culture, Sterile Not Indicated     Organism ID Not indicated.     Please note Comment     Comment: [employer] standards require a culture to be  performed on CSF specimens submitted for bacterial antigen testing.  (CAP SERGO.26713) Urine specimens will not be cultured.       Narrative:       Performed at:  [address] [place] [address]  [id number]  : Nikhil Granger MD, Phone:  [phone]    POC Glucose Once [[id number]]  (Abnormal) Collected:  07/17/18 1130    Specimen:  Blood Updated:  07/17/18 1136     Glucose 321 (H) mg/dL      Comment: Serial Number: [id number]:  [id number]       Blood Culture With OSMEL - Blood, [[id number]]  (Normal) Collected:  07/13/18 0602    Specimen:  Blood from Hand, Right Updated:  07/17/18 0715     Blood Culture No growth at 4 days    Blood Culture With OSMEL - Blood, [[id number]]  (Normal) Collected:  07/13/18 0614    Specimen:  Blood from Arm, Left Updated:  07/17/18 0715     Blood Culture No growth at 4 days    POC Glucose Once [[id number]]  (Abnormal) Collected:  07/17/18 0505    Specimen:  Blood Updated:  07/17/18 0601     Glucose 229 (H) mg/dL      Comment: Serial Number: [id number]:  [id number]       CBC (No Diff) [[id number]]  (Abnormal) Collected:  07/17/18 0354    Specimen:  Blood Updated:  07/17/18 0519     WBC 14.00 (H) 10*3/mm3      RBC 3.79 (L) 10*6/mm3       Hemoglobin 10.2 (L) g/dL      Hematocrit 33.5 (L) %      MCV 88.4 fL      MCH 26.9 (L) pg      MCHC 30.4 g/dL      RDW 15.7 (H) %      RDW-SD 50.9 fl      MPV 10.9 fL      Platelets 307 10*3/mm3     Blood Gas, Arterial With Co-Ox [[id number]]  (Abnormal) Collected:  07/17/18 0504    Specimen:  Arterial Blood Updated:  07/17/18 0504     Site Right Radial     Toy's Test Positive     pH, Arterial 7.371 pH units      pCO2, Arterial 40.7 mm Hg      pO2, Arterial 267.0 (H) mm Hg      HCO3, Arterial 23.6 mmol/L      Base Excess, Arterial -1.6 (L) mmol/L      O2 Saturation, Arterial >99.6 %      Hemoglobin, Blood Gas 10.3 (L) g/dL      Hematocrit, Blood Gas 31.7 %      Oxyhemoglobin 98.2 %      Methemoglobin 1.00 %      Carboxyhemoglobin 1.0 %      Barometric Pressure for Blood Gas 732 mmHg      Modality Ventilator     FIO2 100 %      Ventilator Mode PC     Set [facility] Resp Rate 20.0     PEEP 10.0     Collected by yovani     pH, Temp Corrected -- pH Units      pCO2, Temperature Corrected -- mm Hg      pO2, Temperature Corrected -- mm Hg     Comprehensive Metabolic Panel [[id number]]  (Abnormal) Collected:  07/17/18 0354    Specimen:  Blood Updated:  07/17/18 0449     Glucose 231 (H) mg/dL      Comment: Glucose >180, Hemoglobin A1C recommended.        BUN 37 (H) mg/dL      Creatinine 0.80 mg/dL      Sodium 143 mmol/L      Potassium 5.0 mmol/L      Chloride 113 (H) mmol/L      CO2 21.0 (L) mmol/L      Calcium 8.4 mg/dL      Total Protein 6.3 g/dL      Albumin 3.20 (L) g/dL      ALT (SGPT) 62 U/L      AST (SGOT) 57 (H) U/L      Alkaline Phosphatase 130 (H) U/L      Total Bilirubin 0.5 mg/dL      eGFR Non African Amer 72 mL/min/1.73      Globulin 3.1 gm/dL      A/G Ratio 1.0 g/dL      BUN/Creatinine Ratio 46.3 (H)     Anion Gap 14.0 mmol/L     Narrative:       GFR Normal >60  Chronic Kidney Disease <60  Kidney Failure <15    POC Glucose Once [[id number]]  (Abnormal) Collected:  07/16/18 3537    Specimen:  Blood Updated:  07/16/18 9002      Glucose 158 (H) mg/dL      Comment: Serial Number: [id number]:  [id number]       POC Glucose Once [[id number]]  (Abnormal) Collected:  07/16/18 2025    Specimen:  Blood Updated:  07/16/18 2031     Glucose 155 (H) mg/dL      Comment: Serial Number: [id number]:  [id number]       POC Glucose Once [[id number]]  (Normal) Collected:  07/16/18 1702    Specimen:  Blood Updated:  07/16/18 1711     Glucose 129 mg/dL      Comment: Serial Number: [id number]:  [id number]             I have reviewed the patient's laboratory results.    Radiology results:    Imaging Results (last 24 hours)     Procedure Component Value Units Date/Time    XR Chest 1 View [[id number]] Collected:  07/17/18 0857     Updated:  07/17/18 0909    Narrative:       PORTABLE CHEST     INDICATION: Follow-up pneumonia.     FINDINGS: Single frontal portable chest compared with 07/16/2018. EKG  leads overlie the chest. Right-sided PICC line remains in place.  Endotracheal tube terminates above the arelis near the level of the  aortic arch. Nasogastric tube is again seen extending below the  diaphragm with the tip out of the field-of-view. Fairly extensive  diffuse parenchymal infiltrates are again identified. These are slightly  improved greater towards the right. No pneumothorax.       Impression:       Minimal improvement in infiltrates. Continued follow-up  recommended.     This report was finalized on 7/17/2018 9:07 AM by Tone Naylor MD.          I have reviewed the patient's radiology reports.    Medication Review:     I have reviewed the patients active and prn medications.     Assessment:    1. Multifocal bilateral pneumonia, prior to admission, with MRSA so far growing in sputum  2. Acute hypoxic respiratory failure severe, requiring intubation and placement on mechanical ventilator 7-14-18 with suspected ARDS  3. New onset Afib with RVR, previous sinus tachycardia, improved on diltiazem drip and given digoxin  4. Sepsis, secondary to  pneumonia, prior to admission, with MRSA  5. Diabetes mellitus type 2 insulin dependent  6. Chronic essential hypertension  7. COPD moderate  8. Abnormal CT chest with lymphadenopathy, repeat outpatient CT scan chest       Plan:  Continue to wean oxygen on the mechanical ventilator as tolerated.  I discussed the case with Dr. Tse.  Her chest x-ray and clinical status seemed to be improving at this time.  Her prognosis still remains guarded with critical status.  Continue tube feeds with fluids.  Continue digoxin with diltiazem drip with controlled A. fib.  Continue to wean oxygen as tolerated and hopefully she can be extubated in the next couple of days.  I discussed the patient's case with her  who appeared extremely satisfied with her current improvement.  Antibiotic-wise, we will continue vancomycin and Levaquin.        Lovenox. Pepcid. Full code.  Tube feeding and fluids.      Ese Kirby,   07/17/18  4:30 PM

## 2018-07-18 ENCOUNTER — APPOINTMENT (OUTPATIENT)
Dept: GENERAL RADIOLOGY | Facility: HOSPITAL | Age: 68
End: 2018-07-18

## 2018-07-18 LAB
ALBUMIN SERPL-MCNC: 3.1 G/DL (ref 3.5–5)
ALBUMIN/GLOB SERPL: 1 G/DL (ref 1–2)
ALP SERPL-CCNC: 122 U/L (ref 38–126)
ALT SERPL W P-5'-P-CCNC: 52 U/L (ref 13–69)
ANION GAP SERPL CALCULATED.3IONS-SCNC: 10.5 MMOL/L (ref 10–20)
ARTERIAL PATENCY WRIST A: POSITIVE
ARTERIAL PATENCY WRIST A: POSITIVE
AST SERPL-CCNC: 41 U/L (ref 15–46)
ATMOSPHERIC PRESS: 734 MMHG
ATMOSPHERIC PRESS: 734 MMHG
BACTERIA SPEC AEROBE CULT: NORMAL
BACTERIA SPEC AEROBE CULT: NORMAL
BASE EXCESS BLDA CALC-SCNC: 11 MMOL/L (ref 0–2)
BASE EXCESS BLDA CALC-SCNC: 4.2 MMOL/L (ref 0–2)
BASOPHILS # BLD AUTO: 0.02 10*3/MM3 (ref 0–0.2)
BASOPHILS NFR BLD AUTO: 0.1 % (ref 0–2.5)
BDY SITE: ABNORMAL
BDY SITE: ABNORMAL
BH CV ECHO MEAS - IVSD: 1.3 CM
BH CV ECHO MEAS - LA DIMENSION: 4.4 CM
BH CV ECHO MEAS - LVPWD: 1.3 CM
BH CV ECHO MEAS - RAP SYSTOLE: 10 MMHG
BH CV ECHO MEAS - RVSP: 48 MMHG
BILIRUB SERPL-MCNC: 0.4 MG/DL (ref 0.2–1.3)
BUN BLD-MCNC: 38 MG/DL (ref 7–20)
BUN/CREAT SERPL: 47.5 (ref 7.1–23.5)
CALCIUM SPEC-SCNC: 8.7 MG/DL (ref 8.4–10.2)
CHLORIDE SERPL-SCNC: 109 MMOL/L (ref 98–107)
CO2 SERPL-SCNC: 30 MMOL/L (ref 26–30)
COHGB MFR BLD: 1.1 % (ref 0–2)
COHGB MFR BLD: 1.2 % (ref 0–2)
CREAT BLD-MCNC: 0.8 MG/DL (ref 0.6–1.3)
DEPRECATED RDW RBC AUTO: 46.8 FL (ref 37–54)
EOSINOPHIL # BLD AUTO: 0.01 10*3/MM3 (ref 0–0.7)
EOSINOPHIL NFR BLD AUTO: 0.1 % (ref 0–7)
ERYTHROCYTE [DISTWIDTH] IN BLOOD BY AUTOMATED COUNT: 15.2 % (ref 11.5–14.5)
GFR SERPL CREATININE-BSD FRML MDRD: 72 ML/MIN/1.73
GLOBULIN UR ELPH-MCNC: 3.2 GM/DL
GLUCOSE BLD-MCNC: 216 MG/DL (ref 74–98)
GLUCOSE BLDC GLUCOMTR-MCNC: 177 MG/DL (ref 70–130)
GLUCOSE BLDC GLUCOMTR-MCNC: 191 MG/DL (ref 70–130)
GLUCOSE BLDC GLUCOMTR-MCNC: 218 MG/DL (ref 70–130)
HCO3 BLDA-SCNC: 28.5 MMOL/L (ref 22–28)
HCO3 BLDA-SCNC: 34.6 MMOL/L (ref 22–28)
HCT VFR BLD AUTO: 32.7 % (ref 37–47)
HCT VFR BLD CALC: 32 %
HCT VFR BLD CALC: 37.2 %
HGB BLD-MCNC: 10.3 G/DL (ref 12–16)
HGB BLDA-MCNC: 10.4 G/DL (ref 12–18)
HGB BLDA-MCNC: 12.1 G/DL (ref 12–18)
HOROWITZ INDEX BLD+IHG-RTO: 40 %
HOROWITZ INDEX BLD+IHG-RTO: 60 %
IMM GRANULOCYTES # BLD: 0.3 10*3/MM3 (ref 0–0.06)
IMM GRANULOCYTES NFR BLD: 2.1 % (ref 0–0.6)
LEFT ATRIUM VOLUME INDEX: 36 ML/M2
LV EF 2D ECHO EST: 58 %
LYMPHOCYTES # BLD AUTO: 1.26 10*3/MM3 (ref 0.6–3.4)
LYMPHOCYTES NFR BLD AUTO: 9 % (ref 10–50)
Lab: ABNORMAL
Lab: ABNORMAL
MAGNESIUM SERPL-MCNC: 2.3 MG/DL (ref 1.6–2.3)
MAXIMAL PREDICTED HEART RATE: 153 BPM
MCH RBC QN AUTO: 26.7 PG (ref 27–31)
MCHC RBC AUTO-ENTMCNC: 31.5 G/DL (ref 30–37)
MCV RBC AUTO: 84.7 FL (ref 81–99)
METHGB BLD QL: 0.7 % (ref 0–1.5)
METHGB BLD QL: 0.8 % (ref 0–1.5)
MODALITY: ABNORMAL
MODALITY: ABNORMAL
MONOCYTES # BLD AUTO: 1.39 10*3/MM3 (ref 0–0.9)
MONOCYTES NFR BLD AUTO: 9.9 % (ref 0–12)
NEUTROPHILS # BLD AUTO: 11.06 10*3/MM3 (ref 2–6.9)
NEUTROPHILS NFR BLD AUTO: 78.8 % (ref 37–80)
NRBC BLD MANUAL-RTO: 0.3 /100 WBC (ref 0–0)
OXYHGB MFR BLDV: 94.4 % (ref 94–99)
OXYHGB MFR BLDV: 95.4 % (ref 94–99)
PAW @ PEAK INSP FLOW SETTING VENT: 20 CMH2O
PCO2 BLDA: 40.6 MM HG (ref 35–45)
PCO2 BLDA: 40.7 MM HG (ref 35–45)
PCO2 TEMP ADJ BLD: ABNORMAL MM HG (ref 35–45)
PCO2 TEMP ADJ BLD: ABNORMAL MM HG (ref 35–45)
PEEP RESPIRATORY: 8 CM[H2O]
PH BLDA: 7.45 PH UNITS (ref 7.3–7.5)
PH BLDA: 7.54 PH UNITS (ref 7.3–7.5)
PH, TEMP CORRECTED: ABNORMAL PH UNITS
PH, TEMP CORRECTED: ABNORMAL PH UNITS
PLATELET # BLD AUTO: 370 10*3/MM3 (ref 130–400)
PMV BLD AUTO: 10.5 FL (ref 6–12)
PO2 BLDA: 77.3 MM HG (ref 75–100)
PO2 BLDA: 81.3 MM HG (ref 75–100)
PO2 TEMP ADJ BLD: ABNORMAL MM HG (ref 83–108)
PO2 TEMP ADJ BLD: ABNORMAL MM HG (ref 83–108)
POTASSIUM BLD-SCNC: 4.5 MMOL/L (ref 3.5–5.1)
PROT SERPL-MCNC: 6.3 G/DL (ref 6.3–8.2)
RBC # BLD AUTO: 3.86 10*6/MM3 (ref 4.2–5.4)
SAO2 % BLDCOA: 96.1 % (ref 94–100)
SAO2 % BLDCOA: 97.3 % (ref 94–100)
SET MECH RESP RATE: 20
SODIUM BLD-SCNC: 145 MMOL/L (ref 137–145)
STRESS TARGET HR: 130 BPM
VANCOMYCIN TROUGH SERPL-MCNC: 13.87 MCG/ML (ref 5–15)
VENTILATOR MODE: ABNORMAL
VENTILATOR MODE: ABNORMAL
WBC NRBC COR # BLD: 14.04 10*3/MM3 (ref 4.8–10.8)

## 2018-07-18 PROCEDURE — 25010000002 FUROSEMIDE PER 20 MG: Performed by: INTERNAL MEDICINE

## 2018-07-18 PROCEDURE — 99232 SBSQ HOSP IP/OBS MODERATE 35: CPT | Performed by: FAMILY MEDICINE

## 2018-07-18 PROCEDURE — 71045 X-RAY EXAM CHEST 1 VIEW: CPT

## 2018-07-18 PROCEDURE — 25010000002 METHYLPREDNISOLONE PER 125 MG: Performed by: NURSE PRACTITIONER

## 2018-07-18 PROCEDURE — 25010000002 ENOXAPARIN PER 10 MG: Performed by: INTERNAL MEDICINE

## 2018-07-18 PROCEDURE — 97162 PT EVAL MOD COMPLEX 30 MIN: CPT

## 2018-07-18 PROCEDURE — 82375 ASSAY CARBOXYHB QUANT: CPT

## 2018-07-18 PROCEDURE — 94003 VENT MGMT INPAT SUBQ DAY: CPT

## 2018-07-18 PROCEDURE — 80202 ASSAY OF VANCOMYCIN: CPT | Performed by: FAMILY MEDICINE

## 2018-07-18 PROCEDURE — 25010000002 PROPOFOL 1000 MG/ML EMULSION: Performed by: INTERNAL MEDICINE

## 2018-07-18 PROCEDURE — 25010000002 LEVOFLOXACIN PER 250 MG: Performed by: INTERNAL MEDICINE

## 2018-07-18 PROCEDURE — 83050 HGB METHEMOGLOBIN QUAN: CPT

## 2018-07-18 PROCEDURE — 94799 UNLISTED PULMONARY SVC/PX: CPT

## 2018-07-18 PROCEDURE — 94770: CPT

## 2018-07-18 PROCEDURE — 99233 SBSQ HOSP IP/OBS HIGH 50: CPT | Performed by: INTERNAL MEDICINE

## 2018-07-18 PROCEDURE — 80053 COMPREHEN METABOLIC PANEL: CPT | Performed by: FAMILY MEDICINE

## 2018-07-18 PROCEDURE — 82962 GLUCOSE BLOOD TEST: CPT

## 2018-07-18 PROCEDURE — 63710000001 INSULIN REGULAR HUMAN PER 5 UNITS: Performed by: HOSPITALIST

## 2018-07-18 PROCEDURE — 25010000002 VANCOMYCIN PER 500 MG: Performed by: HOSPITALIST

## 2018-07-18 PROCEDURE — 25010000002 DIGOXIN PER 500 MCG: Performed by: FAMILY MEDICINE

## 2018-07-18 PROCEDURE — 25010000002 VANCOMYCIN PER 500 MG: Performed by: FAMILY MEDICINE

## 2018-07-18 PROCEDURE — 83735 ASSAY OF MAGNESIUM: CPT | Performed by: FAMILY MEDICINE

## 2018-07-18 PROCEDURE — 63710000001 INSULIN DETEMIR PER 5 UNITS: Performed by: FAMILY MEDICINE

## 2018-07-18 PROCEDURE — 85025 COMPLETE CBC W/AUTO DIFF WBC: CPT | Performed by: FAMILY MEDICINE

## 2018-07-18 PROCEDURE — 36600 WITHDRAWAL OF ARTERIAL BLOOD: CPT

## 2018-07-18 PROCEDURE — 82805 BLOOD GASES W/O2 SATURATION: CPT

## 2018-07-18 RX ORDER — BUPROPION HYDROCHLORIDE 75 MG/1
75 TABLET ORAL EVERY 12 HOURS SCHEDULED
Status: DISCONTINUED | OUTPATIENT
Start: 2018-07-18 | End: 2018-07-28

## 2018-07-18 RX ORDER — FUROSEMIDE 10 MG/ML
40 INJECTION INTRAMUSCULAR; INTRAVENOUS ONCE
Status: COMPLETED | OUTPATIENT
Start: 2018-07-18 | End: 2018-07-18

## 2018-07-18 RX ADMIN — VANCOMYCIN HYDROCHLORIDE 1250 MG: 500 INJECTION, POWDER, LYOPHILIZED, FOR SOLUTION INTRAVENOUS at 17:41

## 2018-07-18 RX ADMIN — INSULIN DETEMIR 25 UNITS: 100 INJECTION, SOLUTION SUBCUTANEOUS at 08:28

## 2018-07-18 RX ADMIN — IPRATROPIUM BROMIDE AND ALBUTEROL SULFATE 3 ML: .5; 3 SOLUTION RESPIRATORY (INHALATION) at 18:59

## 2018-07-18 RX ADMIN — DIGOXIN 125 MCG: 0.25 INJECTION INTRAMUSCULAR; INTRAVENOUS at 17:41

## 2018-07-18 RX ADMIN — CLOPIDOGREL BISULFATE 75 MG: 75 TABLET ORAL at 08:24

## 2018-07-18 RX ADMIN — VANCOMYCIN HYDROCHLORIDE 1250 MG: 500 INJECTION, POWDER, LYOPHILIZED, FOR SOLUTION INTRAVENOUS at 06:38

## 2018-07-18 RX ADMIN — MUPIROCIN: 20 OINTMENT TOPICAL at 08:29

## 2018-07-18 RX ADMIN — CLOTRIMAZOLE AND BETAMETHASONE DIPROPIONATE 1 APPLICATION: 10; .5 CREAM TOPICAL at 20:59

## 2018-07-18 RX ADMIN — BUDESONIDE 1 MG: 0.5 INHALANT RESPIRATORY (INHALATION) at 19:48

## 2018-07-18 RX ADMIN — DILTIAZEM HYDROCHLORIDE 10 MG/HR: 5 INJECTION INTRAVENOUS at 00:18

## 2018-07-18 RX ADMIN — CLOTRIMAZOLE AND BETAMETHASONE DIPROPIONATE 1 APPLICATION: 10; .5 CREAM TOPICAL at 08:29

## 2018-07-18 RX ADMIN — IPRATROPIUM BROMIDE AND ALBUTEROL SULFATE 3 ML: .5; 3 SOLUTION RESPIRATORY (INHALATION) at 15:57

## 2018-07-18 RX ADMIN — PROPOFOL 35 MCG/KG/MIN: 10 INJECTION, EMULSION INTRAVENOUS at 03:42

## 2018-07-18 RX ADMIN — METHYLPREDNISOLONE SODIUM SUCCINATE 60 MG: 125 INJECTION, POWDER, FOR SOLUTION INTRAMUSCULAR; INTRAVENOUS at 11:57

## 2018-07-18 RX ADMIN — CHLORHEXIDINE GLUCONATE 0.12% ORAL RINSE 15 ML: 1.2 LIQUID ORAL at 08:29

## 2018-07-18 RX ADMIN — MUPIROCIN: 20 OINTMENT TOPICAL at 21:00

## 2018-07-18 RX ADMIN — BUPROPION HYDROCHLORIDE 75 MG: 75 TABLET, FILM COATED ORAL at 20:59

## 2018-07-18 RX ADMIN — HUMAN INSULIN 3 UNITS: 100 INJECTION, SOLUTION SUBCUTANEOUS at 06:38

## 2018-07-18 RX ADMIN — METHYLPREDNISOLONE SODIUM SUCCINATE 60 MG: 125 INJECTION, POWDER, FOR SOLUTION INTRAMUSCULAR; INTRAVENOUS at 04:51

## 2018-07-18 RX ADMIN — FLUTICASONE PROPIONATE 2 SPRAY: 50 SPRAY, METERED NASAL at 08:29

## 2018-07-18 RX ADMIN — FAMOTIDINE 20 MG: 10 INJECTION, SOLUTION INTRAVENOUS at 20:59

## 2018-07-18 RX ADMIN — METHYLPREDNISOLONE SODIUM SUCCINATE 60 MG: 125 INJECTION, POWDER, FOR SOLUTION INTRAMUSCULAR; INTRAVENOUS at 20:59

## 2018-07-18 RX ADMIN — ACETYLCYSTEINE 3 ML: 200 SOLUTION ORAL; RESPIRATORY (INHALATION) at 07:12

## 2018-07-18 RX ADMIN — CHLORHEXIDINE GLUCONATE 0.12% ORAL RINSE 15 ML: 1.2 LIQUID ORAL at 20:59

## 2018-07-18 RX ADMIN — ARFORMOTEROL TARTRATE 15 MCG: 15 SOLUTION RESPIRATORY (INHALATION) at 07:12

## 2018-07-18 RX ADMIN — Medication 200 MG: at 08:23

## 2018-07-18 RX ADMIN — PANTOPRAZOLE SODIUM 40 MG: 40 INJECTION, POWDER, FOR SOLUTION INTRAVENOUS at 08:20

## 2018-07-18 RX ADMIN — HUMAN INSULIN 4 UNITS: 100 INJECTION, SOLUTION SUBCUTANEOUS at 00:19

## 2018-07-18 RX ADMIN — IPRATROPIUM BROMIDE AND ALBUTEROL SULFATE 3 ML: .5; 3 SOLUTION RESPIRATORY (INHALATION) at 07:12

## 2018-07-18 RX ADMIN — LEVOFLOXACIN 500 MG: 5 INJECTION, SOLUTION INTRAVENOUS at 08:31

## 2018-07-18 RX ADMIN — DIGOXIN 125 MCG: 0.25 INJECTION INTRAMUSCULAR; INTRAVENOUS at 10:04

## 2018-07-18 RX ADMIN — BUDESONIDE 1 MG: 0.5 INHALANT RESPIRATORY (INHALATION) at 07:12

## 2018-07-18 RX ADMIN — DILTIAZEM HYDROCHLORIDE 10 MG/HR: 5 INJECTION INTRAVENOUS at 14:29

## 2018-07-18 RX ADMIN — FUROSEMIDE 40 MG: 10 INJECTION, SOLUTION INTRAMUSCULAR; INTRAVENOUS at 09:44

## 2018-07-18 RX ADMIN — FAMOTIDINE 20 MG: 10 INJECTION, SOLUTION INTRAVENOUS at 08:20

## 2018-07-18 RX ADMIN — SERTRALINE HYDROCHLORIDE 100 MG: 50 TABLET ORAL at 08:24

## 2018-07-18 RX ADMIN — IPRATROPIUM BROMIDE AND ALBUTEROL SULFATE 3 ML: .5; 3 SOLUTION RESPIRATORY (INHALATION) at 23:12

## 2018-07-18 RX ADMIN — IPRATROPIUM BROMIDE AND ALBUTEROL SULFATE 3 ML: .5; 3 SOLUTION RESPIRATORY (INHALATION) at 12:54

## 2018-07-18 RX ADMIN — ATORVASTATIN CALCIUM 20 MG: 20 TABLET, FILM COATED ORAL at 08:24

## 2018-07-18 RX ADMIN — ENOXAPARIN SODIUM 70 MG: 80 INJECTION SUBCUTANEOUS at 11:57

## 2018-07-18 RX ADMIN — GABAPENTIN 300 MG: 300 CAPSULE ORAL at 20:59

## 2018-07-18 RX ADMIN — IPRATROPIUM BROMIDE AND ALBUTEROL SULFATE 3 ML: .5; 3 SOLUTION RESPIRATORY (INHALATION) at 02:57

## 2018-07-18 RX ADMIN — LISINOPRIL 5 MG: 5 TABLET ORAL at 08:24

## 2018-07-18 RX ADMIN — HUMAN INSULIN 2 UNITS: 100 INJECTION, SOLUTION SUBCUTANEOUS at 17:41

## 2018-07-18 RX ADMIN — INSULIN DETEMIR 25 UNITS: 100 INJECTION, SOLUTION SUBCUTANEOUS at 21:00

## 2018-07-18 RX ADMIN — BUPROPION HYDROCHLORIDE 75 MG: 75 TABLET, FILM COATED ORAL at 09:31

## 2018-07-18 RX ADMIN — DIGOXIN 125 MCG: 0.25 INJECTION INTRAMUSCULAR; INTRAVENOUS at 01:39

## 2018-07-18 RX ADMIN — ENOXAPARIN SODIUM 70 MG: 80 INJECTION SUBCUTANEOUS at 00:18

## 2018-07-18 RX ADMIN — GABAPENTIN 300 MG: 300 CAPSULE ORAL at 08:24

## 2018-07-18 RX ADMIN — HUMAN INSULIN 2 UNITS: 100 INJECTION, SOLUTION SUBCUTANEOUS at 11:57

## 2018-07-18 RX ADMIN — VITAMIN D, TAB 1000IU (100/BT) 2000 UNITS: 25 TAB at 08:24

## 2018-07-18 RX ADMIN — ARFORMOTEROL TARTRATE 15 MCG: 15 SOLUTION RESPIRATORY (INHALATION) at 19:48

## 2018-07-18 NOTE — PLAN OF CARE
Problem: Patient Care Overview  Goal: Plan of Care Review  Outcome: Ongoing (interventions implemented as appropriate)   07/18/18 0053   Coping/Psychosocial   Plan of Care Reviewed With patient;spouse   Plan of Care Review   Progress improving   OTHER   Outcome Summary pt able to be extubated today, VSS       Problem: Pneumonia (Adult)  Goal: Signs and Symptoms of Listed Potential Problems Will be Absent, Minimized or Managed (Pneumonia)  Outcome: Ongoing (interventions implemented as appropriate)      Problem: Pain, Acute (Adult)  Goal: Acceptable Pain Control/Comfort Level  Outcome: Ongoing (interventions implemented as appropriate)      Problem: Skin Injury Risk (Adult)  Goal: Skin Health and Integrity  Outcome: Ongoing (interventions implemented as appropriate)      Problem: Arrhythmia/Dysrhythmia (Symptomatic) (Adult)  Goal: Signs and Symptoms of Listed Potential Problems Will be Absent, Minimized or Managed (Arrhythmia/Dysrhythmia)  Outcome: Ongoing (interventions implemented as appropriate)      Problem: Fall Risk (Adult)  Goal: Absence of Fall  Outcome: Ongoing (interventions implemented as appropriate)

## 2018-07-18 NOTE — PROGRESS NOTES
Adult Nutrition  Assessment/PES    Patient Name:  Breann Gallegos  YOB: 1950  MRN: [id number]  Admit Date:  7/13/2018    Assessment Date:  7/18/2018    Comments:  TF currently held for extubation trial. Rec. #1: Consider discontinuation of TF if extubation successful today. Rec. #2: Consider adding MVI daily. Rec. #3: Consider speech evaluation post extubation. RD to follow pt. Consult RD PRN.           Reason for Assessment     Row Name 07/18/18 1045          Reason for Assessment    Reason For Assessment TF/PN;follow-up protocol     Diagnosis diabetes diagnosis/complications;cardiac disease;pulmonary disease;gastrointestinal disease   COPD, DM Type 2, Respiratory Failure, Intubated               Anthropometrics     Row Name 07/18/18 0500          Anthropometrics    Weight 75.1 kg (165 lb 9.6 oz)             Labs/Tests/Procedures/Meds     Row Name 07/18/18 1047          Labs/Procedures/Meds    Lab Results Reviewed reviewed, pertinent     Lab Results Comments High: Glucose, Cl, BUN   Low: Albumin, Hgb/Hct        Medications    Pertinent Medications Reviewed reviewed                 Nutrition Prescription Ordered     Row Name 07/18/18 1048          Nutrition Prescription PO    Current PO Diet NPO        Nutrition Prescription EN    Enteral Route OG     Product Glucerna 1.2 cyrus     TF Delivery Method Continuous     Continuous TF Goal Rate (mL/hr) 39 mL/hr     Continuous TF Current Rate (mL/hr) 39 mL/hr     Continuous TF Goal Volume (mL) 858 mL     Continuous TF Current Volume (mL) 858 mL     Water flush (mL)  93 mL     Water Flush Frequency --   Q 6 hours             Evaluation of Received Nutrient/Fluid Intake     Row Name 07/18/18 1049          PO Evaluation    Number of Days PO Intake Evaluated Insufficient Data        EN Evaluation    Number of Days EN Intake Evaluated 3 days     EN Average Volume Delivered (mL/day) 635 mL/day     % Goal Volume  74 %     TF Changes Held   TF currently held  for extubation trial             Problem/Interventions:          Problem 2     Row Name 07/18/18 1050          Nutrition Diagnoses Problem 2    Problem 2 Increased Nutrient Needs     Macronutrient Kcal;Fluid;Protein     Micronutrient Vitamin;Mineral     Etiology (related to) Medical Diagnosis     Pulmonary/Critical Care COPD;Acute respiratory failure;Other (comment)   hypoxia     Signs/Symptoms (evidenced by) Other (comment)   Pulmonary dysfunction               Problem 4     Row Name 07/18/18 1050          Nutrition Diagnoses Problem 4    Problem 4 Inadequate Nutrient Intake     Etiology (related to) MNT for Treatment/Condition     Signs/Symptoms (evidenced by) NPO;EN Intake Delivery     Percent (%) of EN goal --   TF currently held for extubation trial               Intervention Goal     Row Name 07/18/18 1051          Intervention Goal    General Meet nutritional needs for age/condition     PO --   Continue NPO while intubated     TF/PN --   Discontinue TF if pt. extubated today     Transition TF to PO     Weight No significant weight loss             Nutrition Intervention     Row Name 07/18/18 1052          Nutrition Intervention    RD/Tech Action Follow Tx progress;Await begin PO             Nutrition Prescription     Row Name 07/18/18 1052          Nutrition Prescription PO    PO Prescription --   continue NPO while pt. intubated        Nutrition Prescription EN    Enteral Prescription Continue same protocol   consider discontinuation of EN if pt. successfully extubated today.      Enteral Route OG     Product Glucerna 1.2 cyrus     TF Delivery Method Continuous     Continuous TF Goal Rate (mL/hr) 39 mL/hr     Continuous TF Starting Rate (mL/hr) 25 mL/hr     Continuous TF Goal Volume (mL) 858 mL     Continuous TF Starting Volume (mL) 550 mL     Water flush (mL)  93 mL     Water Flush Frequency Other (comment)   Q 6 hours        Other Orders    Obtain Weight Now     Obtain Weight Ordered? No, recommended      Supplement Vitamin mineral supplement     Supplement Ordered? No, recommended     Other Consider speech evaluation post extubation             Education/Evaluation     Row Name 07/18/18 1056          Education    Education Education not appropriate at this time     Please explain Patient intubated        Monitor/Evaluation    Monitor I&O;Pertinent labs;TF delivery/tolerance;Weight         Electronically signed by:  Aleta Watts RD  07/18/18 10:56 AM

## 2018-07-18 NOTE — PLAN OF CARE
Problem: Pneumonia (Adult)  Intervention: Maximize Oxygenation/Ventilation/Perfusion   07/17/18 2002 07/18/18 0150   Positioning   Head of Bed (HOB) --  HOB at 45 degrees   Respiratory Interventions   Airway/Ventilation Management airway patency maintained;pulmonary hygiene promoted --      Intervention: Prevent/Manage Infection Progression   07/18/18 0000   Safety Interventions   Infection Management aseptic technique maintained   Safety Management   Infection Prevention environmental surveillance performed;personal protective equipment utilized;rest/sleep promoted;single patient room provided   Promote Perineal Hygiene/Elimination Safety   Isolation Precautions contact precautions maintained     Intervention: Monitor/Manage Fluid Electrolyte Balance   07/18/18 0000   Nutrition Interventions   Fluid/Electrolyte Management other (see comments)  (ivf's continued per order)       Goal: Signs and Symptoms of Listed Potential Problems Will be Absent, Minimized or Managed (Pneumonia)  Outcome: Ongoing (interventions implemented as appropriate)   07/18/18 0150   Goal/Outcome Evaluation   Problems Assessed (Pneumonia) all   Problems Present (Pneumonia) fluid/electrolyte imbalance;respiratory compromise       Problem: Pain, Acute (Adult)  Intervention: Monitor and Manage Analgesia   07/18/18 0150   Safety Management   Medication Review/Management medications reviewed     Intervention: Mutually Develop/Implement Acute Pain Management Plan   07/18/18 0000   Cognitive Interventions   Sensory Stimulation Regulation care clustered;music/television provided for relaxation;quiet environment promoted       Goal: Acceptable Pain Control/Comfort Level  Outcome: Ongoing (interventions implemented as appropriate)   07/18/18 0150   Pain, Acute (Adult)   Acceptable Pain Control/Comfort Level making progress toward outcome       Problem: Restraint, Nonbehavioral (Nonviolent)  Goal: Rationale and Justification  Outcome: Ongoing  (interventions implemented as appropriate)   07/18/18 0150   Restraint, Nonbehavioral (Nonviolent)   Rationale and Justification prevent line/tube removal     Goal: Nonbehavioral (Nonviolent) Restraint: Absence of Injury/Harm  Outcome: Ongoing (interventions implemented as appropriate)    Goal: Nonbehavioral (Nonviolent) Restraint: Achievement of Discontinuation Criteria  Outcome: Ongoing (interventions implemented as appropriate)    Goal: Nonbehavioral (Nonviolent) Restraint: Preservation of Dignity and Wellbeing  Outcome: Ongoing (interventions implemented as appropriate)      Problem: Ventilation, Mechanical Invasive (Adult)  Intervention: Prevent Airway Displacement/Mechanical Dysfunction   07/18/18 0000   Prevent Airway Displacement/Mechanical Dysfunction   Airway Safety Measures manual resuscitator at bedside;suction at bedside     Intervention: Prevent Airway-Related Skin/Tissue Breakdown   07/18/18 0000   Skin Interventions   Device Skin Pressure Protection skin-to-device areas padded     Intervention: Prevent Ventilator-Induced Lung Injury   07/17/18 2110   Respiratory Interventions   Lung Protection Measures low tidal volume provided;low inspiratory pressure provided     Intervention: Promote/Provide Early Rehabilitation Treatment/Mobility Program   07/17/18 1600 07/18/18 0100 07/18/18 0150   Prevent Contractures/Hypertrophic Scarring   Range of Motion Bilateral Upper and Lower Extremities --  --    LUE Range of Motion --  --  AROM (active range of motion) and PROM (passive range of motion) performed   RUE Range of Motion --  --  AROM (active range of motion) and PROM (passive range of motion) performed   LLE Range of Motion --  --  AROM (active range of motion) and PROM (passive range of motion) performed   RLE Range of Motion --  --  AROM (active range of motion) and PROM (passive range of motion) performed   Activity   Activity Management --  bedrest --      Intervention: Promote Early  Weaning/Extubation   07/18/18 0000   Coping/Psychosocial Interventions   Environmental Support calm environment promoted   Pain/Comfort/Sleep Interventions   Sleep/Rest Enhancement awakenings minimized;noise level reduced   Safety Management   Medication Review/Management medications reviewed     Intervention: Prevent Ventilator-Associated Pneumonia (VAP)   07/17/18 2002 07/18/18 0150   Positioning   Head of Bed (HOB) --  HOB at 45 degrees   VAP Prevention Measures   VAP Prevention Measures completed --    VAP Prevention Bundle HOB elevation maintained;oral care regularly provided;stress ulcer prophylaxis provided;VTE prophylaxis provided --    VAP Prevention Contraindications condition unstable --    Hygiene Care   Oral Care lip lubricant applied;swabbed with antiseptic solution;tongue brushed --      Intervention: Optimize Oxygenation/Ventilation   07/17/18 2002 07/18/18 0150   Positioning   Body Position --  supine, legs elevated;upper extremity elevated, left;upper extremity elevated, right;weight shift assistance provided   Respiratory Interventions   Airway/Ventilation Management airway patency maintained;pulmonary hygiene promoted --      Intervention: Monitor/Manage Nutrition Support   07/18/18 0000   Nutrition Interventions   Nutrition Support Management other (see comments)  (tube feeding continued)       Goal: Signs and Symptoms of Listed Potential Problems Will be Absent, Minimized or Managed (Ventilation, Mechanical Invasive)  Outcome: Ongoing (interventions implemented as appropriate)   07/18/18 0150   Goal/Outcome Evaluation   Problems Assessed (Mechanical Ventilation, Invasive) all   Problems Present (Mech Vent, Invasive) other (see comments)  (none)       Problem: Skin Injury Risk (Adult)  Intervention: Maintain Head of Bed Elevation Less Than 30 Degrees as Tolerated   07/18/18 0150   Positioning   Head of Bed (HOB) HOB at 45 degrees     Intervention: Prevent/Minimize Shear/Friction Injuries    07/18/18 0000 07/18/18 0100   Positioning   Positioning/Transfer Devices --  pillows;in use   Skin Interventions   Pressure Reduction Devices heel offloading device utilized;positioning supports utilized;pressure-redistributing mattress utilized --      Intervention: Prevent or Minimize Pressure   07/18/18 0000 07/18/18 0100   Positioning   Body Position --  supine   Skin Interventions   Pressure Reduction Techniques heels elevated off bed;weight shift assistance provided --        Goal: Skin Health and Integrity  Outcome: Ongoing (interventions implemented as appropriate)   07/18/18 0150   Skin Injury Risk (Adult)   Skin Health and Integrity making progress toward outcome       Problem: Arrhythmia/Dysrhythmia (Symptomatic) (Adult)  Intervention: Prevent/Manage Thrombi/Emboli   07/18/18 0000   Interventions   VTE Prevention/Management bilateral;sequential compression devices off   Safety Interventions   Bleeding Precautions blood pressure closely monitored;monitored for signs of bleeding       Goal: Signs and Symptoms of Listed Potential Problems Will be Absent, Minimized or Managed (Arrhythmia/Dysrhythmia)  Outcome: Ongoing (interventions implemented as appropriate)   07/18/18 0150   Goal/Outcome Evaluation   Problems Assessed (Arrhythmia/Dysrhythmia) all   Problems Present (Dysrhythmia) electrophysiologic conduction defect       Problem: Nutrition, Enteral (Adult)  Intervention: Manage Aspiration Risk   07/18/18 0000   Safety Interventions   Aspiration Precautions tube feeding placement verified;upright posture maintained     Intervention: Monitor/Manage Fluid Electrolyte Balance   07/18/18 0000   Nutrition Interventions   Fluid/Electrolyte Management other (see comments)  (ivf's continued per order)     Intervention: Monitor/Manage Nutrition Support   07/18/18 0000   Nutrition Interventions   Nutrition Support Management other (see comments)  (tube feeding continued)     Intervention: Prevent Feeding-Related  Adverse Events   07/17/18 2002 07/18/18 0000   Safety Management   Medication Review/Management --  medications reviewed   Hygiene Care   Oral Care lip lubricant applied;swabbed with antiseptic solution;tongue brushed --      Intervention: Promote Alla-Tube Skin/Mucosal Integrity   07/18/18 0000   Skin Interventions   Device Skin Pressure Protection skin-to-device areas padded         Problem: Fall Risk (Adult)  Intervention: Monitor/Assist with Self Care   07/18/18 0100   Activity   Activity Assistance Provided assistance, 2 people     Intervention: Reduce Risk/Promote Restraint Free Environment   07/18/18 0100   Safety Management   Environmental Safety Modification assistive device/personal items within reach;clutter free environment maintained;room organization consistent;room near unit station   Safety Promotion/Fall Prevention safety round/check completed     Intervention: Review Medications/Identify Contributors to Fall Risk   07/18/18 0000   Safety Management   Medication Review/Management medications reviewed       Goal: Absence of Fall  Outcome: Ongoing (interventions implemented as appropriate)   07/18/18 0150   Fall Risk (Adult)   Absence of Fall making progress toward outcome

## 2018-07-18 NOTE — THERAPY EVALUATION
Acute Care - Physical Therapy Initial Evaluation   [place]     Patient Name: Breann Gallegos  : 1950  MRN: [id number]  Today's Date: 2018   Onset of Illness/Injury or Date of Surgery: (P) 18  Date of Referral to PT: (P) 18  Referring Physician: (P) Dr. Kirby      Admit Date: 2018    Visit Dx:     ICD-10-CM ICD-9-CM   1. Pneumonia of both lungs due to infectious organism, unspecified part of lung J18.9 483.8   2. Acute on chronic respiratory failure with hypoxia (CMS/HCC) J96.21 518.84     799.02   3. Impaired functional mobility, balance, gait, and endurance Z74.09 V49.89     Patient Active Problem List   Diagnosis   • Dysphagia   • Abnormal urinalysis   • Anxiety   • Chronic obstructive pulmonary disease (CMS/HCC)   • Depression   • Diabetes mellitus (CMS/HCC)   • Diabetic peripheral neuropathy (CMS/HCC)   • Diarrhea   • Esophageal dysmotility   • Foot pain   • Gastroesophageal reflux disease without esophagitis   • Hyperlipidemia   • Hypertension   • Insomnia   • Irritable bowel syndrome   • Localized swelling, mass and lump, lower limb   • Low back pain   • Abnormal mammogram   • Enlargement of neck   • Neck pain   • Thyroid nodule   • Osteoarthritis of knee   • Osteoarthritis   • Osteopenia   • Peripheral arterial occlusive disease (CMS/HCC)   • Nerve root disorder   • Restless legs syndrome   • Tension headache   • Vitamin D deficiency   • Erythrasma   • Unsteady gait   • Headache   • Chronic left shoulder pain   • Esophageal stricture   • Other dysphagia   • Magnesium deficiency   • Pneumonia of both lungs due to infectious organism   • Acute respiratory failure with hypoxia (CMS/HCC)   • Sinus tachycardia     Past Medical History:   Diagnosis Date   • Arthritis    • Body piercing     EARS   • COPD (chronic obstructive pulmonary disease) (CMS/HCC)    • Diabetes mellitus (CMS/HCC)    • Dysphagia     REPORTS SHE FEELS LIKE THINGS ARE GETTING STUCK IN HER THROAT FOR THE  PAST SEVERAL MONTHS   • Eustachian tube dysfunction    • Fracture     METATRASAL   • Full dentures     INSTRUCTED NO ADHESIVES THE DOS   • GERD (gastroesophageal reflux disease)    • High cholesterol    • History of nuclear stress test     REPORTS 3-4 YEARS AGO AND THAT ALL WAS WNL'S   • Hyperlipidemia    • Hypertension    • Sinus problem    • Skin abscess    • Vision problem    • Vitamin B12 deficiency    • Vitamin D deficiency    • Wears glasses      Past Surgical History:   Procedure Laterality Date   • APPENDECTOMY  1976   • BREAST SURGERY      REDUCTION   • CATARACT EXTRACTION Bilateral    • CHOLECYSTECTOMY  1976   • COLONOSCOPY     • ENDOSCOPY     • ENDOSCOPY N/A 11/29/2017    Procedure: ESOPHAGOGASTRODUODENOSCOPY with biopsies and esophageal balloon dilitation;  Surgeon: Molina Dumont MD;  Location: [facility] ENDOSCOPY;  Service:    • ENDOSCOPY N/A 5/2/2018    Procedure: ESOPHAGOGASTRODUODENOSCOPY WITH ESOPHAGEAL BALLOON DILITATION (15-16.5-18 MM);  Surgeon: Molina Dumont MD;  Location: [facility] ENDOSCOPY;  Service: Gastroenterology   • HAND SURGERY Right    • HYSTERECTOMY  1979   • OOPHORECTOMY Bilateral 1979   • OTHER SURGICAL HISTORY      BLADDER TACK   • REDUCTION MAMMAPLASTY Bilateral 1988   • TONSILLECTOMY          PT ASSESSMENT (last 12 hours)      Physical Therapy Evaluation     Row Name 07/18/18 1403          PT Evaluation Time/Intention    Subjective Information (P)  no complaints  -KO     Document Type (P)  evaluation  -KO     Mode of Treatment (P)  physical therapy  -KO     Patient Effort (P)  adequate  -KO     Symptoms Noted During/After Treatment (P)  fatigue  -KO     Row Name 07/18/18 1408          General Information    Patient Profile Reviewed? (P)  yes  -KO     Onset of Illness/Injury or Date of Surgery (P)  07/13/18  -KO     Referring Physician (P)  Dr. Kirby  -KO     Patient Observations (P)  cooperative;agree to therapy  -KO     Patient/Family Observations (P)  Pt alone in room  -KO      General Observations of Patient (P)  Pt received supine IV intact, mechanical ventilation  -KO     Prior Level of Function (P)  independent:;ADL's  -KO     Pertinent History of Current Functional Problem (P)  acute respiratory failure with hypoxia  -KO     Existing Precautions/Restrictions (P)  fall;oxygen therapy device and L/min  -KO     Risks Reviewed (P)  patient:;increased discomfort  -KO     Benefits Reviewed (P)  patient:;improve function;increase independence;increase strength  -KO     Barriers to Rehab (P)  medically complex  -KO     Row Name 07/18/18 1403          Relationship/Environment    Primary Source of Support/Comfort (P)  spouse  -KO     Lives With (P)  spouse  -KO     Family Caregiver if Needed (P)  spouse  -KO     Row Name 07/18/18 1403          Resource/Environmental Concerns    Current Living Arrangements (P)  home/apartment/condo  -KO     Row Name 07/18/18 1403          Cognitive Assessment/Intervention- PT/OT    Orientation Status (Cognition) (P)  unable/difficult to assess  -KO     Follows Commands (Cognition) (P)  follows one step commands;delayed response/completion;increased processing time needed;physical/tactile prompts required;repetition of directions required;verbal cues/prompting required  -KO     Safety Deficit (Cognitive) (P)  unable/difficult to assess  -KO     Row Name 07/18/18 1403          Safety Issues, Functional Mobility    Impairments Affecting Function (Mobility) (P)  shortness of breath;strength;endurance/activity tolerance;motor control;postural/trunk control  -KO     Row Name 07/18/18 1403          Bed Mobility Assessment/Treatment    Comment (Bed Mobility) (P)  unable to assess on this date  -KO     Row Name 07/18/18 1403          Transfer Assessment/Treatment    Comment (Transfers) (P)  unable to assess on this date  -KO     Row Name 07/18/18 1403          Gait/Stairs Assessment/Training    Comment (Gait/Stairs) (P)  unable to assess on this date  -KO     Row Name  07/18/18 1403          General ROM    GENERAL ROM COMMENTS (P)  PROM grossly WFL  -BHAVESH     [facility] Name 07/18/18 1403          General Assessment (Manual Muscle Testing)    General Manual Muscle Testing (MMT) Assessment (P)  --  -BHAVESH     [facility] Name 07/18/18 1403          Upper Extremity (Manual Muscle Testing)    Comment, MMT: Upper Extremity (P)  grossly 1/5, pt able to initiate muscles, but unable to move on own  -BHAVESH     [facility] Name 07/18/18 1403          Lower Extremity (Manual Muscle Testing)    Comment, MMT: Lower Extremity (P)  hip/knee flexors 1/5, hip/knee extensors gastroc 2-/5  -BHAVESH     [facility] Name 07/18/18 1403          Motor Assessment/Intervention    Additional Documentation (P)  Therapeutic Exercise (Group);Therapeutic Exercise Interventions (Group)  -BHAVESH     Row Name 07/18/18 1403          Pain Assessment    Additional Documentation (P)  Pain Scale: Numbers Pre/Post-Treatment (Group)  -BHAVESH     Row Name 07/18/18 1403          Pain Scale: Numbers Pre/Post-Treatment    Pain Scale: Numbers, Pretreatment (P)  0/10 - no pain  -KO     Pain Scale: Numbers, Post-Treatment (P)  0/10 - no pain  -BHAVESH     Row Name 07/18/18 1403          Coping    Observed Emotional State (P)  accepting;calm;cooperative  -KO     Verbalized Emotional State (P)  acceptance  -PedidosYa / PedidosJÃ¡     [facility] Name 07/18/18 1403          Plan of Care Review    Plan of Care Reviewed With (P)  patient  -BHAVESH     Row Name 07/18/18 1403          Physical Therapy Clinical Impression    Date of Referral to PT (P)  07/17/18  -KO     PT Diagnosis (PT Clinical Impression) (P)  muscle weakness, decreased activity tolerance, impaired trunk control  -KO     Criteria for Skilled Interventions Met (PT Clinical Impression) (P)  yes;treatment indicated  -KO     Pathology/Pathophysiology Noted (Describe Specifically for Each System) (P)  musculoskeletal;cardiovascular;pulmonary;endocrine/metabolic  -KO     Impairments Found (describe specific impairments) (P)  aerobic  capacity/endurance;arousal, attention, and cognition;gait, locomotion, and balance;muscle performance  -KO     Rehab Potential (PT Clinical Summary) (P)  fair, will monitor progress closely  -KO     Care Plan Review (PT) (P)  evaluation/treatment results reviewed;care plan/treatment goals reviewed;risks/benefits reviewed;current/potential barriers reviewed;patient/other agree to care plan  -KO     Care Plan Review, Other Participant (PT Clinical Impression) (P)  spouse  -KO     Row Name 07/18/18 1403          Vital Signs    Pre SpO2 (%) (P)  96  -KO     O2 Delivery Pre Treatment (P)  ventilator  -KO     Intra SpO2 (%) (P)  97  -KO     O2 Delivery Intra Treatment (P)  supplemental O2  -KO     Post SpO2 (%) (P)  96  -KO     O2 Delivery Post Treatment (P)  supplemental O2  -KO     Pre Patient Position (P)  Supine  -KO     Intra Patient Position (P)  Supine  -KO     Post Patient Position (P)  Supine  -KO     Row Name 07/18/18 1403          Physical Therapy Goals    Bed Mobility Goal Selection (PT) (P)  bed mobility, PT goal 1  -KO     Transfer Goal Selection (PT) (P)  transfer, PT goal 1  -KO     Gait Training Goal Selection (PT) (P)  gait training, PT goal 1  -KO     Row Name 07/18/18 1403          Bed Mobility Goal 1 (PT)    Activity/Assistive Device (Bed Mobility Goal 1, PT) (P)  supine to sit;sit to supine;rolling to left;rolling to right  -KO     [place] Level/Cues Needed (Bed Mobility Goal 1, PT) (P)  minimum assist (75% or more patient effort)  -KO     Time Frame (Bed Mobility Goal 1, PT) (P)  2 weeks  -KO     Progress/Outcomes (Bed Mobility Goal 1, PT) (P)  goal ongoing  -KO     Row Name 07/18/18 1403          Transfer Goal 1 (PT)    Activity/Assistive Device (Transfer Goal 1, PT) (P)  sit-to-stand/stand-to-sit;walker, rolling  -KO     [place] Level/Cues Needed (Transfer Goal 1, PT) (P)  minimum assist (75% or more patient effort)  -KO     Time Frame (Transfer Goal 1, PT) (P)  2 weeks  -KO      Progress/Outcome (Transfer Goal 1, PT) (P)  goal ongoing  -KO     Row Name 07/18/18 1403          Gait Training Goal 1 (PT)    Activity/Assistive Device (Gait Training Goal 1, PT) (P)  gait (walking locomotion);decrease fall risk;improve balance and speed;increase endurance/gait distance;walker, rolling;assistive device use  -KO     [place] Level (Gait Training Goal 1, PT) (P)  minimum assist (75% or more patient effort)  -KO     Distance (Gait Goal 1, PT) (P)  25  -KO     Time Frame (Gait Training Goal 1, PT) (P)  2 weeks  -KO     Progress/Outcome (Gait Training Goal 1, PT) (P)  goal ongoing  -KO     Row Name 07/18/18 1403          Patient Education Goal (PT)    Activity (Patient Education Goal, PT) (P)  perform HEP X 15  -KO     [place]/Cues/Accuracy (Memory Goal 2, PT) (P)  demonstrates adequately  -KO     Time Frame (Patient Education Goal, PT) (P)  2 weeks  -KO     Progress/Outcome (Patient Education Goal, PT) (P)  goal ongoing  -KO     Row Name 07/18/18 1403          Positioning and Restraints    Pre-Treatment Position (P)  in bed  -KO     Post Treatment Position (P)  bed  -KO     In Bed (P)  supine;call light within reach;encouraged to call for assist;patient within staff view;with family/caregiver  -KO     Restraints (P)  reapplied:;soft limb  -KO     Row Name 07/18/18 1403          Living Environment    Home Accessibility (P)  other (see comments)   unable to assess fully d/t mechanical ventilation  -KO       User Key  (r) = Recorded By, (t) = Taken By, (c) = Cosigned By    Initials Name Provider Type    BHAVESH Flores, PT Student PT Student          Physical Therapy Education     Title: PT OT SLP Therapies (Active)     Topic: Physical Therapy (Active)     Point: Mobility training (Done)    Learning Progress Summary     Learner Status Readiness Method Response Comment Documented by    Patient Done Acceptance E,TB VU importance of mobility towards recovery KO 07/18/18 7863                       User Key     Initials Effective Dates Name Provider Type Discipline    BHAVESH 06/14/18 -  Merle Flores, PT Student PT Student PT                PT Recommendation and Plan  Anticipated Discharge Disposition (PT): (P) inpatient rehabilitation facility  Planned Therapy Interventions (PT Eval): (P) balance training, bed mobility training, gait training, home exercise program, neuromuscular re-education, patient/family education, postural re-education, ROM (range of motion), strengthening, transfer training  Therapy Frequency (PT Clinical Impression): (P) daily  Outcome Summary/Treatment Plan (PT)  Anticipated Discharge Disposition (PT): (P) inpatient rehabilitation facility  Plan of Care Reviewed With: (P) patient  Outcome Summary: (P) PT eval completed. Pt able to tolerate AAROM/PROM without a drop in sats with motion. Pt presents with deficits in strength and activity tolerance upon eval. Pt expected to benefit from PT services to improve strength, endurance, trunk control, and overall functional mobility prior to DC.          Outcome Measures     Row Name 07/18/18 1403             How much help from another person do you currently need...    Turning from your back to your side while in flat bed without using bedrails? (P)  2  -KO      Moving from lying on back to sitting on the side of a flat bed without bedrails? (P)  2  -KO      Moving to and from a bed to a chair (including a wheelchair)? (P)  1  -KO      Standing up from a chair using your arms (e.g., wheelchair, bedside chair)? (P)  1  -KO      Climbing 3-5 steps with a railing? (P)  1  -KO      To walk in hospital room? (P)  1  -KO      AM-PAC 6 Clicks Score (P)  8  -KO         Functional Assessment    Outcome Measure Options (P)  AM-PAC 6 Clicks Basic Mobility (PT)  -KO        User Key  (r) = Recorded By, (t) = Taken By, (c) = Cosigned By    Initials Name Provider Type    BHAVESH Flores, PT Student PT Student           Time Calculation:         PT Charges      Row Name 07/18/18 1527             Time Calculation    Start Time (P)  1403  -KO      PT Received On (P)  07/18/18  -BHAVESH      PT Goal Re-Cert Due Date (P)  07/28/18  -BHAVESH        User Key  (r) = Recorded By, (t) = Taken By, (c) = Cosigned By    Initials Name Provider Type    BHAVESH Flores, PT Student PT Student        Therapy Suggested Charges     Code   Minutes Charges    None           Therapy Charges for Today     Code Description Service Date Service Provider Modifiers Qty    28806418569 HC PT EVAL MOD COMPLEXITY 4 7/18/2018 Merle Flores, PT Student GP 1          PT G-Codes  Outcome Measure Options: (P) AM-PAC 6 Clicks Basic Mobility (PT)      Merle Flores PT Student  7/18/2018

## 2018-07-18 NOTE — PLAN OF CARE
Problem: Patient Care Overview  Goal: Plan of Care Review  Outcome: Ongoing (interventions implemented as appropriate)   07/18/18 1428   Coping/Psychosocial   Plan of Care Reviewed With patient   Plan of Care Review   Progress improving       Problem: Ventilation, Mechanical Invasive (Adult)  Goal: Signs and Symptoms of Listed Potential Problems Will be Absent, Minimized or Managed (Ventilation, Mechanical Invasive)  Outcome: Ongoing (interventions implemented as appropriate)   07/18/18 1428   Goal/Outcome Evaluation   Problems Assessed (Mechanical Ventilation, Invasive) immobility;gastritis/stress ulcer;artificial airway-induced skin/tissue breakdown;mechanical dysfunction;ventilator-induced lung injury   Problems Present (Mech Vent, Invasive) none

## 2018-07-18 NOTE — PROGRESS NOTES
"  CC: Acute respiratory failure    S: Intubated and sedated.    O:Vital signs reviewed. O2Sat: 97 % on 60%.  Picc Line. Day # 4. Vent Day # 4  /72   Pulse 98   Temp 99.3 °F (37.4 °C) (Oral)   Resp 22   Ht 162.6 cm (64\")   Wt 75.1 kg (165 lb 9.6 oz)   LMP  (LMP Unknown)   SpO2 99%   BMI 28.43 kg/m²     Temp (24hrs), Av.7 °F (37.1 °C), Min:98 °F (36.7 °C), Max:99.6 °F (37.6 °C)        I & Os reviewed.   Intake/Output       18 0700 - 18 0659    Intake (ml) 2673    Output (ml) 2375    Net (ml) 298    Last Weight  75.1 kg (165 lb 9.6 oz)          General: Intubated. Sedated  Eyes: PERRL.   Neck: Supple. No obvious JVD  Cardiovascular: S1 + S2. Irregular.    Respiratory: Transmitted Breath sounds noted. No wheezing heard. No significant crackles noted  Abdomen: Soft. Bowel sounds positive.  Extremities: No edema noted.  Neurologic: Sedated. Detailed exam couldn't be performed due to sedation.   Skin: Appeared without any overt rashes    Labs: Reviewed.     Results from last 7 days  Lab Units 18  0531 18  0354 18  0408   SODIUM mmol/L 145 143 144   POTASSIUM mmol/L 4.5 5.0 4.7   CHLORIDE mmol/L 109* 113* 116*   CO2 mmol/L 30.0 21.0* 21.0*   BUN mg/dL 38* 37* 41*   CREATININE mg/dL 0.80 0.80 0.70   CALCIUM mg/dL 8.7 8.4 8.4   BILIRUBIN mg/dL 0.4 0.5 0.5   ALK PHOS U/L 122 130* 100   ALT (SGPT) U/L 52 62 46   AST (SGOT) U/L 41 57* 78*   GLUCOSE mg/dL 216* 231* 77         Results from last 7 days  Lab Units 18  0531 18  0408 18  0429   MAGNESIUM mg/dL 2.3 2.4* 1.9           Results from last 7 days  Lab Units 18  0531 18  0354 18  0408 07/15/18  0403 18  0429   WBC 10*3/mm3 14.04* 14.00* 14.00* 10.04 12.34*   HEMOGLOBIN g/dL 10.3* 10.2* 9.8* 8.2* 10.0*   PLATELETS 10*3/mm3 370 307 240 238 260       diltiaZEM 5-15 mg/hr Last Rate: 10 mg/hr (18 0018)   Pharmacy to dose vancomycin     propofol 5-50 mcg/kg/min Last Rate: 35 mcg/kg/min " (07/18/18 0342)       ABG: Reviewed  Lab Results   Component Value Date    PHART 7.454 07/18/2018    BPV1ZLC 40.6 07/18/2018    PO2ART 77.3 07/18/2018    HGBBG 10.4 (L) 07/18/2018    U6HMFUWL 96.1 07/18/2018    CARBOXYHGB 1.1 07/18/2018         CXRay: Reviewed personally.  Continued improvement noted.     Assessment & Recommendations/Plan:   1.  Acute hypoxemic respiratory failure  - Continue to require mechanical ventilation  - We will adjust the ventilator settings further today.  - I asked the nursing staff to keep her oxygen saturation more than 90% and to titrate FiO2 down as much as tolerated.    2.  Multifocal pneumonia  - On broad-spectrum antibiotics.  - Sputum cultures have revealed MRSA  - We'll continue IV vancomycin and Levaquin for now  - We will discontinue cefepime.    3.  Likely ARDS versus hypersensitivity pneumonitis  - We'll continue IV steroids.  - I will not change the dose of steroids for 2 more days.  - We'll recommend continuation of steroids for 10 total days.    4.  Diabetes  - Be monitored closely    5.  Lymphadenopathy  - Likely reactive.  - We will need outpatient follow-up.    6.  COPD  - On nebulized treatments.    7.  Possible component of pulmonary edema/Fluid overload?  - Has received Lasix and Diuril.  - Echo pending.   - Will give 1 more dose of Lasix today.   - Follow strict I's and O's.    Her condition is critical and prognosis guarded but stable at this time.    We have updated the admitting attending and nursing staff, as appropriate, on the patient's current status and plan. I will be going off shift tonight and will be unavailable.     D/W Dr. Kirby.       Yamilex Tse MD  07/18/18  8:43 AM    Dictated utilizing Dragon dictation.

## 2018-07-18 NOTE — PLAN OF CARE
Problem: Ventilation, Mechanical Invasive (Adult)  Intervention: Prevent Airway Displacement/Mechanical Dysfunction   07/17/18 2110   Prevent Airway Displacement/Mechanical Dysfunction   Airway Safety Measures manual resuscitator at bedside;suction at bedside     Intervention: Prevent Airway-Related Skin/Tissue Breakdown   07/17/18 2110   Skin Interventions   Device Skin Pressure Protection skin-to-device areas padded;adhesive use limited     Intervention: Prevent Ventilator-Induced Lung Injury   07/17/18 2110   Respiratory Interventions   Lung Protection Measures low tidal volume provided;low inspiratory pressure provided       Goal: Signs and Symptoms of Listed Potential Problems Will be Absent, Minimized or Managed (Ventilation, Mechanical Invasive)   07/17/18 2110   Goal/Outcome Evaluation   Problems Assessed (Mechanical Ventilation, Invasive) ventilator-induced lung injury;mechanical dysfunction;artificial airway-induced skin/tissue breakdown;situational response   Problems Present (Mech Vent, Invasive) none

## 2018-07-18 NOTE — PLAN OF CARE
Problem: Patient Care Overview  Goal: Plan of Care Review  Outcome: Ongoing (interventions implemented as appropriate)   07/18/18 6188   Coping/Psychosocial   Plan of Care Reviewed With patient   OTHER   Outcome Summary PT eval completed. Pt able to tolerate AAROM/PROM without a drop in sats with motion. Pt presents with deficits in strength and activity tolerance upon eval. Pt expected to benefit from PT services to improve strength, endurance, trunk control, and overall functional mobility prior to DC.

## 2018-07-18 NOTE — PROGRESS NOTES
[facility]    Name:  Breann Gallegos   Age:  68 y.o.  Sex:  female  :  1950  MRN:  [id number]   Visit Number:  [id number]  Admission Date:  2018  Date Of Service:  18  Primary Care Physician:  Harry Avery MD     LOS: 5 days :      Chief Complaint:  Follow-up acute respiratory failure and pneumonia        Subjective / Interval History: The patient was seen earlier this morning.  Dr. Tse was at the bedside.  She is still sedated on mechanical ventilator.  She has been weaned FiO2 of 40% and weaning trials will start today.  The patient is sedated and unable to provide chief complaint or review of systems.  The patient appears to be in no acute distress and does not appear to have any pain, distress, or any difficulty breathing.      Review of Systems: Unable to obtain secondary to patient being sedated on mechanical ventilator        Vital Signs:    Temp:  [98 °F (36.7 °C)-99.6 °F (37.6 °C)] 99 °F (37.2 °C)  Heart Rate:  [] 112  Resp:  [18-22] 20  BP: (128-168)/(60-89) 152/79  FiO2 (%):  [40 %-60 %] 40 %    Intake and output:    I/O last 3 completed shifts:  In: 6563 [I.V.:2497; Other:2894; NG/GT:1172]  Out: 2375 [Urine:2375]  I/O this shift:  In: 50 [NG/GT:50]  Out: 2350 [Urine:2350]    Physical Examination:    General Appearance:    No acute distress.  Sedated on mechanical ventilator .   Head:    Atraumatic and normocephalic, without obvious abnormality.   Eyes:            Pinpoint and reactive pupils with stable left pupil defect, conjunctivae and sclerae normal, no Icterus. No pallor. Extra-occular movements are within normal limits.   Throat:   No oral lesions, no thrush, oral mucosa moist.   Neck:   Supple, trachea midline, no thyromegaly   Lungs:     Chest shape is normal. Breath sounds heard bilaterally equallyBut reduced.  No crackles or wheezing. No Pleural rub or bronchial breathing.    Heart:    Normal S1 and S2, no  murmur, no gallop   Abdomen:     Normal bowel sounds, no masses, no organomegaly. Soft        non-tender, non-distended, no guarding, no rebound                tenderness   Extremities:   Moves all extremities, no edema, no cyanosis, no             clubbing   Skin:   No bleeding, bruising or rash.   Neurologic:   No tremor, sensation intact, Motor power is unable to be adequately tested secondary to sedation    Laboratory results:    Lab Results (last 24 hours)     Procedure Component Value Units Date/Time    POC Glucose Once [[id number]]  (Abnormal) Collected:  07/18/18 1111    Specimen:  Blood Updated:  07/18/18 1116     Glucose 191 (H) mg/dL      Comment: Serial Number: [id number]:  [id number]       Blood Culture With OSMEL - Blood, [[id number]]  (Normal) Collected:  07/13/18 0602    Specimen:  Blood from Hand, Right Updated:  07/18/18 0715     Blood Culture No growth at 5 days    Blood Culture With OSMEL - Blood, [[id number]]  (Normal) Collected:  07/13/18 0614    Specimen:  Blood from Arm, Left Updated:  07/18/18 0715     Blood Culture No growth at 5 days    Vancomycin, Trough [[id number]]  (Normal) Collected:  07/18/18 0531    Specimen:  Blood Updated:  07/18/18 0622     Vancomycin Trough 13.87 mcg/mL     POC Glucose Once [[id number]]  (Abnormal) Collected:  07/18/18 0607    Specimen:  Blood Updated:  07/18/18 0611     Glucose 218 (H) mg/dL      Comment: Serial Number: [id number]:  [id number]       Comprehensive Metabolic Panel [[id number]]  (Abnormal) Collected:  07/18/18 0531    Specimen:  Blood Updated:  07/18/18 0605     Glucose 216 (H) mg/dL      Comment: Glucose >180, Hemoglobin A1C recommended.        BUN 38 (H) mg/dL      Creatinine 0.80 mg/dL      Sodium 145 mmol/L      Potassium 4.5 mmol/L      Chloride 109 (H) mmol/L      CO2 30.0 mmol/L      Calcium 8.7 mg/dL      Total Protein 6.3 g/dL      Albumin 3.10 (L) g/dL      ALT (SGPT) 52 U/L      AST (SGOT) 41 U/L      Alkaline Phosphatase 122 U/L       Total Bilirubin 0.4 mg/dL      eGFR Non African Amer 72 mL/min/1.73      Globulin 3.2 gm/dL      A/G Ratio 1.0 g/dL      BUN/Creatinine Ratio 47.5 (H)     Anion Gap 10.5 mmol/L     Narrative:       GFR Normal >60  Chronic Kidney Disease <60  Kidney Failure <15    Magnesium [[id number]]  (Normal) Collected:  07/18/18 0531    Specimen:  Blood Updated:  07/18/18 0605     Magnesium 2.3 mg/dL     CBC & Differential [[id number]] Collected:  07/18/18 0531    Specimen:  Blood Updated:  07/18/18 0556    Narrative:       The following orders were created for panel order CBC & Differential.  Procedure                               Abnormality         Status                     ---------                               -----------         ------                     CBC Auto Differential[[id number]]        Abnormal            Final result                 Please view results for these tests on the individual orders.    CBC Auto Differential [[id number]]  (Abnormal) Collected:  07/18/18 0531    Specimen:  Blood Updated:  07/18/18 0556     WBC 14.04 (H) 10*3/mm3      RBC 3.86 (L) 10*6/mm3      Hemoglobin 10.3 (L) g/dL      Hematocrit 32.7 (L) %      MCV 84.7 fL      MCH 26.7 (L) pg      MCHC 31.5 g/dL      RDW 15.2 (H) %      RDW-SD 46.8 fl      MPV 10.5 fL      Platelets 370 10*3/mm3      Neutrophil % 78.8 %      Lymphocyte % 9.0 (L) %      Monocyte % 9.9 %      Eosinophil % 0.1 %      Basophil % 0.1 %      Immature Grans % 2.1 (H) %      Neutrophils, Absolute 11.06 (H) 10*3/mm3      Lymphocytes, Absolute 1.26 10*3/mm3      Monocytes, Absolute 1.39 (H) 10*3/mm3      Eosinophils, Absolute 0.01 10*3/mm3      Basophils, Absolute 0.02 10*3/mm3      Immature Grans, Absolute 0.30 (H) 10*3/mm3      nRBC 0.3 (H) /100 WBC     Blood Gas, Arterial With Co-Ox [[id number]]  (Abnormal) Collected:  07/18/18 0516    Specimen:  Arterial Blood Updated:  07/18/18 0516     Site Right Radial     Toy's Test Positive     pH, Arterial 7.454 pH units       pCO2, Arterial 40.6 mm Hg      pO2, Arterial 77.3 mm Hg      HCO3, Arterial 28.5 (H) mmol/L      Base Excess, Arterial 4.2 (H) mmol/L      O2 Saturation, Arterial 96.1 %      Hemoglobin, Blood Gas 10.4 (L) g/dL      Hematocrit, Blood Gas 32.0 %      Oxyhemoglobin 94.4 %      Methemoglobin 0.70 %      Carboxyhemoglobin 1.1 %      Barometric Pressure for Blood Gas 734 mmHg      Modality Ventilator     FIO2 60 %      Ventilator Mode PC     Set Mech Resp Rate 20.0     PEEP 8.0     PIP 20 cmH2O      Collected by [id number]     pH, Temp Corrected -- pH Units      pCO2, Temperature Corrected -- mm Hg      pO2, Temperature Corrected -- mm Hg     POC Glucose Once [[id number]]  (Abnormal) Collected:  07/17/18 2348    Specimen:  Blood Updated:  07/17/18 2355     Glucose 272 (H) mg/dL      Comment: Serial Number: [id number]:  [id number]       POC Glucose Once [[id number]]  (Abnormal) Collected:  07/17/18 1657    Specimen:  Blood Updated:  07/17/18 1705     Glucose 258 (H) mg/dL      Comment: Serial Number: [id number]:  [id number]       S. Pneumo Ag Urine or CSF - Urine, Urine, Clean Catch [[id number]] Collected:  07/13/18 0925    Specimen:  Urine from Urine, Clean Catch Updated:  07/17/18 1412     Specimen Source Urine     STREP PNEUMONIAE ANTIGEN Negative     Body Fluid Culture, Sterile Not Indicated     Organism ID Not indicated.     Please note Comment     Comment: [employer] standards require a culture to be  performed on CSF specimens submitted for bacterial antigen testing.  (CAP SERGO.02850) Urine specimens will not be cultured.       Narrative:       Performed at:  [address]  [id number]  : Nikhil Granger MD, Phone:  [phone]          I have reviewed the patient's laboratory results.    Radiology results:    Imaging Results (last 24 hours)     Procedure Component Value Units Date/Time    XR Chest 1 View [[id number]] Collected:  07/18/18  0957     Updated:  07/18/18 1003    Narrative:       PORTABLE CHEST     INDICATION: Pneumonia. Respiratory failure.     FINDINGS: Single frontal portable chest, compared with 07/17/2018. EKG  leads overlie the chest. Right-sided PICC line remains in place. The  endotracheal tube and nasogastric tube are again identified. Heart size  is normal. No pneumothorax. Improvement in both lungs with regards to  aeration and improving infiltrates. Diffuse infiltrates do persist but  have somewhat improved diffusely. No pneumothorax or significant  effusion.       Impression:       Improving aeration of the lungs. Continued follow-up  recommended.     This report was finalized on 7/18/2018 10:01 AM by Tone Naylor MD.          I have reviewed the patient's radiology reports.    Medication Review:     I have reviewed the patients active and prn medications.     Assessment:  1. Multifocal bilateral pneumonia, prior to admission, with MRSA , Improving  2. Acute hypoxic respiratory failure severe, requiring intubation and placement on mechanical ventilator 7-14-18 with  ARDS, improving  3. New onset Afib with RVR, previous sinus tachycardia, improved on diltiazem and digoxin  4. Sepsis, secondary to pneumonia, prior to admission, with MRSA, improved  5. Diabetes mellitus type 2 insulin dependent  6. Chronic essential hypertension  7. COPD moderate  8. Abnormal CT chest with lymphadenopathy, repeat outpatient CT scan chest             Plan:  Weaning trial and hopeful for extubation today. Discussed the case with Dr Tse. Tube feeding on hold. Propofol on hold. Pneumonia appears improving still. Continue vancomycin and treat MRSA severe pneumonia for total of 14 days.  Her Levaquin and ceftriaxone and azithromycin were stopped.  Consider Zyvox for later discharge.  For now, would continue her bilateral wrist restraints and Mariee catheter.    Continue digoxin, diltiazem iv for now. Afib rate controlled. If extubated, will convert to  oral therapy.    Protonix. Pepcid. Full code. Anticipate more than 2-3 more days will be needed.   STill critical status. Prognosis guarded.    Medication risks and benefits were discussed in detail. Patient reported satisfaction with care delivered and treatment plan.     Ese Kirby DO  07/18/18  1:49 PM

## 2018-07-19 ENCOUNTER — APPOINTMENT (OUTPATIENT)
Dept: GENERAL RADIOLOGY | Facility: HOSPITAL | Age: 68
End: 2018-07-19

## 2018-07-19 LAB
ALBUMIN SERPL-MCNC: 3.6 G/DL (ref 3.5–5)
ALBUMIN/GLOB SERPL: 1.1 G/DL (ref 1–2)
ALP SERPL-CCNC: 117 U/L (ref 38–126)
ALT SERPL W P-5'-P-CCNC: 64 U/L (ref 13–69)
ANION GAP SERPL CALCULATED.3IONS-SCNC: 11.1 MMOL/L (ref 10–20)
ARTERIAL PATENCY WRIST A: POSITIVE
AST SERPL-CCNC: 56 U/L (ref 15–46)
ATMOSPHERIC PRESS: 734 MMHG
BASE EXCESS BLDA CALC-SCNC: 11.5 MMOL/L (ref 0–2)
BASOPHILS # BLD AUTO: 0.04 10*3/MM3 (ref 0–0.2)
BASOPHILS NFR BLD AUTO: 0.2 % (ref 0–2.5)
BDY SITE: ABNORMAL
BILIRUB SERPL-MCNC: 0.8 MG/DL (ref 0.2–1.3)
BUN BLD-MCNC: 35 MG/DL (ref 7–20)
BUN/CREAT SERPL: 58.3 (ref 7.1–23.5)
CALCIUM SPEC-SCNC: 9 MG/DL (ref 8.4–10.2)
CHLORIDE SERPL-SCNC: 101 MMOL/L (ref 98–107)
CO2 SERPL-SCNC: 36 MMOL/L (ref 26–30)
COHGB MFR BLD: 1.3 % (ref 0–2)
CREAT BLD-MCNC: 0.6 MG/DL (ref 0.6–1.3)
DEPRECATED RDW RBC AUTO: 46 FL (ref 37–54)
EOSINOPHIL # BLD AUTO: 0.07 10*3/MM3 (ref 0–0.7)
EOSINOPHIL NFR BLD AUTO: 0.4 % (ref 0–7)
ERYTHROCYTE [DISTWIDTH] IN BLOOD BY AUTOMATED COUNT: 15.1 % (ref 11.5–14.5)
GFR SERPL CREATININE-BSD FRML MDRD: 100 ML/MIN/1.73
GLOBULIN UR ELPH-MCNC: 3.3 GM/DL
GLUCOSE BLD-MCNC: 98 MG/DL (ref 74–98)
GLUCOSE BLDC GLUCOMTR-MCNC: 118 MG/DL (ref 70–130)
GLUCOSE BLDC GLUCOMTR-MCNC: 145 MG/DL (ref 70–130)
GLUCOSE BLDC GLUCOMTR-MCNC: 151 MG/DL (ref 70–130)
GLUCOSE BLDC GLUCOMTR-MCNC: 208 MG/DL (ref 70–130)
GLUCOSE BLDC GLUCOMTR-MCNC: 77 MG/DL (ref 70–130)
HCO3 BLDA-SCNC: 35.6 MMOL/L (ref 22–28)
HCT VFR BLD AUTO: 39.1 % (ref 37–47)
HCT VFR BLD CALC: 37.7 %
HGB BLD-MCNC: 12.5 G/DL (ref 12–16)
HGB BLDA-MCNC: 12.3 G/DL (ref 12–18)
HOROWITZ INDEX BLD+IHG-RTO: 100 %
IMM GRANULOCYTES # BLD: 0.37 10*3/MM3 (ref 0–0.06)
IMM GRANULOCYTES NFR BLD: 2.2 % (ref 0–0.6)
LYMPHOCYTES # BLD AUTO: 2.36 10*3/MM3 (ref 0.6–3.4)
LYMPHOCYTES NFR BLD AUTO: 13.9 % (ref 10–50)
Lab: ABNORMAL
MAGNESIUM SERPL-MCNC: 2.1 MG/DL (ref 1.6–2.3)
MCH RBC QN AUTO: 27.1 PG (ref 27–31)
MCHC RBC AUTO-ENTMCNC: 32 G/DL (ref 30–37)
MCV RBC AUTO: 84.6 FL (ref 81–99)
METHGB BLD QL: 0.6 % (ref 0–1.5)
MODALITY: ABNORMAL
MONOCYTES # BLD AUTO: 1.38 10*3/MM3 (ref 0–0.9)
MONOCYTES NFR BLD AUTO: 8.2 % (ref 0–12)
NEUTROPHILS # BLD AUTO: 12.7 10*3/MM3 (ref 2–6.9)
NEUTROPHILS NFR BLD AUTO: 75.1 % (ref 37–80)
NRBC BLD MANUAL-RTO: 0.1 /100 WBC (ref 0–0)
OXYHGB MFR BLDV: 89.1 % (ref 94–99)
PCO2 BLDA: 43.8 MM HG (ref 35–45)
PCO2 TEMP ADJ BLD: ABNORMAL MM HG (ref 35–45)
PH BLDA: 7.52 PH UNITS (ref 7.3–7.5)
PH, TEMP CORRECTED: ABNORMAL PH UNITS
PLATELET # BLD AUTO: 431 10*3/MM3 (ref 130–400)
PMV BLD AUTO: 10.4 FL (ref 6–12)
PO2 BLDA: 56.9 MM HG (ref 75–100)
PO2 TEMP ADJ BLD: ABNORMAL MM HG (ref 83–108)
POTASSIUM BLD-SCNC: 4.1 MMOL/L (ref 3.5–5.1)
PROT SERPL-MCNC: 6.9 G/DL (ref 6.3–8.2)
RBC # BLD AUTO: 4.62 10*6/MM3 (ref 4.2–5.4)
SAO2 % BLDCOA: 90.8 % (ref 94–100)
SODIUM BLD-SCNC: 144 MMOL/L (ref 137–145)
VENTILATOR MODE: ABNORMAL
WBC NRBC COR # BLD: 16.92 10*3/MM3 (ref 4.8–10.8)

## 2018-07-19 PROCEDURE — 99232 SBSQ HOSP IP/OBS MODERATE 35: CPT | Performed by: FAMILY MEDICINE

## 2018-07-19 PROCEDURE — 97166 OT EVAL MOD COMPLEX 45 MIN: CPT

## 2018-07-19 PROCEDURE — 63710000001 INSULIN REGULAR HUMAN PER 5 UNITS: Performed by: HOSPITALIST

## 2018-07-19 PROCEDURE — 83735 ASSAY OF MAGNESIUM: CPT | Performed by: FAMILY MEDICINE

## 2018-07-19 PROCEDURE — 63710000001 INSULIN DETEMIR PER 5 UNITS: Performed by: FAMILY MEDICINE

## 2018-07-19 PROCEDURE — 92610 EVALUATE SWALLOWING FUNCTION: CPT

## 2018-07-19 PROCEDURE — 99232 SBSQ HOSP IP/OBS MODERATE 35: CPT | Performed by: INTERNAL MEDICINE

## 2018-07-19 PROCEDURE — 71045 X-RAY EXAM CHEST 1 VIEW: CPT

## 2018-07-19 PROCEDURE — 83050 HGB METHEMOGLOBIN QUAN: CPT

## 2018-07-19 PROCEDURE — 82375 ASSAY CARBOXYHB QUANT: CPT

## 2018-07-19 PROCEDURE — 94799 UNLISTED PULMONARY SVC/PX: CPT

## 2018-07-19 PROCEDURE — 97110 THERAPEUTIC EXERCISES: CPT

## 2018-07-19 PROCEDURE — 25010000002 DIGOXIN PER 500 MCG: Performed by: FAMILY MEDICINE

## 2018-07-19 PROCEDURE — 36600 WITHDRAWAL OF ARTERIAL BLOOD: CPT

## 2018-07-19 PROCEDURE — 80053 COMPREHEN METABOLIC PANEL: CPT | Performed by: FAMILY MEDICINE

## 2018-07-19 PROCEDURE — 82805 BLOOD GASES W/O2 SATURATION: CPT

## 2018-07-19 PROCEDURE — 25010000002 MORPHINE SULFATE (PF) 2 MG/ML SOLUTION: Performed by: INTERNAL MEDICINE

## 2018-07-19 PROCEDURE — 94660 CPAP INITIATION&MGMT: CPT

## 2018-07-19 PROCEDURE — 25010000002 METHYLPREDNISOLONE PER 125 MG: Performed by: NURSE PRACTITIONER

## 2018-07-19 PROCEDURE — 25010000002 VANCOMYCIN PER 500 MG: Performed by: FAMILY MEDICINE

## 2018-07-19 PROCEDURE — 82962 GLUCOSE BLOOD TEST: CPT

## 2018-07-19 PROCEDURE — 25010000002 ENOXAPARIN PER 10 MG: Performed by: INTERNAL MEDICINE

## 2018-07-19 PROCEDURE — 85025 COMPLETE CBC W/AUTO DIFF WBC: CPT | Performed by: FAMILY MEDICINE

## 2018-07-19 RX ORDER — DILTIAZEM HYDROCHLORIDE 60 MG/1
60 TABLET, FILM COATED ORAL EVERY 6 HOURS SCHEDULED
Status: DISCONTINUED | OUTPATIENT
Start: 2018-07-19 | End: 2018-07-19

## 2018-07-19 RX ORDER — DILTIAZEM HYDROCHLORIDE 60 MG/1
60 TABLET, FILM COATED ORAL EVERY 6 HOURS SCHEDULED
Status: DISCONTINUED | OUTPATIENT
Start: 2018-07-19 | End: 2018-07-22

## 2018-07-19 RX ADMIN — NYSTATIN 500000 UNITS: 100000 SUSPENSION ORAL at 17:40

## 2018-07-19 RX ADMIN — DILTIAZEM HYDROCHLORIDE 60 MG: 60 TABLET, FILM COATED ORAL at 08:09

## 2018-07-19 RX ADMIN — METHYLPREDNISOLONE SODIUM SUCCINATE 60 MG: 125 INJECTION, POWDER, FOR SOLUTION INTRAMUSCULAR; INTRAVENOUS at 22:58

## 2018-07-19 RX ADMIN — SERTRALINE HYDROCHLORIDE 100 MG: 50 TABLET ORAL at 08:06

## 2018-07-19 RX ADMIN — IPRATROPIUM BROMIDE AND ALBUTEROL SULFATE 3 ML: .5; 3 SOLUTION RESPIRATORY (INHALATION) at 23:08

## 2018-07-19 RX ADMIN — DILTIAZEM HYDROCHLORIDE 10 MG/HR: 5 INJECTION INTRAVENOUS at 01:42

## 2018-07-19 RX ADMIN — FLUTICASONE PROPIONATE 2 SPRAY: 50 SPRAY, METERED NASAL at 08:11

## 2018-07-19 RX ADMIN — APIXABAN 5 MG: 2.5 TABLET, FILM COATED ORAL at 11:52

## 2018-07-19 RX ADMIN — IPRATROPIUM BROMIDE AND ALBUTEROL SULFATE 3 ML: .5; 3 SOLUTION RESPIRATORY (INHALATION) at 03:43

## 2018-07-19 RX ADMIN — INSULIN DETEMIR 25 UNITS: 100 INJECTION, SOLUTION SUBCUTANEOUS at 22:57

## 2018-07-19 RX ADMIN — CLOTRIMAZOLE AND BETAMETHASONE DIPROPIONATE 1 APPLICATION: 10; .5 CREAM TOPICAL at 08:10

## 2018-07-19 RX ADMIN — DILTIAZEM HYDROCHLORIDE 60 MG: 60 TABLET, FILM COATED ORAL at 17:40

## 2018-07-19 RX ADMIN — MUPIROCIN: 20 OINTMENT TOPICAL at 08:11

## 2018-07-19 RX ADMIN — BUPROPION HYDROCHLORIDE 75 MG: 75 TABLET, FILM COATED ORAL at 08:09

## 2018-07-19 RX ADMIN — METHYLPREDNISOLONE SODIUM SUCCINATE 60 MG: 125 INJECTION, POWDER, FOR SOLUTION INTRAMUSCULAR; INTRAVENOUS at 11:52

## 2018-07-19 RX ADMIN — BUDESONIDE 1 MG: 0.5 INHALANT RESPIRATORY (INHALATION) at 07:03

## 2018-07-19 RX ADMIN — GABAPENTIN 300 MG: 300 CAPSULE ORAL at 22:58

## 2018-07-19 RX ADMIN — GABAPENTIN 300 MG: 300 CAPSULE ORAL at 08:06

## 2018-07-19 RX ADMIN — NYSTATIN 500000 UNITS: 100000 SUSPENSION ORAL at 22:57

## 2018-07-19 RX ADMIN — IPRATROPIUM BROMIDE AND ALBUTEROL SULFATE 3 ML: .5; 3 SOLUTION RESPIRATORY (INHALATION) at 13:18

## 2018-07-19 RX ADMIN — INSULIN DETEMIR 25 UNITS: 100 INJECTION, SOLUTION SUBCUTANEOUS at 11:52

## 2018-07-19 RX ADMIN — CHLORHEXIDINE GLUCONATE 0.12% ORAL RINSE 15 ML: 1.2 LIQUID ORAL at 08:10

## 2018-07-19 RX ADMIN — ATORVASTATIN CALCIUM 20 MG: 20 TABLET, FILM COATED ORAL at 08:08

## 2018-07-19 RX ADMIN — ARFORMOTEROL TARTRATE 15 MCG: 15 SOLUTION RESPIRATORY (INHALATION) at 07:03

## 2018-07-19 RX ADMIN — Medication 200 MG: at 08:09

## 2018-07-19 RX ADMIN — FAMOTIDINE 20 MG: 10 INJECTION, SOLUTION INTRAVENOUS at 22:58

## 2018-07-19 RX ADMIN — HUMAN INSULIN 3 UNITS: 100 INJECTION, SOLUTION SUBCUTANEOUS at 17:40

## 2018-07-19 RX ADMIN — MORPHINE SULFATE 1 MG: 2 INJECTION, SOLUTION INTRAMUSCULAR; INTRAVENOUS at 05:00

## 2018-07-19 RX ADMIN — FAMOTIDINE 20 MG: 10 INJECTION, SOLUTION INTRAVENOUS at 08:07

## 2018-07-19 RX ADMIN — PANTOPRAZOLE SODIUM 40 MG: 40 INJECTION, POWDER, FOR SOLUTION INTRAVENOUS at 08:08

## 2018-07-19 RX ADMIN — ENOXAPARIN SODIUM 70 MG: 80 INJECTION SUBCUTANEOUS at 00:16

## 2018-07-19 RX ADMIN — VANCOMYCIN HYDROCHLORIDE 1250 MG: 500 INJECTION, POWDER, LYOPHILIZED, FOR SOLUTION INTRAVENOUS at 17:40

## 2018-07-19 RX ADMIN — DILTIAZEM HYDROCHLORIDE 60 MG: 60 TABLET, FILM COATED ORAL at 11:52

## 2018-07-19 RX ADMIN — BUPROPION HYDROCHLORIDE 75 MG: 75 TABLET, FILM COATED ORAL at 22:58

## 2018-07-19 RX ADMIN — LISINOPRIL 5 MG: 5 TABLET ORAL at 08:12

## 2018-07-19 RX ADMIN — IPRATROPIUM BROMIDE AND ALBUTEROL SULFATE 3 ML: .5; 3 SOLUTION RESPIRATORY (INHALATION) at 19:38

## 2018-07-19 RX ADMIN — VITAMIN D, TAB 1000IU (100/BT) 2000 UNITS: 25 TAB at 08:08

## 2018-07-19 RX ADMIN — METHYLPREDNISOLONE SODIUM SUCCINATE 60 MG: 125 INJECTION, POWDER, FOR SOLUTION INTRAMUSCULAR; INTRAVENOUS at 04:45

## 2018-07-19 RX ADMIN — APIXABAN 5 MG: 2.5 TABLET, FILM COATED ORAL at 22:57

## 2018-07-19 RX ADMIN — VANCOMYCIN HYDROCHLORIDE 1250 MG: 500 INJECTION, POWDER, LYOPHILIZED, FOR SOLUTION INTRAVENOUS at 05:01

## 2018-07-19 RX ADMIN — IPRATROPIUM BROMIDE AND ALBUTEROL SULFATE 3 ML: .5; 3 SOLUTION RESPIRATORY (INHALATION) at 07:03

## 2018-07-19 RX ADMIN — CLOPIDOGREL BISULFATE 75 MG: 75 TABLET ORAL at 08:10

## 2018-07-19 RX ADMIN — BUDESONIDE 1 MG: 0.5 INHALANT RESPIRATORY (INHALATION) at 19:38

## 2018-07-19 RX ADMIN — ARFORMOTEROL TARTRATE 15 MCG: 15 SOLUTION RESPIRATORY (INHALATION) at 19:38

## 2018-07-19 RX ADMIN — DIGOXIN 125 MCG: 0.25 INJECTION INTRAMUSCULAR; INTRAVENOUS at 03:11

## 2018-07-19 NOTE — PROGRESS NOTES
Continued Stay Note  [facility]     Patient Name: Breann Gallegos  MRN: [id number]  Today's Date: 7/19/2018    Admit Date: 7/13/2018          Discharge Plan     Row Name 07/19/18 1318       Plan    Plan Followed up with pt and  was also present in the ICU. Pt currently wearing Bipap and all info provided by spouse. According to spouse, he is unsure at this time whether pt will need inpt rehab or home health.  states that pt may require IV abx at d/c but unsure at this time. Will f/u with hospitalist. Advised that CM will continue to follow and assist with dcp and will initiate referrals if ordered.     Patient/Family in Agreement with Plan yes              Discharge Codes    No documentation.       Expected Discharge Date and Time     Expected Discharge Date Expected Discharge Time    Jul 16, 2018 April  RODNEY Rondon  07/19/18  1:22 PM

## 2018-07-19 NOTE — PROGRESS NOTES
[facility]    PROGRESS NOTE    Name:  Breann Gallegos   Age:  68 y.o.  Sex:  female  :  1950  MRN:  [id number]   Visit Number:  [id number]  Admission Date:  2018  Date Of Service:  18  Primary Care Physician:  Harry Avery MD     LOS: 6 days :      Chief Complaint:  Follow-up pneumonia and ARDS with respiratory failure        Subjective / Interval History:   She was seen this morning again. She is awake and sitting up in bed smiling, extubated today. She is still groggy and sedated appearing however. OG tube left in place if needed for medications. She is able to speak but doesn't say much. She denies chest pain, pain, shortness of breath, abdominal pain. No acute events occurred overnight. Will DC timmons.     She is still requiring bipap therapy and overnight had some hypoxia to the 80s after movement and placed on nonrebreather with improvement. Today on 60% Bipap she has sat 97%. She is tolerating the bipap well at this time. She does have a nonproductive cough. She had small thick mucus suctioned yesterday and that's all.       Review of Systems:  General ROS: Patient denies any fevers, chills or loss of consciousness. Appears to have generalized weakness.  Psychological ROS: Denies any hallucinations and delusions.  Ophthalmic ROS: Denies any diplopia or transient loss of vision.  ENT ROS: Denies sore throat, nasal congestion or ear pain.   Allergy and Immunology ROS: Denies rash or itching.  Hematological and Lymphatic ROS: Denies neck swelling or easy bleeding.  Endocrine ROS: Denies any recent unintentional weight gain or loss.  Breast ROS: Denies any pain or swelling.  Respiratory ROS:Positive  cough or shortness of breath.  Cardiovascular ROS: Denies chest pain or palpitations. No history of exertional chest pain.  Gastrointestinal ROS: Denies nausea and vomiting. Denies any abdominal pain. No diarrhea.  Genito-Urinary ROS: Denies dysuria or  hematuria.  Musculoskeletal ROS: Denies chronic back pain. No muscle pain. No calf pain.  Neurological ROS: Denies any focal weakness. No loss of consciousness. Denies any numbness. Denies neck pain.  Dermatological ROS: Denies any redness or pruritis.          Vital Signs:    Temp:  [97.5 °F (36.4 °C)-99 °F (37.2 °C)] 97.5 °F (36.4 °C)  Heart Rate:  [] 92  Resp:  [16-24] 18  BP: (127-174)/(70-99) 146/90  FiO2 (%):  [40 %-60 %] 60 %    Intake and output:    I/O last 3 completed shifts:  In: 3074 [I.V.:1800; Other:574; NG/GT:700]  Out: 5875 [Urine:5875]  I/O this shift:  In: 50 [Other:50]  Out: 410 [Urine:410]    Physical Examination:    General Appearance:    No acute distress.  Awake sitting up In bed extubated. Sedated appearing still    Head:    Normocephalic and atraumativ.   Eyes:            PERRLA. EOMI conjunctivae and sclerae normal, no Icterus. No pallor.   Throat:   +thrush. Dry   Neck:   Supple, no LAD   Lungs:    Reduced breath sounds throughout. Small right sided crackles. No rhonchi or wheezing.    Heart:    Normal S1 and S2, no murmur, no gallop   Abdomen:     Normal bowel sounds, no masses, no organomegaly. Soft        non-tender, non-distended, no guarding, no rebound                tenderness   Extremities:   Moves all extremities, no edema, no cyanosis, no             clubbing   Skin:   No bleeding, bruising or rash. Dry   Neurologic:   No tremor. Slowed but equal  Movement. Normal reflexes   Laboratory results:    Lab Results (last 24 hours)     Procedure Component Value Units Date/Time    POC Glucose Once [[id number]]  (Abnormal) Collected:  07/19/18 0628    Specimen:  Blood Updated:  07/19/18 0631     Glucose 145 (H) mg/dL      Comment: Serial Number: [id number]:  [id number]       CBC & Differential [[id number]] Collected:  07/19/18 0414    Specimen:  Blood Updated:  07/19/18 0611    Narrative:       The following orders were created for panel order CBC & Differential.  Procedure                                Abnormality         Status                     ---------                               -----------         ------                     CBC Auto Differential[[id number]]        Abnormal            Final result                 Please view results for these tests on the individual orders.    CBC Auto Differential [[id number]]  (Abnormal) Collected:  07/19/18 0414    Specimen:  Blood Updated:  07/19/18 0611     WBC 16.92 (H) 10*3/mm3      RBC 4.62 10*6/mm3      Hemoglobin 12.5 g/dL      Hematocrit 39.1 %      MCV 84.6 fL      MCH 27.1 pg      MCHC 32.0 g/dL      RDW 15.1 (H) %      RDW-SD 46.0 fl      MPV 10.4 fL      Platelets 431 (H) 10*3/mm3      Neutrophil % 75.1 %      Lymphocyte % 13.9 %      Monocyte % 8.2 %      Eosinophil % 0.4 %      Basophil % 0.2 %      Immature Grans % 2.2 (H) %      Neutrophils, Absolute 12.70 (H) 10*3/mm3      Lymphocytes, Absolute 2.36 10*3/mm3      Monocytes, Absolute 1.38 (H) 10*3/mm3      Eosinophils, Absolute 0.07 10*3/mm3      Basophils, Absolute 0.04 10*3/mm3      Immature Grans, Absolute 0.37 (H) 10*3/mm3      nRBC 0.1 (H) /100 WBC     Blood Gas, Arterial With Co-Ox [[id number]]  (Abnormal) Collected:  07/19/18 0553    Specimen:  Arterial Blood Updated:  07/19/18 0555     Site Left Radial     Toy's Test Positive     pH, Arterial 7.519 (C) pH units      pCO2, Arterial 43.8 mm Hg      pO2, Arterial 56.9 (L) mm Hg      HCO3, Arterial 35.6 (H) mmol/L      Base Excess, Arterial 11.5 (H) mmol/L      O2 Saturation, Arterial 90.8 (L) %      Hemoglobin, Blood Gas 12.3 g/dL      Hematocrit, Blood Gas 37.7 %      Oxyhemoglobin 89.1 (L) %      Methemoglobin 0.60 %      Carboxyhemoglobin 1.3 %      Barometric Pressure for Blood Gas 734 mmHg      Modality Aerosol Mask     FIO2 100 %      Ventilator Mode NA     Collected by [id number]     pH, Temp Corrected -- pH Units      pCO2, Temperature Corrected -- mm Hg      pO2, Temperature Corrected -- mm Hg     POC Glucose Once  [[id number]]  (Normal) Collected:  07/19/18 0537    Specimen:  Blood Updated:  07/19/18 0550     Glucose 77 mg/dL      Comment: Serial Number: [id number]:  [id number]       Comprehensive Metabolic Panel [[id number]]  (Abnormal) Collected:  07/19/18 0414    Specimen:  Blood Updated:  07/19/18 0536     Glucose 98 mg/dL      BUN 35 (H) mg/dL      Creatinine 0.60 mg/dL      Sodium 144 mmol/L      Potassium 4.1 mmol/L      Chloride 101 mmol/L      CO2 36.0 (H) mmol/L      Calcium 9.0 mg/dL      Total Protein 6.9 g/dL      Albumin 3.60 g/dL      ALT (SGPT) 64 U/L      AST (SGOT) 56 (H) U/L      Alkaline Phosphatase 117 U/L      Total Bilirubin 0.8 mg/dL      eGFR Non African Amer 100 mL/min/1.73      Globulin 3.3 gm/dL      A/G Ratio 1.1 g/dL      BUN/Creatinine Ratio 58.3 (H)     Anion Gap 11.1 mmol/L     Narrative:       GFR Normal >60  Chronic Kidney Disease <60  Kidney Failure <15    Magnesium [[id number]]  (Normal) Collected:  07/19/18 0414    Specimen:  Blood Updated:  07/19/18 0533     Magnesium 2.1 mg/dL     POC Glucose Once [[id number]]  (Abnormal) Collected:  07/19/18 0012    Specimen:  Blood Updated:  07/19/18 0015     Glucose 151 (H) mg/dL      Comment: Serial Number: [id number]:  [id number]       POC Glucose Once [[id number]]  (Abnormal) Collected:  07/18/18 1711    Specimen:  Blood Updated:  07/18/18 1726     Glucose 177 (H) mg/dL      Comment: Serial Number: [id number]:  [id number]       Blood Gas, Arterial With Co-Ox [[id number]]  (Abnormal) Collected:  07/18/18 1538    Specimen:  Arterial Blood Updated:  07/18/18 1540     Site Right Radial     Toy's Test Positive     pH, Arterial 7.537 (C) pH units      pCO2, Arterial 40.7 mm Hg      pO2, Arterial 81.3 mm Hg      HCO3, Arterial 34.6 (H) mmol/L      Base Excess, Arterial 11.0 (H) mmol/L      O2 Saturation, Arterial 97.3 %      Hemoglobin, Blood Gas 12.1 g/dL      Hematocrit, Blood Gas 37.2 %      Oxyhemoglobin 95.4 %      Methemoglobin 0.80 %       Carboxyhemoglobin 1.2 %      Barometric Pressure for Blood Gas 734 mmHg      Modality Ventilator     FIO2 40 %      Ventilator Mode NA     Collected by      pH, Temp Corrected -- pH Units      pCO2, Temperature Corrected -- mm Hg      pO2, Temperature Corrected -- mm Hg     POC Glucose Once [[id number]]  (Abnormal) Collected:  07/18/18 1111    Specimen:  Blood Updated:  07/18/18 1116     Glucose 191 (H) mg/dL      Comment: Serial Number: [id number]:  [id number]             I have reviewed the patient's laboratory results.    Radiology results:    Imaging Results (last 24 hours)     Procedure Component Value Units Date/Time    XR Chest 1 View [[id number]] Updated:  07/19/18 0627    XR Chest 1 View [[id number]] Collected:  07/18/18 0957     Updated:  07/18/18 1003    Narrative:       PORTABLE CHEST     INDICATION: Pneumonia. Respiratory failure.     FINDINGS: Single frontal portable chest, compared with 07/17/2018. EKG  leads overlie the chest. Right-sided PICC line remains in place. The  endotracheal tube and nasogastric tube are again identified. Heart size  is normal. No pneumothorax. Improvement in both lungs with regards to  aeration and improving infiltrates. Diffuse infiltrates do persist but  have somewhat improved diffusely. No pneumothorax or significant  effusion.       Impression:       Improving aeration of the lungs. Continued follow-up  recommended.     This report was finalized on 7/18/2018 10:01 AM by Tone Naylor MD.          I have reviewed the patient's radiology reports.    Medication Review:     I have reviewed the patients active and prn medications.     Assessment:  1. Multifocal bilateral pneumonia, prior to admission, with MRSA , still improving  2. Acute hypoxic respiratory failure severe, requiring intubation and placement on mechanical ventilator 7-14-18 extubated 7-18 with  ARDS, improved  3. New onset Afib with RVR, previous sinus tachycardia, improved on diltiazem   4. Sepsis,  secondary to pneumonia, prior to admission, with MRSA, improved  5. Diabetes mellitus type 2 insulin dependent  6. Chronic essential hypertension  7. COPD moderate  8. Abnormal CT chest with lymphadenopathy, repeat outpatient CT scan chest   9. Anticoagulation therapy        Plan:  She is extubated but still lethargic from profolol for 5 days. Continue PT, add OT and Speech therapy.     Continue to use OG until speech therapy clears. Will use iv fluids for now. Tube feeding on hold.     Pneumonia is improving on Vancomycin and will continue to treat for total of 14 days due to severity of MRSA pneumonia. Considering Zyvox later for outpatient therapy.     Discontinue timmons.     Discussed case with Dr Beyer. He will start Eliquis and Oral diltiazem and remove dilt drip and iv digoxin.    Her status is guarded, upgraded from critical.   Continue digoxin, diltiazem iv for now. Afib rate controlled. If extubated, will convert to oral therapy.    Protonix. Pepcid. Full code. Anticipate still days will be needed for further treatment but she is improving.      Medication risks and benefits were discussed in detail. Patient reported satisfaction with care delivered and treatment plan.     Ese Kirby DO  07/19/18  9:22 AM

## 2018-07-19 NOTE — PLAN OF CARE
Problem: Patient Care Overview  Goal: Plan of Care Review  Outcome: Ongoing (interventions implemented as appropriate)   07/19/18 0584   Coping/Psychosocial   Plan of Care Reviewed With patient   Plan of Care Review   Progress no change   OTHER   Outcome Summary Pt seen for OT evaluation today. Pt presents with weakness and decreased ability to complete self care and functional mobility tasks. Pt is expected to benefit from skilled OT to improve her strength, activity tolerance and independence with self care and functional mobility tasks.

## 2018-07-19 NOTE — PLAN OF CARE
Problem: Patient Care Overview  Goal: Plan of Care Review  Outcome: Ongoing (interventions implemented as appropriate)   07/19/18 7384   Coping/Psychosocial   Plan of Care Reviewed With patient;family   Plan of Care Review   Progress improving   OTHER   Outcome Summary Bedside eval completed with pt. exhibiting moderate oral phase dysphagia, suspect pharyngeal phase dysphagia, and prior dx of third stage dysphagia. See full report for details. Recommend: 1. initiate pureed diet with nectar-thick liq as abby, 2. meds crushed as allowable with pudding/applesauce, 3. small bites/sips, monitoring closely for s/s aspiration, 4. aspiration precautions, 5. reflux precautions. SLP will f/u for diet tolerance and reassess for potential diet upgrade and/or candidacy for instrumental exam.

## 2018-07-19 NOTE — PROGRESS NOTES
Adult Nutrition  Assessment/PES    Patient Name:  Breann Gallegos  YOB: 1950  MRN: [id number]  Admit Date:  7/13/2018    Assessment Date:  7/19/2018    Comments:  Rec #1: Post SLP evaluation, pt started on Pureed diet with Nectar-Thickened Liquids. Continue current diet order; Advancing diet as tolerated. Encourage and assist with intake PRN. Nutritional supplement ordered Glucerna with NTL BID to promote PO intake. Rec #2: Consider MVI with minerals daily. Rec #3: Continue to monitor/replace electrolytes PRN. RD to follow pt. Consult RD PRN.             Reason for Assessment     Row Name 07/19/18 1503          Reason for Assessment    Reason For Assessment follow-up protocol     Diagnosis diabetes diagnosis/complications;cardiac disease;pulmonary disease;gastrointestinal disease   COPD, DM Type 2, Respiratory Failure, Intubated     Identified At Risk by Screening Criteria difficulty chewing/swallowing                 Labs/Tests/Procedures/Meds     Row Name 07/19/18 1503          Labs/Procedures/Meds    Lab Results Reviewed reviewed, pertinent     Lab Results Comments High: Platelets, BUN, Tg, Mg        Medications    Pertinent Medications Reviewed reviewed             Physical Findings     Row Name 07/19/18 1504          Physical Findings    Tubes orogastric tube               Nutrition Prescription Ordered     Row Name 07/19/18 1504          Nutrition Prescription PO    Current PO Diet Pureed     Fluid Consistency Nectar/syrup thick        Nutrition Prescription EN    Enteral Route OG   TF d/c             Evaluation of Received Nutrient/Fluid Intake     Row Name 07/19/18 1505          PO Evaluation    Number of Days PO Intake Evaluated Insufficient Data        EN Evaluation    TF Changes Discontinued             Problem/Interventions:          Problem 2     Row Name 07/19/18 1507          Nutrition Diagnoses Problem 2    Problem 2 Increased Nutrient Needs     Macronutrient Kcal;Fluid;Protein      Micronutrient Vitamin;Mineral     Etiology (related to) Medical Diagnosis     Pulmonary/Critical Care COPD;Acute respiratory failure;Other (comment)   hypoxia     Signs/Symptoms (evidenced by) Other (comment)   Pulmonary dysfunction               Problem 4     Row Name 07/19/18 1508          Nutrition Diagnoses Problem 4    Problem 4 Biting/Chewing Difficulty     Etiology (related to) Functional Diagnosis     Functional Diagnosis Dysphagia     Signs/Symptoms (evidenced by) SLP/Swallow eval     Percent (%) of EN goal --   TF currently held for extubation trial     Swallow eval status Done     Type of SLP Evaluation Bedside               Intervention Goal     Row Name 07/19/18 1509          Intervention Goal    General Meet nutritional needs for age/condition     PO Meet estimated needs;Tolerate PO;Establish PO     Weight Maintain weight             Nutrition Intervention     Row Name 07/19/18 1510          Nutrition Intervention    RD/Tech Action Follow Tx progress;Care plan reviewd;Encourage intake;Recommend/ordered     Recommended/Ordered Supplement             Nutrition Prescription     Row Name 07/19/18 1510          Nutrition Prescription PO    PO Prescription Begin/change supplement     Fluid Consistency Nectar/syrup thick     Supplement Glucerna Shake     Supplement Frequency 2 times a day     New PO Prescription Ordered? Yes        Nutrition Prescription EN    Enteral Prescription Discontinue enteral feeding        Other Orders    Obtain Weight Daily     Obtain Weight Ordered? No, recommended     Supplement Vitamin mineral supplement     Supplement Ordered? No, recommended             Education/Evaluation     Row Name 07/19/18 1511          Education    Education Education not appropriate at this time     Please explain Defer until post ICU   Recently extubated        Monitor/Evaluation    Monitor Per protocol;I&O;PO intake;Supplement intake;Pertinent labs;Weight         Electronically signed by:  Alia  D Such, RD  07/19/18 3:12 PM

## 2018-07-19 NOTE — PLAN OF CARE
Problem: Patient Care Overview  Goal: Plan of Care Review  Outcome: Ongoing (interventions implemented as appropriate)      Problem: Pneumonia (Adult)  Goal: Signs and Symptoms of Listed Potential Problems Will be Absent, Minimized or Managed (Pneumonia)  Outcome: Ongoing (interventions implemented as appropriate)      Problem: NPPV/CPAP (Adult)  Goal: Signs and Symptoms of Listed Potential Problems Will be Absent, Minimized or Managed (NPPV/CPAP)  Outcome: Ongoing (interventions implemented as appropriate)      Problem: Pain, Acute (Adult)  Goal: Acceptable Pain Control/Comfort Level  Outcome: Ongoing (interventions implemented as appropriate)      Problem: Skin Injury Risk (Adult)  Goal: Skin Health and Integrity  Outcome: Ongoing (interventions implemented as appropriate)      Problem: Arrhythmia/Dysrhythmia (Symptomatic) (Adult)  Goal: Signs and Symptoms of Listed Potential Problems Will be Absent, Minimized or Managed (Arrhythmia/Dysrhythmia)  Outcome: Ongoing (interventions implemented as appropriate)      Problem: Fall Risk (Adult)  Goal: Absence of Fall  Outcome: Ongoing (interventions implemented as appropriate)

## 2018-07-19 NOTE — THERAPY EVALUATION
Acute Care - Occupational Therapy Initial Evaluation   [place]     Patient Name: Breann Gallegos  : 1950  MRN: [id number]  Today's Date: 2018  Onset of Illness/Injury or Date of Surgery: 18  Date of Referral to OT: 18  Referring Physician: Dr. Kirby    Admit Date: 2018       ICD-10-CM ICD-9-CM   1. Pneumonia of both lungs due to infectious organism, unspecified part of lung J18.9 483.8   2. Acute on chronic respiratory failure with hypoxia (CMS/HCC) J96.21 518.84     799.02   3. Impaired functional mobility, balance, gait, and endurance Z74.09 V49.89   4. Impaired mobility and ADLs Z74.09 799.89     Patient Active Problem List   Diagnosis   • Dysphagia   • Abnormal urinalysis   • Anxiety   • Chronic obstructive pulmonary disease (CMS/HCC)   • Depression   • Diabetes mellitus (CMS/HCC)   • Diabetic peripheral neuropathy (CMS/[facility])   • Diarrhea   • Esophageal dysmotility   • Foot pain   • Gastroesophageal reflux disease without esophagitis   • Hyperlipidemia   • Hypertension   • Insomnia   • Irritable bowel syndrome   • Localized swelling, mass and lump, lower limb   • Low back pain   • Abnormal mammogram   • Enlargement of neck   • Neck pain   • Thyroid nodule   • Osteoarthritis of knee   • Osteoarthritis   • Osteopenia   • Peripheral arterial occlusive disease (CMS/HCC)   • Nerve root disorder   • Restless legs syndrome   • Tension headache   • Vitamin D deficiency   • Erythrasma   • Unsteady gait   • Headache   • Chronic left shoulder pain   • Esophageal stricture   • Other dysphagia   • Magnesium deficiency   • Pneumonia of both lungs due to infectious organism   • Acute respiratory failure with hypoxia (CMS/HCC)   • Sinus tachycardia     Past Medical History:   Diagnosis Date   • Arthritis    • Body piercing     EARS   • COPD (chronic obstructive pulmonary disease) (CMS/HCC)    • Diabetes mellitus (CMS/HCC)    • Dysphagia     REPORTS SHE FEELS LIKE THINGS ARE GETTING STUCK  IN HER THROAT FOR THE PAST SEVERAL MONTHS   • Eustachian tube dysfunction    • Fracture     METATRASAL   • Full dentures     INSTRUCTED NO ADHESIVES THE DOS   • GERD (gastroesophageal reflux disease)    • High cholesterol    • History of nuclear stress test     REPORTS 3-4 YEARS AGO AND THAT ALL WAS WNL'S   • Hyperlipidemia    • Hypertension    • Sinus problem    • Skin abscess    • Vision problem    • Vitamin B12 deficiency    • Vitamin D deficiency    • Wears glasses      Past Surgical History:   Procedure Laterality Date   • APPENDECTOMY  1976   • BREAST SURGERY      REDUCTION   • CATARACT EXTRACTION Bilateral    • CHOLECYSTECTOMY  1976   • COLONOSCOPY     • ENDOSCOPY     • ENDOSCOPY N/A 11/29/2017    Procedure: ESOPHAGOGASTRODUODENOSCOPY with biopsies and esophageal balloon dilitation;  Surgeon: Molina Dumont MD;  Location: [facility] ENDOSCOPY;  Service:    • ENDOSCOPY N/A 5/2/2018    Procedure: ESOPHAGOGASTRODUODENOSCOPY WITH ESOPHAGEAL BALLOON DILITATION (15-16.5-18 MM);  Surgeon: Molina Dumont MD;  Location: [facility] ENDOSCOPY;  Service: Gastroenterology   • HAND SURGERY Right    • HYSTERECTOMY  1979   • OOPHORECTOMY Bilateral 1979   • OTHER SURGICAL HISTORY      BLADDER TACK   • REDUCTION MAMMAPLASTY Bilateral 1988   • TONSILLECTOMY            OT ASSESSMENT FLOWSHEET (last 72 hours)      Occupational Therapy Evaluation     Row Name 07/19/18 1428                   OT Evaluation Time/Intention    Subjective Information complains of;weakness  -        Document Type evaluation  -        Mode of Treatment occupational therapy  -        Patient Effort good  -        Symptoms Noted During/After Treatment fatigue  -        Comment Pt on bi-pap during OT evaluation  -           General Information    Patient Profile Reviewed? yes  -        Onset of Illness/Injury or Date of Surgery 07/13/18  -        Referring Physician Dr. Kirby  -        Patient Observations alert;cooperative;agree to therapy  -         Patient/Family Observations No family present  -        General Observations of Patient Pt received supine in bed on bedpan with RN present.  Pt on bi-pap  -        Prior Level of Function independent:;community mobility;ADL's  -        Equipment Currently Used at Home none  -        Pertinent History of Current Functional Problem acute respiratory failure with hypoxia  -        Existing Precautions/Restrictions fall;oxygen therapy device and L/min  -        Risks Reviewed patient:;increased discomfort  -        Benefits Reviewed patient:;improve function;increase independence;increase strength  -           Relationship/Environment    Primary Source of Support/Comfort spouse  -        Lives With spouse  -           Resource/Environmental Concerns    Current Living Arrangements home/apartment/condo  -           Cognitive Assessment/Intervention- PT/OT    Orientation Status (Cognition) oriented to;person;place  -        Follows Commands (Cognition) follows one step commands  -           Bed Mobility Assessment/Treatment    Bed Mobility Assessment/Treatment supine-sit;sit-supine  -        Supine-Sit [place] (Bed Mobility) moderate assist (50% patient effort)  -        Sit-Supine [place] (Bed Mobility) moderate assist (50% patient effort)  [facility]        Bed Mobility, Safety Issues decreased use of arms for pushing/pulling;decreased use of legs for bridging/pushing;impaired trunk control for bed mobility  -        Assistive Device (Bed Mobility) draw sheet  -        Comment (Bed Mobility) Pt able to sit eob x11 minutes  -           Functional Mobility    Functional Mobility- Comment unable to assess  -           Transfer Assessment/Treatment    Comment (Transfers) unable to assess  -           ADL Assessment/Intervention    BADL Assessment/Intervention bathing;upper body dressing;lower body dressing;grooming;feeding;toileting  -           Bathing  Assessment/Intervention    Bathing [place] Level dependent (less than 25% patient effort)  -           Upper Body Dressing Assessment/Training    Upper Body Dressing [place] Level dependent (less than 25% patient effort)  -           Lower Body Dressing Assessment/Training    Lower Body Dressing [place] Level dependent (less than 25% patient effort)  -           Grooming Assessment/Training    [place] Level (Grooming) dependent (less than 25% patient effort)  -           Self-Feeding Assessment/Training    [place] Level (Feeding) dependent (less than 25% patient effort)  -           Toileting Assessment/Training    [place] Level (Toileting) maximum assist (25% patient effort)  -           BADL Safety/Performance    Impairments, BADL Safety/Performance endurance/activity tolerance;shortness of breath;strength;trunk/postural control  -           General ROM    GENERAL ROM COMMENTS PROM WFL BUE, AAROM is limited d/t decreased strength  -           General Assessment (Manual Muscle Testing)    Comment, General Manual Muscle Testing (MMT) Assessment BUE 1/5  -           Positioning and Restraints    Pre-Treatment Position in bed  -        Post Treatment Position bed  -        In Bed supine;call light within reach;encouraged to call for assist  -           Pain Assessment    Additional Documentation Pain Scale: Numbers Pre/Post-Treatment (Group)  -           Pain Scale: Numbers Pre/Post-Treatment    Pain Scale: Numbers, Pretreatment 0/10 - no pain  -        Pain Scale: Numbers, Post-Treatment 0/10 - no pain  -           Coping    Observed Emotional State accepting;cooperative  -        Verbalized Emotional State acceptance  -           Plan of Care Review    Plan of Care Reviewed With patient  -           Clinical Impression (OT)    Date of Referral to OT 07/19/18  -        OT Diagnosis ADL decline  -        Patient/Family Goals Statement (OT Eval) Pt  wants to d/c home  -        Criteria for Skilled Therapeutic Interventions Met (OT Eval) yes;treatment indicated  -        Rehab Potential (OT Eval) good, to achieve stated therapy goals  -        Therapy Frequency (OT Eval) daily   Mon-Fri  -        Care Plan Review (OT) evaluation/treatment results reviewed;patient/other agree to care plan  -        Anticipated Discharge Disposition (OT) inpatient rehabilitation facility  -           Vital Signs    Pre SpO2 (%) 98  -AH        O2 Delivery Pre Treatment other (see comments)   bi-pap  -        Intra SpO2 (%) 98  -AH        O2 Delivery Intra Treatment supplemental O2   bi-pap  -        Post SpO2 (%) 100  -AH        O2 Delivery Post Treatment supplemental O2   bi-pap  -           Planned OT Interventions    Planned Therapy Interventions (OT Eval) BADL retraining;ROM/therapeutic exercise;strengthening exercise;transfer/mobility retraining  -           OT Goals    Bed Mobility Goal Selection (OT) bed mobility, OT goal 1  -        Transfer Goal Selection (OT) transfer, OT goal 1  -        Dressing Goal Selection (OT) dressing, OT goal 1  -        Toileting Goal Selection (OT) toileting, OT goal 1  -        Strength Goal Selection (OT) strength, OT goal 1  -        Functional Mobility Goal Selection (OT) functional mobility, OT goal 1  -        Additional Documentation Strength Goal Selection (OT) (Row);Functional Mobility Selection (OT) (Row)  -           Bed Mobility Goal 1 (OT)    Activity/Assistive Device (Bed Mobility Goal 1, OT) supine to sit  -        [place] Level/Cues Needed (Bed Mobility Goal 1, OT) minimum assist (75% or more patient effort)  -        Time Frame (Bed Mobility Goal 1, OT) long term goal (LTG);2 weeks  -        Progress/Outcomes (Bed Mobility Goal 1, OT) goal ongoing  -           Transfer Goal 1 (OT)    Activity/Assistive Device (Transfer Goal 1, OT) sit-to-stand/stand-to-sit;walker, rolling  -         [place] Level/Cues Needed (Transfer Goal 1, OT) minimum assist (75% or more patient effort)  -        Time Frame (Transfer Goal 1, OT) long term goal (LTG);2 weeks  -AH        Progress/Outcome (Transfer Goal 1, OT) goal ongoing  -           Dressing Goal 1 (OT)    Activity/Assistive Device (Dressing Goal 1, OT) upper body dressing  -        [place]/Cues Needed (Dressing Goal 1, OT) minimum assist (75% or more patient effort)  -        Time Frame (Dressing Goal 1, OT) long term goal (LTG);2 weeks  -AH        Progress/Outcome (Dressing Goal 1, OT) goal ongoing  -           Toileting Goal 1 (OT)    Activity/Device (Toileting Goal 1, OT) adjust/manage clothing;perform perineal hygiene;commode;commode, bedside without drop arms  -        [place] Level/Cues Needed (Toileting Goal 1, OT) minimum assist (75% or more patient effort)  -        Time Frame (Toileting Goal 1, OT) long term goal (LTG);2 weeks  -        Progress/Outcome (Toileting Goal 1, OT) goal ongoing  -           Strength Goal 1 (OT)    Strength Goal 1 (OT) Pt will participate in UB ex to improve her functional strength.  -        Time Frame (Strength Goal 1, OT) long term goal (LTG);2 weeks  -        Progress/Outcome (Strength Goal 1, OT) goal ongoing  -           Functional Mobility Goal 1 (OT)    Activity/Assistive Device (Functional Mobility Goal 1, OT) walker, rolling  -        [place] Level/Cues Needed (Functional Mobility Goal 1, OT) minimum assist (75% or more patient effort)  -        Distance Goal 1 (Functional Mobility, OT) 20  -        Time Frame (Functional Mobility Goal 1, OT) long term goal (LTG);2 weeks  -        Progress/Outcome (Functional Mobility Goal 1, OT) goal ongoing  -          User Key  (r) = Recorded By, (t) = Taken By, (c) = Cosigned By    Initials Name Effective Dates    KELLEY Nabila Raimundo 03/07/18 -            Occupational Therapy Education     Title: PT OT SLP Therapies  (Active)     Topic: Occupational Therapy (Active)     Point: ADL training (Done)     Description: Instruct learner(s) on proper safety adaptation and remediation techniques during self care or transfers.   Instruct in proper use of assistive devices.   Learning Progress Summary     Learner Status Readiness Method Response Comment Documented by    Patient Done Acceptance E,TB VU Role of OT/POC  07/19/18 1541                      User Key     Initials Effective Dates Name Provider Type Discipline     03/07/18 -  Nabila Eubanks Occupational Therapist OT                  OT Recommendation and Plan  Outcome Summary/Treatment Plan (OT)  Anticipated Discharge Disposition (OT): inpatient rehabilitation facility  Planned Therapy Interventions (OT Eval): BADL retraining, ROM/therapeutic exercise, strengthening exercise, transfer/mobility retraining  Therapy Frequency (OT Eval): daily (Mon-Fri)  Plan of Care Review  Plan of Care Reviewed With: patient  Plan of Care Reviewed With: patient  Outcome Summary: Pt seen for OT evaluation today.  Pt presents with weakness and decreased ability to complete self care and functional mobility tasks.  Pt is expected to benefit from skilled  OT to improve her strength, activity tolerance and independence with self care and functional mobility tasks.          Outcome Measures     Row Name 07/19/18 1428 07/18/18 1403          How much help from another person do you currently need...    Turning from your back to your side while in flat bed without using bedrails?  -- 2  -JR (r) KO (t) JR (c)     Moving from lying on back to sitting on the side of a flat bed without bedrails?  -- 2  -JR (r) KO (t) JR (c)     Moving to and from a bed to a chair (including a wheelchair)?  -- 1  -JR (r) KO (t) JR (c)     Standing up from a chair using your arms (e.g., wheelchair, bedside chair)?  -- 1  -JR (r) KO (t) JR (c)     Climbing 3-5 steps with a railing?  -- 1  -JR (r) KO (t) JR (c)     To walk in  hospital room?  -- 1  - (r) BHAVESH (brock)  (c)     AM-PAC 6 Clicks Score  -- 8  - (r) BHAVESH (t)        How much help from another is currently needed...    Putting on and taking off regular lower body clothing? 1  -AH  --     Bathing (including washing, rinsing, and drying) 1  -AH  --     Toileting (which includes using toilet bed pan or urinal) 2  -AH  --     Putting on and taking off regular upper body clothing 1  -AH  --     Taking care of personal grooming (such as brushing teeth) 1  -AH  --     Eating meals 1  -AH  --     Score 7  -AH  --        Functional Assessment    Outcome Measure Options AM-PAC 6 Clicks Daily Activity (OT)  - AM-[facility] 6 Clicks Basic Mobility (PT)  - (r) BHAVESH (brock)  (ronny)       User Key  (r) = Recorded By, (t) = Taken By, (c) = Cosigned By    Initials Name Provider Type     Nabila Eubanks Occupational Therapist    JR Ese Fuller, PT Physical Therapist    BHAVESH Flores, PT Student PT Student          Time Calculation:   OT Start Time: 1428  Therapy Suggested Charges     Code   Minutes Charges    None           Therapy Charges for Today     Code Description Service Date Service Provider Modifiers Qty    [id number] HC OT EVAL MOD COMPLEXITY 4 7/19/2018 Nabila Eubanks GO 1               Nabila Eubanks  7/19/2018

## 2018-07-19 NOTE — PROGRESS NOTES
"[facility]-Cardiology Progress note     LOS: 6 days   Patient Care Team:  Harry Avery MD as PCP - General  Harry Avery MD as PCP - Family Medicine  Harry Avery MD as PCP - Claims Attributed  Tereza Bateman RN as Care Coordinator ([facility])    Chief Complaint:  SOB    Subjective     Interval History:     Patient Complaints: none  Patient Denies:    The patient has no chest discomfort at rest or with activity.  There is no exertional chest arm neck jaw shoulder or back discomfort.  There is no orthopnea PND or lower extremity edema.  There is no dizziness palpitations or syncope.  History taken from: patient RN    Review of Systems:   All systems were reviewed and negative    Objective     Vital Sign Min/Max for last 24 hours  Temp  Min: 97.5 °F (36.4 °C)  Max: 99 °F (37.2 °C)   BP  Min: 127/89  Max: 174/87   Pulse  Min: 83  Max: 129   Resp  Min: 16  Max: 24   SpO2  Min: 83 %  Max: 100 %   No Data Recorded   Weight  Min: 70.9 kg (156 lb 4.8 oz)  Max: 70.9 kg (156 lb 4.8 oz)     Flowsheet Rows      First Filed Value   Admission Height  162.6 cm (64\") Documented at 07/13/2018 0205   Admission Weight  68.7 kg (151 lb 8 oz) Documented at 07/13/2018 0441          Physical Exam:     General Appearance:    Alert, cooperative, in no acute distress   Head:    Normocephalic, without obvious abnormality, atraumatic   Eyes:            Lids and lashes normal, conjunctivae and sclerae normal, no   icterus, no pallor, corneas clear, PERRLA   Ears:    Ears appear intact with no abnormalities noted   Throat:   No oral lesions, no thrush, oral mucosa moist   Neck:   No adenopathy, supple, trachea midline, no thyromegaly, no     carotid bruit, no JVD   Back:     No kyphosis present, no scoliosis present, no skin lesions,       erythema or scars, no tenderness to percussion or                   palpation,   range of motion normal   Lungs:     Clear to auscultation,respirations regular, even and                   unlabored    " Heart:    IRIR rhythm and normal rate, normal S1 and S2, no            murmur, no gallop, no rub, no click   Breast Exam:    Deferred   Abdomen:     Normal bowel sounds, no masses, no organomegaly, soft        non-tender, non-distended, no guarding, no rebound                 tenderness   Genitalia:    Deferred   Extremities:   Moves all extremities well, no edema, no cyanosis, no              redness   Pulses:   Pulses palpable and equal bilaterally   Skin:   No bleeding, bruising or rash   Lymph nodes:   No palpable adenopathy   Neurologic:   Cranial nerves 2 - 12 grossly intact, sensation intact, DTR        present and equal bilaterally        Results Review:     I reviewed the patient's new clinical results.        Results from last 7 days  Lab Units 07/19/18  0414   SODIUM mmol/L 144   POTASSIUM mmol/L 4.1   CHLORIDE mmol/L 101   CO2 mmol/L 36.0*   BUN mg/dL 35*   CREATININE mg/dL 0.60   GLUCOSE mg/dL 98   CALCIUM mg/dL 9.0       Results from last 7 days  Lab Units 07/19/18  0414 07/18/18  0531 07/17/18  0354   WBC 10*3/mm3 16.92* 14.04* 14.00*   HEMOGLOBIN g/dL 12.5 10.3* 10.2*   HEMATOCRIT % 39.1 32.7* 33.5*   PLATELETS 10*3/mm3 431* 370 307             Echo EF Estimated  Lab Results   Component Value Date    ECHOEFEST 58 07/16/2018       Physical Exam    Medication Review: yes    Assessment/Plan     Principal Problem:    Acute respiratory failure with hypoxia (CMS/HCC)  Active Problems:    Diabetes mellitus (CMS/HCC)    Pneumonia of both lungs due to infectious organism    Sinus tachycardia      Atrial fibrillation with controlled ventricular response.  The patient has now been extubated and is able to take oral medications.  I have recommended transitioning IV diltiazem to oral diltiazem.  I have recommended replacing Lovenox with Eliquis.  She will no longer require Plavix therapy.  I have recommended discontinuation of lisinopril and we will optimize the dose of her diltiazem for both heart rate and blood  pressure control.  Her discharge the patient will follow-up in my office in 2 weeks.        Gulshan Farias MD  07/19/18  10:02 AM

## 2018-07-19 NOTE — PLAN OF CARE
Problem: Fall Risk (Adult)  Intervention: Monitor/Assist with Self Care   07/19/18 1368   Activity   Activity Assistance Provided assistance, 2 people

## 2018-07-19 NOTE — PLAN OF CARE
Problem: Pneumonia (Adult)  Intervention: Maximize Oxygenation/Ventilation/Perfusion   07/18/18 2200 07/19/18 0400   Positioning   Head of Bed (HOB) --  HOB elevated   Respiratory Interventions   Airway/Ventilation Management airway patency maintained;pulmonary hygiene promoted --      Intervention: Prevent/Manage Infection Progression   07/19/18 0400   Safety Interventions   Infection Management aseptic technique maintained   Safety Management   Infection Prevention cohorting utilized;environmental surveillance performed;personal protective equipment utilized;rest/sleep promoted;single patient room provided   Promote Perineal Hygiene/Elimination Safety   Isolation Precautions contact precautions maintained     Intervention: Monitor/Manage Fluid Electrolyte Balance   07/18/18 0000   Nutrition Interventions   Fluid/Electrolyte Management other (see comments)  (ivf's continued per order)         Problem: Pain, Acute (Adult)  Intervention: Monitor and Manage Analgesia   07/19/18 0400   Safety Management   Medication Review/Management medications reviewed     Intervention: Mutually Develop/Implement Acute Pain Management Plan   07/19/18 0400   Cognitive Interventions   Sensory Stimulation Regulation care clustered;lighting decreased;quiet environment promoted;music/television provided for relaxation     Intervention: Support/Optimize Psychosocial Response to Acute Pain   07/17/18 2002 07/19/18 0400   Interventions   Trust Relationship/Rapport --  care explained   Psychosocial Support   Diversional Activities --  television   Family/Support System Care presence promoted --    Coping/Psychosocial Interventions   Supportive Measures --  active listening utilized         Problem: Skin Injury Risk (Adult)  Intervention: Prevent/Manage Excess Moisture   07/19/18 0200 07/19/18 0400   Skin Interventions   Skin Protection --  adhesive use limited;incontinence pads utilized;transparent dressing maintained;tubing/devices free from  skin contact   Hygiene Care   Perineal Care absorbent pad changed;catheter care provided;perineum cleansed --    Bathing/Skin Care bath, chlorhexidine;dressed/undressed;foot care;shampoo --      Intervention: Maintain Head of Bed Elevation Less Than 30 Degrees as Tolerated   07/19/18 0400   Positioning   Head of Bed (HOB) HOB elevated     Intervention: Prevent/Minimize Shear/Friction Injuries   07/19/18 0400   Positioning   Positioning/Transfer Devices pillows;in use;other (see comments)  (bed in rotation mode)   Skin Interventions   Pressure Reduction Devices heel offloading device utilized     Intervention: Prevent or Minimize Pressure   07/19/18 0400   Positioning   Body Position supine, legs elevated;upper extremity elevated, left;upper extremity elevated, right;weight shift assistance provided   Skin Interventions   Pressure Reduction Techniques frequent weight shift encouraged;heels elevated off bed;weight shift assistance provided         Problem: Arrhythmia/Dysrhythmia (Symptomatic) (Adult)  Intervention: Prevent/Manage Thrombi/Emboli   07/18/18 2200 07/19/18 0400   Interventions   VTE Prevention/Management --  bilateral;sequential compression devices off   Safety Interventions   Bleeding Precautions blood pressure closely monitored --      Intervention: Promote Hemodynamic Stability   07/18/18 0000 07/19/18 0400   Positioning   Head of Bed (HOB) --  HOB elevated   Nutrition Interventions   Fluid/Electrolyte Management other (see comments)  (ivf's continued per order) --    Cognitive Interventions   Sensory Stimulation Regulation --  care clustered;lighting decreased;quiet environment promoted;music/television provided for relaxation         Problem: Fall Risk (Adult)  Intervention: Monitor/Assist with Self Care   07/19/18 0422   Activity   Activity Assistance Provided (pt refuses assistance)     Intervention: Reduce Risk/Promote Restraint Free Environment   07/19/18 0400   Safety Management   Environmental  Safety Modification assistive device/personal items within reach;clutter free environment maintained;room near unit station   Safety Promotion/Fall Prevention safety round/check completed     Intervention: Review Medications/Identify Contributors to Fall Risk   07/19/18 0400   Safety Management   Medication Review/Management medications reviewed       Goal: Absence of Fall  Outcome: Ongoing (interventions implemented as appropriate)   07/19/18 0422   Fall Risk (Adult)   Absence of Fall making progress toward outcome

## 2018-07-20 ENCOUNTER — APPOINTMENT (OUTPATIENT)
Dept: GENERAL RADIOLOGY | Facility: HOSPITAL | Age: 68
End: 2018-07-20
Attending: FAMILY MEDICINE

## 2018-07-20 LAB
ALBUMIN SERPL-MCNC: 3.3 G/DL (ref 3.5–5)
ALBUMIN/GLOB SERPL: 1 G/DL (ref 1–2)
ALP SERPL-CCNC: 101 U/L (ref 38–126)
ALT SERPL W P-5'-P-CCNC: 64 U/L (ref 13–69)
ANION GAP SERPL CALCULATED.3IONS-SCNC: 9.2 MMOL/L (ref 10–20)
AST SERPL-CCNC: 42 U/L (ref 15–46)
BASOPHILS # BLD AUTO: 0.02 10*3/MM3 (ref 0–0.2)
BASOPHILS NFR BLD AUTO: 0.1 % (ref 0–2.5)
BILIRUB SERPL-MCNC: 0.8 MG/DL (ref 0.2–1.3)
BUN BLD-MCNC: 35 MG/DL (ref 7–20)
BUN/CREAT SERPL: 70 (ref 7.1–23.5)
CALCIUM SPEC-SCNC: 8.6 MG/DL (ref 8.4–10.2)
CHLORIDE SERPL-SCNC: 101 MMOL/L (ref 98–107)
CO2 SERPL-SCNC: 35 MMOL/L (ref 26–30)
CREAT BLD-MCNC: 0.5 MG/DL (ref 0.6–1.3)
DEPRECATED RDW RBC AUTO: 43.9 FL (ref 37–54)
EOSINOPHIL # BLD AUTO: 0 10*3/MM3 (ref 0–0.7)
EOSINOPHIL NFR BLD AUTO: 0 % (ref 0–7)
ERYTHROCYTE [DISTWIDTH] IN BLOOD BY AUTOMATED COUNT: 14.4 % (ref 11.5–14.5)
GFR SERPL CREATININE-BSD FRML MDRD: 123 ML/MIN/1.73
GLOBULIN UR ELPH-MCNC: 3.2 GM/DL
GLUCOSE BLD-MCNC: 212 MG/DL (ref 74–98)
GLUCOSE BLDC GLUCOMTR-MCNC: 204 MG/DL (ref 70–130)
GLUCOSE BLDC GLUCOMTR-MCNC: 214 MG/DL (ref 70–130)
GLUCOSE BLDC GLUCOMTR-MCNC: 220 MG/DL (ref 70–130)
GLUCOSE BLDC GLUCOMTR-MCNC: 294 MG/DL (ref 70–130)
HCT VFR BLD AUTO: 39.6 % (ref 37–47)
HGB BLD-MCNC: 12.8 G/DL (ref 12–16)
IMM GRANULOCYTES # BLD: 0.26 10*3/MM3 (ref 0–0.06)
IMM GRANULOCYTES NFR BLD: 1.8 % (ref 0–0.6)
L PNEUMO1 AG UR QL IA: NEGATIVE
LYMPHOCYTES # BLD AUTO: 1.03 10*3/MM3 (ref 0.6–3.4)
LYMPHOCYTES NFR BLD AUTO: 7.2 % (ref 10–50)
MCH RBC QN AUTO: 27 PG (ref 27–31)
MCHC RBC AUTO-ENTMCNC: 32.3 G/DL (ref 30–37)
MCV RBC AUTO: 83.5 FL (ref 81–99)
MONOCYTES # BLD AUTO: 0.61 10*3/MM3 (ref 0–0.9)
MONOCYTES NFR BLD AUTO: 4.2 % (ref 0–12)
NEUTROPHILS # BLD AUTO: 12.44 10*3/MM3 (ref 2–6.9)
NEUTROPHILS NFR BLD AUTO: 86.7 % (ref 37–80)
NRBC BLD MANUAL-RTO: 0 /100 WBC (ref 0–0)
PLATELET # BLD AUTO: 405 10*3/MM3 (ref 130–400)
PMV BLD AUTO: 10.5 FL (ref 6–12)
POTASSIUM BLD-SCNC: 4.2 MMOL/L (ref 3.5–5.1)
PROT SERPL-MCNC: 6.5 G/DL (ref 6.3–8.2)
RBC # BLD AUTO: 4.74 10*6/MM3 (ref 4.2–5.4)
SODIUM BLD-SCNC: 141 MMOL/L (ref 137–145)
WBC NRBC COR # BLD: 14.36 10*3/MM3 (ref 4.8–10.8)

## 2018-07-20 PROCEDURE — 97530 THERAPEUTIC ACTIVITIES: CPT

## 2018-07-20 PROCEDURE — 94799 UNLISTED PULMONARY SVC/PX: CPT

## 2018-07-20 PROCEDURE — 93005 ELECTROCARDIOGRAM TRACING: CPT | Performed by: INTERNAL MEDICINE

## 2018-07-20 PROCEDURE — 63710000001 INSULIN REGULAR HUMAN PER 5 UNITS: Performed by: HOSPITALIST

## 2018-07-20 PROCEDURE — 82962 GLUCOSE BLOOD TEST: CPT

## 2018-07-20 PROCEDURE — 25010000002 MORPHINE SULFATE (PF) 2 MG/ML SOLUTION: Performed by: INTERNAL MEDICINE

## 2018-07-20 PROCEDURE — 25010000002 VANCOMYCIN PER 500 MG: Performed by: FAMILY MEDICINE

## 2018-07-20 PROCEDURE — 97110 THERAPEUTIC EXERCISES: CPT

## 2018-07-20 PROCEDURE — 25010000002 METHYLPREDNISOLONE PER 40 MG: Performed by: FAMILY MEDICINE

## 2018-07-20 PROCEDURE — 85025 COMPLETE CBC W/AUTO DIFF WBC: CPT | Performed by: FAMILY MEDICINE

## 2018-07-20 PROCEDURE — 71045 X-RAY EXAM CHEST 1 VIEW: CPT

## 2018-07-20 PROCEDURE — 94660 CPAP INITIATION&MGMT: CPT

## 2018-07-20 PROCEDURE — 63710000001 INSULIN DETEMIR PER 5 UNITS: Performed by: FAMILY MEDICINE

## 2018-07-20 PROCEDURE — 99232 SBSQ HOSP IP/OBS MODERATE 35: CPT | Performed by: FAMILY MEDICINE

## 2018-07-20 PROCEDURE — 25010000002 METHYLPREDNISOLONE PER 125 MG: Performed by: NURSE PRACTITIONER

## 2018-07-20 PROCEDURE — 97535 SELF CARE MNGMENT TRAINING: CPT

## 2018-07-20 PROCEDURE — 80053 COMPREHEN METABOLIC PANEL: CPT | Performed by: FAMILY MEDICINE

## 2018-07-20 RX ORDER — METHYLPREDNISOLONE SODIUM SUCCINATE 40 MG/ML
40 INJECTION, POWDER, LYOPHILIZED, FOR SOLUTION INTRAMUSCULAR; INTRAVENOUS EVERY 8 HOURS
Status: DISCONTINUED | OUTPATIENT
Start: 2018-07-20 | End: 2018-07-24

## 2018-07-20 RX ORDER — SODIUM CHLORIDE 9 MG/ML
30 INJECTION, SOLUTION INTRAVENOUS CONTINUOUS
Status: DISCONTINUED | OUTPATIENT
Start: 2018-07-20 | End: 2018-07-22

## 2018-07-20 RX ORDER — FAMOTIDINE 20 MG/1
20 TABLET, FILM COATED ORAL 2 TIMES DAILY
Status: DISCONTINUED | OUTPATIENT
Start: 2018-07-20 | End: 2018-07-29 | Stop reason: HOSPADM

## 2018-07-20 RX ORDER — LISINOPRIL 10 MG/1
10 TABLET ORAL
Status: DISCONTINUED | OUTPATIENT
Start: 2018-07-20 | End: 2018-07-27

## 2018-07-20 RX ADMIN — Medication 10 ML: at 09:00

## 2018-07-20 RX ADMIN — METHYLPREDNISOLONE SODIUM SUCCINATE 60 MG: 125 INJECTION, POWDER, FOR SOLUTION INTRAMUSCULAR; INTRAVENOUS at 05:11

## 2018-07-20 RX ADMIN — DILTIAZEM HYDROCHLORIDE 60 MG: 60 TABLET, FILM COATED ORAL at 00:58

## 2018-07-20 RX ADMIN — BUDESONIDE 1 MG: 0.5 INHALANT RESPIRATORY (INHALATION) at 06:57

## 2018-07-20 RX ADMIN — HUMAN INSULIN 3 UNITS: 100 INJECTION, SOLUTION SUBCUTANEOUS at 06:48

## 2018-07-20 RX ADMIN — ARFORMOTEROL TARTRATE 15 MCG: 15 SOLUTION RESPIRATORY (INHALATION) at 21:56

## 2018-07-20 RX ADMIN — ATORVASTATIN CALCIUM 20 MG: 20 TABLET, FILM COATED ORAL at 09:01

## 2018-07-20 RX ADMIN — FLUTICASONE PROPIONATE 2 SPRAY: 50 SPRAY, METERED NASAL at 09:20

## 2018-07-20 RX ADMIN — HUMAN INSULIN 3 UNITS: 100 INJECTION, SOLUTION SUBCUTANEOUS at 17:55

## 2018-07-20 RX ADMIN — METHYLPREDNISOLONE SODIUM SUCCINATE 40 MG: 40 INJECTION, POWDER, FOR SOLUTION INTRAMUSCULAR; INTRAVENOUS at 12:54

## 2018-07-20 RX ADMIN — DILTIAZEM HYDROCHLORIDE 60 MG: 60 TABLET, FILM COATED ORAL at 07:36

## 2018-07-20 RX ADMIN — METOPROLOL TARTRATE 5 MG: 1 INJECTION, SOLUTION INTRAVENOUS at 22:22

## 2018-07-20 RX ADMIN — DILTIAZEM HYDROCHLORIDE 60 MG: 60 TABLET, FILM COATED ORAL at 17:54

## 2018-07-20 RX ADMIN — APIXABAN 5 MG: 2.5 TABLET, FILM COATED ORAL at 09:00

## 2018-07-20 RX ADMIN — HUMAN INSULIN 3 UNITS: 100 INJECTION, SOLUTION SUBCUTANEOUS at 00:59

## 2018-07-20 RX ADMIN — VANCOMYCIN HYDROCHLORIDE 1250 MG: 500 INJECTION, POWDER, LYOPHILIZED, FOR SOLUTION INTRAVENOUS at 17:54

## 2018-07-20 RX ADMIN — IPRATROPIUM BROMIDE AND ALBUTEROL SULFATE 3 ML: .5; 3 SOLUTION RESPIRATORY (INHALATION) at 15:32

## 2018-07-20 RX ADMIN — BUDESONIDE 1 MG: 0.5 INHALANT RESPIRATORY (INHALATION) at 19:53

## 2018-07-20 RX ADMIN — NYSTATIN 500000 UNITS: 100000 SUSPENSION ORAL at 12:53

## 2018-07-20 RX ADMIN — CLOTRIMAZOLE AND BETAMETHASONE DIPROPIONATE 1 APPLICATION: 10; .5 CREAM TOPICAL at 23:23

## 2018-07-20 RX ADMIN — SODIUM CHLORIDE 100 ML/HR: 9 INJECTION, SOLUTION INTRAVENOUS at 14:55

## 2018-07-20 RX ADMIN — CLOTRIMAZOLE AND BETAMETHASONE DIPROPIONATE 1 APPLICATION: 10; .5 CREAM TOPICAL at 06:28

## 2018-07-20 RX ADMIN — LISINOPRIL 10 MG: 10 TABLET ORAL at 09:01

## 2018-07-20 RX ADMIN — Medication 200 MG: at 09:01

## 2018-07-20 RX ADMIN — FAMOTIDINE 20 MG: 20 TABLET ORAL at 21:29

## 2018-07-20 RX ADMIN — APIXABAN 5 MG: 2.5 TABLET, FILM COATED ORAL at 21:29

## 2018-07-20 RX ADMIN — INSULIN DETEMIR 25 UNITS: 100 INJECTION, SOLUTION SUBCUTANEOUS at 09:23

## 2018-07-20 RX ADMIN — HUMAN INSULIN 4 UNITS: 100 INJECTION, SOLUTION SUBCUTANEOUS at 12:54

## 2018-07-20 RX ADMIN — SERTRALINE HYDROCHLORIDE 100 MG: 50 TABLET ORAL at 09:00

## 2018-07-20 RX ADMIN — BUPROPION HYDROCHLORIDE 75 MG: 75 TABLET, FILM COATED ORAL at 09:00

## 2018-07-20 RX ADMIN — ACETAMINOPHEN 650 MG: 325 TABLET, FILM COATED ORAL at 21:29

## 2018-07-20 RX ADMIN — IPRATROPIUM BROMIDE AND ALBUTEROL SULFATE 3 ML: .5; 3 SOLUTION RESPIRATORY (INHALATION) at 12:23

## 2018-07-20 RX ADMIN — METHYLPREDNISOLONE SODIUM SUCCINATE 40 MG: 40 INJECTION, POWDER, FOR SOLUTION INTRAMUSCULAR; INTRAVENOUS at 21:29

## 2018-07-20 RX ADMIN — IPRATROPIUM BROMIDE AND ALBUTEROL SULFATE 3 ML: .5; 3 SOLUTION RESPIRATORY (INHALATION) at 03:09

## 2018-07-20 RX ADMIN — IPRATROPIUM BROMIDE AND ALBUTEROL SULFATE 3 ML: .5; 3 SOLUTION RESPIRATORY (INHALATION) at 19:53

## 2018-07-20 RX ADMIN — MUPIROCIN: 20 OINTMENT TOPICAL at 06:29

## 2018-07-20 RX ADMIN — MUPIROCIN: 20 OINTMENT TOPICAL at 23:23

## 2018-07-20 RX ADMIN — FAMOTIDINE 20 MG: 20 TABLET ORAL at 09:00

## 2018-07-20 RX ADMIN — Medication 10 ML: at 12:54

## 2018-07-20 RX ADMIN — INSULIN DETEMIR 25 UNITS: 100 INJECTION, SOLUTION SUBCUTANEOUS at 21:30

## 2018-07-20 RX ADMIN — IPRATROPIUM BROMIDE AND ALBUTEROL SULFATE 3 ML: .5; 3 SOLUTION RESPIRATORY (INHALATION) at 23:51

## 2018-07-20 RX ADMIN — MORPHINE SULFATE 1 MG: 2 INJECTION, SOLUTION INTRAMUSCULAR; INTRAVENOUS at 00:59

## 2018-07-20 RX ADMIN — NYSTATIN 500000 UNITS: 100000 SUSPENSION ORAL at 17:54

## 2018-07-20 RX ADMIN — GABAPENTIN 300 MG: 300 CAPSULE ORAL at 09:00

## 2018-07-20 RX ADMIN — VANCOMYCIN HYDROCHLORIDE 1250 MG: 500 INJECTION, POWDER, LYOPHILIZED, FOR SOLUTION INTRAVENOUS at 07:37

## 2018-07-20 RX ADMIN — NYSTATIN 500000 UNITS: 100000 SUSPENSION ORAL at 21:29

## 2018-07-20 RX ADMIN — GABAPENTIN 300 MG: 300 CAPSULE ORAL at 21:30

## 2018-07-20 RX ADMIN — BUPROPION HYDROCHLORIDE 75 MG: 75 TABLET, FILM COATED ORAL at 21:29

## 2018-07-20 RX ADMIN — NYSTATIN 500000 UNITS: 100000 SUSPENSION ORAL at 09:00

## 2018-07-20 RX ADMIN — DILTIAZEM HYDROCHLORIDE 60 MG: 60 TABLET, FILM COATED ORAL at 12:54

## 2018-07-20 RX ADMIN — VITAMIN D, TAB 1000IU (100/BT) 2000 UNITS: 25 TAB at 09:00

## 2018-07-20 RX ADMIN — ARFORMOTEROL TARTRATE 15 MCG: 15 SOLUTION RESPIRATORY (INHALATION) at 06:57

## 2018-07-20 NOTE — PROGRESS NOTES
Case Management/Social Work    Patient Name:  Breann Gallegos  YOB: 1950  MRN: [id number]  Admit Date:  7/13/2018      According to Dr. Kirby, pt will benefit from LTACH placement. Spoke with pt at bedside and she also had this conversation with Dr. Kirby and is agreeable. She states she would like to discuss which facility would be best with her . Advised that I will speak with pt's  Galindo for his input as well before initiating referral. Will continue to follow and assist.      Electronically signed by:  RODNEY Gaitan  07/20/18 11:17 AM     This SW spoke with both pt and  in the ICU. They are both agreeable for referral for LTAC to be sent to [facility]. Referral faxed and CM will continue to follow and assist with placement.    2:36 PM  07/20/18

## 2018-07-20 NOTE — PLAN OF CARE
Problem: Patient Care Overview  Goal: Plan of Care Review  Outcome: Ongoing (interventions implemented as appropriate)   07/19/18 2787   Coping/Psychosocial   Plan of Care Reviewed With patient   Plan of Care Review   Progress improving   OTHER   Outcome Summary Pt performed ex with [facility] occ AROM con't with PT POC and progress as pt tolerates

## 2018-07-20 NOTE — PLAN OF CARE
Problem: NPPV/CPAP (Adult)  Goal: Signs and Symptoms of Listed Potential Problems Will be Absent, Minimized or Managed (NPPV/CPAP)  Outcome: Ongoing (interventions implemented as appropriate)   07/20/18 0059   Goal/Outcome Evaluation   Problems Assessed (NPPV/CPAP) hypoxia/hypoxemia;dry mucous membranes;situational response;skin breakdown   Problems Present (NPPV/CPAP) none

## 2018-07-20 NOTE — PROGRESS NOTES
Adult Nutrition  Assessment/PES    Patient Name:  Breann Gallegos  YOB: 1950  MRN: [id number]  Admit Date:  7/13/2018    Assessment Date:  7/20/2018    Comments:  Rec. #1: If pt. To remain NPO consider use of EN. Rec. #2: Advance diet once medically feasible and per SLP recs. Rec. #3: Consider adding MVI daily. RD to follow pt. Consult RD PRN.           Reason for Assessment     Row Name 07/20/18 1342          Reason for Assessment    Reason For Assessment follow-up protocol     Diagnosis diabetes diagnosis/complications;cardiac disease;pulmonary disease;gastrointestinal disease   COPD, DM Type 2, Respiratory Failure, Intubated     Identified At Risk by Screening Criteria difficulty chewing/swallowing                 Labs/Tests/Procedures/Meds     Row Name 07/20/18 1344          Labs/Procedures/Meds    Lab Results Reviewed reviewed, pertinent     Lab Results Comments High: Glucose, BUN   Low: Albumin        Medications    Pertinent Medications Reviewed reviewed                 Nutrition Prescription Ordered     Row Name 07/20/18 1345          Nutrition Prescription PO    Current PO Diet NPO   sips of nectar thickened glucerna with medications per MD        Nutrition Prescription EN    Enteral Route OG   TF d/c             Evaluation of Received Nutrient/Fluid Intake     Row Name 07/20/18 1345          PO Evaluation    Number of Days PO Intake Evaluated Insufficient Data             Problem/Interventions:        Problem 1     Row Name 07/20/18 1351          Nutrition Diagnoses Problem 1    Problem 1 Impaired Nutrient Utilization     Etiology (related to) Medical Diagnosis     Endocrine DM2     Signs/Symptoms (evidenced by) Biochemical     Specific Labs Noted Glucose     Resolved? Yes             Problem 2     Row Name 07/20/18 1351          Nutrition Diagnoses Problem 2    Problem 2 Increased Nutrient Needs     Macronutrient Kcal;Fluid;Protein     Micronutrient Vitamin;Mineral     Etiology  (related to) Medical Diagnosis     Pulmonary/Critical Care COPD;Acute respiratory failure;Other (comment)   hypoxia     Signs/Symptoms (evidenced by) Other (comment)   Pulmonary dysfunction               Problem 4     Row Name 07/20/18 1352          Nutrition Diagnoses Problem 4    Problem 4 Biting/Chewing Difficulty     Etiology (related to) Functional Diagnosis     Functional Diagnosis Dysphagia     Signs/Symptoms (evidenced by) SLP/Swallow eval     Percent (%) of EN goal --   TF currently held for extubation trial     Swallow eval status Done     Type of SLP Evaluation Bedside               Intervention Goal     Row Name 07/20/18 1352          Intervention Goal    General Meet nutritional needs for age/condition     PO Meet estimated needs;Advance diet     Weight No significant weight loss             Nutrition Intervention     Row Name 07/20/18 1352          Nutrition Intervention    RD/Tech Action Follow Tx progress;Await begin PO;Recommend/ordered     Recommended/Ordered EN             Nutrition Prescription     Row Name 07/20/18 1352          Nutrition Prescription PO    PO Prescription --   Advance diet once medically feasible and per SLP recommendations        Other Orders    Supplement Vitamin mineral supplement     Supplement Ordered? No, recommended             Education/Evaluation     Row Name 07/20/18 7351          Education    Education Education not appropriate at this time     Please explain --   Pt. continues NPO        Monitor/Evaluation    Monitor Per protocol;I&O;PO intake;Pertinent labs;Weight         Electronically signed by:  Aleta Watts RD  07/20/18 1:55 PM

## 2018-07-20 NOTE — PLAN OF CARE
Problem: NPPV/CPAP (Adult)  Intervention: Monitor and Manage Anxiety Related to NPPV/CPAP   07/20/18 1324   Interventions   Trust Relationship/Rapport care explained;questions answered;thoughts/feelings acknowledged;choices provided       Goal: Signs and Symptoms of Listed Potential Problems Will be Absent, Minimized or Managed (NPPV/CPAP)  Outcome: Ongoing (interventions implemented as appropriate)   07/20/18 1324   Goal/Outcome Evaluation   Problems Assessed (NPPV/CPAP) hypoxia/hypoxemia;skin breakdown   Problems Present (NPPV/CPAP) none

## 2018-07-20 NOTE — PLAN OF CARE
Problem: Patient Care Overview  Goal: Plan of Care Review  Outcome: Ongoing (interventions implemented as appropriate)   07/20/18 1430   Coping/Psychosocial   Plan of Care Reviewed With patient   Plan of Care Review   Progress improving   OTHER   Outcome Summary Pt tolerateing increased activity this date with decreased assistance required for mobility. Pt was also able to tolerate getting [facility] this tx. See flowsheet for details.

## 2018-07-20 NOTE — DISCHARGE PLACEMENT REQUEST
"Breann Zarco (68 y.o. Female)     Date of Birth Social Security Number Address Home Phone MRN    1950  [id number]  [facility] [place] [address] [phone] [id number]    Jehovah's witness Marital Status          Mosque        Admission Date Admission Type Admitting Provider Attending Provider Department, Room/Bed    7/13/18 Emergency Sabra Navarro MD Gandee, Julie G, DO [facility] INTENSIVE CARE, I07/1    Discharge Date Discharge Disposition Discharge Destination                       Attending Provider:  Ese Kirby DO    Allergies:  Codeine, Fish-derived Products, Terbinafine, Penicillins    Isolation:  Contact   Infection:  MRSA (07/16/18)   Code Status:  CPR    Ht:  162.6 cm (64\")   Wt:  70.9 kg (156 lb 4.8 oz)    Admission Cmt:  None   Principal Problem:  Acute respiratory failure with hypoxia (CMS/HCC) [J96.01]                 Active Insurance as of 7/13/2018     Primary Coverage     Payor Plan Insurance Group Employer/Plan Group    MEDICARE MEDICARE A & B      Payor Plan Address Payor Plan Phone Number Effective From Effective To    [address] [phone] 5/1/2016     [facility] [address]       Subscriber Name Subscriber Birth Date Member ID       BREANN ZARCO 1950 [id number]           Secondary Coverage     Payor Plan Insurance Group Employer/Plan Group    [facility] [id number]     Payor Plan Address Payor Plan Phone Number Effective From Effective To    [address]  1/1/2016     [facility] [id number]       Subscriber Name Subscriber Birth Date Member ID       GALDINO ZARCO 5/19/1944 [id number]                 Emergency Contacts      (Rel.) Home Phone Work Phone Mobile Phone    Galdino Zarco (Spouse) [phone] -- [phone]               History & Physical      Sabra Navarro MD at 7/13/2018  5:17 AM          [facility] - HOSPITALIST HISTORY & PHYSICAL    Name: Breann Zarco, 68 y.o. " "female  MRN: [id number], : 1950   Date of Admission: 2018   PCP: Harry Avery MD     Chief Complaint: shortness of breath     History of Present Illness: Breann Gallegos is a(n) 68 y.o. year old female with a history of COPD, GERD, DM2, HTN, HLD who presents as admission from ER with shortness of breath that increased after going to therapy and productive cough x 1 day.  I am told she was hypoxic on room air on arrival, ~80% in ER, improved on 5LPM NC to mid to low 90s%. Patient tells me that they drove through a \"fog\" of dust from [place] yesterday. Both  and wife noted changes in respiratory status since then. She denies any fevers or chills. Denies any rhinorrhea, headache, sinus drainage, chest pain, abdominal pain, N/V/D, dysuria or urinary frequency. Denies any myalgias or arthralgias.     ER course:   VS: /72   Pulse 97   Temp 98.7 °F (37.1 °C)   Resp 20   Ht 162.6 cm (64\")   Wt 68.7 kg (151 lb 8 oz)   LMP  (LMP Unknown)   SpO2 90%   BMI 26.00 kg/m²     Meds: cefepime; duoneb; levaquin; solumedrol; vancomycin; NS 1L  Abnl Labs: AB.48/30/56/22; Glc 194; CO2 25; ; WBC 13K  Studies:   CT chest: \"no definite filling defect to suggest a pulmonary embolus. No acute aortic abnormality. Mediastinal lymphadenopathy. This could be reactive to infection or inflammation, but malignancy is not excluded. Multifocal air space consolidation diffusely. This may be seen with multifocal pneumonia, but short-term follow-up in 3 months should be considered to exclude underlying malignancy.\"  EKG: (my read), compared to EKG from: 18. Rate: 106bpm / Rhythm: sinus tach with baseline artifact / Axis: nl / ST/T changes: no / Hypertrophy: no / QTc: 373ms     Patient seen at 545am on 2018. History was provided by: chart review, discussion with ER provider (Dr. Contreras), and patient.     Recent hospitalization?: yes, -5/3 for recurrent esophageal stricture "     ==============================================================================================================================================================================================================   History:   ROS: all other systems reviewed and are negative  PMHx: Patient  has a past medical history of Arthritis; Body piercing; COPD (chronic obstructive pulmonary disease) (CMS/HCC); Diabetes mellitus (CMS/HCC); Dysphagia; Eustachian tube dysfunction; Fracture; Full dentures; GERD (gastroesophageal reflux disease); High cholesterol; History of nuclear stress test; Hyperlipidemia; Hypertension; Sinus problem; Skin abscess; Vision problem; Vitamin B12 deficiency; Vitamin D deficiency; and Wears glasses.   PSHx: Patient  has a past surgical history that includes Other surgical history; Cataract Extraction (Bilateral); Hand surgery (Right); Tonsillectomy; Appendectomy (1976); Cholecystectomy (1976); Hysterectomy (1979); Breast surgery; Esophagogastroduodenoscopy; Colonoscopy; Esophagogastroduodenoscopy (N/A, 11/29/2017); Reduction mammaplasty (Bilateral, 1988); Oophorectomy (Bilateral, 1979); and Esophagogastroduodenoscopy (N/A, 5/2/2018).   FamHx: Patient family history includes COPD in her mother; Diabetes in her other; Kidney failure in her father; Pneumonia in her mother; Prostate cancer in her father.   SocHx: Patient  reports that she quit smoking about 8 years ago. Her smoking use included Cigarettes. She has a 30.00 pack-year smoking history. She has never used smokeless tobacco. She reports that she does not drink alcohol or use drugs.   Allergies: Patient is allergic to codeine; fish-derived products; protonix [pantoprazole]; terbinafine; and penicillins.   Medications:   No current facility-administered medications on file prior to encounter.      Current Outpatient Prescriptions on File Prior to Encounter   Medication Sig Dispense Refill   • albuterol (PROAIR RESPICLICK) 108 (90 BASE) MCG/ACT  "inhaler Inhale 1 puff every 4 (four) hours as needed for wheezing or shortness of air. 1 inhaler 0   • amLODIPine (NORVASC) 5 MG tablet TAKE 1 TABLET BY MOUTH AT BEDTIME 30 tablet 5   • arformoterol (BROVANA) 15 MCG/2ML nebulizer solution Take 2 mL by nebulization 2 (Two) Times a Day. 120 mL 5   • B-D INS SYR ULTRAFINE 1CC/31G 31G X 5/16\" 1 ML misc use as directed 100 each 5   • budesonide (PULMICORT) 0.5 MG/2ML nebulizer solution Take 2 mL by nebulization 2 (Two) Times a Day. 120 mL 5   • buPROPion XL (WELLBUTRIN XL) 150 MG 24 hr tablet take 1 tablet by mouth once daily 30 tablet 4   • calcitonin, salmon, (MIACALCIN) 200 UNIT/ACT nasal spray 1 spray into each nostril Daily. 1 each 12   • cholecalciferol (VITAMIN D3) 1000 units tablet Take 2,000 Units by mouth Daily.     • clopidogrel (PLAVIX) 75 MG tablet take 1 tablet by mouth once daily 30 tablet 6   • clotrimazole-betamethasone (LOTRISONE) 1-0.05 % cream Apply 1 application topically 2 (Two) Times a Day. Apply topically twice daily as direted. 45 g 5   • colestipol (COLESTID) 1 g tablet take 2 tablets by mouth twice a day (Patient taking differently: take 2 tablets by mouth daily) 120 tablet 3   • diphenoxylate-atropine (LOMOTIL) 2.5-0.025 MG per tablet patient states she takes one tablet every day  0   • flunisolide (NASALIDE) 25 MCG/ACT (0.025%) solution nasal spray Inhale 2 sprays Every 12 (Twelve) Hours. 1 bottle 5   • gabapentin (NEURONTIN) 300 MG capsule take 1 capsule by mouth twice a day 60 capsule 3   • glucose blood (ONE TOUCH ULTRA TEST) test strip USE TO TEST TWICE DAILY. E11.9 100 each 5   • insulin glargine (LANTUS) 100 UNIT/ML injection Inject 45 Units under the skin 2 (Two) Times a Day. 50 mL 5   • Insulin Syringe-Needle U-100 (B-D INS SYR ULTRAFINE .5CC/30G) 30G X 1/2\" 0.5 ML misc      • Insulin Syringe-Needle U-100 (B-D INS SYR ULTRAFINE 1CC/30G) 30G X 1/2\" 1 ML misc      • ipratropium-albuterol (DUO-NEB) 0.5-2.5 mg/mL nebulizer Take 3 mL by " "nebulization Every 4 (Four) Hours As Needed for wheezing or shortness of air. 120 vial 2   • Lancets (ONETOUCH ULTRASOFT) lancets USE AS DIRECTED TWICE A  each 5   • lisinopril (PRINIVIL,ZESTRIL) 20 MG tablet take 1 tablet by mouth once daily 30 tablet 5   • magnesium 30 MG tablet Take 1 tablet by mouth Daily. 30 tablet 1   • metFORMIN (GLUCOPHAGE) 500 MG tablet TAKE 2 TABLETS BY MOUTH IN THE MORNING, 1 TABLET AT LUNCH, AND 2 TABLETS IN THE EVENING 150 tablet 5   • omeprazole (PRILOSEC) 40 MG capsule 1 po daily in the am 30 minutes before breakfast 30 capsule 5   • ondansetron (ZOFRAN) 4 MG tablet Take 1 tablet by mouth Every 8 (Eight) Hours As Needed for Nausea or Vomiting. 30 tablet 0   • sertraline (ZOLOFT) 100 MG tablet TAKE 1 1/2 TABLETS BY MOUTH ONCE DAILY 45 tablet 1   • simvastatin (ZOCOR) 40 MG tablet TAKE 1 TABLET BY MOUTH ONCE DAILY 30 tablet 11   • traZODone (DESYREL) 50 MG tablet TAKE 1 1/2 TABLETS BY MOUTH AT BEDTIME 45 tablet 2        ==============================================================================================================================================================================================================   Vitals:   /72   Pulse 97   Temp 98.7 °F (37.1 °C)   Resp 20   Ht 162.6 cm (64\")   Wt 68.7 kg (151 lb 8 oz)   LMP  (LMP Unknown)   SpO2 90%   BMI 26.00 kg/m²     SpO2: 90 %   No intake or output data in the 24 hours ending 07/13/18 0518     Physical Exam:   General Appearance:  Alert and cooperative, not in any acute distress. Coughs occasionally, some conversational dyspnea.   Head:  Atraumatic and normocephalic, without obvious abnormality.   Eyes:          PERRL, conjunctivae and sclerae normal, no Icterus. No pallor. Extra-occular movements are within normal limits.   Ears:  Ears appear intact with no abnormalities noted.   Throat: No oral lesions, no thrush, oral mucosa moist.   Neck: Supple, trachea midline, no thyromegaly, no carotid " bruit.   Back:   No kyphoscoliosis present. No tenderness to palpation, range of motion normal.   Lungs:   Chest shape is normal. Breath sounds heard bilaterally equally.  No crackles or wheezing. B/l rhonchi without wheeze.   Heart:  Normal S1 and S2, no murmur, no gallop, no rub.   Abdomen:   Normal bowel sounds, no masses. Soft, non-tender, non-distended, no guarding, no rebound tenderness.   Genitourinary: deferred   Extremities: Moves all extremities well, no edema, no cyanosis, no clubbing.   Pulses: Pulses palpable and equal bilaterally.   Skin: No bleeding, bruising or rash.   Lymph nodes: No palpable adenopathy.   Neurologic: Alert and oriented x 3. Moves all four limbs equally. No tremors. No facial asymetry.       Labs:     Results from last 7 days  Lab Units 07/13/18  0235   SODIUM mmol/L 142   POTASSIUM mmol/L 3.7   CHLORIDE mmol/L 102   CO2 mmol/L 25.0*   BUN mg/dL 12   CREATININE mg/dL 0.60   CALCIUM mg/dL 9.5   BILIRUBIN mg/dL 1.7*   ALK PHOS U/L 85   ALT (SGPT) U/L 14   AST (SGOT) U/L 28   GLUCOSE mg/dL 194*       Results from last 7 days  Lab Units 07/13/18  0235   WBC 10*3/mm3 13.20*   HEMOGLOBIN g/dL 12.6   HEMATOCRIT % 38.1   PLATELETS 10*3/mm3 289       Results from last 7 days  Lab Units 07/13/18  0235   TROPONIN I ng/mL 0.016       Results from last 7 days  Lab Units 07/13/18  0235   PROBNP pg/mL 901.0*     Results from last 7 days  Lab Units 07/13/18  0250   PH, ARTERIAL pH units 7.483   PO2 ART mm Hg 56.2*   PCO2, ARTERIAL mm Hg 30.5*   HCO3 ART mmol/L 22.9     ==============================================================================================================================================================================================================   Assessment/Plan:   Breann Melissa Gallegos is a(n) 68 y.o. year old female with:     1. Multifocal pneumonia - concern for acute pneumonitis from environmental exposure  1. S/p vanc/cefepime/levaquin in ER, continue on  vanc/cefepime/doxycycline  2. Check urine strep/legionella, MRSA screen. F/u BCx  3. Supplemental O2 to keep sats 88-92%  4. Duonebs, symbicort  5. Need repeat CT in 3 months to monitor for resolution and status of mediastinal adenopathy  6. Consult PICC nurse for midline, as currently has PIV in thumb and anticipating IV therapy needs  2. Acute respiratory failure with hypoxia. Due to #1. Improved with supplemental O2 via NC.  3. Sinus tachycardia. EKG ok. Monitor on tele. Likely due to #1  4. DM2. hgba1c 6.9%, 2017. Fairly well controlled. Hold metformin x 48 hours as received IV contrast. Continue lantus. Start ssi / accuchecks ac and hs.    // F/E/N: diabetic diet  // DVT PPx: SCDs   // GI PPx: not indicated    // Code Status: FULL CODE   // NOK: Galindo Gallegos (spouse) [phone]  // Dispo: admission, inpatient, need for hospitalization: IV therapy    Assessment and plan was discussed with the patient and her . All questions were answered.     Time in: 545am Time out: 636am   Date patient seen: 2018     Sabra Navarro MD   5:18 AM on 2018    Electronically signed by Sabra Navarro MD at 2018  6:47 AM            Physician Progress Notes (last 24 hours) (Notes from 2018  2:26 PM through 2018  2:26 PM)      Ese Kirby DO at 2018  2:08 PM                [facility]    [facility]    Name:  Breann Gallegos   Age:  68 y.o.  Sex:  female  :  1950  MRN:  [id number]   Visit Number:  [id number]  Admission Date:  2018  Date Of Service:  18  Primary Care Physician:  Harry Avery MD     LOS: 7 days :      Chief Complaint:  Follow-up acute respiratory failure and pneumonia        Subjective / Interval History:  The patient was seen again today.  She is more awake and alert.  She did have some dysphagia with double swallows yesterday evening.  Her chest x-ray shows increased right sided opacity consistent with  possible aspiration.  She is now placed nothing by mouth.  The patient has improving shortness of breath, cough and denies any chest pain, abdominal pain, any edema.  She is severely generally weak.  She has been requiring increased nursing assistance..          Review of Systems:   General ROS: Patient denies any fevers, chills or loss of consciousness. Complains of severe generalized weakness.  Psychological ROS: Denies any hallucinations and delusions. Less sedated appearing  Ophthalmic ROS: Denies any diplopia or transient loss of vision.  ENT ROS: Denies sore throat, nasal congestion or ear pain.   Allergy and Immunology ROS: Denies rash or itching.  Hematological and Lymphatic ROS: Denies neck swelling or easy bleeding.  Endocrine ROS: Denies any recent unintentional weight gain or loss.  Breast ROS: Denies any pain or swelling.  Respiratory ROS: Denies cough or shortness of breath.  Cardiovascular ROS: Denies chest pain or palpitations. No history of exertional chest pain.  Gastrointestinal ROS: Denies nausea and vomiting. Denies any abdominal pain. No diarrhea.  Genito-Urinary ROS: Denies dysuria or hematuria.  Musculoskeletal ROS: Denies chronic back pain. No muscle pain. No calf pain.  Neurological ROS: Denies any focal weakness. No loss of consciousness. Denies any numbness. Denies neck pain.  Dermatological ROS: Denies any redness or pruritis.        Vital Signs:    Temp:  [97.7 °F (36.5 °C)-98.4 °F (36.9 °C)] 98 °F (36.7 °C)  Heart Rate:  [] 103  Resp:  [15-22] 18  BP: (117-165)/() 159/90  FiO2 (%):  [60 %] 60 %    Intake and output:    I/O last 3 completed shifts:  In: 1888 [P.O.:360; I.V.:1268; Other:260]  Out: 2295 [Urine:2295]  I/O this shift:  In: 120 [P.O.:120]  Out: 225 [Urine:225]    Physical Examination:    General Appearance:    No acute distress.  Awake sitting up in bed smiling. She appears no longer sedated    Head:    Atraumatic and normocephalic, without obvious abnormality.    Eyes:           PERRLA. EOMI. No icterus   Throat:   Clear,moist   Neck:   Supple, no LAD   Lungs:    Clear bilaterally without wheeze, rhonchi, or crackles.    Heart:    Normal S1 and S2, no murmur, no gallop   Abdomen:     Normal bowel sounds, no masses, no organomegaly. Soft        non-tender, non-distended, no guarding, no rebound                tenderness   Extremities:   Moves all extremities, no edema, no cyanosis, no             clubbing   Skin:   No bleeding, bruising or rash.   Neurologic:   5/5 muscles arms. 4/5 legs. No tremor    Laboratory results:    Lab Results (last 24 hours)     Procedure Component Value Units Date/Time    POC Glucose Once [[id number]]  (Abnormal) Collected:  07/20/18 1131    Specimen:  Blood Updated:  07/20/18 1145     Glucose 294 (H) mg/dL      Comment: Serial Number: [id number]:  [id number]       POC Glucose Once [[id number]]  (Abnormal) Collected:  07/20/18 0614    Specimen:  Blood Updated:  07/20/18 0621     Glucose 214 (H) mg/dL      Comment: Serial Number: [id number]:  [id number]       Comprehensive Metabolic Panel [[id number]]  (Abnormal) Collected:  07/20/18 0430    Specimen:  Blood Updated:  07/20/18 0558     Glucose 212 (H) mg/dL      Comment: Glucose >180, Hemoglobin A1C recommended.        BUN 35 (H) mg/dL      Creatinine 0.50 (L) mg/dL      Sodium 141 mmol/L      Potassium 4.2 mmol/L      Chloride 101 mmol/L      CO2 35.0 (H) mmol/L      Calcium 8.6 mg/dL      Total Protein 6.5 g/dL      Albumin 3.30 (L) g/dL      ALT (SGPT) 64 U/L      AST (SGOT) 42 U/L      Alkaline Phosphatase 101 U/L      Total Bilirubin 0.8 mg/dL      eGFR Non African Amer 123 mL/min/1.73      Globulin 3.2 gm/dL      A/G Ratio 1.0 g/dL      BUN/Creatinine Ratio 70.0 (H)     Anion Gap 9.2 (L) mmol/L     Narrative:       GFR Normal >60  Chronic Kidney Disease <60  Kidney Failure <15    CBC & Differential [[id number]] Collected:  07/20/18 0430    Specimen:  Blood Updated:  07/20/18 0500     Narrative:       The following orders were created for panel order CBC & Differential.  Procedure                               Abnormality         Status                     ---------                               -----------         ------                     CBC Auto Differential[[id number]]        Abnormal            Final result                 Please view results for these tests on the individual orders.    CBC Auto Differential [[id number]]  (Abnormal) Collected:  07/20/18 0430    Specimen:  Blood Updated:  07/20/18 0500     WBC 14.36 (H) 10*3/mm3      RBC 4.74 10*6/mm3      Hemoglobin 12.8 g/dL      Hematocrit 39.6 %      MCV 83.5 fL      MCH 27.0 pg      MCHC 32.3 g/dL      RDW 14.4 %      RDW-SD 43.9 fl      MPV 10.5 fL      Platelets 405 (H) 10*3/mm3      Neutrophil % 86.7 (H) %      Lymphocyte % 7.2 (L) %      Monocyte % 4.2 %      Eosinophil % 0.0 %      Basophil % 0.1 %      Immature Grans % 1.8 (H) %      Neutrophils, Absolute 12.44 (H) 10*3/mm3      Lymphocytes, Absolute 1.03 10*3/mm3      Monocytes, Absolute 0.61 10*3/mm3      Eosinophils, Absolute 0.00 10*3/mm3      Basophils, Absolute 0.02 10*3/mm3      Immature Grans, Absolute 0.26 (H) 10*3/mm3      nRBC 0.0 /100 WBC     POC Glucose Once [[id number]]  (Abnormal) Collected:  07/20/18 0005    Specimen:  Blood Updated:  07/20/18 0010     Glucose 204 (H) mg/dL      Comment: Serial Number: [id number]:  [id number]       POC Glucose Once [[id number]]  (Abnormal) Collected:  07/19/18 1729    Specimen:  Blood Updated:  07/19/18 1750     Glucose 208 (H) mg/dL      Comment: Serial Number: [id number]:  [id number]             I have reviewed the patient's laboratory results.    Radiology results:    Imaging Results (last 24 hours)     Procedure Component Value Units Date/Time    XR Chest 1 View [[id number]] Collected:  07/20/18 0823     Updated:  07/20/18 0840    Narrative:       PORTABLE CHEST     INDICATION: Pneumonia. Respiratory failure.     FINDINGS:  Single frontal view chest, compared with 07/19/2018. Interval  removal of nasogastric tube. Right-sided PICC line remains in place. EKG  leads overlie the chest. No pneumothorax. Since previous, there has been  interval worsening of opacification at the right lung base which may  represent worsening infiltrate from pneumonia. Aspiration not excluded.  Perihilar and left basilar infiltrates are otherwise similar to slightly  worsened as well. No pneumothorax. Small right pleural effusion not  excluded.       Impression:       Worsening infiltrates, greatest towards the right base as  detailed above. Continued follow-up recommended.     This report was finalized on 7/20/2018 8:38 AM by Tone Naylor MD.          I have reviewed the patient's radiology reports.    Medication Review:     I have reviewed the patients active and prn medications.     Assessment:  1. Multifocal bilateral pneumonia, prior to admission, with MRSA, Improving  2. Dysphagia with suspected aspiration pneumonitis  3. Acute hypoxic respiratory failure severe, requiring intubation and placement on mechanical ventilator 7-14-18 with  ARDS, improved her, on  bipap  4. New onset Afib with RVR, previous sinus tachycardia, improved on diltiazem  5. Sepsis, secondary to pneumonia, prior to admission, with MRSA, treated  6. Diabetes mellitus type 2 insulin dependent  7. Chronic essential hypertension  8. COPD moderate  9. Abnormal CT chest with lymphadenopathy, repeat outpatient CT scan chest        Plan:  The patient has tolerated extubation well.  She is stable on high flow oxygen with BiPAP use every 3-4 hours.  She is still requiring ICU monitoring with metabolic encephalopathy as well as high risk reintubation and diffuse severe bilateral pneumonia.  Her chest x-ray look worse today after eating and she does have dysphagia.  I suspect aspiration pneumonitis.  Continue Solu-Medrol.  For now, would continue the vancomycin and once further improvement  noted on the chest xray will narrow spectrum to Levaquin. Sputum culture sensitivities reviewed.     OG feeding tube removed. Will hold off on restarting today. Keep NPO except for sips with  Meds. Aspiration precautions. If chest xray better tomorrow, consider restarting feeding especially as she is more awake and alert today. Repeat chest xray tomorrow morning.    Continue PT and OT and Speech therapy. The patient would be candidate for LTAC type facility. Discussed options.     Cardizem continued. Digoxin was stopped. Heart rate is controlled. Continue Eliquis.    Protonix. Pepcid. Full code.   Neurology..     Ese Kirby DO  07/20/18  2:08 PM            Electronically signed by Ese Kirby DO at 7/20/2018  2:18 PM    Nutrition Notes (last 24 hours) (Notes from 7/19/2018  2:27 PM through 7/20/2018  2:27 PM)      Aleta Watts RD at 7/20/2018  1:55 PM          Adult Nutrition  Assessment/PES    Patient Name:  Breann Gallegos  YOB: 1950  MRN: [id number]  Admit Date:  7/13/2018    Assessment Date:  7/20/2018    Comments:  Rec. #1: If pt. To remain NPO consider use of EN. Rec. #2: Advance diet once medically feasible and per SLP recs. Rec. #3: Consider adding MVI daily. RD to follow pt. Consult RD PRN.           Reason for Assessment     Row Name 07/20/18 1342          Reason for Assessment    Reason For Assessment follow-up protocol     Diagnosis diabetes diagnosis/complications;cardiac disease;pulmonary disease;gastrointestinal disease   COPD, DM Type 2, Respiratory Failure, Intubated     Identified At Risk by Screening Criteria difficulty chewing/swallowing                 Labs/Tests/Procedures/Meds     Row Name 07/20/18 1349          Labs/Procedures/Meds    Lab Results Reviewed reviewed, pertinent     Lab Results Comments High: Glucose, BUN   Low: Albumin        Medications    Pertinent Medications Reviewed reviewed                 Nutrition Prescription Ordered     Row Name  07/20/18 1345          Nutrition Prescription PO    Current PO Diet NPO   sips of nectar thickened glucerna with medications per MD        Nutrition Prescription EN    Enteral Route OG   TF d/c             Evaluation of Received Nutrient/Fluid Intake     Row Name 07/20/18 1345          PO Evaluation    Number of Days PO Intake Evaluated Insufficient Data             Problem/Interventions:        Problem 1     Row Name 07/20/18 1351          Nutrition Diagnoses Problem 1    Problem 1 Impaired Nutrient Utilization     Etiology (related to) Medical Diagnosis     Endocrine DM2     Signs/Symptoms (evidenced by) Biochemical     Specific Labs Noted Glucose     Resolved? Yes             Problem 2     Row Name 07/20/18 1351          Nutrition Diagnoses Problem 2    Problem 2 Increased Nutrient Needs     Macronutrient Kcal;Fluid;Protein     Micronutrient Vitamin;Mineral     Etiology (related to) Medical Diagnosis     Pulmonary/Critical Care COPD;Acute respiratory failure;Other (comment)   hypoxia     Signs/Symptoms (evidenced by) Other (comment)   Pulmonary dysfunction               Problem 4     Row Name 07/20/18 1352          Nutrition Diagnoses Problem 4    Problem 4 Biting/Chewing Difficulty     Etiology (related to) Functional Diagnosis     Functional Diagnosis Dysphagia     Signs/Symptoms (evidenced by) SLP/Swallow eval     Percent (%) of EN goal --   TF currently held for extubation trial     Swallow eval status Done     Type of SLP Evaluation Bedside               Intervention Goal     Row Name 07/20/18 1354          Intervention Goal    General Meet nutritional needs for age/condition     PO Meet estimated needs;Advance diet     Weight No significant weight loss             Nutrition Intervention     Row Name 07/20/18 1353          Nutrition Intervention    RD/Tech Action Follow Tx progress;Await begin PO;Recommend/ordered     Recommended/Ordered EN             Nutrition Prescription     Row Name 07/20/18 3044           Nutrition Prescription PO    PO Prescription --   Advance diet once medically feasible and per SLP recommendations        Other Orders    Supplement Vitamin mineral supplement     Supplement Ordered? No, recommended             Education/Evaluation     Row Name 07/20/18 2712          Education    Education Education not appropriate at this time     Please explain --   Pt. continues NPO        Monitor/Evaluation    Monitor Per protocol;I&O;PO intake;Pertinent labs;Weight         Electronically signed by:  Aleta Watts RD  07/20/18 1:55 PM    Electronically signed by Aleta Watts RD at 7/20/2018  1:59 PM     Alia Cruz RD at 7/19/2018  3:12 PM          Adult Nutrition  Assessment/PES    Patient Name:  Breann Gallegos  YOB: 1950  MRN: [id number]  Admit Date:  7/13/2018    Assessment Date:  7/19/2018    Comments:  Rec #1: Post SLP evaluation, pt started on Pureed diet with Nectar-Thickened Liquids. Continue current diet order; Advancing diet as tolerated. Encourage and assist with intake PRN. Nutritional supplement ordered Glucerna with NTL BID to promote PO intake. Rec #2: Consider MVI with minerals daily. Rec #3: Continue to monitor/replace electrolytes PRN. RD to follow pt. Consult RD PRN.             Reason for Assessment     Row Name 07/19/18 1505          Reason for Assessment    Reason For Assessment follow-up protocol     Diagnosis diabetes diagnosis/complications;cardiac disease;pulmonary disease;gastrointestinal disease   COPD, DM Type 2, Respiratory Failure, Intubated     Identified At Risk by Screening Criteria difficulty chewing/swallowing                 Labs/Tests/Procedures/Meds     Row Name 07/19/18 1502          Labs/Procedures/Meds    Lab Results Reviewed reviewed, pertinent     Lab Results Comments High: Platelets, BUN, Tg, Mg        Medications    Pertinent Medications Reviewed reviewed             Physical Findings     Row Name 07/19/18 1503          Physical  Findings    Tubes orogastric tube               Nutrition Prescription Ordered     Row Name 07/19/18 1504          Nutrition Prescription PO    Current PO Diet Pureed     Fluid Consistency Nectar/syrup thick        Nutrition Prescription EN    Enteral Route OG   TF d/c             Evaluation of Received Nutrient/Fluid Intake     Row Name 07/19/18 1505          PO Evaluation    Number of Days PO Intake Evaluated Insufficient Data        EN Evaluation    TF Changes Discontinued             Problem/Interventions:          Problem 2     Row Name 07/19/18 1507          Nutrition Diagnoses Problem 2    Problem 2 Increased Nutrient Needs     Macronutrient Kcal;Fluid;Protein     Micronutrient Vitamin;Mineral     Etiology (related to) Medical Diagnosis     Pulmonary/Critical Care COPD;Acute respiratory failure;Other (comment)   hypoxia     Signs/Symptoms (evidenced by) Other (comment)   Pulmonary dysfunction               Problem 4     Row Name 07/19/18 1508          Nutrition Diagnoses Problem 4    Problem 4 Biting/Chewing Difficulty     Etiology (related to) Functional Diagnosis     Functional Diagnosis Dysphagia     Signs/Symptoms (evidenced by) SLP/Swallow eval     Percent (%) of EN goal --   TF currently held for extubation trial     Swallow eval status Done     Type of SLP Evaluation Bedside               Intervention Goal     Row Name 07/19/18 1509          Intervention Goal    General Meet nutritional needs for age/condition     PO Meet estimated needs;Tolerate PO;Establish PO     Weight Maintain weight             Nutrition Intervention     Row Name 07/19/18 1510          Nutrition Intervention    RD/Tech Action Follow Tx progress;Care plan reviewd;Encourage intake;Recommend/ordered     Recommended/Ordered Supplement             Nutrition Prescription     Row Name 07/19/18 1510          Nutrition Prescription PO    PO Prescription Begin/change supplement     Fluid Consistency Nectar/syrup thick     Supplement  Glucerna Shake     Supplement Frequency 2 times a day     New PO Prescription Ordered? Yes        Nutrition Prescription EN    Enteral Prescription Discontinue enteral feeding        Other Orders    Obtain Weight Daily     Obtain Weight Ordered? No, recommended     Supplement Vitamin mineral supplement     Supplement Ordered? No, recommended             Education/Evaluation     Row Name 18 1511          Education    Education Education not appropriate at this time     Please explain Defer until post ICU   Recently extubated        Monitor/Evaluation    Monitor Per protocol;I&O;PO intake;Supplement intake;Pertinent labs;Weight         Electronically signed by:  Alia Cruz RD  18 3:12 PM    Electronically signed by Alia Cruz RD at 2018  3:14 PM          Physical Therapy Notes (last 24 hours) (Notes from 2018  2:27 PM through 2018  2:27 PM)      Anay Goel PTA at 2018  8:38 PM  Version 1 of 1         Problem: Patient Care Overview  Goal: Plan of Care Review  Outcome: Ongoing (interventions implemented as appropriate)   18 1740   Coping/Psychosocial   Plan of Care Reviewed With patient   Plan of Care Review   Progress improving   OTHER   Outcome Summary Pt performed ex with AAROM occ AROM con't with PT POC and progress as pt tolerates            Electronically signed by Anay Goel PTA at 2018  8:38 PM     Anay Goel PTA at 2018  8:39 PM  Version 1 of 1         Acute Care - Physical Therapy Treatment Note  [facility]     Patient Name: Breann Gallegos  : 1950  MRN: [id number]  Today's Date: 2018  Onset of Illness/Injury or Date of Surgery: 18  Date of Referral to PT: 18  Referring Physician: Dr. Kirby    Admit Date: 2018    Visit Dx:    ICD-10-CM ICD-9-CM   1. Pneumonia of both lungs due to infectious organism, unspecified part of lung J18.9 483.8   2. Acute on chronic respiratory failure with hypoxia  (CMS/[facility]) J96.21 518.84     799.02   3. Impaired functional mobility, balance, gait, and endurance Z74.09 V49.89   4. Impaired mobility and ADLs Z74.09 799.89     Patient Active Problem List   Diagnosis   • Dysphagia   • Abnormal urinalysis   • Anxiety   • Chronic obstructive pulmonary disease (CMS/HCC)   • Depression   • Diabetes mellitus (CMS/HCC)   • Diabetic peripheral neuropathy (CMS/HCC)   • Diarrhea   • Esophageal dysmotility   • Foot pain   • Gastroesophageal reflux disease without esophagitis   • Hyperlipidemia   • Hypertension   • Insomnia   • Irritable bowel syndrome   • Localized swelling, mass and lump, lower limb   • Low back pain   • Abnormal mammogram   • Enlargement of neck   • Neck pain   • Thyroid nodule   • Osteoarthritis of knee   • Osteoarthritis   • Osteopenia   • Peripheral arterial occlusive disease (CMS/HCC)   • Nerve root disorder   • Restless legs syndrome   • Tension headache   • Vitamin D deficiency   • Erythrasma   • Unsteady gait   • Headache   • Chronic left shoulder pain   • Esophageal stricture   • Other dysphagia   • Magnesium deficiency   • Pneumonia of both lungs due to infectious organism   • Acute respiratory failure with hypoxia (CMS/[facility])   • Sinus tachycardia       Therapy Treatment          Rehabilitation Treatment Summary     Row Name 07/19/18 1740             Treatment Time/Intention    Discipline physical therapy assistant  -CC      Document Type therapy note (daily note)  -CC      Subjective Information complains of;weakness;fatigue  -CC      Mode of Treatment individual therapy  -CC      Care Plan Review care plan/treatment goals reviewed  -CC      Patient Effort good  -CC      Existing Precautions/Restrictions fall;oxygen therapy device and L/min  -CC      Recorded by [CC] Anay Goel, PTA 07/19/18 2035      Row Name 07/19/18 1740             Vital Signs    Pre Patient Position Supine  -CC      Intra Patient Position Supine  -CC      Post Patient Position Supine   -CC      Recorded by [CC] Anay Goel, [facility] 07/19/18 2035      Row Name 07/19/18 1740             Motor Skills Assessment/Interventions    Additional Documentation Therapeutic Exercise (Group)  -CC      Recorded by [CC] Anay Goel, [facility] 07/19/18 2035      Row Name 07/19/18 1740             Therapeutic Exercise    71614 - PT Therapeutic Exercise Minutes 24  -CC      Recorded by [CC] Anay Goel [facility] 07/19/18 2035      Row Name 07/19/18 1740             Therapeutic Exercise    Lower Extremity (Therapeutic Exercise) heel slides, bilateral;SAQ (short arc quad), bilateral;SLR (straight leg raise), bilateral  -CC      Lower Extremity Range of Motion (Therapeutic Exercise) ankle dorsiflexion/plantar flexion, bilateral;hip abduction/adduction, bilateral  -CC      Exercise Type (Therapeutic Exercise) AAROM (active assistive range of motion)  -CC      Position (Therapeutic Exercise) supine  -CC      Sets/Reps (Therapeutic Exercise) 2x10  -CC      Expected Outcome (Therapeutic Exercise) increase active range of motion;improve performance, transfer skills  -CC      Recorded by [CC] Anay Goel, [facility] 07/19/18 2035      Row Name 07/19/18 1740             Positioning and Restraints    Pre-Treatment Position in bed  -CC      Post Treatment Position bed  -CC      In Bed supine;call light within reach;encouraged to call for assist;patient within staff view  -CC      Recorded by [CC] Anay Goel, [facility] 07/19/18 2035      Row Name 07/19/18 1740             Pain Scale: Numbers Pre/Post-Treatment    Pain Scale: Numbers, Pretreatment 0/10 - no pain  -CC      Pain Scale: Numbers, Post-Treatment 0/10 - no pain  -CC      Recorded by [CC] Anay Goel, [facility] 07/19/18 2035        User Key  (r) = Recorded By, (t) = Taken By, (c) = Cosigned By    Initials Name Effective Dates Discipline    CC Anay Goel, [facility] 03/07/18 -  PT        Physical Therapy Education     Title: PT OT SLP Therapies (Active)     Topic: Physical  Therapy (Active)     Point: Mobility training (Done)    Learning Progress Summary     Learner Status Readiness Method Response Comment Documented by    Patient Done Acceptance E,TB VU importance of mobility towards recovery KO 07/18/18 1524          Point: Home exercise program (Done)    Learning Progress Summary     Learner Status Readiness Method Response Comment Documented by    Patient Done Acceptance E,TB VU importance of performing ex throughout the day  07/19/18 2035                      User Key     Initials Effective Dates Name Provider Type Discipline     03/07/18 -  Anay Goel, PTA Physical Therapy Assistant PT     06/14/18 -  Merle Flores, PT Student PT Student PT              PT Recommendation and Plan     Plan of Care Reviewed With: patient  Progress: improving  Outcome Summary: Pt performed ex with AAROM occ AROM con't with PT POC and progress as pt tolerates           Outcome Measures     Row Name 07/19/18 1428 07/18/18 1403          How much help from another person do you currently need...    Turning from your back to your side while in flat bed without using bedrails?  -- 2  -JR (r) KO (t) JR (c)     Moving from lying on back to sitting on the side of a flat bed without bedrails?  -- 2  -JR (r) KO (t) JR (c)     Moving to and from a bed to a chair (including a wheelchair)?  -- 1  -JR (r) KO (t) JR (c)     Standing up from a chair using your arms (e.g., wheelchair, bedside chair)?  -- 1  -JR (r) KO (t) JR (c)     Climbing 3-5 steps with a railing?  -- 1  -JR (r) KO (t) JR (c)     To walk in hospital room?  -- 1  -JR (r) KO (t) JR (c)     AM-PAC 6 Clicks Score  -- 8  -JR (r) KO (t)        How much help from another is currently needed...    Putting on and taking off regular lower body clothing? 1  -AH  --     Bathing (including washing, rinsing, and drying) 1  -AH  --     Toileting (which includes using toilet bed pan or urinal) 2  -AH  --     Putting on and taking off regular upper  body clothing 1  -AH  --     Taking care of personal grooming (such as brushing teeth) 1  -AH  --     Eating meals 1  -AH  --     Score 7  -AH  --        Functional Assessment    Outcome Measure Options AM-PAC 6 Clicks Daily Activity (OT)  - AM-PAC 6 Clicks Basic Mobility (PT)  -JR (r) KO (t) JR (c)       User Key  (r) = Recorded By, (t) = Taken By, (c) = Cosigned By    Initials Name Provider Type     Nabila Eubanks Occupational Therapist    JR Ese Fuller, PT Physical Therapist    BHAVESH Flores, PT Student PT Student           Time Calculation:         PT Charges     Row Name 07/19/18 1740             Time Calculation    Start Time 1740  -CC      PT Received On 07/19/18  -CC      PT Goal Re-Cert Due Date 07/28/18  -CC         Timed Charges    26331 - PT Therapeutic Exercise Minutes 24  -CC        User Key  (r) = Recorded By, (t) = Taken By, (c) = Cosigned By    Initials Name Provider Type     Anay Goel PTA Physical Therapy Assistant        Therapy Suggested Charges     Code   Minutes Charges    34196 (CPT®) Hc Pt Neuromusc Re Education Ea 15 Min      33725 (CPT®) Hc Pt Ther Proc Ea 15 Min 24 2    48440 (CPT®) Hc Gait Training Ea 15 Min      66479 (CPT®) Hc Pt Therapeutic Act Ea 15 Min      06809 (CPT®) Hc Pt Manual Therapy Ea 15 Min      11459 (CPT®) Hc Pt Iontophoresis Ea 15 Min      85368 (CPT®) Hc Pt Elec Stim Ea-Per 15 Min      77331 (CPT®) Hc Pt Ultrasound Ea 15 Min      39271 (CPT®) Hc Pt Self Care/Mgmt/Train Ea 15 Min      Total  24 2        Therapy Charges for Today     Code Description Service Date Service Provider Modifiers Qty    78048993204 HC PT THER PROC EA 15 MIN 7/19/2018 Anay Goel PTA GP 2          PT G-Codes  Outcome Measure Options: AM-PAC 6 Clicks Daily Activity (OT)    Anay Goel PTA  7/19/2018         Electronically signed by Anay Goel PTA at 7/19/2018  8:39 PM          Occupational Therapy Notes (last 24 hours) (Notes from 7/19/2018  2:27 PM through  2018  2:27 PM)      Nabila Eubanks at 2018  3:44 PM          Acute Care - Occupational Therapy Initial Evaluation  [facility]     Patient Name: Breann Gallegos  : 1950  MRN: [id number]  Today's Date: 2018  Onset of Illness/Injury or Date of Surgery: 18  Date of Referral to OT: 18  Referring Physician: Dr. Kirby    Admit Date: 2018       ICD-10-CM ICD-9-CM   1. Pneumonia of both lungs due to infectious organism, unspecified part of lung J18.9 483.8   2. Acute on chronic respiratory failure with hypoxia (CMS/HCC) J96.21 518.84     799.02   3. Impaired functional mobility, balance, gait, and endurance Z74.09 V49.89   4. Impaired mobility and ADLs Z74.09 799.89     Patient Active Problem List   Diagnosis   • Dysphagia   • Abnormal urinalysis   • Anxiety   • Chronic obstructive pulmonary disease (CMS/HCC)   • Depression   • Diabetes mellitus (CMS/HCC)   • Diabetic peripheral neuropathy (CMS/HCC)   • Diarrhea   • Esophageal dysmotility   • Foot pain   • Gastroesophageal reflux disease without esophagitis   • Hyperlipidemia   • Hypertension   • Insomnia   • Irritable bowel syndrome   • Localized swelling, mass and lump, lower limb   • Low back pain   • Abnormal mammogram   • Enlargement of neck   • Neck pain   • Thyroid nodule   • Osteoarthritis of knee   • Osteoarthritis   • Osteopenia   • Peripheral arterial occlusive disease (CMS/HCC)   • Nerve root disorder   • Restless legs syndrome   • Tension headache   • Vitamin D deficiency   • Erythrasma   • Unsteady gait   • Headache   • Chronic left shoulder pain   • Esophageal stricture   • Other dysphagia   • Magnesium deficiency   • Pneumonia of both lungs due to infectious organism   • Acute respiratory failure with hypoxia (CMS/HCC)   • Sinus tachycardia     Past Medical History:   Diagnosis Date   • Arthritis    • Body piercing     EARS   • COPD (chronic obstructive pulmonary disease) (CMS/HCC)    • Diabetes mellitus  (CMS/[facility])    • Dysphagia     REPORTS SHE FEELS LIKE THINGS ARE GETTING STUCK IN HER THROAT FOR THE PAST SEVERAL MONTHS   • Eustachian tube dysfunction    • Fracture     METATRASAL   • Full dentures     INSTRUCTED NO ADHESIVES THE DOS   • GERD (gastroesophageal reflux disease)    • High cholesterol    • History of nuclear stress test     REPORTS 3-4 YEARS AGO AND THAT ALL WAS WNL'S   • Hyperlipidemia    • Hypertension    • Sinus problem    • Skin abscess    • Vision problem    • Vitamin B12 deficiency    • Vitamin D deficiency    • Wears glasses      Past Surgical History:   Procedure Laterality Date   • APPENDECTOMY  1976   • BREAST SURGERY      REDUCTION   • CATARACT EXTRACTION Bilateral    • CHOLECYSTECTOMY  1976   • COLONOSCOPY     • ENDOSCOPY     • ENDOSCOPY N/A 11/29/2017    Procedure: ESOPHAGOGASTRODUODENOSCOPY with biopsies and esophageal balloon dilitation;  Surgeon: Molina Dumont MD;  Location: [facility] ENDOSCOPY;  Service:    • ENDOSCOPY N/A 5/2/2018    Procedure: ESOPHAGOGASTRODUODENOSCOPY WITH ESOPHAGEAL BALLOON DILITATION (15-16.5-18 MM);  Surgeon: Molina Dumont MD;  Location: [facility] ENDOSCOPY;  Service: Gastroenterology   • HAND SURGERY Right    • HYSTERECTOMY  1979   • OOPHORECTOMY Bilateral 1979   • OTHER SURGICAL HISTORY      BLADDER TACK   • REDUCTION MAMMAPLASTY Bilateral 1988   • TONSILLECTOMY            OT ASSESSMENT FLOWSHEET (last 72 hours)      Occupational Therapy Evaluation     Row Name 07/19/18 1428                   OT Evaluation Time/Intention    Subjective Information complains of;weakness  -        Document Type evaluation  -        Mode of Treatment occupational therapy  -        Patient Effort good  -        Symptoms Noted During/After Treatment fatigue  -        Comment Pt on bi-pap during OT evaluation  -           General Information    Patient Profile Reviewed? yes  -        Onset of Illness/Injury or Date of Surgery 07/13/18  -        Referring Physician   Mirta  -        Patient Observations alert;cooperative;agree to therapy  -        Patient/Family Observations No family present  -        General Observations of Patient Pt received supine in bed on bedpan with RN present.  Pt on bi-pap  -        Prior Level of Function independent:;community mobility;ADL's  -        Equipment Currently Used at Home none  -        Pertinent History of Current Functional Problem acute respiratory failure with hypoxia  -        Existing Precautions/Restrictions fall;oxygen therapy device and L/min  -        Risks Reviewed patient:;increased discomfort  -        Benefits Reviewed patient:;improve function;increase independence;increase strength  -           Relationship/Environment    Primary Source of Support/Comfort spouse  -        Lives With spouse  -           Resource/Environmental Concerns    Current Living Arrangements home/apartment/condo  -           Cognitive Assessment/Intervention- PT/OT    Orientation Status (Cognition) oriented to;person;place  -        Follows Commands (Cognition) follows one step commands  -           Bed Mobility Assessment/Treatment    Bed Mobility Assessment/Treatment supine-sit;sit-supine  -        Supine-Sit Crystal (Bed Mobility) moderate assist (50% patient effort)  -        Sit-Supine Crystal (Bed Mobility) moderate assist (50% patient effort)  [facility]        Bed Mobility, Safety Issues decreased use of arms for pushing/pulling;decreased use of legs for bridging/pushing;impaired trunk control for bed mobility  -        Assistive Device (Bed Mobility) draw sheet  -        Comment (Bed Mobility) Pt able to sit eob x11 minutes  -           Functional Mobility    Functional Mobility- Comment unable to assess  -           Transfer Assessment/Treatment    Comment (Transfers) unable to assess  -           ADL Assessment/Intervention    BADL Assessment/Intervention bathing;upper body dressing;lower  body dressing;grooming;feeding;toileting  -           Bathing Assessment/Intervention    Bathing [place] Level dependent (less than 25% patient effort)  -           Upper Body Dressing Assessment/Training    Upper Body Dressing [place] Level dependent (less than 25% patient effort)  -           Lower Body Dressing Assessment/Training    Lower Body Dressing [place] Level dependent (less than 25% patient effort)  -           Grooming Assessment/Training    [place] Level (Grooming) dependent (less than 25% patient effort)  -           Self-Feeding Assessment/Training    [place] Level (Feeding) dependent (less than 25% patient effort)  -           Toileting Assessment/Training    [place] Level (Toileting) maximum assist (25% patient effort)  -           BADL Safety/Performance    Impairments, BADL Safety/Performance endurance/activity tolerance;shortness of breath;strength;trunk/postural control  -           General ROM    GENERAL ROM COMMENTS PROM WFL BUE, AAROM is limited d/t decreased strength  -           General Assessment (Manual Muscle Testing)    Comment, General Manual Muscle Testing (MMT) Assessment BUE 1/5  -           Positioning and Restraints    Pre-Treatment Position in bed  -        Post Treatment Position bed  -        In Bed supine;call light within reach;encouraged to call for assist  -           Pain Assessment    Additional Documentation Pain Scale: Numbers Pre/Post-Treatment (Group)  -           Pain Scale: Numbers Pre/Post-Treatment    Pain Scale: Numbers, Pretreatment 0/10 - no pain  -        Pain Scale: Numbers, Post-Treatment 0/10 - no pain  -           Coping    Observed Emotional State accepting;cooperative  -        Verbalized Emotional State acceptance  -           Plan of Care Review    Plan of Care Reviewed With patient  -           Clinical Impression (OT)    Date of Referral to OT 07/19/18  -        OT Diagnosis ADL  decline  -        Patient/Family Goals Statement (OT Eval) Pt wants to d/c home  -        Criteria for Skilled Therapeutic Interventions Met (OT Eval) yes;treatment indicated  -        Rehab Potential (OT Eval) good, to achieve stated therapy goals  -        Therapy Frequency (OT Eval) daily   Mon-Fri  -        Care Plan Review (OT) evaluation/treatment results reviewed;patient/other agree to care plan  -        Anticipated Discharge Disposition (OT) inpatient rehabilitation facility  -           Vital Signs    Pre SpO2 (%) 98  -AH        O2 Delivery Pre Treatment other (see comments)   bi-pap  -AH        Intra SpO2 (%) 98  -AH        O2 Delivery Intra Treatment supplemental O2   bi-pap  -AH        Post SpO2 (%) 100  -AH        O2 Delivery Post Treatment supplemental O2   bi-pap  -           Planned OT Interventions    Planned Therapy Interventions (OT Eval) BADL retraining;ROM/therapeutic exercise;strengthening exercise;transfer/mobility retraining  -           OT Goals    Bed Mobility Goal Selection (OT) bed mobility, OT goal 1  -        Transfer Goal Selection (OT) transfer, OT goal 1  -        Dressing Goal Selection (OT) dressing, OT goal 1  -        Toileting Goal Selection (OT) toileting, OT goal 1  -        Strength Goal Selection (OT) strength, OT goal 1  -        Functional Mobility Goal Selection (OT) functional mobility, OT goal 1  -        Additional Documentation Strength Goal Selection (OT) (Row);Functional Mobility Selection (OT) (Row)  -           Bed Mobility Goal 1 (OT)    Activity/Assistive Device (Bed Mobility Goal 1, OT) supine to sit  -        Gilbert Level/Cues Needed (Bed Mobility Goal 1, OT) minimum assist (75% or more patient effort)  -        Time Frame (Bed Mobility Goal 1, OT) long term goal (LTG);2 weeks  -        Progress/Outcomes (Bed Mobility Goal 1, OT) goal ongoing  -           Transfer Goal 1 (OT)    Activity/Assistive Device (Transfer  Goal 1, OT) sit-to-stand/stand-to-sit;walker, rolling  -        Mcdaniel Level/Cues Needed (Transfer Goal 1, OT) minimum assist (75% or more patient effort)  -        Time Frame (Transfer Goal 1, OT) long term goal (LTG);2 weeks  -        Progress/Outcome (Transfer Goal 1, OT) goal ongoing  -           Dressing Goal 1 (OT)    Activity/Assistive Device (Dressing Goal 1, OT) upper body dressing  -        Mcdaniel/Cues Needed (Dressing Goal 1, OT) minimum assist (75% or more patient effort)  -        Time Frame (Dressing Goal 1, OT) long term goal (LTG);2 weeks  -        Progress/Outcome (Dressing Goal 1, OT) goal ongoing  -           Toileting Goal 1 (OT)    Activity/Device (Toileting Goal 1, OT) adjust/manage clothing;perform perineal hygiene;commode;commode, bedside without drop arms  -        Mcdaniel Level/Cues Needed (Toileting Goal 1, OT) minimum assist (75% or more patient effort)  -        Time Frame (Toileting Goal 1, OT) long term goal (LTG);2 weeks  -        Progress/Outcome (Toileting Goal 1, OT) goal ongoing  -           Strength Goal 1 (OT)    Strength Goal 1 (OT) Pt will participate in UB ex to improve her functional strength.  -        Time Frame (Strength Goal 1, OT) long term goal (LTG);2 weeks  -        Progress/Outcome (Strength Goal 1, OT) goal ongoing  -           Functional Mobility Goal 1 (OT)    Activity/Assistive Device (Functional Mobility Goal 1, OT) walker, rolling  -        Mcdaniel Level/Cues Needed (Functional Mobility Goal 1, OT) minimum assist (75% or more patient effort)  -        Distance Goal 1 (Functional Mobility, OT) 20  -        Time Frame (Functional Mobility Goal 1, OT) long term goal (LTG);2 weeks  -        Progress/Outcome (Functional Mobility Goal 1, OT) goal ongoing  -          User Key  (r) = Recorded By, (t) = Taken By, (c) = Cosigned By    Initials Name Effective Dates     Nabila Raimundo 03/07/18 -             Occupational Therapy Education     Title: PT OT SLP Therapies (Active)     Topic: Occupational Therapy (Active)     Point: ADL training (Done)     Description: Instruct learner(s) on proper safety adaptation and remediation techniques during self care or transfers.   Instruct in proper use of assistive devices.   Learning Progress Summary     Learner Status Readiness Method Response Comment Documented by    Patient Done Acceptance E,TB VU Role of OT/POC  07/19/18 1541                      User Key     Initials Effective Dates Name Provider Type Discipline     03/07/18 -  Nabila Eubanks Occupational Therapist OT              OT Recommendation and Plan  Outcome Summary/Treatment Plan (OT)  Anticipated Discharge Disposition (OT): inpatient rehabilitation facility  Planned Therapy Interventions (OT Eval): BADL retraining, ROM/therapeutic exercise, strengthening exercise, transfer/mobility retraining  Therapy Frequency (OT Eval): daily (Mon-Fri)  Plan of Care Review  Plan of Care Reviewed With: patient  Plan of Care Reviewed With: patient  Outcome Summary: Pt seen for OT evaluation today.  Pt presents with weakness and decreased ability to complete self care and functional mobility tasks.  Pt is expected to benefit from skilled  OT to improve her strength, activity tolerance and independence with self care and functional mobility tasks.          Outcome Measures     Row Name 07/19/18 1428 07/18/18 1403          How much help from another person do you currently need...    Turning from your back to your side while in flat bed without using bedrails?  -- 2  -JR (r) KO (t) JR (c)     Moving from lying on back to sitting on the side of a flat bed without bedrails?  -- 2  -JR (r) KO (t) JR (c)     Moving to and from a bed to a chair (including a wheelchair)?  -- 1  -JR (r) KO (t) JR (c)     Standing up from a chair using your arms (e.g., wheelchair, bedside chair)?  -- 1  -JR (r) KO (t) JR (c)     Climbing 3-5 steps  with a railing?  -- 1  - (r) BHAVESH (brock)  (ronny)     To walk in hospital room?  -- 1  - (r) BHAVESH (brock)  (c)     AM-PAC 6 Clicks Score  -- 8  - (r) BHAVESH (t)        How much help from another is currently needed...    Putting on and taking off regular lower body clothing? 1  -AH  --     Bathing (including washing, rinsing, and drying) 1  -AH  --     Toileting (which includes using toilet bed pan or urinal) 2  -AH  --     Putting on and taking off regular upper body clothing 1  -AH  --     Taking care of personal grooming (such as brushing teeth) 1  -AH  --     Eating meals 1  -  --     Score 7  -AH  --        Functional Assessment    Outcome Measure Options AM-PAC 6 Clicks Daily Activity (OT)  - AM-PAC 6 Clicks Basic Mobility (PT)  - (r) BHAVESH (brock)  (c)       User Key  (r) = Recorded By, (t) = Taken By, (c) = Cosigned By    Initials Name Provider Type     Nabila Eubanks Occupational Therapist    JR Ese Fuller, PT Physical Therapist    BHAVESH Flores, PT Student PT Student          Time Calculation:   OT Start Time: 1428  Therapy Suggested Charges     Code   Minutes Charges    None           Therapy Charges for Today     Code Description Service Date Service Provider Modifiers Qty    [id number]  OT EVAL MOD COMPLEXITY 4 7/19/2018 Nabila Eubanks GO 1               Nabila Eubanks  7/19/2018    Electronically signed by Nabila Eubanks at 7/19/2018  3:44 PM     Nabila Eubanks at 7/19/2018  3:43 PM          Problem: Patient Care Overview  Goal: Plan of Care Review  Outcome: Ongoing (interventions implemented as appropriate)   07/19/18 1542   Coping/Psychosocial   Plan of Care Reviewed With patient   Plan of Care Review   Progress no change   OTHER   Outcome Summary Pt seen for OT evaluation today. Pt presents with weakness and decreased ability to complete self care and functional mobility tasks. Pt is expected to benefit from skilled OT to improve her strength, activity tolerance and independence with self care and  functional mobility tasks.           Electronically signed by Nabila Eubanks at 7/19/2018  3:43 PM          Speech Language Pathology Notes (last 24 hours) (Notes from 7/19/2018  2:27 PM through 7/20/2018  2:27 PM)      Aruna Lenz at 7/20/2018  9:48 AM        Pt. not tolerating pureed diet with nectar-thick liquids, as CXR shows worsening per MD.  Suspect silent aspiration.  D/W MD and care team - pt. not safe to continue po at this time.  When pt. demonstrates greater improvement with respiratory status, would recommend instrumental exam before resuming po intake.          Electronically signed by Aruna Lenz at 7/20/2018  9:51 AM          Respiratory Therapy Notes (last 24 hours) (Notes from 7/19/2018  2:27 PM through 7/20/2018  2:27 PM)      Nikhil Ha III, RRT at 7/20/2018  1:25 PM          Problem: NPPV/CPAP (Adult)  Intervention: Monitor and Manage Anxiety Related to NPPV/CPAP   07/20/18 1324   Interventions   Trust Relationship/Rapport care explained;questions answered;thoughts/feelings acknowledged;choices provided       Goal: Signs and Symptoms of Listed Potential Problems Will be Absent, Minimized or Managed (NPPV/CPAP)  Outcome: Ongoing (interventions implemented as appropriate)   07/20/18 1324   Goal/Outcome Evaluation   Problems Assessed (NPPV/CPAP) hypoxia/hypoxemia;skin breakdown   Problems Present (NPPV/CPAP) none           Electronically signed by Nikhil Ha III RRT at 7/20/2018  1:25 PM     Vahid Long CRT at 7/20/2018 12:59 AM          Problem: NPPV/CPAP (Adult)  Goal: Signs and Symptoms of Listed Potential Problems Will be Absent, Minimized or Managed (NPPV/CPAP)  Outcome: Ongoing (interventions implemented as appropriate)   07/20/18 0059   Goal/Outcome Evaluation   Problems Assessed (NPPV/CPAP) hypoxia/hypoxemia;dry mucous membranes;situational response;skin breakdown   Problems Present (NPPV/CPAP) none           Electronically signed by Vahid Long CRT at 7/20/2018  12:59 AM     Marcella Mares RRT at 7/19/2018  3:53 PM          Problem: NPPV/CPAP (Adult)  Goal: Signs and Symptoms of Listed Potential Problems Will be Absent, Minimized or Managed (NPPV/CPAP)  Outcome: Ongoing (interventions implemented as appropriate)   07/19/18 1552   Goal/Outcome Evaluation   Problems Assessed (NPPV/CPAP) all   Problems Present (NPPV/CPAP) none           Electronically signed by Marcella Mares RRT at 7/19/2018  3:53 PM

## 2018-07-20 NOTE — PLAN OF CARE
Problem: Patient Care Overview  Goal: Plan of Care Review  Outcome: Ongoing (interventions implemented as appropriate)   07/20/18 1631   Coping/Psychosocial   Plan of Care Reviewed With patient   Plan of Care Review   Progress improving   OTHER   Outcome Summary Pt able to participate in UB ex today with improved strength noted in BUEs. Pt able to completed grooming tasks with min assist, feeding tasks with set up of toothette. Pt making progress toward functional goals.

## 2018-07-20 NOTE — THERAPY TREATMENT NOTE
Acute Care - Occupational Therapy Treatment Note   [place]     Patient Name: Breann Gallegos  : 1950  MRN: [id number]  Today's Date: 2018  Onset of Illness/Injury or Date of Surgery: 18  Date of Referral to OT: 18  Referring Physician: Dr. Kirby    Admit Date: 2018       ICD-10-CM ICD-9-CM   1. Pneumonia of both lungs due to infectious organism, unspecified part of lung J18.9 483.8   2. Acute on chronic respiratory failure with hypoxia (CMS/HCC) J96.21 518.84     799.02   3. Impaired functional mobility, balance, gait, and endurance Z74.09 V49.89   4. Impaired mobility and ADLs Z74.09 799.89     Patient Active Problem List   Diagnosis   • Dysphagia   • Abnormal urinalysis   • Anxiety   • Chronic obstructive pulmonary disease (CMS/HCC)   • Depression   • Diabetes mellitus (CMS/HCC)   • Diabetic peripheral neuropathy (CMS/Former[facility])   • Diarrhea   • Esophageal dysmotility   • Foot pain   • Gastroesophageal reflux disease without esophagitis   • Hyperlipidemia   • Hypertension   • Insomnia   • Irritable bowel syndrome   • Localized swelling, mass and lump, lower limb   • Low back pain   • Abnormal mammogram   • Enlargement of neck   • Neck pain   • Thyroid nodule   • Osteoarthritis of knee   • Osteoarthritis   • Osteopenia   • Peripheral arterial occlusive disease (CMS/[facility])   • Nerve root disorder   • Restless legs syndrome   • Tension headache   • Vitamin D deficiency   • Erythrasma   • Unsteady gait   • Headache   • Chronic left shoulder pain   • Esophageal stricture   • Other dysphagia   • Magnesium deficiency   • Pneumonia of both lungs due to infectious organism   • Acute respiratory failure with hypoxia (CMS/HCC)   • Sinus tachycardia     Past Medical History:   Diagnosis Date   • Arthritis    • Body piercing     EARS   • COPD (chronic obstructive pulmonary disease) (CMS/[facility])    • Diabetes mellitus (CMS/HCC)    • Dysphagia     REPORTS SHE FEELS LIKE THINGS ARE GETTING STUCK IN  HER THROAT FOR THE PAST SEVERAL MONTHS   • Eustachian tube dysfunction    • Fracture     METATRASAL   • Full dentures     INSTRUCTED NO ADHESIVES THE DOS   • GERD (gastroesophageal reflux disease)    • High cholesterol    • History of nuclear stress test     REPORTS 3-4 YEARS AGO AND THAT ALL WAS WNL'S   • Hyperlipidemia    • Hypertension    • Sinus problem    • Skin abscess    • Vision problem    • Vitamin B12 deficiency    • Vitamin D deficiency    • Wears glasses      Past Surgical History:   Procedure Laterality Date   • APPENDECTOMY  1976   • BREAST SURGERY      REDUCTION   • CATARACT EXTRACTION Bilateral    • CHOLECYSTECTOMY  1976   • COLONOSCOPY     • ENDOSCOPY     • ENDOSCOPY N/A 11/29/2017    Procedure: ESOPHAGOGASTRODUODENOSCOPY with biopsies and esophageal balloon dilitation;  Surgeon: Molina Dumont MD;  Location: [facility] ENDOSCOPY;  Service:    • ENDOSCOPY N/A 5/2/2018    Procedure: ESOPHAGOGASTRODUODENOSCOPY WITH ESOPHAGEAL BALLOON DILITATION (15-16.5-18 MM);  Surgeon: Molina Dumont MD;  Location: [facility] ENDOSCOPY;  Service: Gastroenterology   • HAND SURGERY Right    • HYSTERECTOMY  1979   • OOPHORECTOMY Bilateral 1979   • OTHER SURGICAL HISTORY      BLADDER TACK   • REDUCTION MAMMAPLASTY Bilateral 1988   • TONSILLECTOMY         Therapy Treatment          Rehabilitation Treatment Summary     Row Name 07/20/18 1400 07/20/18 0943          Treatment Time/Intention    Discipline occupational therapist  - physical therapy assistant  -     Document Type therapy note (daily note)  - therapy note (daily note)  -     Subjective Information complains of;weakness  - complains of;fatigue  -     Mode of Treatment occupational therapy  - physical therapy  -     Patient/Family Observations Pt received supine in bed with  at bedside.  Pt on 10L O2 via nc  - Pt alone in room  -     Care Plan Review care plan/treatment goals reviewed;patient/other agree to care plan  - evaluation/treatment  results reviewed;care plan/treatment goals reviewed  -     Care Plan Review, Other Participant(s) spouse  -  --     Patient Effort good  -AH adequate  -RM     Existing Precautions/Restrictions  -- fall;oxygen therapy device and L/min  -RM     Treatment Considerations/Comments  -- 10 lpm O2 per Hi-flow NC  -RM     Recorded by [] Nabila Eubanks 07/20/18 1625 [] Gee Hamilton, [facility] 07/20/18 1430     Row Name 07/20/18 1400 07/20/18 0943          Vital Signs    Pre SpO2 (%) 97  -AH 95  -RM     O2 Delivery Pre Treatment supplemental O2   10L  -AH supplemental O2  -RM     Intra SpO2 (%) 88  -AH 92  -RM     O2 Delivery Intra Treatment supplemental O2   10L  -AH supplemental O2  -RM     Post SpO2 (%) 98  -  -RM     O2 Delivery Post Treatment supplemental O2  -AH supplemental O2  -RM     Pre Patient Position  -- Supine  -RM     Intra Patient Position  -- Standing  -RM     Post Patient Position  -- Sitting  -RM     Recorded by [] Nabila Eubanks 07/20/18 1625 [] Gee Hamilton, [facility] [place] 07/20/18 1430     Row Name 07/20/18 0943             Safety Issues, Functional Mobility    Impairments Affecting Function (Mobility) endurance/activity tolerance;shortness of breath;balance  -RM      Recorded by [] Gee Hamilton, [facility] [place] [facility] 07/20/18 1430      Row Name 07/20/18 0943             Bed Mobility Assessment/Treatment    Supine-Sit Graham (Bed Mobility) minimum assist (75% patient effort);verbal cues  -RM      Bed Mobility, Safety Issues decreased use of arms for pushing/pulling;decreased use of legs for bridging/pushing  -RM      Assistive Device (Bed Mobility) bed rails;head of bed elevated  -RM      Recorded by [] Gee Hamilton, [facility] 07/20/18 1430      Row Name 07/20/18 0943             Transfer Assessment/Treatment    Transfer Assessment/Treatment sit-stand transfer;stand-sit transfer;stand pivot/stand step transfer  -RM      Recorded by [] Gee Hamilton, [facility] [place] [facility] 07/20/18 1430      Row Name 07/20/18 0943              Sit-Stand Transfer    Sit-Stand Clifford (Transfers) minimum assist (75% patient effort);verbal cues  -RM      Recorded by [] Gee Hamilton, [facility] 07/20/18 1430      Row Name 07/20/18 0943             Stand-Sit Transfer    Stand-Sit Clifford (Transfers) minimum assist (75% patient effort);verbal cues  -RM      Recorded by [] Gee Hamilton, [facility] 07/20/18 1430      Row Name 07/20/18 0943             Stand Pivot/Stand Step Transfer    Stand Pivot/Stand Step Clifford minimum assist (75% patient effort);moderate assist (50% patient effort);verbal cues  -RM      Recorded by [] Gee Hamilton, [facility] 07/20/18 1430      Row Name 07/20/18 1400             Grooming Assessment/Training    Clifford Level (Grooming) other (see comments);minimum assist (75% patient effort)   putting on lip gloss  -      Recorded by [] Nabila Eubanks 07/20/18 1625      Row Name 07/20/18 1400             Self-Feeding Assessment/Training    Clifford Level (Feeding) other (see comments);set up   toothette to mouth  -      Recorded by [] Nabila Eubanks 07/20/18 1625      Row Name 07/20/18 1400             Therapeutic Exercise    Upper Extremity Range of Motion (Therapeutic Exercise) shoulder flexion/extension, bilateral;shoulder horizontal abduction/adduction, bilateral;elbow flexion/extension, bilateral  -      Hand (Therapeutic Exercise) other (see comments)   Pt able to use her hands to hold her purse and unzip/zip   -      Exercise Type (Therapeutic Exercise) AROM (active range of motion)  -      Position (Therapeutic Exercise) supine  -      Sets/Reps (Therapeutic Exercise) 1 set 10 reps  -AH      Recorded by [] Nabila Eubanks 07/20/18 1625      Row Name 07/20/18 1400             Positioning and Restraints    Pre-Treatment Position in bed  -AH      Post Treatment Position bed  -AH      In Bed supine;call light within reach;encouraged to call for assist;with family/caregiver  -AH      Recorded by  [] [facility] 07/20/18 1625      Row Name 07/20/18 1400             Pain Assessment    Additional Documentation Pain Scale: Numbers Pre/Post-Treatment (Group)  -      Recorded by [] Nabila Ross 07/20/18 1625      Row Name 07/20/18 1400 07/20/18 0943          Pain Scale: Numbers Pre/Post-Treatment    Pain Scale: Numbers, Pretreatment 0/10 - no pain  - 0/10 - no pain  -RM     Pain Scale: Numbers, Post-Treatment 0/10 - no pain  - 0/10 - no pain  -     Recorded by [] [facility] 07/20/18 1625 [RM] Gee Hamilton, PTA 07/20/18 1430     Row Name 07/20/18 1400             Coping    Observed Emotional State accepting;cooperative  -      Verbalized Emotional State acceptance  -      Recorded by [] [facility] 07/20/18 1625      Row Name 07/20/18 1400             Plan of Care Review    Plan of Care Reviewed With patient  -      Recorded by [] [facility] 07/20/18 1625      Row Name 07/20/18 1400             Outcome Summary/Treatment Plan (OT)    Daily Summary of Progress (OT) progress towards functional goals is fair  -      Plan for Continued Treatment (OT) Cont OT per POC  -      Anticipated Discharge Disposition (OT) inpatient rehabilitation facility  -      Recorded by [] [facility] 07/20/18 1625      Row Name 07/20/18 0943             Outcome Summary/Treatment Plan (PT)    Daily Summary of Progress (PT) progress toward functional goals is good  -RM      Barriers to Overall Progress (PT) strength,endurance,balance, SOA   -RM      Plan for Continued Treatment (PT) Cont PT per POC progressing to goals as pt tolerates.   -RM      Recorded by [] Gee Hamilton, PTA 07/20/18 1430        User Key  (r) = Recorded By, (t) = Taken By, (c) = Cosigned By    Initials Name Effective Dates Discipline     Nabila Eubanks 03/07/18 -  OT    RM Gee Hamilton, PTA 03/07/18 -  PT                   OT Rehab Goals     Row Name 07/20/18 1400             Bed Mobility Goal 1 (OT)     Progress/Outcomes (Bed Mobility Goal 1, OT) goal ongoing  -         Transfer Goal 1 (OT)    Progress/Outcome (Transfer Goal 1, OT) goal ongoing  -         Dressing Goal 1 (OT)    Progress/Outcome (Dressing Goal 1, OT) goal ongoing  -         Toileting Goal 1 (OT)    Progress/Outcome (Toileting Goal 1, OT) goal ongoing  -         Strength Goal 1 (OT)    Progress/Outcome (Strength Goal 1, OT) goal ongoing  -        User Key  (r) = Recorded By, (t) = Taken By, (c) = Cosigned By    Initials Name Provider Type Discipline     Nabila Eubanks Occupational Therapist OT        Occupational Therapy Education     Title: PT OT SLP Therapies (Active)     Topic: Occupational Therapy (Active)     Point: ADL training (Done)     Description: Instruct learner(s) on proper safety adaptation and remediation techniques during self care or transfers.   Instruct in proper use of assistive devices.   Learning Progress Summary     Learner Status Readiness Method Response Comment Documented by    Patient Done Acceptance E,TB VU benefit of activity/ther ex  07/20/18 1627     Done Acceptance E,TB VU Role of OT/POC  07/19/18 1541          Point: Home exercise program (Done)     Description: Instruct learner(s) on appropriate technique for monitoring, assisting and/or progressing therapeutic exercises/activities.   Learning Progress Summary     Learner Status Readiness Method Response Comment Documented by    Patient Done Acceptance E,TB VU benefit of activity/ther ex  07/20/18 1627                      User Key     Initials Effective Dates Name Provider Type Discipline     03/07/18 -  Nabila Eubanks Occupational Therapist OT                OT Recommendation and Plan  Outcome Summary/Treatment Plan (OT)  Daily Summary of Progress (OT): progress towards functional goals is fair  Plan for Continued Treatment (OT): Cont OT per POC  Anticipated Discharge Disposition (OT): inpatient rehabilitation facility  Planned Therapy  Interventions (OT Eval): BADL retraining, ROM/therapeutic exercise, strengthening exercise, transfer/mobility retraining  Therapy Frequency (OT Eval): daily (Mon-Fri)  Daily Summary of Progress (OT): progress towards functional goals is fair  Plan of Care Review  Plan of Care Reviewed With: patient  Plan of Care Reviewed With: patient  Outcome Summary: Pt able to participate in UB ex today with improved strength noted in BUEs.  Pt able to completed grooming tasks with min assist, feeding tasks with set up of toothette.  Pt making progress toward functional goals.        Outcome Measures     Row Name 07/20/18 1400 07/20/18 0943 07/19/18 1428       How much help from another person do you currently need...    Turning from your back to your side while in flat bed without using bedrails?  -- 3  -RM  --    Moving from lying on back to sitting on the side of a flat bed without bedrails?  -- 3  -RM  --    Moving to and from a bed to a chair (including a wheelchair)?  -- 2  -RM  --    Standing up from a chair using your arms (e.g., wheelchair, bedside chair)?  -- 3  -RM  --    Climbing 3-5 steps with a railing?  -- 2  -RM  --    To walk in hospital room?  -- 2  -RM  --    AM-PAC 6 Clicks Score  -- 15  -RM  --       How much help from another is currently needed...    Putting on and taking off regular lower body clothing? 1  -AH  -- 1  -AH    Bathing (including washing, rinsing, and drying) 1  -AH  -- 1  -AH    Toileting (which includes using toilet bed pan or urinal) 2  -AH  -- 2  -AH    Putting on and taking off regular upper body clothing 1  -AH  -- 1  -AH    Taking care of personal grooming (such as brushing teeth) 3  -AH  -- 1  -AH    Eating meals 3  -AH  -- 1  -AH    Score 11  -AH  -- 7  -AH       Functional Assessment    Outcome Measure Options AM-PAC 6 Clicks Daily Activity (OT)  -AH AM-PAC 6 Clicks Basic Mobility (PT)  -RM AM-PAC 6 Clicks Daily Activity (OT)  -AH    Row Name 07/18/18 1403             How much help  from another person do you currently need...    Turning from your back to your side while in flat bed without using bedrails? 2  -JR (r) KO (t) JR (c)      Moving from lying on back to sitting on the side of a flat bed without bedrails? 2  -JR (r) KO (t) JR (c)      Moving to and from a bed to a chair (including a wheelchair)? 1  -JR (r) KO (t) JR (c)      Standing up from a chair using your arms (e.g., wheelchair, bedside chair)? 1  -JR (r) KO (t) JR (c)      Climbing 3-5 steps with a railing? 1  -JR (r) KO (t) JR (c)      To walk in hospital room? 1  -JR (r) KO (t) JR (c)      AM-PAC 6 Clicks Score 8  -JR (r) KO (t)         Functional Assessment    Outcome Measure Options AM-PAC 6 Clicks Basic Mobility (PT)  -JR (r) KO (t) JR (c)        User Key  (r) = Recorded By, (t) = Taken By, (c) = Cosigned By    Initials Name Provider Type     Nabila Eubanks Occupational Therapist    JR Ese Fuller, PT Physical Therapist    RM Gee Hamilton, PTA Physical Therapy Assistant    BHAVESH Flores, PT Student PT Student           Time Calculation:         Time Calculation- OT     Row Name 07/20/18 1634             Time Calculation- OT    OT Start Time 1400  -      Total Timed Code Minutes- OT 25 minute(s)  -      OT Received On 07/20/18  -      OT Goal Re-Cert Due Date 07/29/18  -         Timed Charges    49802 - OT Therapeutic Exercise Minutes 12  -      62005 - OT Self Care/Mgmt Minutes 13  -        User Key  (r) = Recorded By, (t) = Taken By, (c) = Cosigned By    Initials Name Provider Type     Nabila Eubanks Occupational Therapist           Therapy Suggested Charges     Code   Minutes Charges    63055 (CPT®) Hc Ot Neuromusc Re Education Ea 15 Min      20920 (CPT®) Hc Ot Ther Proc Ea 15 Min 12 1    58502 (CPT®) Hc Ot Therapeutic Act Ea 15 Min      93137 (CPT®) Hc Ot Manual Therapy Ea 15 Min      21752 (CPT®) Hc Ot Iontophoresis Ea 15 Min      71301 (CPT®) Hc Ot Elec Stim Ea-Per 15 Min      42576 (CPT®) Hc Ot  Ultrasound Ea 15 Min      25262 (CPT®) Hc Ot Self Care/Mgmt/Train Ea 15 Min 13 1    Total  25 2        Therapy Charges for Today     Code Description Service Date Service Provider Modifiers Qty    15181106344 HC OT EVAL MOD COMPLEXITY 4 7/19/2018 Nabila Eubanks GO 1    60801642508 HC OT THER PROC EA 15 MIN 7/20/2018 Nabila Eubanks GO 1    94061027826 HC OT SELF CARE/MGMT/TRAIN EA 15 MIN 7/20/2018 Nabila Eubanks GO 1               Nabila Eubanks  7/20/2018

## 2018-07-20 NOTE — PROGRESS NOTES
[facility]    Name:  Breann Gallegos   Age:  68 y.o.  Sex:  female  :  1950  MRN:  [id number]   Visit Number:  [id number]  Admission Date:  2018  Date Of Service:  18  Primary Care Physician:  Harry Avery MD     LOS: 7 days :      Chief Complaint:  Follow-up acute respiratory failure and pneumonia        Subjective / Interval History:  The patient was seen again today.  She is more awake and alert.  She did have some dysphagia with double swallows yesterday evening.  Her chest x-ray shows increased right sided opacity consistent with possible aspiration.  She is now placed nothing by mouth.  The patient has improving shortness of breath, cough and denies any chest pain, abdominal pain, any edema.  She is severely generally weak.  She has been requiring increased nursing assistance..          Review of Systems:   General ROS: Patient denies any fevers, chills or loss of consciousness. Complains of severe generalized weakness.  Psychological ROS: Denies any hallucinations and delusions. Less sedated appearing  Ophthalmic ROS: Denies any diplopia or transient loss of vision.  ENT ROS: Denies sore throat, nasal congestion or ear pain.   Allergy and Immunology ROS: Denies rash or itching.  Hematological and Lymphatic ROS: Denies neck swelling or easy bleeding.  Endocrine ROS: Denies any recent unintentional weight gain or loss.  Breast ROS: Denies any pain or swelling.  Respiratory ROS: Denies cough or shortness of breath.  Cardiovascular ROS: Denies chest pain or palpitations. No history of exertional chest pain.  Gastrointestinal ROS: Denies nausea and vomiting. Denies any abdominal pain. No diarrhea.  Genito-Urinary ROS: Denies dysuria or hematuria.  Musculoskeletal ROS: Denies chronic back pain. No muscle pain. No calf pain.  Neurological ROS: Denies any focal weakness. No loss of consciousness. Denies any numbness. Denies neck  pain.  Dermatological ROS: Denies any redness or pruritis.        Vital Signs:    Temp:  [97.7 °F (36.5 °C)-98.4 °F (36.9 °C)] 98 °F (36.7 °C)  Heart Rate:  [] 103  Resp:  [15-22] 18  BP: (117-165)/() 159/90  FiO2 (%):  [60 %] 60 %    Intake and output:    I/O last 3 completed shifts:  In: 1888 [P.O.:360; I.V.:1268; Other:260]  Out: 2295 [Urine:2295]  I/O this shift:  In: 120 [P.O.:120]  Out: 225 [Urine:225]    Physical Examination:    General Appearance:    No acute distress.  Awake sitting up in bed smiling. She appears no longer sedated    Head:    Atraumatic and normocephalic, without obvious abnormality.   Eyes:           PERRLA. EOMI. No icterus   Throat:   Clear,moist   Neck:   Supple, no LAD   Lungs:    Clear bilaterally without wheeze, rhonchi, or crackles.    Heart:    Normal S1 and S2, no murmur, no gallop   Abdomen:     Normal bowel sounds, no masses, no organomegaly. Soft        non-tender, non-distended, no guarding, no rebound                tenderness   Extremities:   Moves all extremities, no edema, no cyanosis, no             clubbing   Skin:   No bleeding, bruising or rash.   Neurologic:   5/5 muscles arms. 4/5 legs. No tremor    Laboratory results:    Lab Results (last 24 hours)     Procedure Component Value Units Date/Time    POC Glucose Once [[id number]]  (Abnormal) Collected:  07/20/18 1131    Specimen:  Blood Updated:  07/20/18 1145     Glucose 294 (H) mg/dL      Comment: Serial Number: [id number]:  [id number]       POC Glucose Once [[id number]]  (Abnormal) Collected:  07/20/18 0614    Specimen:  Blood Updated:  07/20/18 0621     Glucose 214 (H) mg/dL      Comment: Serial Number: [id number]:  [id number]       Comprehensive Metabolic Panel [[id number]]  (Abnormal) Collected:  07/20/18 0430    Specimen:  Blood Updated:  07/20/18 0558     Glucose 212 (H) mg/dL      Comment: Glucose >180, Hemoglobin A1C recommended.        BUN 35 (H) mg/dL      Creatinine 0.50 (L) mg/dL       Sodium 141 mmol/L      Potassium 4.2 mmol/L      Chloride 101 mmol/L      CO2 35.0 (H) mmol/L      Calcium 8.6 mg/dL      Total Protein 6.5 g/dL      Albumin 3.30 (L) g/dL      ALT (SGPT) 64 U/L      AST (SGOT) 42 U/L      Alkaline Phosphatase 101 U/L      Total Bilirubin 0.8 mg/dL      eGFR Non African Amer 123 mL/min/1.73      Globulin 3.2 gm/dL      A/G Ratio 1.0 g/dL      BUN/Creatinine Ratio 70.0 (H)     Anion Gap 9.2 (L) mmol/L     Narrative:       GFR Normal >60  Chronic Kidney Disease <60  Kidney Failure <15    CBC & Differential [[id number]] Collected:  07/20/18 0430    Specimen:  Blood Updated:  07/20/18 0500    Narrative:       The following orders were created for panel order CBC & Differential.  Procedure                               Abnormality         Status                     ---------                               -----------         ------                     CBC Auto Differential[[id number]]        Abnormal            Final result                 Please view results for these tests on the individual orders.    CBC Auto Differential [[id number]]  (Abnormal) Collected:  07/20/18 0430    Specimen:  Blood Updated:  07/20/18 0500     WBC 14.36 (H) 10*3/mm3      RBC 4.74 10*6/mm3      Hemoglobin 12.8 g/dL      Hematocrit 39.6 %      MCV 83.5 fL      MCH 27.0 pg      MCHC 32.3 g/dL      RDW 14.4 %      RDW-SD 43.9 fl      MPV 10.5 fL      Platelets 405 (H) 10*3/mm3      Neutrophil % 86.7 (H) %      Lymphocyte % 7.2 (L) %      Monocyte % 4.2 %      Eosinophil % 0.0 %      Basophil % 0.1 %      Immature Grans % 1.8 (H) %      Neutrophils, Absolute 12.44 (H) 10*3/mm3      Lymphocytes, Absolute 1.03 10*3/mm3      Monocytes, Absolute 0.61 10*3/mm3      Eosinophils, Absolute 0.00 10*3/mm3      Basophils, Absolute 0.02 10*3/mm3      Immature Grans, Absolute 0.26 (H) 10*3/mm3      nRBC 0.0 /100 WBC     POC Glucose Once [[id number]]  (Abnormal) Collected:  07/20/18 0005    Specimen:  Blood Updated:  07/20/18 0010      Glucose 204 (H) mg/dL      Comment: Serial Number: [id number]:  [id number]       POC Glucose Once [[id number]]  (Abnormal) Collected:  07/19/18 1729    Specimen:  Blood Updated:  07/19/18 1750     Glucose 208 (H) mg/dL      Comment: Serial Number: [id number]:  [id number]             I have reviewed the patient's laboratory results.    Radiology results:    Imaging Results (last 24 hours)     Procedure Component Value Units Date/Time    XR Chest 1 View [[id number]] Collected:  07/20/18 0823     Updated:  07/20/18 0840    Narrative:       PORTABLE CHEST     INDICATION: Pneumonia. Respiratory failure.     FINDINGS: Single frontal view chest, compared with 07/19/2018. Interval  removal of nasogastric tube. Right-sided PICC line remains in place. EKG  leads overlie the chest. No pneumothorax. Since previous, there has been  interval worsening of opacification at the right lung base which may  represent worsening infiltrate from pneumonia. Aspiration not excluded.  Perihilar and left basilar infiltrates are otherwise similar to slightly  worsened as well. No pneumothorax. Small right pleural effusion not  excluded.       Impression:       Worsening infiltrates, greatest towards the right base as  detailed above. Continued follow-up recommended.     This report was finalized on 7/20/2018 8:38 AM by Tone Naylor MD.          I have reviewed the patient's radiology reports.    Medication Review:     I have reviewed the patients active and prn medications.     Assessment:  1. Multifocal bilateral pneumonia, prior to admission, with MRSA, Improving  2. Dysphagia with suspected aspiration pneumonitis  3. Acute hypoxic respiratory failure severe, requiring intubation and placement on mechanical ventilator 7-14-18 with  ARDS, improved her, on  bipap  4. New onset Afib with RVR, previous sinus tachycardia, improved on diltiazem  5. Sepsis, secondary to pneumonia, prior to admission, with MRSA, treated  6. Diabetes  mellitus type 2 insulin dependent  7. Chronic essential hypertension  8. COPD moderate  9. Abnormal CT chest with lymphadenopathy, repeat outpatient CT scan chest        Plan:  The patient has tolerated extubation well.  She is stable on high flow oxygen with BiPAP use every 3-4 hours.  She is still requiring ICU monitoring with metabolic encephalopathy as well as high risk reintubation and diffuse severe bilateral pneumonia.  Her chest x-ray look worse today after eating and she does have dysphagia.  I suspect aspiration pneumonitis.  Continue Solu-Medrol.  For now, would continue the vancomycin and once further improvement noted on the chest xray will narrow spectrum to Levaquin. Sputum culture sensitivities reviewed.     OG feeding tube removed. Will hold off on restarting today. Keep NPO except for sips with  Meds. Aspiration precautions. If chest xray better tomorrow, consider restarting feeding especially as she is more awake and alert today. Repeat chest xray tomorrow morning.    Continue PT and OT and Speech therapy. The patient would be candidate for LTAC type facility. Discussed options.     Cardizem continued. Digoxin was stopped. Heart rate is controlled. Continue Eliquis.    Protonix. Pepcid. Full code.   Neurology..     Ese Kirby,   07/20/18  2:08 PM

## 2018-07-20 NOTE — THERAPY TREATMENT NOTE
Acute Care - Physical Therapy Treatment Note   [facility]     Patient Name: Breann Gallegos  : 1950  MRN: [id number]  Today's Date: 2018  Onset of Illness/Injury or Date of Surgery: 18  Date of Referral to PT: 18  Referring Physician: Dr. Kirby    Admit Date: 2018    Visit Dx:    ICD-10-CM ICD-9-CM   1. Pneumonia of both lungs due to infectious organism, unspecified part of lung J18.9 483.8   2. Acute on chronic respiratory failure with hypoxia (CMS/HCC) J96.21 518.84     799.02   3. Impaired functional mobility, balance, gait, and endurance Z74.09 V49.89   4. Impaired mobility and ADLs Z74.09 799.89     Patient Active Problem List   Diagnosis   • Dysphagia   • Abnormal urinalysis   • Anxiety   • Chronic obstructive pulmonary disease (CMS/HCC)   • Depression   • Diabetes mellitus (CMS/HCC)   • Diabetic peripheral neuropathy (CMS/HCC)   • Diarrhea   • Esophageal dysmotility   • Foot pain   • Gastroesophageal reflux disease without esophagitis   • Hyperlipidemia   • Hypertension   • Insomnia   • Irritable bowel syndrome   • Localized swelling, mass and lump, lower limb   • Low back pain   • Abnormal mammogram   • Enlargement of neck   • Neck pain   • Thyroid nodule   • Osteoarthritis of knee   • Osteoarthritis   • Osteopenia   • Peripheral arterial occlusive disease (CMS/HCC)   • Nerve root disorder   • Restless legs syndrome   • Tension headache   • Vitamin D deficiency   • Erythrasma   • Unsteady gait   • Headache   • Chronic left shoulder pain   • Esophageal stricture   • Other dysphagia   • Magnesium deficiency   • Pneumonia of both lungs due to infectious organism   • Acute respiratory failure with hypoxia (CMS/HCC)   • Sinus tachycardia       Therapy Treatment          Rehabilitation Treatment Summary     Row Name 18 0943             Treatment Time/Intention    Discipline physical therapy assistant  -RM      Document Type therapy note (daily note)  -RM      Subjective  Information complains of;fatigue  -RM      Mode of Treatment physical therapy  -RM      Patient/Family Observations Pt alone in room  -RM      Care Plan Review evaluation/treatment results reviewed;care plan/treatment goals reviewed  -RM      Patient Effort adequate  -RM      Existing Precautions/Restrictions fall;oxygen therapy device and L/min  -RM      Treatment Considerations/Comments 10 lpm O2 per Hi-flow NC  -RM      Recorded by [RM] Gee Hamilton, PTA 07/20/18 1430      Row Name 07/20/18 0943             Vital Signs    Pre SpO2 (%) 95  -RM      O2 Delivery Pre Treatment supplemental O2  -RM      Intra SpO2 (%) 92  -RM      O2 Delivery Intra Treatment supplemental O2  -RM      Post SpO2 (%) 100  -RM      O2 Delivery Post Treatment supplemental O2  -RM      Pre Patient Position Supine  -RM      Intra Patient Position Standing  -RM      Post Patient Position Sitting  -RM      Recorded by [RM] Gee Hamilton, PTA 07/20/18 1430      Row Name 07/20/18 0943             Safety Issues, Functional Mobility    Impairments Affecting Function (Mobility) endurance/activity tolerance;shortness of breath;balance  -RM      Recorded by [RM] Gee Hamilton, [facility] 07/20/18 1430      Row Name 07/20/18 0943             Bed Mobility Assessment/Treatment    Supine-Sit [place] (Bed Mobility) minimum assist (75% patient effort);verbal cues  -RM      Bed Mobility, Safety Issues decreased use of arms for pushing/pulling;decreased use of legs for bridging/pushing  -RM      Assistive Device (Bed Mobility) bed rails;head of bed elevated  -RM      Recorded by [RM] Gee Hamilton, PTA 07/20/18 1430      Row Name 07/20/18 0943             Transfer Assessment/Treatment    Transfer Assessment/Treatment sit-stand transfer;stand-sit transfer;stand pivot/stand step transfer  -RM      Recorded by [] Gee Hamilton, PTA 07/20/18 1430      Row Name 07/20/18 0943             Sit-Stand Transfer    Sit-Stand [place] (Transfers)  minimum assist (75% patient effort);verbal cues  -RM      Recorded by [] Gee Hamilton, [facility] 07/20/18 1430      Row Name 07/20/18 0943             Stand-Sit Transfer    Stand-Sit [place] (Transfers) minimum assist (75% patient effort);verbal cues  -RM      Recorded by [] Gee Hamilton, [facility] 07/20/18 1430      Row Name 07/20/18 0943             Stand Pivot/Stand Step Transfer    Stand Pivot/Stand Step [place] minimum assist (75% patient effort);moderate assist (50% patient effort);verbal cues  -RM      Recorded by [] Gee Hamilton, [facility] 07/20/18 1430      Row Name 07/20/18 0943             Pain Scale: Numbers Pre/Post-Treatment    Pain Scale: Numbers, Pretreatment 0/10 - no pain  -RM      Pain Scale: Numbers, Post-Treatment 0/10 - no pain  -RM      Recorded by [] Gee Hamilton, [facility] 07/20/18 1430      Row Name 07/20/18 0943             Outcome Summary/Treatment Plan (PT)    Daily Summary of Progress (PT) progress toward functional goals is good  -RM      Barriers to Overall Progress (PT) strength,endurance,balance, SOA   -RM      Plan for Continued Treatment (PT) Cont PT per POC progressing to goals as pt tolerates.   -RM      Recorded by [] Gee Hamilton, [facility] 07/20/18 1430        User Key  (r) = Recorded By, (t) = Taken By, (c) = Cosigned By    Initials Name Effective Dates Discipline     Gee Hamilton, [facility] 03/07/18 -  PT                     Physical Therapy Education     Title: PT OT SLP Therapies (Active)     Topic: Physical Therapy (Active)     Point: Mobility training (Done)    Learning Progress Summary     Learner Status Readiness Method Response Comment Documented by    Patient Done Acceptance E,TB,D VU,NR   07/20/18 1430     Done Acceptance ETB VU importance of mobility towards recovery KO 07/18/18 1524          Point: Home exercise program (Done)    Learning Progress Summary     Learner Status Readiness Method Response Comment Documented by    Patient Done Acceptance  E,TB,D VU,NR   07/20/18 1430     Done Acceptance E,TB VU importance of performing ex throughout the day  07/19/18 2035                      User Key     Initials Effective Dates Name Provider Type Discipline     03/07/18 -  Anay Goel, PTA Physical Therapy Assistant PT     03/07/18 -  Gee Hamilton PTA Physical Therapy Assistant PT    KO 06/14/18 -  Merle Flores, KB Student PT Student PT                    PT Recommendation and Plan     Outcome Summary/Treatment Plan (PT)  Daily Summary of Progress (PT): progress toward functional goals is good  Barriers to Overall Progress (PT): strength,endurance,balance, SOA   Plan for Continued Treatment (PT): Cont PT per POC progressing to goals as pt tolerates.   Plan of Care Reviewed With: patient  Progress: improving  Outcome Summary: Pt tolerateing increased activity this date with decreased assistance required for mobility. Pt was also able to tolerate getting UIC this tx. See flowsheet for details.           Outcome Measures     Row Name 07/20/18 0943 07/19/18 1428 07/18/18 1403       How much help from another person do you currently need...    Turning from your back to your side while in flat bed without using bedrails? 3  -RM  -- 2  -JR (r) KO (t) JR (c)    Moving from lying on back to sitting on the side of a flat bed without bedrails? 3  -RM  -- 2  -JR (r) KO (t) JR (c)    Moving to and from a bed to a chair (including a wheelchair)? 2  -RM  -- 1  -JR (r) KO (t) JR (c)    Standing up from a chair using your arms (e.g., wheelchair, bedside chair)? 3  -RM  -- 1  -JR (r) KO (t) JR (c)    Climbing 3-5 steps with a railing? 2  -RM  -- 1  -JR (r) KO (t) JR (c)    To walk in hospital room? 2  -RM  -- 1  -JR (r) KO (t) JR (c)    AM-PAC 6 Clicks Score 15  -RM  -- 8  -JR (r) KO (t)       How much help from another is currently needed...    Putting on and taking off regular lower body clothing?  -- 1  -AH  --    Bathing (including washing, rinsing, and  drying)  -- 1  -AH  --    Toileting (which includes using toilet bed pan or urinal)  -- 2  -AH  --    Putting on and taking off regular upper body clothing  -- 1  -AH  --    Taking care of personal grooming (such as brushing teeth)  -- 1  -AH  --    Eating meals  -- 1  -AH  --    Score  -- 7  -AH  --       Functional Assessment    Outcome Measure Options AM-PAC 6 Clicks Basic Mobility (PT)  -RM AM-PAC 6 Clicks Daily Activity (OT)  - AM-PAC 6 Clicks Basic Mobility (PT)  -JR (r) KO (t)  (c)      User Key  (r) = Recorded By, (t) = Taken By, (c) = Cosigned By    Initials Name Provider Type     Nabila Eubanks Occupational Therapist    JR Ese Fuller, PT Physical Therapist     Gee Hamilton, PTA Physical Therapy Assistant    BHAVESH Flores, PT Student PT Student           Time Calculation:         PT Charges     Row Name 07/20/18 1432             Time Calculation    Start Time 0943  -RM      PT Received On 07/20/18  -RM      PT Goal Re-Cert Due Date 07/28/18  -RM         Time Calculation- PT    Total Timed Code Minutes- PT 25 minute(s)  -RM         Timed Charges    22848 - PT Therapeutic Activity Minutes 25  -RM        User Key  (r) = Recorded By, (t) = Taken By, (c) = Cosigned By    Initials Name Provider Type     Gee Hamilton, DEANDRE Physical Therapy Assistant        Therapy Suggested Charges     Code   Minutes Charges    13854 (CPT®) Hc Pt Neuromusc Re Education Ea 15 Min      04851 (CPT®) Hc Pt Ther Proc Ea 15 Min      72298 (CPT®) Hc Gait Training Ea 15 Min      98619 (CPT®) Hc Pt Therapeutic Act Ea 15 Min 25 2    65497 (CPT®) Hc Pt Manual Therapy Ea 15 Min      35545 (CPT®) Hc Pt Iontophoresis Ea 15 Min      06293 (CPT®) Hc Pt Elec Stim Ea-Per 15 Min      95982 (CPT®) Hc Pt Ultrasound Ea 15 Min      10381 (CPT®) Hc Pt Self Care/Mgmt/Train Ea 15 Min      Total  25 2        Therapy Charges for Today     Code Description Service Date Service Provider Modifiers Qty    87608026191 HC PT THERAPEUTIC ACT  EA 15 MIN 7/20/2018 Gee Hamilton, PTA GP 2          PT G-Codes  Outcome Measure Options: AM-PAC 6 Clicks Basic Mobility (PT)    Gee Hamilton, PTA  7/20/2018

## 2018-07-20 NOTE — SIGNIFICANT NOTE
Pt. not tolerating pureed diet with nectar-thick liquids, as CXR shows worsening per MD.  Suspect silent aspiration.  D/W MD and care team - pt. not safe to continue po at this time.  When pt. demonstrates greater improvement with respiratory status, would recommend instrumental exam before resuming po intake.

## 2018-07-20 NOTE — THERAPY TREATMENT NOTE
Acute Care - Physical Therapy Treatment Note   [place]     Patient Name: Breann Gallegos  : 1950  MRN: [id number]  Today's Date: 2018  Onset of Illness/Injury or Date of Surgery: 18  Date of Referral to PT: 18  Referring Physician: Dr. Kirby    Admit Date: 2018    Visit Dx:    ICD-10-CM ICD-9-CM   1. Pneumonia of both lungs due to infectious organism, unspecified part of lung J18.9 483.8   2. Acute on chronic respiratory failure with hypoxia (CMS/HCC) J96.21 518.84     799.02   3. Impaired functional mobility, balance, gait, and endurance Z74.09 V49.89   4. Impaired mobility and ADLs Z74.09 799.89     Patient Active Problem List   Diagnosis   • Dysphagia   • Abnormal urinalysis   • Anxiety   • Chronic obstructive pulmonary disease (CMS/HCC)   • Depression   • Diabetes mellitus (CMS/HCC)   • Diabetic peripheral neuropathy (CMS/HCC)   • Diarrhea   • Esophageal dysmotility   • Foot pain   • Gastroesophageal reflux disease without esophagitis   • Hyperlipidemia   • Hypertension   • Insomnia   • Irritable bowel syndrome   • Localized swelling, mass and lump, lower limb   • Low back pain   • Abnormal mammogram   • Enlargement of neck   • Neck pain   • Thyroid nodule   • Osteoarthritis of knee   • Osteoarthritis   • Osteopenia   • Peripheral arterial occlusive disease (CMS/HCC)   • Nerve root disorder   • Restless legs syndrome   • Tension headache   • Vitamin D deficiency   • Erythrasma   • Unsteady gait   • Headache   • Chronic left shoulder pain   • Esophageal stricture   • Other dysphagia   • Magnesium deficiency   • Pneumonia of both lungs due to infectious organism   • Acute respiratory failure with hypoxia (CMS/HCC)   • Sinus tachycardia       Therapy Treatment          Rehabilitation Treatment Summary     Row Name 18 1740             Treatment Time/Intention    Discipline physical therapy assistant  -CC      Document Type therapy note (daily note)  -CC      Subjective  Information complains of;weakness;fatigue  -CC      Mode of Treatment individual therapy  -CC      Care Plan Review care plan/treatment goals reviewed  -CC      Patient Effort good  -CC      Existing Precautions/Restrictions fall;oxygen therapy device and L/min  -CC      Recorded by [CC] Anay Goel [facility] 07/19/18 2035      Row Name 07/19/18 1740             Vital Signs    Pre Patient Position Supine  -CC      Intra Patient Position Supine  -CC      Post Patient Position Supine  -CC      Recorded by [CC] Anay Goel [facility] 07/19/18 2035      Row Name 07/19/18 1740             Motor Skills Assessment/Interventions    Additional Documentation Therapeutic Exercise (Group)  -CC      Recorded by [CC] Anay Goel [facility] 07/19/18 2035      Row Name 07/19/18 1740             Therapeutic Exercise    60384 - PT Therapeutic Exercise Minutes 24  -CC      Recorded by [CC] Anay Goel [facility] 07/19/18 2035      Row Name 07/19/18 1740             Therapeutic Exercise    Lower Extremity (Therapeutic Exercise) heel slides, bilateral;SAQ (short arc quad), bilateral;SLR (straight leg raise), bilateral  -CC      Lower Extremity Range of Motion (Therapeutic Exercise) ankle dorsiflexion/plantar flexion, bilateral;hip abduction/adduction, bilateral  -CC      Exercise Type (Therapeutic Exercise) AAROM (active assistive range of motion)  -CC      Position (Therapeutic Exercise) supine  -CC      Sets/Reps (Therapeutic Exercise) 2x10  -CC      Expected Outcome (Therapeutic Exercise) increase active range of motion;improve performance, transfer skills  -CC      Recorded by [CC] Anay Goel PTA 07/19/18 2035      Row Name 07/19/18 1740             Positioning and Restraints    Pre-Treatment Position in bed  -CC      Post Treatment Position bed  -CC      In Bed supine;call light within reach;encouraged to call for assist;patient within staff view  -CC      Recorded by [CC] Anay Goel PTA 07/19/18 2035      Row Name  07/19/18 1740             Pain Scale: Numbers Pre/Post-Treatment    Pain Scale: Numbers, Pretreatment 0/10 - no pain  -      Pain Scale: Numbers, Post-Treatment 0/10 - no pain  -      Recorded by [] Anay Goel PTA 07/19/18 2035        User Key  (r) = Recorded By, (t) = Taken By, (c) = Cosigned By    Initials Name Effective Dates Discipline     Anay Goel PTA 03/07/18 -  PT                     Physical Therapy Education     Title: PT OT SLP Therapies (Active)     Topic: Physical Therapy (Active)     Point: Mobility training (Done)    Learning Progress Summary     Learner Status Readiness Method Response Comment Documented by    Patient Done Acceptance E,TB VU importance of mobility towards recovery  07/18/18 1524          Point: Home exercise program (Done)    Learning Progress Summary     Learner Status Readiness Method Response Comment Documented by    Patient Done Acceptance E,TB VU importance of performing ex throughout the day  07/19/18 2035                      User Key     Initials Effective Dates Name Provider Type Discipline     03/07/18 -  Anay Goel PTA Physical Therapy Assistant PT     06/14/18 -  Merle Flores, PT Student PT Student PT                    PT Recommendation and Plan     Plan of Care Reviewed With: patient  Progress: improving  Outcome Summary: Pt performed ex with AAROM occ AROM con't with PT POC and progress as pt tolerates           Outcome Measures     Row Name 07/19/18 1428 07/18/18 1403          How much help from another person do you currently need...    Turning from your back to your side while in flat bed without using bedrails?  -- 2  -JR (r) KO (t) JR (c)     Moving from lying on back to sitting on the side of a flat bed without bedrails?  -- 2  -JR (r) KO (t) JR (c)     Moving to and from a bed to a chair (including a wheelchair)?  -- 1  -JR (r) KO (t) JR (c)     Standing up from a chair using your arms (e.g., wheelchair, bedside chair)?   -- 1  - (r) BHAVESH (brock)  (c)     Climbing 3-5 steps with a railing?  -- 1  - (r) BHAVESH (brock)  (c)     To walk in hospital room?  -- 1  - (r) BHAVESH (t)  (c)     -[facility] 6 Clicks Score  -- 8  - (r) BHAVESH (t)        How much help from another is currently needed...    Putting on and taking off regular lower body clothing? 1  -  --     Bathing (including washing, rinsing, and drying) 1  -  --     Toileting (which includes using toilet bed pan or urinal) 2  -AH  --     Putting on and taking off regular upper body clothing 1  -  --     Taking care of personal grooming (such as brushing teeth) 1  -  --     Eating meals 1  -  --     Score 7  -AH  --        Functional Assessment    Outcome Measure Options AM-[facility] 6 Clicks Daily Activity (OT)  - AM-[facility] 6 Clicks Basic Mobility (PT)  - (r) BHAVESH (brock)  (c)       User Key  (r) = Recorded By, (t) = Taken By, (c) = Cosigned By    Initials Name Provider Type     Nabila Eubanks Occupational Therapist    JR Ese Fuller, PT Physical Therapist    BHAVESH Flores, PT Student PT Student           Time Calculation:         PT Charges     Row Name 07/19/18 1740             Time Calculation    Start Time 1740  -CC      PT Received On 07/19/18  -CC      PT Goal Re-Cert Due Date 07/28/18  -CC         Timed Charges    52516 - PT Therapeutic Exercise Minutes 24  -CC        User Key  (r) = Recorded By, (t) = Taken By, (c) = Cosigned By    Initials Name Provider Type    KAMRYN Goel, PTA Physical Therapy Assistant        Therapy Suggested Charges     Code   Minutes Charges    67571 (CPT®) Hc Pt Neuromusc Re Education Ea 15 Min      86303 (CPT®) Hc Pt Ther Proc Ea 15 Min 24 2    23650 (CPT®) Hc Gait Training Ea 15 Min      52669 (CPT®) Hc Pt Therapeutic Act Ea 15 Min      28525 (CPT®) Hc Pt Manual Therapy Ea 15 Min      53775 (CPT®) Hc Pt Iontophoresis Ea 15 Min      75328 (CPT®) Hc Pt Elec Stim Ea-Per 15 Min      24117 (CPT®) Hc Pt Ultrasound Ea 15 Min      52736 (CPT®) Hc Pt  Self Care/Mgmt/Train Ea 15 Min      Total  24 2        Therapy Charges for Today     Code Description Service Date Service Provider Modifiers Qty    85512458890 HC PT THER PROC EA 15 MIN 7/19/2018 Anay Goel, PTA GP 2          PT G-Codes  Outcome Measure Options: AM-PAC 6 Clicks Daily Activity (OT)    Aany Goel, PTA  7/19/2018

## 2018-07-21 ENCOUNTER — APPOINTMENT (OUTPATIENT)
Dept: GENERAL RADIOLOGY | Facility: HOSPITAL | Age: 68
End: 2018-07-21

## 2018-07-21 LAB
ALBUMIN SERPL-MCNC: 3 G/DL (ref 3.5–5)
ALBUMIN/GLOB SERPL: 1 G/DL (ref 1–2)
ALP SERPL-CCNC: 90 U/L (ref 38–126)
ALT SERPL W P-5'-P-CCNC: 55 U/L (ref 13–69)
ANION GAP SERPL CALCULATED.3IONS-SCNC: 9.6 MMOL/L (ref 10–20)
AST SERPL-CCNC: 30 U/L (ref 15–46)
BILIRUB SERPL-MCNC: 0.5 MG/DL (ref 0.2–1.3)
BUN BLD-MCNC: 37 MG/DL (ref 7–20)
BUN/CREAT SERPL: 74 (ref 7.1–23.5)
CALCIUM SPEC-SCNC: 8.8 MG/DL (ref 8.4–10.2)
CHLORIDE SERPL-SCNC: 109 MMOL/L (ref 98–107)
CO2 SERPL-SCNC: 30 MMOL/L (ref 26–30)
CREAT BLD-MCNC: 0.5 MG/DL (ref 0.6–1.3)
DEPRECATED RDW RBC AUTO: 43.8 FL (ref 37–54)
ERYTHROCYTE [DISTWIDTH] IN BLOOD BY AUTOMATED COUNT: 14.6 % (ref 11.5–14.5)
GFR SERPL CREATININE-BSD FRML MDRD: 123 ML/MIN/1.73
GLOBULIN UR ELPH-MCNC: 2.9 GM/DL
GLUCOSE BLD-MCNC: 188 MG/DL (ref 74–98)
GLUCOSE BLDC GLUCOMTR-MCNC: 174 MG/DL (ref 70–130)
GLUCOSE BLDC GLUCOMTR-MCNC: 215 MG/DL (ref 70–130)
GLUCOSE BLDC GLUCOMTR-MCNC: 245 MG/DL (ref 70–130)
GLUCOSE BLDC GLUCOMTR-MCNC: 251 MG/DL (ref 70–130)
GLUCOSE BLDC GLUCOMTR-MCNC: 278 MG/DL (ref 70–130)
HCT VFR BLD AUTO: 35.6 % (ref 37–47)
HGB BLD-MCNC: 11.6 G/DL (ref 12–16)
MCH RBC QN AUTO: 27.1 PG (ref 27–31)
MCHC RBC AUTO-ENTMCNC: 32.6 G/DL (ref 30–37)
MCV RBC AUTO: 83.2 FL (ref 81–99)
PLATELET # BLD AUTO: 415 10*3/MM3 (ref 130–400)
PMV BLD AUTO: 10.6 FL (ref 6–12)
POTASSIUM BLD-SCNC: 3.6 MMOL/L (ref 3.5–5.1)
PROT SERPL-MCNC: 5.9 G/DL (ref 6.3–8.2)
RBC # BLD AUTO: 4.28 10*6/MM3 (ref 4.2–5.4)
SODIUM BLD-SCNC: 145 MMOL/L (ref 137–145)
WBC NRBC COR # BLD: 17.57 10*3/MM3 (ref 4.8–10.8)

## 2018-07-21 PROCEDURE — 71045 X-RAY EXAM CHEST 1 VIEW: CPT

## 2018-07-21 PROCEDURE — 63710000001 INSULIN ASPART PER 5 UNITS: Performed by: FAMILY MEDICINE

## 2018-07-21 PROCEDURE — 80053 COMPREHEN METABOLIC PANEL: CPT | Performed by: FAMILY MEDICINE

## 2018-07-21 PROCEDURE — 94799 UNLISTED PULMONARY SVC/PX: CPT

## 2018-07-21 PROCEDURE — 63710000001 INSULIN REGULAR HUMAN PER 5 UNITS: Performed by: HOSPITALIST

## 2018-07-21 PROCEDURE — 85027 COMPLETE CBC AUTOMATED: CPT | Performed by: FAMILY MEDICINE

## 2018-07-21 PROCEDURE — 82962 GLUCOSE BLOOD TEST: CPT

## 2018-07-21 PROCEDURE — 25010000002 VANCOMYCIN PER 500 MG: Performed by: FAMILY MEDICINE

## 2018-07-21 PROCEDURE — 63710000001 INSULIN DETEMIR PER 5 UNITS: Performed by: FAMILY MEDICINE

## 2018-07-21 PROCEDURE — 97530 THERAPEUTIC ACTIVITIES: CPT | Performed by: PHYSICAL THERAPIST

## 2018-07-21 PROCEDURE — 25010000002 METHYLPREDNISOLONE PER 40 MG: Performed by: FAMILY MEDICINE

## 2018-07-21 PROCEDURE — 99232 SBSQ HOSP IP/OBS MODERATE 35: CPT | Performed by: FAMILY MEDICINE

## 2018-07-21 RX ADMIN — METHYLPREDNISOLONE SODIUM SUCCINATE 40 MG: 40 INJECTION, POWDER, FOR SOLUTION INTRAMUSCULAR; INTRAVENOUS at 21:28

## 2018-07-21 RX ADMIN — ATORVASTATIN CALCIUM 20 MG: 20 TABLET, FILM COATED ORAL at 09:06

## 2018-07-21 RX ADMIN — DILTIAZEM HYDROCHLORIDE 60 MG: 60 TABLET, FILM COATED ORAL at 09:06

## 2018-07-21 RX ADMIN — VITAMIN D, TAB 1000IU (100/BT) 2000 UNITS: 25 TAB at 09:06

## 2018-07-21 RX ADMIN — HUMAN INSULIN 3 UNITS: 100 INJECTION, SOLUTION SUBCUTANEOUS at 01:28

## 2018-07-21 RX ADMIN — METHYLPREDNISOLONE SODIUM SUCCINATE 40 MG: 40 INJECTION, POWDER, FOR SOLUTION INTRAMUSCULAR; INTRAVENOUS at 04:20

## 2018-07-21 RX ADMIN — DIPHENOXYLATE HYDROCHLORIDE AND ATROPINE SULFATE 1 TABLET: 2.5; .025 TABLET ORAL at 21:28

## 2018-07-21 RX ADMIN — BUPROPION HYDROCHLORIDE 75 MG: 75 TABLET, FILM COATED ORAL at 09:06

## 2018-07-21 RX ADMIN — INSULIN DETEMIR 25 UNITS: 100 INJECTION, SOLUTION SUBCUTANEOUS at 09:09

## 2018-07-21 RX ADMIN — HUMAN INSULIN 2 UNITS: 100 INJECTION, SOLUTION SUBCUTANEOUS at 06:52

## 2018-07-21 RX ADMIN — SERTRALINE HYDROCHLORIDE 100 MG: 50 TABLET ORAL at 09:06

## 2018-07-21 RX ADMIN — NYSTATIN 500000 UNITS: 100000 SUSPENSION ORAL at 12:27

## 2018-07-21 RX ADMIN — APIXABAN 5 MG: 2.5 TABLET, FILM COATED ORAL at 09:06

## 2018-07-21 RX ADMIN — VANCOMYCIN HYDROCHLORIDE 1250 MG: 500 INJECTION, POWDER, LYOPHILIZED, FOR SOLUTION INTRAVENOUS at 17:01

## 2018-07-21 RX ADMIN — FAMOTIDINE 20 MG: 20 TABLET ORAL at 21:28

## 2018-07-21 RX ADMIN — FAMOTIDINE 20 MG: 20 TABLET ORAL at 09:06

## 2018-07-21 RX ADMIN — DILTIAZEM HYDROCHLORIDE 60 MG: 60 TABLET, FILM COATED ORAL at 14:00

## 2018-07-21 RX ADMIN — IPRATROPIUM BROMIDE AND ALBUTEROL SULFATE 3 ML: .5; 3 SOLUTION RESPIRATORY (INHALATION) at 19:20

## 2018-07-21 RX ADMIN — APIXABAN 5 MG: 2.5 TABLET, FILM COATED ORAL at 21:29

## 2018-07-21 RX ADMIN — CLOTRIMAZOLE AND BETAMETHASONE DIPROPIONATE 1 APPLICATION: 10; .5 CREAM TOPICAL at 21:30

## 2018-07-21 RX ADMIN — NYSTATIN 500000 UNITS: 100000 SUSPENSION ORAL at 17:00

## 2018-07-21 RX ADMIN — ARFORMOTEROL TARTRATE 15 MCG: 15 SOLUTION RESPIRATORY (INHALATION) at 07:09

## 2018-07-21 RX ADMIN — INSULIN ASPART 4 UNITS: 100 INJECTION, SOLUTION INTRAVENOUS; SUBCUTANEOUS at 16:53

## 2018-07-21 RX ADMIN — IPRATROPIUM BROMIDE AND ALBUTEROL SULFATE 3 ML: .5; 3 SOLUTION RESPIRATORY (INHALATION) at 03:29

## 2018-07-21 RX ADMIN — METOPROLOL TARTRATE 5 MG: 1 INJECTION, SOLUTION INTRAVENOUS at 15:28

## 2018-07-21 RX ADMIN — DILTIAZEM HYDROCHLORIDE 60 MG: 60 TABLET, FILM COATED ORAL at 21:29

## 2018-07-21 RX ADMIN — VANCOMYCIN HYDROCHLORIDE 1250 MG: 500 INJECTION, POWDER, LYOPHILIZED, FOR SOLUTION INTRAVENOUS at 06:52

## 2018-07-21 RX ADMIN — INSULIN ASPART 3 UNITS: 100 INJECTION, SOLUTION INTRAVENOUS; SUBCUTANEOUS at 21:29

## 2018-07-21 RX ADMIN — FLUTICASONE PROPIONATE 2 SPRAY: 50 SPRAY, METERED NASAL at 09:09

## 2018-07-21 RX ADMIN — INSULIN DETEMIR 25 UNITS: 100 INJECTION, SOLUTION SUBCUTANEOUS at 21:30

## 2018-07-21 RX ADMIN — BUDESONIDE 1 MG: 0.5 INHALANT RESPIRATORY (INHALATION) at 19:20

## 2018-07-21 RX ADMIN — IPRATROPIUM BROMIDE AND ALBUTEROL SULFATE 3 ML: .5; 3 SOLUTION RESPIRATORY (INHALATION) at 07:09

## 2018-07-21 RX ADMIN — NYSTATIN 500000 UNITS: 100000 SUSPENSION ORAL at 09:06

## 2018-07-21 RX ADMIN — CLOTRIMAZOLE AND BETAMETHASONE DIPROPIONATE 1 APPLICATION: 10; .5 CREAM TOPICAL at 09:09

## 2018-07-21 RX ADMIN — MUPIROCIN: 20 OINTMENT TOPICAL at 21:30

## 2018-07-21 RX ADMIN — INSULIN ASPART 4 UNITS: 100 INJECTION, SOLUTION INTRAVENOUS; SUBCUTANEOUS at 11:37

## 2018-07-21 RX ADMIN — GABAPENTIN 300 MG: 300 CAPSULE ORAL at 09:06

## 2018-07-21 RX ADMIN — LISINOPRIL 10 MG: 10 TABLET ORAL at 09:06

## 2018-07-21 RX ADMIN — ARFORMOTEROL TARTRATE 15 MCG: 15 SOLUTION RESPIRATORY (INHALATION) at 19:20

## 2018-07-21 RX ADMIN — IPRATROPIUM BROMIDE AND ALBUTEROL SULFATE 3 ML: .5; 3 SOLUTION RESPIRATORY (INHALATION) at 12:48

## 2018-07-21 RX ADMIN — GABAPENTIN 300 MG: 300 CAPSULE ORAL at 21:28

## 2018-07-21 RX ADMIN — BUDESONIDE 1 MG: 0.5 INHALANT RESPIRATORY (INHALATION) at 07:09

## 2018-07-21 RX ADMIN — BUPROPION HYDROCHLORIDE 75 MG: 75 TABLET, FILM COATED ORAL at 21:29

## 2018-07-21 RX ADMIN — SODIUM CHLORIDE 100 ML/HR: 9 INJECTION, SOLUTION INTRAVENOUS at 04:20

## 2018-07-21 RX ADMIN — MUPIROCIN 1 APPLICATION: 20 OINTMENT TOPICAL at 09:08

## 2018-07-21 RX ADMIN — Medication 200 MG: at 09:07

## 2018-07-21 RX ADMIN — METHYLPREDNISOLONE SODIUM SUCCINATE 40 MG: 40 INJECTION, POWDER, FOR SOLUTION INTRAMUSCULAR; INTRAVENOUS at 12:27

## 2018-07-21 RX ADMIN — DIPHENOXYLATE HYDROCHLORIDE AND ATROPINE SULFATE 1 TABLET: 2.5; .025 TABLET ORAL at 16:55

## 2018-07-21 NOTE — PLAN OF CARE
Problem: Patient Care Overview  Goal: Plan of Care Review   07/21/18 6261   Coping/Psychosocial   Plan of Care Reviewed With patient   Plan of Care Review   Progress improving   OTHER   Outcome Summary Pt required less assist with mobility today. Able to transfer to bedside commode and then ambulate to the chair. Still with poor oxygen saturation, fatigue and SOA. Progressing overall with therapy.

## 2018-07-21 NOTE — PROGRESS NOTES
[facility]    Name:  Breann Gallegos   Age:  68 y.o.  Sex:  female  :  1950  MRN:  [id number]   Visit Number:  [id number]  Admission Date:  2018  Date Of Service:  18  Primary Care Physician:  Harry Avery MD     LOS: 8 days :      Chief Complaint:   Follow-up pneumonia bilateral diffuse multifocal with respiratory failure severe        Subjective / Interval History: The patient was seen this morning and again this afternoon.  Her family has been here to visit.  She is much more awake and alert each day.  She seems to be tolerating a puréed diet that was restarted today.  She has improved cough with sputum.  She has improved shortness of breath and has been tolerating physical therapy.  She is still requiring intermittent BiPAP therapy and high flow oxygen at 10 L.  She is still requiring increased nursing assistance and ICU care.  Today, after holding her feeds yesterday, her chest x-ray showed improvement.  Aspiration precautions continued.  I have reviewed the x-ray myself.    Despite her severe illness, the patient has been very upbeat and pleasant and very motivated.      Review of Systems:     General ROS: Patient denies any fevers, chills or loss of consciousness.  More awake and alert with improved ability to swallow without cough  Ophthalmic ROS: Denies any diplopia or transient loss of vision.  ENT ROS: Denies sore throat, nasal congestion or ear pain.   Respiratory ROS: Improved cough and shortness of breath.  Cardiovascular ROS: Denies chest pain or palpitations. No history of exertional chest pain.   Gastrointestinal ROS: Denies nausea and vomiting. Denies any abdominal pain. No diarrhea.  Genito-Urinary ROS: Denies dysuria or hematuria.  Musculoskeletal ROS: Denies chronic back pain. No muscle pain. No calf pain.  Neurological ROS: Denies any focal weakness. No loss of consciousness. Denies any numbness. Denies neck pain.    Dermatological ROS: Denies any redness or pruritis.    Vital Signs:    Temp:  [97.9 °F (36.6 °C)-98 °F (36.7 °C)] 98 °F (36.7 °C)  Heart Rate:  [] 58  Resp:  [15-25] 18  BP: (117-168)/(55-95) 168/74  FiO2 (%):  [60 %] 60 %    Intake and output:    I/O last 3 completed shifts:  In: 2928 [P.O.:780; I.V.:2148]  Out: 975 [Urine:975]  No intake/output data recorded.    Physical Examination:    General Appearance:    Alert and cooperative, not in any acute distress.More awake and alert    Head:    Atraumatic and normocephalic, without obvious abnormality.   Eyes:            PERRLA, conjunctivae and sclerae normal, no Icterus. No pallor. Extra-occular movements are within normal limits.   Throat:   No oral lesions, no thrush, oral mucosa moist.   Neck:   Supple, trachea midline, no thyromegaly   Lungs:     Chest shape is normal. Breath sounds heard bilaterally equally But reduced throughout.  No crackles or wheezing. No Pleural rub or bronchial breathing.    Heart:    Normal S1 and S2, no murmur, no gallop   Abdomen:     Normal bowel sounds, no masses, no organomegaly. Soft        non-tender, non-distended, no guarding, no rebound                tenderness   Extremities:   Moves all extremities well, no edema, no cyanosis, no             clubbing   Skin:   No bleeding, bruising or rash.   Neurologic:   Cranial nerves 2 - 12 grossly intact, sensation intact, Motor power is normal and equal bilaterally.   Laboratory results:    Lab Results (last 24 hours)     Procedure Component Value Units Date/Time    POC Glucose Once [[id number]]  (Abnormal) Collected:  07/21/18 0613    Specimen:  Blood Updated:  07/21/18 0621     Glucose 174 (H) mg/dL      Comment: Serial Number: [id number]:  [id number]       Comprehensive Metabolic Panel [[id number]]  (Abnormal) Collected:  07/21/18 0413    Specimen:  Blood Updated:  07/21/18 0539     Glucose 188 (H) mg/dL      Comment: Glucose >180, Hemoglobin A1C recommended.        BUN 37  (H) mg/dL      Creatinine 0.50 (L) mg/dL      Sodium 145 mmol/L      Potassium 3.6 mmol/L      Chloride 109 (H) mmol/L      CO2 30.0 mmol/L      Calcium 8.8 mg/dL      Total Protein 5.9 (L) g/dL      Albumin 3.00 (L) g/dL      ALT (SGPT) 55 U/L      AST (SGOT) 30 U/L      Alkaline Phosphatase 90 U/L      Total Bilirubin 0.5 mg/dL      eGFR Non African Amer 123 mL/min/1.73      Globulin 2.9 gm/dL      A/G Ratio 1.0 g/dL      BUN/Creatinine Ratio 74.0 (H)     Anion Gap 9.6 (L) mmol/L     Narrative:       GFR Normal >60  Chronic Kidney Disease <60  Kidney Failure <15    CBC (No Diff) [[id number]]  (Abnormal) Collected:  07/21/18 0413    Specimen:  Blood Updated:  07/21/18 0457     WBC 17.57 (H) 10*3/mm3      RBC 4.28 10*6/mm3      Hemoglobin 11.6 (L) g/dL      Hematocrit 35.6 (L) %      MCV 83.2 fL      MCH 27.1 pg      MCHC 32.6 g/dL      RDW 14.6 (H) %      RDW-SD 43.8 fl      MPV 10.6 fL      Platelets 415 (H) 10*3/mm3     POC Glucose Once [[id number]]  (Abnormal) Collected:  07/20/18 2330    Specimen:  Blood Updated:  07/21/18 0001     Glucose 215 (H) mg/dL      Comment: Serial Number: [id number]:  [id number]       POC Glucose Once [[id number]]  (Abnormal) Collected:  07/20/18 1734    Specimen:  Blood Updated:  07/20/18 1750     Glucose 220 (H) mg/dL      Comment: Serial Number: [id number]:  [id number]       Legionella Antigen, Urine - Urine, Urine, Clean Catch [[id number]] Collected:  07/13/18 0925    Specimen:  Urine from Urine, Clean Catch Updated:  07/20/18 1615     L. pneumophila Serogp 1 Ur Ag Negative     Comment: Presumptive negative for L. pneumophila serogroup 1 antigen in urine,  suggesting no recent or current infection. Legionnaires' disease  cannot be ruled out since other serogroups and species may also cause  disease.       Narrative:       Performed at:  [address] [facility]  [id number]  : Nikhil Granger MD, Phone:  [phone]    POC Glucose  Once [[id number]]  (Abnormal) Collected:  07/20/18 1131    Specimen:  Blood Updated:  07/20/18 1145     Glucose 294 (H) mg/dL      Comment: Serial Number: [id number]:  [id number]             I have reviewed the patient's laboratory results.    Radiology results:    Imaging Results (last 24 hours)     Procedure Component Value Units Date/Time    XR Chest 1 View [[id number]] Updated:  07/21/18 0806          I have reviewed the patient's radiology reports.    Medication Review:     I have reviewed the patients active and prn medications.     Assessment:  1. Multifocal bilateral pneumonia, prior to admission, with MRSA, Improved   2. Dysphagia with suspected aspiration pneumonitis, suspect improving with less sedation  3. Acute hypoxic respiratory failure severe, requiring intubation and placement on mechanical ventilator 7-14-18 with  ARDS, improving still requiring frequent bipap and high flow oxygen  4. New onset Afib with RVR, previous sinus tachycardia, improved on diltiazem with bradycardia overnight   5. Sepsis, secondary to pneumonia, prior to admission, with MRSA, treated  6. Diabetes mellitus type 2 insulin dependent  7. Chronic essential hypertension  8. COPD moderate  9. Abnormal CT chest with lymphadenopathy, repeat outpatient CT scan chest         Plan:  Restart her Cardizem as she now has recurrent A. fib with RVR.  This seemed to control her heart rate today.  The patient has restarted eating today.  We'll go ahead and continue her current Cardizem dose and if she becomes bradycardic tonight, would consider reducing the dose of Cardizem.  Continue the Solu-Medrol, vancomycin.  Continue high flow oxygen and use BiPAP intermittently and nightly and wean as tolerated.  With the patient having bilateral severe pneumonia and debility with severe respiratory failure, continue to monitor her in the ICU and would plan to transfer her to an LTAC type facility in the next couple days.  Dr. Tse will be back on  Monday for consultation as well.  Continue neb treatments.  Continue physical therapy.  She is improving daily at this time and seems to be tolerating her food today.  Further speech therapy consultation will be needed on Monday.  Until then, we will continue the puréed diet with nectar thick liquids.    Medication risks and benefits were discussed in detail. Patient reported satisfaction with care delivered and treatment plan.     Ese Kirby,   07/21/18  9:01 AM

## 2018-07-21 NOTE — PLAN OF CARE
Problem: Patient Care Overview  Goal: Plan of Care Review  Outcome: Ongoing (interventions implemented as appropriate)   07/20/18 1600   Coping/Psychosocial   Plan of Care Reviewed With patient;spouse   Plan of Care Review   Progress improving   OTHER   Outcome Summary VSS       Problem: Pneumonia (Adult)  Goal: Signs and Symptoms of Listed Potential Problems Will be Absent, Minimized or Managed (Pneumonia)  Outcome: Ongoing (interventions implemented as appropriate)      Problem: NPPV/CPAP (Adult)  Goal: Signs and Symptoms of Listed Potential Problems Will be Absent, Minimized or Managed (NPPV/CPAP)  Outcome: Ongoing (interventions implemented as appropriate)      Problem: Pain, Acute (Adult)  Goal: Acceptable Pain Control/Comfort Level  Outcome: Ongoing (interventions implemented as appropriate)      Problem: Skin Injury Risk (Adult)  Goal: Skin Health and Integrity  Outcome: Ongoing (interventions implemented as appropriate)      Problem: Arrhythmia/Dysrhythmia (Symptomatic) (Adult)  Goal: Signs and Symptoms of Listed Potential Problems Will be Absent, Minimized or Managed (Arrhythmia/Dysrhythmia)  Outcome: Ongoing (interventions implemented as appropriate)      Problem: Fall Risk (Adult)  Goal: Absence of Fall  Outcome: Ongoing (interventions implemented as appropriate)

## 2018-07-21 NOTE — THERAPY TREATMENT NOTE
Acute Care - Physical Therapy Treatment Note   [facility]     Patient Name: Breann Gallegos  : 1950  MRN: [id number]  Today's Date: 2018  Onset of Illness/Injury or Date of Surgery: 18  Date of Referral to PT: 18  Referring Physician: Dr. Kirby    Admit Date: 2018    Visit Dx:    ICD-10-CM ICD-9-CM   1. Pneumonia of both lungs due to infectious organism, unspecified part of lung J18.9 483.8   2. Acute on chronic respiratory failure with hypoxia (CMS/HCC) J96.21 518.84     799.02   3. Impaired functional mobility, balance, gait, and endurance Z74.09 V49.89   4. Impaired mobility and ADLs Z74.09 799.89     Patient Active Problem List   Diagnosis   • Dysphagia   • Abnormal urinalysis   • Anxiety   • Chronic obstructive pulmonary disease (CMS/HCC)   • Depression   • Diabetes mellitus (CMS/HCC)   • Diabetic peripheral neuropathy (CMS/HCC)   • Diarrhea   • Esophageal dysmotility   • Foot pain   • Gastroesophageal reflux disease without esophagitis   • Hyperlipidemia   • Hypertension   • Insomnia   • Irritable bowel syndrome   • Localized swelling, mass and lump, lower limb   • Low back pain   • Abnormal mammogram   • Enlargement of neck   • Neck pain   • Thyroid nodule   • Osteoarthritis of knee   • Osteoarthritis   • Osteopenia   • Peripheral arterial occlusive disease (CMS/HCC)   • Nerve root disorder   • Restless legs syndrome   • Tension headache   • Vitamin D deficiency   • Erythrasma   • Unsteady gait   • Headache   • Chronic left shoulder pain   • Esophageal stricture   • Other dysphagia   • Magnesium deficiency   • Pneumonia of both lungs due to infectious organism   • Acute respiratory failure with hypoxia (CMS/HCC)   • Sinus tachycardia       Therapy Treatment          Rehabilitation Treatment Summary     Row Name 18 1331             Treatment Time/Intention    Discipline physical therapist  -JG      Document Type therapy note (daily note)  -JG      Subjective  Information no complaints;pain  -JG      Mode of Treatment individual therapy;physical therapy  -JG      Care Plan Review care plan/treatment goals reviewed  -JG      Total Minutes, Physical Therapy Treatment 20  -JG      Patient Effort good  -JG      Recorded by [JG] Kenji Wolfe, PT 07/21/18 1358      Row Name 07/21/18 1331             Vital Signs    Pre SpO2 (%) 90  -JG      O2 Delivery Pre Treatment supplemental O2  -JG      Intra SpO2 (%) 80  -JG      O2 Delivery Intra Treatment supplemental O2  -JG      Post SpO2 (%) 90  -JG      O2 Delivery Post Treatment supplemental O2  -JG      Recorded by [JG] Kenji Wolfe, PT 07/21/18 1358      Row Name 07/21/18 1331             Bed Mobility Assessment/Treatment    Bed Mobility Assessment/Treatment supine-sit  -JG      Supine-Sit Barnes (Bed Mobility) contact guard  -JG      Recorded by [JG] Kenji Wolfe, PT 07/21/18 1358      Row Name 07/21/18 1331             Transfer Assessment/Treatment    Transfer Assessment/Treatment sit-stand transfer  -JG      Recorded by [JG] Kenji Wolfe, PT 07/21/18 1358      Row Name 07/21/18 1331             Sit-Stand Transfer    Sit-Stand Barnes (Transfers) contact guard  -JG      Recorded by [JG] Kenji Wolfe, PT 07/21/18 1358      Row Name 07/21/18 1331             Stand Pivot/Stand Step Transfer    Stand Pivot/Stand Step Barnes minimum assist (75% patient effort)  -JG      Recorded by [JG] Kenji Wolfe, PT 07/21/18 1358      Row Name 07/21/18 1331             Gait/Stairs Assessment/Training    Barnes Level (Gait) minimum assist (75% patient effort)  -JG      Distance in Feet (Gait) 3  -JG      Recorded by [JG] Kenji Wolfe, PT 07/21/18 1358      Row Name 07/21/18 1331             Positioning and Restraints    Pre-Treatment Position in bed  -JG      Post Treatment Position chair  -JG      In Bed with family/caregiver  -JG      Recorded by [JG] Kenji Wolfe, PT 07/21/18 1358      Row Name 07/21/18 1331             Pain  Assessment    Additional Documentation Pain Scale: Numbers Pre/Post-Treatment (Group)  -JG      Recorded by [DEENA] Kenjiamanda Wolfe, PT 07/21/18 1358      Row Name 07/21/18 1331             Pain Scale: Numbers Pre/Post-Treatment    Pain Scale: Numbers, Pretreatment 0/10 - no pain  -JG      Recorded by [DEENA] Kenji Wolfe, PT 07/21/18 1358      Row Name 07/21/18 1331             Outcome Summary/Treatment Plan (PT)    Daily Summary of Progress (PT) progress toward functional goals is good  -JG      Recorded by [DEENA] Kenji Wolfe, PT 07/21/18 1358        User Key  (r) = Recorded By, (t) = Taken By, (c) = Cosigned By    Initials Name Effective Dates Discipline    DEENA PalomaresKenjiamanda Wolfe, PT 04/06/17 -  PT                     Physical Therapy Education     Title: PT OT SLP Therapies (Done)     Topic: Physical Therapy (Done)     Point: Mobility training (Done)    Learning Progress Summary     Learner Status Readiness Method Response Comment Documented by    Patient Done Acceptance E VU,NR Pt instructed on safety with mobility and transfers  07/21/18 1359     Done Acceptance E VU  RL 07/21/18 1045     Done Acceptance E,TB,D VU,NR  RM 07/20/18 1430     Done Acceptance E,TB VU importance of mobility towards recovery KO 07/18/18 1524          Point: Home exercise program (Done)    Learning Progress Summary     Learner Status Readiness Method Response Comment Documented by    Patient Done Acceptance E VU  RL 07/21/18 1045     Done Acceptance E,TB,D VU,NR  RM 07/20/18 1430     Done Acceptance E,TB VU importance of performing ex throughout the day CC 07/19/18 2035          Point: Body mechanics (Done)    Learning Progress Summary     Learner Status Readiness Method Response Comment Documented by    Patient Done Acceptance E VU  RL 07/21/18 1045          Point: Precautions (Done)    Learning Progress Summary     Learner Status Readiness Method Response Comment Documented by    Patient Done Acceptance E VU  RL 07/21/18 1045                      User  Key     Initials Effective Dates Name Provider Type Discipline     04/06/17 -  Kenji Wolfe, PT Physical Therapist PT    RL 11/07/16 -  Fannie Ovalle, RN Registered Nurse Nurse    CC 03/07/18 -  Anay Goel, PTA Physical Therapy Assistant PT    RM 03/07/18 -  Gee Hamilton, PTA Physical Therapy Assistant PT    KO 06/14/18 -  Merle Flores, PT Student PT Student PT                    PT Recommendation and Plan     Outcome Summary/Treatment Plan (PT)  Daily Summary of Progress (PT): progress toward functional goals is good  Plan of Care Reviewed With: patient  Progress: improving  Outcome Summary: Pt required less assist with mobility today. Able to transfer to bedside commode and then ambulate to the chair. Still with poor oxygen saturation, fatigue and SOA. Progressing overall with therapy.           Outcome Measures     Row Name 07/20/18 1400 07/20/18 0943 07/19/18 1428       How much help from another person do you currently need...    Turning from your back to your side while in flat bed without using bedrails?  -- 3  -RM  --    Moving from lying on back to sitting on the side of a flat bed without bedrails?  -- 3  -RM  --    Moving to and from a bed to a chair (including a wheelchair)?  -- 2  -RM  --    Standing up from a chair using your arms (e.g., wheelchair, bedside chair)?  -- 3  -RM  --    Climbing 3-5 steps with a railing?  -- 2  -RM  --    To walk in hospital room?  -- 2  -RM  --    AM-PAC 6 Clicks Score  -- 15  -RM  --       How much help from another is currently needed...    Putting on and taking off regular lower body clothing? 1  -AH  -- 1  -AH    Bathing (including washing, rinsing, and drying) 1  -AH  -- 1  -AH    Toileting (which includes using toilet bed pan or urinal) 2  -AH  -- 2  -AH    Putting on and taking off regular upper body clothing 1  -AH  -- 1  -AH    Taking care of personal grooming (such as brushing teeth) 3  -AH  -- 1  -AH    Eating meals 3  -AH  -- 1  -AH    Score  11  -  -- 7  -       Functional Assessment    Outcome Measure Options AM-PAC 6 Clicks Daily Activity (OT)  - AM-PAC 6 Clicks Basic Mobility (PT)  - AM-PAC 6 Clicks Daily Activity (OT)  -    Row Name 07/18/18 1403             How much help from another person do you currently need...    Turning from your back to your side while in flat bed without using bedrails? 2  -JR (r) KO (t) JR (c)      Moving from lying on back to sitting on the side of a flat bed without bedrails? 2  -JR (r) KO (t) JR (c)      Moving to and from a bed to a chair (including a wheelchair)? 1  -JR (r) KO (t) JR (c)      Standing up from a chair using your arms (e.g., wheelchair, bedside chair)? 1  -JR (r) KO (t) JR (c)      Climbing 3-5 steps with a railing? 1  -JR (r) KO (t) JR (c)      To walk in hospital room? 1  -JR (r) KO (t) JR (c)      AM-PAC 6 Clicks Score 8  -JR (r) KO (t)         Functional Assessment    Outcome Measure Options AM-PAC 6 Clicks Basic Mobility (PT)  -JR (r) KO (t) JR (c)        User Key  (r) = Recorded By, (t) = Taken By, (c) = Cosigned By    Initials Name Provider Type     Nabila Eubanks Occupational Therapist    JR Ese Fuller, PT Physical Therapist    RM Gee Hamilton, PTA Physical Therapy Assistant    BHAVESH Flores, PT Student PT Student           Time Calculation:         PT Charges     Row Name 07/21/18 1401             Time Calculation    Start Time 1331  -JG      Stop Time 1351  -JG      Time Calculation (min) 20 min  -JG      PT Received On 07/21/18  -JG        User Key  (r) = Recorded By, (t) = Taken By, (c) = Cosigned By    Initials Name Provider Type    DEENA Wolfe, KB Physical Therapist        Therapy Suggested Charges     Code   Minutes Charges    52764 (CPT®) Hc Pt Neuromusc Re Education Ea 15 Min      87638 (CPT®) Hc Pt Ther Proc Ea 15 Min      61453 (CPT®) Hc Gait Training Ea 15 Min      75448 (CPT®) Hc Pt Therapeutic Act Ea 15 Min 25 2    08270 (CPT®) Hc Pt Manual Therapy Ea 15 Min       70411 (CPT®) Hc Pt Iontophoresis Ea 15 Min      02283 (CPT®) Hc Pt Elec Stim Ea-Per 15 Min      27589 (CPT®) Hc Pt Ultrasound Ea 15 Min      30698 (CPT®) Hc Pt Self Care/Mgmt/Train Ea 15 Min      Total  25 2        Therapy Charges for Today     Code Description Service Date Service Provider Modifiers Qty    48503521919 HC PT THERAPEUTIC ACT EA 15 MIN 7/21/2018 Kenji Wolfe, PT GP 1          PT G-Codes  Outcome Measure Options: AM-PAC 6 Clicks Daily Activity (OT)    Kenji Wolfe, PT  7/21/2018

## 2018-07-21 NOTE — PLAN OF CARE
Problem: Patient Care Overview  Goal: Individualization and Mutuality  Outcome: Ongoing (interventions implemented as appropriate)      Problem: Pneumonia (Adult)  Goal: Signs and Symptoms of Listed Potential Problems Will be Absent, Minimized or Managed (Pneumonia)  Outcome: Ongoing (interventions implemented as appropriate)      Problem: NPPV/CPAP (Adult)  Goal: Signs and Symptoms of Listed Potential Problems Will be Absent, Minimized or Managed (NPPV/CPAP)  Outcome: Ongoing (interventions implemented as appropriate)      Problem: Pain, Acute (Adult)  Goal: Acceptable Pain Control/Comfort Level  Outcome: Ongoing (interventions implemented as appropriate)      Problem: Skin Injury Risk (Adult)  Goal: Skin Health and Integrity  Outcome: Ongoing (interventions implemented as appropriate)      Problem: Arrhythmia/Dysrhythmia (Symptomatic) (Adult)  Goal: Signs and Symptoms of Listed Potential Problems Will be Absent, Minimized or Managed (Arrhythmia/Dysrhythmia)  Outcome: Ongoing (interventions implemented as appropriate)      Problem: Fall Risk (Adult)  Goal: Absence of Fall  Outcome: Ongoing (interventions implemented as appropriate)

## 2018-07-22 ENCOUNTER — APPOINTMENT (OUTPATIENT)
Dept: GENERAL RADIOLOGY | Facility: HOSPITAL | Age: 68
End: 2018-07-22
Attending: FAMILY MEDICINE

## 2018-07-22 LAB
ANION GAP SERPL CALCULATED.3IONS-SCNC: 6.7 MMOL/L (ref 10–20)
ARTERIAL PATENCY WRIST A: POSITIVE
ATMOSPHERIC PRESS: 727 MMHG
BACTERIA UR QL AUTO: ABNORMAL /HPF
BASE EXCESS BLDA CALC-SCNC: 3.9 MMOL/L (ref 0–2)
BASOPHILS # BLD AUTO: 0.07 10*3/MM3 (ref 0–0.2)
BASOPHILS NFR BLD AUTO: 0.3 % (ref 0–2.5)
BDY SITE: ABNORMAL
BILIRUB UR QL STRIP: NEGATIVE
BUN BLD-MCNC: 22 MG/DL (ref 7–20)
BUN/CREAT SERPL: 44 (ref 7.1–23.5)
CALCIUM SPEC-SCNC: 8.5 MG/DL (ref 8.4–10.2)
CHLORIDE SERPL-SCNC: 109 MMOL/L (ref 98–107)
CLARITY UR: CLEAR
CO2 SERPL-SCNC: 31 MMOL/L (ref 26–30)
COHGB MFR BLD: 1.6 % (ref 0–2)
COLOR UR: YELLOW
CREAT BLD-MCNC: 0.5 MG/DL (ref 0.6–1.3)
DEPRECATED RDW RBC AUTO: 44.1 FL (ref 37–54)
EOSINOPHIL # BLD AUTO: 0.41 10*3/MM3 (ref 0–0.7)
EOSINOPHIL NFR BLD AUTO: 1.5 % (ref 0–7)
EPAP: 8
ERYTHROCYTE [DISTWIDTH] IN BLOOD BY AUTOMATED COUNT: 14.8 % (ref 11.5–14.5)
GFR SERPL CREATININE-BSD FRML MDRD: 123 ML/MIN/1.73
GLUCOSE BLD-MCNC: 57 MG/DL (ref 74–98)
GLUCOSE BLDC GLUCOMTR-MCNC: 103 MG/DL (ref 70–130)
GLUCOSE BLDC GLUCOMTR-MCNC: 103 MG/DL (ref 70–130)
GLUCOSE BLDC GLUCOMTR-MCNC: 111 MG/DL (ref 70–130)
GLUCOSE BLDC GLUCOMTR-MCNC: 198 MG/DL (ref 70–130)
GLUCOSE UR STRIP-MCNC: ABNORMAL MG/DL
HCO3 BLDA-SCNC: 27.7 MMOL/L (ref 22–28)
HCT VFR BLD AUTO: 39.6 % (ref 37–47)
HCT VFR BLD CALC: 34.4 %
HGB BLD-MCNC: 12.7 G/DL (ref 12–16)
HGB BLDA-MCNC: 11.2 G/DL (ref 12–18)
HGB UR QL STRIP.AUTO: ABNORMAL
HOROWITZ INDEX BLD+IHG-RTO: 100 %
HYALINE CASTS UR QL AUTO: ABNORMAL /LPF
IMM GRANULOCYTES # BLD: 0.54 10*3/MM3 (ref 0–0.06)
IMM GRANULOCYTES NFR BLD: 1.9 % (ref 0–0.6)
IPAP: 16
KETONES UR QL STRIP: NEGATIVE
LEUKOCYTE ESTERASE UR QL STRIP.AUTO: NEGATIVE
LYMPHOCYTES # BLD AUTO: 3.09 10*3/MM3 (ref 0.6–3.4)
LYMPHOCYTES NFR BLD AUTO: 11 % (ref 10–50)
Lab: ABNORMAL
MAGNESIUM SERPL-MCNC: 1.8 MG/DL (ref 1.6–2.3)
MCH RBC QN AUTO: 26.3 PG (ref 27–31)
MCHC RBC AUTO-ENTMCNC: 32.1 G/DL (ref 30–37)
MCV RBC AUTO: 82.2 FL (ref 81–99)
METHGB BLD QL: 0.7 % (ref 0–1.5)
MODALITY: ABNORMAL
MONOCYTES # BLD AUTO: 1.79 10*3/MM3 (ref 0–0.9)
MONOCYTES NFR BLD AUTO: 6.4 % (ref 0–12)
NEUTROPHILS # BLD AUTO: 22.08 10*3/MM3 (ref 2–6.9)
NEUTROPHILS NFR BLD AUTO: 78.9 % (ref 37–80)
NITRITE UR QL STRIP: NEGATIVE
NRBC BLD MANUAL-RTO: 0.1 /100 WBC (ref 0–0)
OXYHGB MFR BLDV: 87.9 % (ref 94–99)
PCO2 BLDA: 37.5 MM HG (ref 35–45)
PCO2 TEMP ADJ BLD: ABNORMAL MM HG (ref 35–45)
PH BLDA: 7.48 PH UNITS (ref 7.3–7.5)
PH UR STRIP.AUTO: 7 [PH] (ref 5–8)
PH, TEMP CORRECTED: ABNORMAL PH UNITS
PLATELET # BLD AUTO: 456 10*3/MM3 (ref 130–400)
PMV BLD AUTO: 10.2 FL (ref 6–12)
PO2 BLDA: 56.4 MM HG (ref 75–100)
PO2 TEMP ADJ BLD: ABNORMAL MM HG (ref 83–108)
POTASSIUM BLD-SCNC: 2.7 MMOL/L (ref 3.5–5.1)
PROT UR QL STRIP: NEGATIVE
RBC # BLD AUTO: 4.82 10*6/MM3 (ref 4.2–5.4)
RBC # UR: ABNORMAL /HPF
REF LAB TEST METHOD: ABNORMAL
SAO2 % BLDCOA: 90 % (ref 94–100)
SET MECH RESP RATE: 16
SODIUM BLD-SCNC: 144 MMOL/L (ref 137–145)
SP GR UR STRIP: 1.02 (ref 1–1.03)
SQUAMOUS #/AREA URNS HPF: ABNORMAL /HPF
UROBILINOGEN UR QL STRIP: ABNORMAL
VENTILATOR MODE: ABNORMAL
WBC NRBC COR # BLD: 27.98 10*3/MM3 (ref 4.8–10.8)
WBC UR QL AUTO: ABNORMAL /HPF
YEAST URNS QL MICRO: ABNORMAL /HPF

## 2018-07-22 PROCEDURE — 25010000002 FUROSEMIDE PER 20 MG: Performed by: INTERNAL MEDICINE

## 2018-07-22 PROCEDURE — 87106 FUNGI IDENTIFICATION YEAST: CPT | Performed by: FAMILY MEDICINE

## 2018-07-22 PROCEDURE — 83050 HGB METHEMOGLOBIN QUAN: CPT

## 2018-07-22 PROCEDURE — 25010000002 VANCOMYCIN PER 500 MG: Performed by: FAMILY MEDICINE

## 2018-07-22 PROCEDURE — 94799 UNLISTED PULMONARY SVC/PX: CPT

## 2018-07-22 PROCEDURE — 36600 WITHDRAWAL OF ARTERIAL BLOOD: CPT

## 2018-07-22 PROCEDURE — 63710000001 INSULIN DETEMIR PER 5 UNITS: Performed by: FAMILY MEDICINE

## 2018-07-22 PROCEDURE — 85025 COMPLETE CBC W/AUTO DIFF WBC: CPT | Performed by: FAMILY MEDICINE

## 2018-07-22 PROCEDURE — 99232 SBSQ HOSP IP/OBS MODERATE 35: CPT | Performed by: FAMILY MEDICINE

## 2018-07-22 PROCEDURE — 81001 URINALYSIS AUTO W/SCOPE: CPT | Performed by: FAMILY MEDICINE

## 2018-07-22 PROCEDURE — 87086 URINE CULTURE/COLONY COUNT: CPT | Performed by: FAMILY MEDICINE

## 2018-07-22 PROCEDURE — 25010000002 CHLOROTHIAZIDE PER 500 MG: Performed by: FAMILY MEDICINE

## 2018-07-22 PROCEDURE — 63710000001 INSULIN ASPART PER 5 UNITS: Performed by: FAMILY MEDICINE

## 2018-07-22 PROCEDURE — 83735 ASSAY OF MAGNESIUM: CPT | Performed by: FAMILY MEDICINE

## 2018-07-22 PROCEDURE — 25010000003 POTASSIUM CHLORIDE 10 MEQ/100ML SOLUTION: Performed by: INTERNAL MEDICINE

## 2018-07-22 PROCEDURE — 71045 X-RAY EXAM CHEST 1 VIEW: CPT

## 2018-07-22 PROCEDURE — 94660 CPAP INITIATION&MGMT: CPT

## 2018-07-22 PROCEDURE — 82962 GLUCOSE BLOOD TEST: CPT

## 2018-07-22 PROCEDURE — 25010000002 METHYLPREDNISOLONE PER 40 MG: Performed by: FAMILY MEDICINE

## 2018-07-22 PROCEDURE — 82805 BLOOD GASES W/O2 SATURATION: CPT

## 2018-07-22 PROCEDURE — 82375 ASSAY CARBOXYHB QUANT: CPT

## 2018-07-22 PROCEDURE — 80048 BASIC METABOLIC PNL TOTAL CA: CPT | Performed by: FAMILY MEDICINE

## 2018-07-22 RX ORDER — POTASSIUM CHLORIDE 7.45 MG/ML
10 INJECTION INTRAVENOUS
Status: COMPLETED | OUTPATIENT
Start: 2018-07-22 | End: 2018-07-22

## 2018-07-22 RX ORDER — L.ACID,PARA/B.BIFIDUM/S.THERM 8B CELL
1 CAPSULE ORAL 2 TIMES DAILY
Status: DISCONTINUED | OUTPATIENT
Start: 2018-07-22 | End: 2018-07-29 | Stop reason: HOSPADM

## 2018-07-22 RX ORDER — SODIUM CHLORIDE 9 MG/ML
100 INJECTION, SOLUTION INTRAVENOUS CONTINUOUS
Status: DISCONTINUED | OUTPATIENT
Start: 2018-07-22 | End: 2018-07-22

## 2018-07-22 RX ORDER — SODIUM CHLORIDE 9 MG/ML
50 INJECTION, SOLUTION INTRAVENOUS CONTINUOUS
Status: DISCONTINUED | OUTPATIENT
Start: 2018-07-22 | End: 2018-07-23

## 2018-07-22 RX ORDER — LABETALOL HYDROCHLORIDE 5 MG/ML
10 INJECTION, SOLUTION INTRAVENOUS EVERY 4 HOURS PRN
Status: DISCONTINUED | OUTPATIENT
Start: 2018-07-22 | End: 2018-07-29 | Stop reason: HOSPADM

## 2018-07-22 RX ORDER — POTASSIUM CHLORIDE 1.5 G/1.77G
40 POWDER, FOR SOLUTION ORAL 2 TIMES DAILY
Status: COMPLETED | OUTPATIENT
Start: 2018-07-22 | End: 2018-07-22

## 2018-07-22 RX ORDER — FUROSEMIDE 10 MG/ML
20 INJECTION INTRAMUSCULAR; INTRAVENOUS ONCE
Status: COMPLETED | OUTPATIENT
Start: 2018-07-22 | End: 2018-07-22

## 2018-07-22 RX ORDER — CHLOROTHIAZIDE SODIUM 500 MG/1
500 INJECTION INTRAVENOUS ONCE
Status: COMPLETED | OUTPATIENT
Start: 2018-07-22 | End: 2018-07-22

## 2018-07-22 RX ADMIN — IPRATROPIUM BROMIDE AND ALBUTEROL SULFATE 3 ML: .5; 3 SOLUTION RESPIRATORY (INHALATION) at 16:06

## 2018-07-22 RX ADMIN — VITAMIN D, TAB 1000IU (100/BT) 2000 UNITS: 25 TAB at 08:58

## 2018-07-22 RX ADMIN — FUROSEMIDE 20 MG: 10 INJECTION, SOLUTION INTRAMUSCULAR; INTRAVENOUS at 21:32

## 2018-07-22 RX ADMIN — APIXABAN 5 MG: 2.5 TABLET, FILM COATED ORAL at 08:58

## 2018-07-22 RX ADMIN — APIXABAN 5 MG: 2.5 TABLET, FILM COATED ORAL at 21:31

## 2018-07-22 RX ADMIN — POTASSIUM CHLORIDE 40 MEQ: 1.5 POWDER, FOR SOLUTION ORAL at 21:31

## 2018-07-22 RX ADMIN — DILTIAZEM HYDROCHLORIDE 60 MG: 60 TABLET, FILM COATED ORAL at 03:50

## 2018-07-22 RX ADMIN — INSULIN ASPART 2 UNITS: 100 INJECTION, SOLUTION INTRAVENOUS; SUBCUTANEOUS at 21:31

## 2018-07-22 RX ADMIN — SODIUM CHLORIDE 100 ML/HR: 9 INJECTION, SOLUTION INTRAVENOUS at 09:13

## 2018-07-22 RX ADMIN — INSULIN DETEMIR 10 UNITS: 100 INJECTION, SOLUTION SUBCUTANEOUS at 21:31

## 2018-07-22 RX ADMIN — INSULIN DETEMIR 10 UNITS: 100 INJECTION, SOLUTION SUBCUTANEOUS at 08:57

## 2018-07-22 RX ADMIN — POTASSIUM CHLORIDE 10 MEQ: 7.46 INJECTION, SOLUTION INTRAVENOUS at 11:48

## 2018-07-22 RX ADMIN — IPRATROPIUM BROMIDE AND ALBUTEROL SULFATE 3 ML: .5; 3 SOLUTION RESPIRATORY (INHALATION) at 00:20

## 2018-07-22 RX ADMIN — FLUTICASONE PROPIONATE 2 SPRAY: 50 SPRAY, METERED NASAL at 09:13

## 2018-07-22 RX ADMIN — SERTRALINE HYDROCHLORIDE 100 MG: 50 TABLET ORAL at 08:55

## 2018-07-22 RX ADMIN — BUDESONIDE 1 MG: 0.5 INHALANT RESPIRATORY (INHALATION) at 19:22

## 2018-07-22 RX ADMIN — SODIUM CHLORIDE 50 ML/HR: 9 INJECTION, SOLUTION INTRAVENOUS at 17:56

## 2018-07-22 RX ADMIN — MUPIROCIN: 20 OINTMENT TOPICAL at 21:42

## 2018-07-22 RX ADMIN — IPRATROPIUM BROMIDE AND ALBUTEROL SULFATE 3 ML: .5; 3 SOLUTION RESPIRATORY (INHALATION) at 11:59

## 2018-07-22 RX ADMIN — FAMOTIDINE 20 MG: 20 TABLET ORAL at 08:58

## 2018-07-22 RX ADMIN — SODIUM CHLORIDE 30 ML/HR: 9 INJECTION, SOLUTION INTRAVENOUS at 03:51

## 2018-07-22 RX ADMIN — GABAPENTIN 300 MG: 300 CAPSULE ORAL at 08:56

## 2018-07-22 RX ADMIN — NYSTATIN 500000 UNITS: 100000 SUSPENSION ORAL at 17:51

## 2018-07-22 RX ADMIN — DILTIAZEM HYDROCHLORIDE 60 MG: 60 TABLET, FILM COATED ORAL at 08:56

## 2018-07-22 RX ADMIN — METHYLPREDNISOLONE SODIUM SUCCINATE 40 MG: 40 INJECTION, POWDER, FOR SOLUTION INTRAMUSCULAR; INTRAVENOUS at 06:18

## 2018-07-22 RX ADMIN — VANCOMYCIN HYDROCHLORIDE 1250 MG: 500 INJECTION, POWDER, LYOPHILIZED, FOR SOLUTION INTRAVENOUS at 06:18

## 2018-07-22 RX ADMIN — CHLOROTHIAZIDE SODIUM 500 MG: 500 INJECTION, POWDER, LYOPHILIZED, FOR SOLUTION INTRAVENOUS at 17:12

## 2018-07-22 RX ADMIN — BUDESONIDE 0.5 MG: 0.5 INHALANT RESPIRATORY (INHALATION) at 06:44

## 2018-07-22 RX ADMIN — ARFORMOTEROL TARTRATE 15 MCG: 15 SOLUTION RESPIRATORY (INHALATION) at 06:46

## 2018-07-22 RX ADMIN — METHYLPREDNISOLONE SODIUM SUCCINATE 40 MG: 40 INJECTION, POWDER, FOR SOLUTION INTRAMUSCULAR; INTRAVENOUS at 13:00

## 2018-07-22 RX ADMIN — BUPROPION HYDROCHLORIDE 75 MG: 75 TABLET, FILM COATED ORAL at 08:56

## 2018-07-22 RX ADMIN — Medication 200 MG: at 08:56

## 2018-07-22 RX ADMIN — METHYLPREDNISOLONE SODIUM SUCCINATE 40 MG: 40 INJECTION, POWDER, FOR SOLUTION INTRAMUSCULAR; INTRAVENOUS at 21:32

## 2018-07-22 RX ADMIN — FAMOTIDINE 20 MG: 20 TABLET ORAL at 21:31

## 2018-07-22 RX ADMIN — METOPROLOL TARTRATE 5 MG: 1 INJECTION, SOLUTION INTRAVENOUS at 10:07

## 2018-07-22 RX ADMIN — CLOTRIMAZOLE AND BETAMETHASONE DIPROPIONATE 1 APPLICATION: 10; .5 CREAM TOPICAL at 09:13

## 2018-07-22 RX ADMIN — IPRATROPIUM BROMIDE AND ALBUTEROL SULFATE 3 ML: .5; 3 SOLUTION RESPIRATORY (INHALATION) at 06:44

## 2018-07-22 RX ADMIN — LISINOPRIL 10 MG: 10 TABLET ORAL at 08:58

## 2018-07-22 RX ADMIN — BUPROPION HYDROCHLORIDE 75 MG: 75 TABLET, FILM COATED ORAL at 21:31

## 2018-07-22 RX ADMIN — NYSTATIN 500000 UNITS: 100000 SUSPENSION ORAL at 13:00

## 2018-07-22 RX ADMIN — DILTIAZEM HYDROCHLORIDE 5 MG/HR: 5 INJECTION INTRAVENOUS at 11:22

## 2018-07-22 RX ADMIN — POTASSIUM CHLORIDE 10 MEQ: 7.46 INJECTION, SOLUTION INTRAVENOUS at 10:19

## 2018-07-22 RX ADMIN — MUPIROCIN: 20 OINTMENT TOPICAL at 09:13

## 2018-07-22 RX ADMIN — Medication 1 CAPSULE: at 21:31

## 2018-07-22 RX ADMIN — CALCITONIN SALMON 1 SPRAY: 200 SPRAY, METERED NASAL at 09:13

## 2018-07-22 RX ADMIN — POTASSIUM CHLORIDE 40 MEQ: 1.5 POWDER, FOR SOLUTION ORAL at 08:58

## 2018-07-22 RX ADMIN — POTASSIUM CHLORIDE 10 MEQ: 7.46 INJECTION, SOLUTION INTRAVENOUS at 08:16

## 2018-07-22 RX ADMIN — NYSTATIN 500000 UNITS: 100000 SUSPENSION ORAL at 08:58

## 2018-07-22 RX ADMIN — IPRATROPIUM BROMIDE AND ALBUTEROL SULFATE 3 ML: .5; 3 SOLUTION RESPIRATORY (INHALATION) at 19:21

## 2018-07-22 RX ADMIN — GABAPENTIN 300 MG: 300 CAPSULE ORAL at 21:31

## 2018-07-22 RX ADMIN — ATORVASTATIN CALCIUM 20 MG: 20 TABLET, FILM COATED ORAL at 08:58

## 2018-07-22 RX ADMIN — ARFORMOTEROL TARTRATE 15 MCG: 15 SOLUTION RESPIRATORY (INHALATION) at 19:22

## 2018-07-22 RX ADMIN — VANCOMYCIN HYDROCHLORIDE 1250 MG: 500 INJECTION, POWDER, LYOPHILIZED, FOR SOLUTION INTRAVENOUS at 17:53

## 2018-07-22 RX ADMIN — CLOTRIMAZOLE AND BETAMETHASONE DIPROPIONATE 1 APPLICATION: 10; .5 CREAM TOPICAL at 21:42

## 2018-07-22 NOTE — NURSING NOTE
Pt started c/o chest pain.  EKG obtained.  Resp in room to give neb.  Pt severly sob.  Dr Aldrich called.

## 2018-07-22 NOTE — PLAN OF CARE
Problem: Pneumonia (Adult)  Intervention: Prevent/Manage Infection Progression   07/21/18 2003 07/22/18 0200   Safety Interventions   Infection Management aseptic technique maintained --    Safety Management   Infection Prevention --  environmental surveillance performed;personal protective equipment utilized;rest/sleep promoted;single patient room provided   Promote Perineal Hygiene/Elimination Safety   Isolation Precautions --  contact precautions maintained     Intervention: Monitor/Manage Fluid Electrolyte Balance   07/21/18 2003   Nutrition Interventions   Fluid/Electrolyte Management fluids provided         Problem: NPPV/CPAP (Adult)  Intervention: Monitor and Manage Anxiety Related to NPPV/CPAP   07/21/18 2003 07/22/18 0200   Interventions   Trust Relationship/Rapport care explained;choices provided;emotional support provided;questions answered;reassurance provided --    Safety Management   Medication Review/Management --  medications reviewed       Goal: Signs and Symptoms of Listed Potential Problems Will be Absent, Minimized or Managed (NPPV/CPAP)  Outcome: Ongoing (interventions implemented as appropriate)   07/22/18 0434   Goal/Outcome Evaluation   Problems Assessed (NPPV/CPAP) all   Problems Present (NPPV/CPAP) dry mucous membranes;hypoxia/hypoxemia       Problem: Pain, Acute (Adult)  Intervention: Monitor and Manage Analgesia   07/22/18 0200   Safety Management   Medication Review/Management medications reviewed     Intervention: Mutually Develop/Implement Acute Pain Management Plan   07/21/18 2003   Cognitive Interventions   Sensory Stimulation Regulation care clustered;lighting decreased;music/television provided for relaxation;quiet environment promoted     Intervention: Support/Optimize Psychosocial Response to Acute Pain   07/21/18 2003   Interventions   Trust Relationship/Rapport care explained;choices provided;emotional support provided;questions answered;reassurance provided   Psychosocial  Support   Diversional Activities television   Coping/Psychosocial Interventions   Supportive Measures active listening utilized;positive reinforcement provided      07/21/18 2003   Interventions   Trust Relationship/Rapport care explained;choices provided;emotional support provided;questions answered;reassurance provided   Psychosocial Support   Diversional Activities television   Coping/Psychosocial Interventions   Supportive Measures active listening utilized;positive reinforcement provided       Goal: Acceptable Pain Control/Comfort Level  Outcome: Ongoing (interventions implemented as appropriate)   07/22/18 0434   Pain, Acute (Adult)   Acceptable Pain Control/Comfort Level making progress toward outcome       Problem: Skin Injury Risk (Adult)  Intervention: Prevent/Manage Excess Moisture   07/21/18 1900 07/22/18 0000 07/22/18 0200   Skin Interventions   Skin Protection --  adhesive use limited;incontinence pads utilized;transparent dressing maintained;tubing/devices free from skin contact --    Hygiene Care   Perineal Care --  --  absorbent pad changed;perineum cleansed   Bathing/Skin Care bath, complete;dressed/undressed;linen changed --  --      Intervention: Maintain Head of Bed Elevation Less Than 30 Degrees as Tolerated   07/22/18 0300   Positioning   Head of Bed (HOB) HOB elevated     Intervention: Prevent/Minimize Shear/Friction Injuries   07/22/18 0000 07/22/18 0300   Positioning   Positioning/Transfer Devices --  pillows   Skin Interventions   Pressure Reduction Devices heel offloading device utilized;positioning supports utilized;pressure-redistributing mattress utilized --      Intervention: Prevent or Minimize Pressure   07/22/18 0000 07/22/18 0300   Positioning   Body Position --  supine   Skin Interventions   Pressure Reduction Techniques frequent weight shift encouraged;heels elevated off bed;weight shift assistance provided --        Goal: Skin Health and Integrity  Outcome: Ongoing  (interventions implemented as appropriate)   07/22/18 0434   Skin Injury Risk (Adult)   Skin Health and Integrity making progress toward outcome       Problem: Arrhythmia/Dysrhythmia (Symptomatic) (Adult)  Intervention: Prevent/Manage Thrombi/Emboli   07/21/18 2003 07/22/18 0000   Interventions   VTE Prevention/Management --  bilateral;sequential compression devices off   Safety Interventions   Bleeding Precautions blood pressure closely monitored --      Intervention: Promote Hemodynamic Stability   07/21/18 2003 07/22/18 0300   Positioning   Head of Bed (HOB) --  HOB elevated   Nutrition Interventions   Fluid/Electrolyte Management fluids provided --    Cognitive Interventions   Sensory Stimulation Regulation care clustered;lighting decreased;music/television provided for relaxation;quiet environment promoted --      Intervention: Monitor/Manage Cardiac Rhythm Disturbance   07/22/18 0434   Cardiac Interventions   Dysrhythmia Management (cardizem po)         Problem: Fall Risk (Adult)  Intervention: Monitor/Assist with Self Care   07/22/18 0434   Activity   Activity Assistance Provided assistance, 1 person   Daily Care Interventions   Self-Care Promotion independence encouraged     Intervention: Reduce Risk/Promote Restraint Free Environment   07/22/18 0300   Safety Management   Environmental Safety Modification assistive device/personal items within reach;clutter free environment maintained;lighting adjusted   Safety Promotion/Fall Prevention fall prevention program maintained;safety round/check completed     Intervention: Review Medications/Identify Contributors to Fall Risk   07/22/18 0200   Safety Management   Medication Review/Management medications reviewed       Goal: Absence of Fall  Outcome: Ongoing (interventions implemented as appropriate)   07/22/18 0434   Fall Risk (Adult)   Absence of Fall making progress toward outcome

## 2018-07-22 NOTE — PROGRESS NOTES
[facility]    Name:  Breann Gallegos   Age:  68 y.o.  Sex:  female  :  1950  MRN:  [id number]   Visit Number:  [id number]  Admission Date:  2018  Date Of Service:  18  Primary Care Physician:  Harry Avery MD     LOS: 9 days :      Chief Complaint:   Follow-up pneumonia bilateral diffuse multifocal with respiratory failure severe        Subjective / Interval History:  The patient is a 68-year-old lady admitted with severe bilateral multifocal pneumonia with a sputum culture growing MRSA.  She had severe hypoxic respiratory failure previously on the ventilator.  Dr. Tse with pulmonary has been following.  The patient post extubation had some aspiration pneumonia which had improved yesterday.  2 days ago, she was bradycardic when nothing by mouth and her diltiazem held and then she's had intermittent A. fib with RVR ever since.  Today, she had increased recurrence of A. fib with RVR improved after metoprolol and restarting her diltiazem drip.  Since her elevated heart rate, she has had some increased shortness of breath and has been placed on BiPAP continuous today.  A repeat chest x-ray is pending.  Diuril given.  She wasn't able to eat much this morning due to dyspnea.  On the BiPAP, her sat is greater than 95%.  Her heart rate has improved to the 80s after this treatment.    I have seen the patient this morning and again this afternoon.  She denies chest pain but did have some increased shortness of breath.  She had increased frequency of urination.  She denies nausea, vomiting, abdominal pain.  She is still requiring increased nursing assistance for routine activities of daily living but she is much more awake and alert after the propofol has worn off.       Review of Systems:     General ROS: Patient denies any fevers, chills or loss of consciousness.  Generalized weakness  Ophthalmic ROS: Denies any diplopia or transient loss of  vision.  ENT ROS: Denies sore throat, nasal congestion or ear pain.   Respiratory ROS: Stable improved cough but having increase shortness of breath, stable on BiPAP.  Cardiovascular ROS: Denies chest pain or palpitations. No history of exertional chest pain.   Gastrointestinal ROS: Denies nausea and vomiting. Denies any abdominal pain. No diarrhea.  Genito-Urinary ROS: Denies dysuria or hematuria.  Increase intermittent urine output similar to approximately 4 days ago which resolved  Musculoskeletal ROS: Denies chronic back pain. No muscle pain. No calf pain.  Neurological ROS: Denies any focal weakness. No loss of consciousness. Denies any numbness. Denies neck pain.   Dermatological ROS: Denies any redness or pruritis.    Vital Signs:    Temp:  [97.9 °F (36.6 °C)-98.2 °F (36.8 °C)] 97.9 °F (36.6 °C)  Heart Rate:  [] 88  Resp:  [15-30] 24  BP: (105-182)/(57-99) 168/80  FiO2 (%):  [60 %-80 %] 80 %    Intake and output:    I/O last 3 completed shifts:  In: 4841 [P.O.:1240; I.V.:3601]  Out: 2550 [Urine:2550]  I/O this shift:  In: -   Out: 200 [Urine:200]    Physical Examination:    General Appearance:    Sitting up in bed in no acute distress.  Later had some mild dyspnea and placed on BiPAP.     Head:    Atraumatic and normocephalic, without obvious abnormality.   Eyes:            PERRLA, conjunctivae and sclerae normal, no Icterus. No pallor. Extra-occular movements are within normal limits.   Throat:   No oral lesions, no thrush, oral mucosa moist.   Neck:   Supple, trachea midline, no thyromegaly   Lungs:     Bilateral crackles.  No wheeze or rhonchi.      Heart:    Normal S1 and S2, no murmur, no gallop   Abdomen:     Normal bowel sounds, no masses, no organomegaly. Soft        non-tender, non-distended, no guarding, no rebound                tenderness   Extremities:   Moves all extremities well, no edema, no cyanosis, no             clubbing   Skin:   No bleeding, bruising or rash.   Neurologic:   No  tremor, sensation intact, Motor power is normal and equal bilaterally.   Laboratory results:    Lab Results (last 24 hours)     Procedure Component Value Units Date/Time    POC Glucose Once [[id number]]  (Normal) Collected:  07/22/18 1146    Specimen:  Blood Updated:  07/22/18 1155     Glucose 103 mg/dL      Comment: Serial Number: [id number]:  [id number]       Urinalysis With Culture If Indicated - Urine, Clean Catch [[id number]]  (Abnormal) Collected:  07/22/18 0806    Specimen:  Urine from Urine, Clean Catch Updated:  07/22/18 0910     Color, UA Yellow     Appearance, UA Clear     pH, UA 7.0     Specific Gravity, UA 1.025     Glucose,  mg/dL (Trace) (A)     Ketones, UA Negative     Bilirubin, UA Negative     Blood, UA Trace (A)     Protein, UA Negative     Leuk Esterase, UA Negative     Nitrite, UA Negative     Urobilinogen, UA 0.2 E.U./dL    Urinalysis, Microscopic Only - Urine, Clean Catch [[id number]]  (Abnormal) Collected:  07/22/18 0806    Specimen:  Urine from Urine, Clean Catch Updated:  07/22/18 0910     RBC, UA 0-2 (A) /HPF      WBC, UA 6-12 (A) /HPF      Bacteria, UA 1+ (A) /HPF      Squamous Epithelial Cells, UA 0-2 /HPF      Yeast, UA Large/3+ Budding Yeast /HPF      Hyaline Casts, UA None Seen /LPF      Methodology Manual Light Microscopy    Urine Culture - Urine, [[id number]] Collected:  07/22/18 0806    Specimen:  Urine from Urine, Clean Catch Updated:  07/22/18 0910    POC Glucose Once [[id number]]  (Normal) Collected:  07/22/18 0555    Specimen:  Blood Updated:  07/22/18 0605     Glucose 103 mg/dL      Comment: Serial Number: [id number]:  [id number]       Basic Metabolic Panel [[id number]]  (Abnormal) Collected:  07/22/18 0414    Specimen:  Blood Updated:  07/22/18 0539     Glucose 57 (L) mg/dL      BUN 22 (H) mg/dL      Creatinine 0.50 (L) mg/dL      Sodium 144 mmol/L      Potassium 2.7 (C) mmol/L      Chloride 109 (H) mmol/L      CO2 31.0 (H) mmol/L      Calcium 8.5 mg/dL      eGFR Non   Amer 123 mL/min/1.73      BUN/Creatinine Ratio 44.0 (H)     Anion Gap 6.7 (L) mmol/L     Narrative:       GFR Normal >60  Chronic Kidney Disease <60  Kidney Failure <15    Magnesium [[id number]]  (Normal) Collected:  07/22/18 0414    Specimen:  Blood Updated:  07/22/18 0522     Magnesium 1.8 mg/dL     CBC & Differential [[id number]] Collected:  07/22/18 0414    Specimen:  Blood Updated:  07/22/18 0521    Narrative:       The following orders were created for panel order CBC & Differential.  Procedure                               Abnormality         Status                     ---------                               -----------         ------                     CBC Auto Differential[[id number]]        Abnormal            Final result                 Please view results for these tests on the individual orders.    CBC Auto Differential [[id number]]  (Abnormal) Collected:  07/22/18 0414    Specimen:  Blood Updated:  07/22/18 0521     WBC 27.98 (C) 10*3/mm3      RBC 4.82 10*6/mm3      Hemoglobin 12.7 g/dL      Hematocrit 39.6 %      MCV 82.2 fL      MCH 26.3 (L) pg      MCHC 32.1 g/dL      RDW 14.8 (H) %      RDW-SD 44.1 fl      MPV 10.2 fL      Platelets 456 (H) 10*3/mm3      Neutrophil % 78.9 %      Lymphocyte % 11.0 %      Monocyte % 6.4 %      Eosinophil % 1.5 %      Basophil % 0.3 %      Immature Grans % 1.9 (H) %      Neutrophils, Absolute 22.08 (H) 10*3/mm3      Lymphocytes, Absolute 3.09 10*3/mm3      Monocytes, Absolute 1.79 (H) 10*3/mm3      Eosinophils, Absolute 0.41 10*3/mm3      Basophils, Absolute 0.07 10*3/mm3      Immature Grans, Absolute 0.54 (H) 10*3/mm3      nRBC 0.1 (H) /100 WBC     POC Glucose Once [[id number]]  (Abnormal) Collected:  07/21/18 2105    Specimen:  Blood Updated:  07/21/18 2110     Glucose 245 (H) mg/dL      Comment: Serial Number: [id number]:  [id number]             I have reviewed the patient's laboratory results.    Radiology results:    Imaging Results (last 24 hours)      "** No results found for the last 24 hours. **          I have reviewed the patient's radiology reports.    Medication Review:     I have reviewed the patients active and prn medications.     Assessment:  1. Multifocal bilateral pneumonia, prior to admission, with MRSA, Improved   2. Dysphagia with suspected aspiration pneumonitis, suspect improving with less sedation  3. Acute hypoxic respiratory failure severe, requiring intubation and placement on mechanical ventilator 7-14-18 with  ARDS, improving still requiring frequent bipap and high flow oxygen  4. New onset Afib with RVR, previous sinus tachycardia, improved on diltiazem with bradycardia overnight   5. Sepsis, secondary to pneumonia, prior to admission, with MRSA, treated  6. Diabetes mellitus type 2 insulin dependent  7. Chronic essential hypertension  8. COPD moderate  9. Abnormal CT chest with lymphadenopathy, repeat outpatient CT scan chest   10. Hypomagnesemia, replaced      Plan:  In the ICU monitoring.  Continue BiPAP continuously as needed today.  Continue to monitor closely with feeding for risk of aspiration.  Cardizem drip and IV metoprolol ordered as needed.  Continue vancomycin for MRSA pneumonia.  Prior to acute dyspnea and increased heart rate today, the patient was hoping to go to LTAC tomorrow.  May consider transfer there in the next couple days.  Dr. Tse will follow.  A repeat chest x-rays pending.  A dose of diarrheal was given.  Continue to titrate medications closely and check daily labs.  Potassium and magnesium replaced.  Her prognosis remains guarded.      Medication risks and benefits were discussed in detail. Patient and  reported satisfaction with care delivered and treatment plan.     Ese Kirby DO  07/22/18  4:11 PM    I was asked by [facility] dictating team to addendum this note. Instead of \"Diarrheal given\" as listed above, the note should state Diuril was given.\" No other changes needed or made.      "

## 2018-07-22 NOTE — NURSING NOTE
[id number] pt has hr 170's.  Dr Kirby was made aware.  Pt was ordered Cardizem drip.  Pt was sleeping and had no complaints while hr was elevated.

## 2018-07-22 NOTE — PLAN OF CARE
Problem: Patient Care Overview  Goal: Plan of Care Review  Outcome: Ongoing (interventions implemented as appropriate)   07/22/18 1748   Coping/Psychosocial   Plan of Care Reviewed With patient   OTHER   Outcome Summary pt has low sat at times and will stay in icu for observation        Problem: Pneumonia (Adult)  Goal: Signs and Symptoms of Listed Potential Problems Will be Absent, Minimized or Managed (Pneumonia)  Outcome: Ongoing (interventions implemented as appropriate)   07/22/18 1748   Goal/Outcome Evaluation   Problems Assessed (Pneumonia) all   Problems Present (Pneumonia) (no further compromise noted. )       Problem: NPPV/CPAP (Adult)  Goal: Signs and Symptoms of Listed Potential Problems Will be Absent, Minimized or Managed (NPPV/CPAP)  Outcome: Ongoing (interventions implemented as appropriate)   07/22/18 1748   Goal/Outcome Evaluation   Problems Assessed (NPPV/CPAP) all   Problems Present (NPPV/CPAP) other (see comments)  (no new problems noted w/ bipap. )       Problem: Pain, Acute (Adult)  Goal: Acceptable Pain Control/Comfort Level  Outcome: Ongoing (interventions implemented as appropriate)   07/22/18 1748   Pain, Acute (Adult)   Acceptable Pain Control/Comfort Level (no c/o pain this shift. )       Problem: Skin Injury Risk (Adult)  Goal: Skin Health and Integrity  Outcome: Ongoing (interventions implemented as appropriate)   07/22/18 1748   Skin Injury Risk (Adult)   Skin Health and Integrity (no new skin problems to report)       Problem: Arrhythmia/Dysrhythmia (Symptomatic) (Adult)  Goal: Signs and Symptoms of Listed Potential Problems Will be Absent, Minimized or Managed (Arrhythmia/Dysrhythmia)  Outcome: Ongoing (interventions implemented as appropriate)   07/22/18 1748   Goal/Outcome Evaluation   Problems Assessed (Arrhythmia/Dysrhythmia) all  (pt did have 3 episode this shift w/ hr 170's md aware)       Problem: Fall Risk (Adult)  Goal: Absence of Fall  Outcome: Ongoing (interventions  implemented as appropriate)   07/22/18 8407   Fall Risk (Adult)   Absence of Fall achieves outcome  (no falls this shift)

## 2018-07-22 NOTE — SIGNIFICANT NOTE
07/22/18 1201   PT Deferred Reason   PT Deferred Reason Unable to treat, medical status change  (tx with held per nsg d/t elevated HR most of morning)

## 2018-07-22 NOTE — NURSING NOTE
[id number] pt had epidsode where hr was 170's.  Dr Kirby was in unit and was aware.  Meds to be given.

## 2018-07-22 NOTE — PLAN OF CARE
Problem: Patient Care Overview  Goal: Plan of Care Review  Outcome: Ongoing (interventions implemented as appropriate)   07/22/18 1637   Coping/Psychosocial   Plan of Care Reviewed With spouse   Plan of Care Review   Progress no change       Problem: NPPV/CPAP (Adult)  Goal: Signs and Symptoms of Listed Potential Problems Will be Absent, Minimized or Managed (NPPV/CPAP)  Outcome: Ongoing (interventions implemented as appropriate)   07/22/18 7627   Goal/Outcome Evaluation   Problems Assessed (NPPV/CPAP) hypoxia/hypoxemia   Problems Present (NPPV/CPAP) hypoxia/hypoxemia

## 2018-07-23 ENCOUNTER — APPOINTMENT (OUTPATIENT)
Dept: GENERAL RADIOLOGY | Facility: HOSPITAL | Age: 68
End: 2018-07-23
Attending: INTERNAL MEDICINE

## 2018-07-23 LAB
ANION GAP SERPL CALCULATED.3IONS-SCNC: 9.2 MMOL/L (ref 10–20)
ARTERIAL PATENCY WRIST A: POSITIVE
ATMOSPHERIC PRESS: 730 MMHG
BASE EXCESS BLDA CALC-SCNC: 5 MMOL/L (ref 0–2)
BASOPHILS # BLD AUTO: 0.02 10*3/MM3 (ref 0–0.2)
BASOPHILS NFR BLD AUTO: 0.1 % (ref 0–2.5)
BDY SITE: ABNORMAL
BUN BLD-MCNC: 22 MG/DL (ref 7–20)
BUN/CREAT SERPL: 44 (ref 7.1–23.5)
CALCIUM SPEC-SCNC: 8.5 MG/DL (ref 8.4–10.2)
CHLORIDE SERPL-SCNC: 104 MMOL/L (ref 98–107)
CO2 SERPL-SCNC: 30 MMOL/L (ref 26–30)
COHGB MFR BLD: 1.5 % (ref 0–2)
CREAT BLD-MCNC: 0.5 MG/DL (ref 0.6–1.3)
DEPRECATED RDW RBC AUTO: 44.6 FL (ref 37–54)
EOSINOPHIL # BLD AUTO: 0.01 10*3/MM3 (ref 0–0.7)
EOSINOPHIL NFR BLD AUTO: 0.1 % (ref 0–7)
ERYTHROCYTE [DISTWIDTH] IN BLOOD BY AUTOMATED COUNT: 14.9 % (ref 11.5–14.5)
GFR SERPL CREATININE-BSD FRML MDRD: 123 ML/MIN/1.73
GLUCOSE BLD-MCNC: 183 MG/DL (ref 74–98)
GLUCOSE BLDC GLUCOMTR-MCNC: 201 MG/DL (ref 70–130)
GLUCOSE BLDC GLUCOMTR-MCNC: 205 MG/DL (ref 70–130)
GLUCOSE BLDC GLUCOMTR-MCNC: 218 MG/DL (ref 70–130)
GLUCOSE BLDC GLUCOMTR-MCNC: 227 MG/DL (ref 70–130)
HCO3 BLDA-SCNC: 28.6 MMOL/L (ref 22–28)
HCT VFR BLD AUTO: 30.7 % (ref 37–47)
HCT VFR BLD CALC: 33.5 %
HGB BLD-MCNC: 9.8 G/DL (ref 12–16)
HGB BLDA-MCNC: 10.9 G/DL (ref 12–18)
HOROWITZ INDEX BLD+IHG-RTO: 100 %
IMM GRANULOCYTES # BLD: 0.15 10*3/MM3 (ref 0–0.06)
IMM GRANULOCYTES NFR BLD: 0.9 % (ref 0–0.6)
LYMPHOCYTES # BLD AUTO: 0.91 10*3/MM3 (ref 0.6–3.4)
LYMPHOCYTES NFR BLD AUTO: 5.3 % (ref 10–50)
Lab: ABNORMAL
MCH RBC QN AUTO: 26.3 PG (ref 27–31)
MCHC RBC AUTO-ENTMCNC: 31.9 G/DL (ref 30–37)
MCV RBC AUTO: 82.5 FL (ref 81–99)
METHGB BLD QL: 0.3 % (ref 0–1.5)
MODALITY: ABNORMAL
MONOCYTES # BLD AUTO: 0.52 10*3/MM3 (ref 0–0.9)
MONOCYTES NFR BLD AUTO: 3 % (ref 0–12)
NEUTROPHILS # BLD AUTO: 15.55 10*3/MM3 (ref 2–6.9)
NEUTROPHILS NFR BLD AUTO: 90.6 % (ref 37–80)
NRBC BLD MANUAL-RTO: 0 /100 WBC (ref 0–0)
NT-PROBNP SERPL-MCNC: 1580 PG/ML (ref 0–125)
OXYHGB MFR BLDV: 83 % (ref 94–99)
PCO2 BLDA: 37.6 MM HG (ref 35–45)
PCO2 TEMP ADJ BLD: ABNORMAL MM HG (ref 35–45)
PH BLDA: 7.49 PH UNITS (ref 7.3–7.5)
PH, TEMP CORRECTED: ABNORMAL PH UNITS
PLATELET # BLD AUTO: 242 10*3/MM3 (ref 130–400)
PMV BLD AUTO: 10.4 FL (ref 6–12)
PO2 BLDA: 48.1 MM HG (ref 75–100)
PO2 TEMP ADJ BLD: ABNORMAL MM HG (ref 83–108)
POTASSIUM BLD-SCNC: 4.2 MMOL/L (ref 3.5–5.1)
RBC # BLD AUTO: 3.72 10*6/MM3 (ref 4.2–5.4)
SAO2 % BLDCOA: 84.5 % (ref 94–100)
SODIUM BLD-SCNC: 139 MMOL/L (ref 137–145)
TROPONIN I SERPL-MCNC: 0.03 NG/ML (ref 0–0.03)
VANCOMYCIN TROUGH SERPL-MCNC: 14.25 MCG/ML (ref 5–15)
VENTILATOR MODE: ABNORMAL
WBC NRBC COR # BLD: 17.16 10*3/MM3 (ref 4.8–10.8)

## 2018-07-23 PROCEDURE — 93005 ELECTROCARDIOGRAM TRACING: CPT | Performed by: INTERNAL MEDICINE

## 2018-07-23 PROCEDURE — 94799 UNLISTED PULMONARY SVC/PX: CPT

## 2018-07-23 PROCEDURE — 25010000002 AMIODARONE IN DEXTROSE 5% 150-4.21 MG/100ML-% SOLUTION: Performed by: INTERNAL MEDICINE

## 2018-07-23 PROCEDURE — 71045 X-RAY EXAM CHEST 1 VIEW: CPT

## 2018-07-23 PROCEDURE — 25010000002 CHLOROTHIAZIDE PER 500 MG: Performed by: INTERNAL MEDICINE

## 2018-07-23 PROCEDURE — 80048 BASIC METABOLIC PNL TOTAL CA: CPT | Performed by: FAMILY MEDICINE

## 2018-07-23 PROCEDURE — 94660 CPAP INITIATION&MGMT: CPT

## 2018-07-23 PROCEDURE — 85025 COMPLETE CBC W/AUTO DIFF WBC: CPT | Performed by: FAMILY MEDICINE

## 2018-07-23 PROCEDURE — 63710000001 INSULIN DETEMIR PER 5 UNITS: Performed by: FAMILY MEDICINE

## 2018-07-23 PROCEDURE — 25010000002 VANCOMYCIN PER 500 MG: Performed by: FAMILY MEDICINE

## 2018-07-23 PROCEDURE — 84484 ASSAY OF TROPONIN QUANT: CPT | Performed by: INTERNAL MEDICINE

## 2018-07-23 PROCEDURE — 63710000001 INSULIN ASPART PER 5 UNITS: Performed by: FAMILY MEDICINE

## 2018-07-23 PROCEDURE — 99232 SBSQ HOSP IP/OBS MODERATE 35: CPT | Performed by: INTERNAL MEDICINE

## 2018-07-23 PROCEDURE — 83050 HGB METHEMOGLOBIN QUAN: CPT

## 2018-07-23 PROCEDURE — 25010000002 MORPHINE PER 10 MG: Performed by: INTERNAL MEDICINE

## 2018-07-23 PROCEDURE — 36600 WITHDRAWAL OF ARTERIAL BLOOD: CPT

## 2018-07-23 PROCEDURE — 80202 ASSAY OF VANCOMYCIN: CPT | Performed by: INTERNAL MEDICINE

## 2018-07-23 PROCEDURE — 82962 GLUCOSE BLOOD TEST: CPT

## 2018-07-23 PROCEDURE — 99233 SBSQ HOSP IP/OBS HIGH 50: CPT | Performed by: INTERNAL MEDICINE

## 2018-07-23 PROCEDURE — 25010000002 AMIODARONE IN DEXTROSE 5% 360-4.14 MG/200ML-% SOLUTION: Performed by: INTERNAL MEDICINE

## 2018-07-23 PROCEDURE — 99291 CRITICAL CARE FIRST HOUR: CPT | Performed by: INTERNAL MEDICINE

## 2018-07-23 PROCEDURE — 25010000002 FUROSEMIDE PER 20 MG: Performed by: INTERNAL MEDICINE

## 2018-07-23 PROCEDURE — 82375 ASSAY CARBOXYHB QUANT: CPT

## 2018-07-23 PROCEDURE — 25010000002 METHYLPREDNISOLONE PER 40 MG: Performed by: FAMILY MEDICINE

## 2018-07-23 PROCEDURE — 25010000002 DIGOXIN PER 500 MCG: Performed by: INTERNAL MEDICINE

## 2018-07-23 PROCEDURE — 82805 BLOOD GASES W/O2 SATURATION: CPT

## 2018-07-23 PROCEDURE — 83880 ASSAY OF NATRIURETIC PEPTIDE: CPT | Performed by: INTERNAL MEDICINE

## 2018-07-23 RX ORDER — DIGOXIN 0.25 MG/ML
INJECTION INTRAMUSCULAR; INTRAVENOUS
Status: COMPLETED
Start: 2018-07-23 | End: 2018-07-23

## 2018-07-23 RX ORDER — CHLOROTHIAZIDE SODIUM 500 MG/1
250 INJECTION INTRAVENOUS ONCE
Status: COMPLETED | OUTPATIENT
Start: 2018-07-23 | End: 2018-07-23

## 2018-07-23 RX ORDER — DIGOXIN 0.25 MG/ML
500 INJECTION INTRAMUSCULAR; INTRAVENOUS ONCE
Status: COMPLETED | OUTPATIENT
Start: 2018-07-23 | End: 2018-07-23

## 2018-07-23 RX ADMIN — Medication 1 CAPSULE: at 21:58

## 2018-07-23 RX ADMIN — Medication 1 CAPSULE: at 10:14

## 2018-07-23 RX ADMIN — AMIODARONE HYDROCHLORIDE 150 MG: 1.5 INJECTION, SOLUTION INTRAVENOUS at 09:25

## 2018-07-23 RX ADMIN — METHYLPREDNISOLONE SODIUM SUCCINATE 40 MG: 40 INJECTION, POWDER, FOR SOLUTION INTRAMUSCULAR; INTRAVENOUS at 13:26

## 2018-07-23 RX ADMIN — CHLOROTHIAZIDE SODIUM 250 MG: 500 INJECTION, POWDER, LYOPHILIZED, FOR SOLUTION INTRAVENOUS at 10:12

## 2018-07-23 RX ADMIN — GABAPENTIN 300 MG: 300 CAPSULE ORAL at 21:57

## 2018-07-23 RX ADMIN — SODIUM CHLORIDE 50 ML/HR: 9 INJECTION, SOLUTION INTRAVENOUS at 00:49

## 2018-07-23 RX ADMIN — VANCOMYCIN HYDROCHLORIDE 1250 MG: 500 INJECTION, POWDER, LYOPHILIZED, FOR SOLUTION INTRAVENOUS at 18:34

## 2018-07-23 RX ADMIN — DILTIAZEM HYDROCHLORIDE 5 MG/HR: 5 INJECTION INTRAVENOUS at 05:10

## 2018-07-23 RX ADMIN — APIXABAN 5 MG: 2.5 TABLET, FILM COATED ORAL at 10:14

## 2018-07-23 RX ADMIN — BUDESONIDE 1 MG: 0.5 INHALANT RESPIRATORY (INHALATION) at 19:31

## 2018-07-23 RX ADMIN — INSULIN ASPART 3 UNITS: 100 INJECTION, SOLUTION INTRAVENOUS; SUBCUTANEOUS at 12:41

## 2018-07-23 RX ADMIN — IPRATROPIUM BROMIDE AND ALBUTEROL SULFATE 3 ML: .5; 3 SOLUTION RESPIRATORY (INHALATION) at 16:04

## 2018-07-23 RX ADMIN — INSULIN DETEMIR 10 UNITS: 100 INJECTION, SOLUTION SUBCUTANEOUS at 21:58

## 2018-07-23 RX ADMIN — ARFORMOTEROL TARTRATE 15 MCG: 15 SOLUTION RESPIRATORY (INHALATION) at 06:38

## 2018-07-23 RX ADMIN — CLOTRIMAZOLE AND BETAMETHASONE DIPROPIONATE 1 APPLICATION: 10; .5 CREAM TOPICAL at 21:59

## 2018-07-23 RX ADMIN — FUROSEMIDE 100 MG: 10 INJECTION, SOLUTION INTRAMUSCULAR; INTRAVENOUS at 09:28

## 2018-07-23 RX ADMIN — INSULIN DETEMIR 10 UNITS: 100 INJECTION, SOLUTION SUBCUTANEOUS at 10:12

## 2018-07-23 RX ADMIN — LISINOPRIL 10 MG: 10 TABLET ORAL at 10:13

## 2018-07-23 RX ADMIN — AMIODARONE HYDROCHLORIDE 0.5 MG/MIN: 1.8 INJECTION, SOLUTION INTRAVENOUS at 14:57

## 2018-07-23 RX ADMIN — DILTIAZEM HYDROCHLORIDE 15 MG/HR: 5 INJECTION INTRAVENOUS at 14:57

## 2018-07-23 RX ADMIN — IPRATROPIUM BROMIDE AND ALBUTEROL SULFATE 3 ML: .5; 3 SOLUTION RESPIRATORY (INHALATION) at 19:30

## 2018-07-23 RX ADMIN — INSULIN ASPART 3 UNITS: 100 INJECTION, SOLUTION INTRAVENOUS; SUBCUTANEOUS at 21:58

## 2018-07-23 RX ADMIN — NYSTATIN 500000 UNITS: 100000 SUSPENSION ORAL at 21:58

## 2018-07-23 RX ADMIN — GABAPENTIN 300 MG: 300 CAPSULE ORAL at 10:13

## 2018-07-23 RX ADMIN — Medication: at 18:34

## 2018-07-23 RX ADMIN — MUPIROCIN: 20 OINTMENT TOPICAL at 21:59

## 2018-07-23 RX ADMIN — IPRATROPIUM BROMIDE AND ALBUTEROL SULFATE 3 ML: .5; 3 SOLUTION RESPIRATORY (INHALATION) at 23:39

## 2018-07-23 RX ADMIN — INSULIN ASPART 3 UNITS: 100 INJECTION, SOLUTION INTRAVENOUS; SUBCUTANEOUS at 18:01

## 2018-07-23 RX ADMIN — BUPROPION HYDROCHLORIDE 75 MG: 75 TABLET, FILM COATED ORAL at 21:57

## 2018-07-23 RX ADMIN — MORPHINE SULFATE 4 MG: 2 INJECTION, SOLUTION INTRAMUSCULAR; INTRAVENOUS at 08:25

## 2018-07-23 RX ADMIN — METHYLPREDNISOLONE SODIUM SUCCINATE 40 MG: 40 INJECTION, POWDER, FOR SOLUTION INTRAMUSCULAR; INTRAVENOUS at 21:58

## 2018-07-23 RX ADMIN — BUDESONIDE 1 MG: 0.5 INHALANT RESPIRATORY (INHALATION) at 06:39

## 2018-07-23 RX ADMIN — ARFORMOTEROL TARTRATE 15 MCG: 15 SOLUTION RESPIRATORY (INHALATION) at 19:31

## 2018-07-23 RX ADMIN — METHYLPREDNISOLONE SODIUM SUCCINATE 40 MG: 40 INJECTION, POWDER, FOR SOLUTION INTRAMUSCULAR; INTRAVENOUS at 05:10

## 2018-07-23 RX ADMIN — IPRATROPIUM BROMIDE AND ALBUTEROL SULFATE 3 ML: .5; 3 SOLUTION RESPIRATORY (INHALATION) at 11:42

## 2018-07-23 RX ADMIN — FAMOTIDINE 20 MG: 20 TABLET ORAL at 21:57

## 2018-07-23 RX ADMIN — FAMOTIDINE 20 MG: 20 TABLET ORAL at 10:14

## 2018-07-23 RX ADMIN — MUPIROCIN: 20 OINTMENT TOPICAL at 10:16

## 2018-07-23 RX ADMIN — IPRATROPIUM BROMIDE AND ALBUTEROL SULFATE 3 ML: .5; 3 SOLUTION RESPIRATORY (INHALATION) at 00:45

## 2018-07-23 RX ADMIN — CLOTRIMAZOLE AND BETAMETHASONE DIPROPIONATE 1 APPLICATION: 10; .5 CREAM TOPICAL at 10:15

## 2018-07-23 RX ADMIN — IPRATROPIUM BROMIDE AND ALBUTEROL SULFATE 3 ML: .5; 3 SOLUTION RESPIRATORY (INHALATION) at 04:53

## 2018-07-23 RX ADMIN — VANCOMYCIN HYDROCHLORIDE 1250 MG: 500 INJECTION, POWDER, LYOPHILIZED, FOR SOLUTION INTRAVENOUS at 05:10

## 2018-07-23 RX ADMIN — INSULIN ASPART 3 UNITS: 100 INJECTION, SOLUTION INTRAVENOUS; SUBCUTANEOUS at 06:48

## 2018-07-23 RX ADMIN — DILTIAZEM HYDROCHLORIDE 15 MG/HR: 5 INJECTION INTRAVENOUS at 22:58

## 2018-07-23 RX ADMIN — AMIODARONE HYDROCHLORIDE 0.5 MG/MIN: 1.8 INJECTION, SOLUTION INTRAVENOUS at 09:28

## 2018-07-23 RX ADMIN — APIXABAN 5 MG: 2.5 TABLET, FILM COATED ORAL at 21:58

## 2018-07-23 RX ADMIN — IPRATROPIUM BROMIDE AND ALBUTEROL SULFATE 3 ML: .5; 3 SOLUTION RESPIRATORY (INHALATION) at 06:38

## 2018-07-23 RX ADMIN — DIGOXIN 500 MCG: 0.25 INJECTION INTRAMUSCULAR; INTRAVENOUS at 09:27

## 2018-07-23 NOTE — PROGRESS NOTES
Case Management/Social Work    Patient Name:  Breann Gallegos  YOB: 1950  MRN: [id number]  Admit Date:  7/13/2018      SLIME spoke with Yeni with [facility] admissions and they are currently reviewing pt's referral. She advised that she will follow up with me later today. Will continue to follow and assist.      Electronically signed by:  RODNEY Gaitan  07/23/18 10:56 AM     Yeni with [facility] to do an onsite visit with pt and family today. CM will continue to follow and assist.    07/24/18  10:17 AM

## 2018-07-23 NOTE — SIGNIFICANT NOTE
07/23/18 0943   Rehab Treatment   Discipline physical therapy assistant   Reason Treatment Not Performed other (see comments)  (Pt treatment held today per MD. Pt having significant pulmonary issues. Therapy will f/u with pt at a later time.)

## 2018-07-23 NOTE — PLAN OF CARE
Problem: Patient Care Overview  Goal: Plan of Care Review  Outcome: Ongoing (interventions implemented as appropriate)   07/23/18 1358   Coping/Psychosocial   Plan of Care Reviewed With patient   Plan of Care Review   Progress improving       Problem: NPPV/CPAP (Adult)  Goal: Signs and Symptoms of Listed Potential Problems Will be Absent, Minimized or Managed (NPPV/CPAP)  Outcome: Ongoing (interventions implemented as appropriate)   07/23/18 7538   Goal/Outcome Evaluation   Problems Assessed (NPPV/CPAP) all   Problems Present (NPPV/CPAP) none

## 2018-07-23 NOTE — PROGRESS NOTES
"[facility]iology Progress note     LOS: 10 days   Patient Care Team:  Harry Avery MD as PCP - General  Harry Avery MD as PCP - Family Medicine  Harry Avery MD as PCP - Claims Attributed  Tereza Bateman RN as Care Coordinator ([facility])    Chief Complaint:  SOB    Subjective     Interval History: Atrial fibrillation rate has escalated as her breathing difficulties have worsened    Patient Complaints: SOB  Patient Denies:    The patient has no chest discomfort at rest or with activity.  There is no exertional chest arm neck jaw shoulder or back discomfort.  There is no orthopnea PND or lower extremity edema.  There is no dizziness palpitations or syncope.  History taken from: patient    Review of Systems:   All systems were reviewed and negative    Objective     Vital Sign Min/Max for last 24 hours  Temp  Min: 97.9 °F (36.6 °C)  Max: 98.1 °F (36.7 °C)   BP  Min: 105/69  Max: 168/80   Pulse  Min: 44  Max: 170   Resp  Min: 16  Max: 31   SpO2  Min: 64 %  Max: 98 %   No Data Recorded   Weight  Min: 78.2 kg (172 lb 4.8 oz)  Max: 78.2 kg (172 lb 4.8 oz)     Flowsheet Rows      First Filed Value   Admission Height  162.6 cm (64\") Documented at 07/13/2018 0205   Admission Weight  68.7 kg (151 lb 8 oz) Documented at 07/13/2018 0441          Physical Exam:     General Appearance:    Alert, cooperative, in no acute distress   Head:    Normocephalic, without obvious abnormality, atraumatic   Eyes:            Lids and lashes normal, conjunctivae and sclerae normal, no   icterus, no pallor, corneas clear, PERRLA   Ears:    Ears appear intact with no abnormalities noted   Throat:   No oral lesions, no thrush, oral mucosa moist   Neck:   No adenopathy, supple, trachea midline, no thyromegaly, no     carotid bruit, no JVD   Back:     No kyphosis present, no scoliosis present, no skin lesions,       erythema or scars, no tenderness to percussion or                   palpation,   range of motion normal   Lungs:     " Bilateral expiratory wheezes,respirations regular, respiratory distress with increased work of breathing     Heart:    Irregularly irregular, tachycardic, normal S1 and S2, no            murmur, no gallop, no rub, no click   Breast Exam:    Deferred   Abdomen:     Normal bowel sounds, no masses, no organomegaly, soft        non-tender, non-distended, no guarding, no rebound                 tenderness   Genitalia:    Deferred   Extremities:   Moves all extremities well, no edema, no cyanosis, no              redness   Pulses:   Pulses palpable and equal bilaterally   Skin:   No bleeding, bruising or rash   Lymph nodes:   No palpable adenopathy   Neurologic:   Cranial nerves 2 - 12 grossly intact, sensation intact, DTR        present and equal bilaterally        Results Review:     I reviewed the patient's new clinical results.        Results from last 7 days  Lab Units 07/23/18  0423   SODIUM mmol/L 139   POTASSIUM mmol/L 4.2   CHLORIDE mmol/L 104   CO2 mmol/L 30.0   BUN mg/dL 22*   CREATININE mg/dL 0.50*   GLUCOSE mg/dL 183*   CALCIUM mg/dL 8.5       Results from last 7 days  Lab Units 07/23/18  0423 07/22/18  0414 07/21/18  0413   WBC 10*3/mm3 17.16* 27.98* 17.57*   HEMOGLOBIN g/dL 9.8* 12.7 11.6*   HEMATOCRIT % 30.7* 39.6 35.6*   PLATELETS 10*3/mm3 242 456* 415*       Echo EF Estimated  Lab Results   Component Value Date    ECHOEFEST 58 07/16/2018       Physical Exam    Medication Review: yes    Assessment/Plan     Principal Problem:    Acute respiratory failure with hypoxia (CMS/[facility])  Active Problems:    Diabetes mellitus (CMS/[facility])    Pneumonia of both lungs due to infectious organism    Sinus tachycardia    Atrial fibrillation with rapid ventricular response.  As the patient's respiratory failure and symptoms have worsened her ventricular response to atrial fibrillation has accelerated.  Most of her heart rate is being driven by her pulmonary issues.  I have recommended adding IV digoxin as well as IV  amiodarone.  Her IV diltiazem will continue at maximum dose.  We will not consider ALIDA cardioversion unless the patient is reintubated.          Gulshan Farias MD  07/23/18  9:27 AM

## 2018-07-23 NOTE — PROGRESS NOTES
"  CC: Acute respiratory failure    S: Extubated but on BiPAP. Events reviewed.    O:Vital signs reviewed. O2Sat: 89% on 100%.  Picc Line. Day # 9  /68   Pulse 85   Temp 98.1 °F (36.7 °C) (Axillary)   Resp 23   Ht 162.6 cm (64\")   Wt 78.2 kg (172 lb 4.8 oz)   LMP  (LMP Unknown)   SpO2 (!) 89%   BMI 29.58 kg/m²     Temp (24hrs), Av °F (36.7 °C), Min:97.9 °F (36.6 °C), Max:98.1 °F (36.7 °C)        I & Os reviewed.   Intake/Output       18 0700 - 18 0659    Intake (ml) 1933    Output (ml) 1845    Net (ml) 88    Last Weight  78.2 kg (172 lb 4.8 oz)          General: On BiPAP.  Eyes: PERRL.   Neck: Supple. +ve JVD  Cardiovascular: S1 + S2. Irregular. Tachy.  Respiratory: Transmitted Breath sounds noted. No wheezing heard. Bilateral crackles noted  Abdomen: Soft. Bowel sounds positive   Extremities: Upper extremity edema noted.  Neurologic: On BiPAP. AAO x 3.   Skin: Appeared without any overt rashes    Labs: Reviewed.     Results from last 7 days  Lab Units 18  0423 18  0414 18  0413 18  0430 18  0414   SODIUM mmol/L 139 144 145 141 144   POTASSIUM mmol/L 4.2 2.7* 3.6 4.2 4.1   CHLORIDE mmol/L 104 109* 109* 101 101   CO2 mmol/L 30.0 31.0* 30.0 35.0* 36.0*   BUN mg/dL 22* 22* 37* 35* 35*   CREATININE mg/dL 0.50* 0.50* 0.50* 0.50* 0.60   CALCIUM mg/dL 8.5 8.5 8.8 8.6 9.0   BILIRUBIN mg/dL  --   --  0.5 0.8 0.8   ALK PHOS U/L  --   --  90 101 117   ALT (SGPT) U/L  --   --  55 64 64   AST (SGOT) U/L  --   --  30 42 56*   GLUCOSE mg/dL 183* 57* 188* 212* 98         Results from last 7 days  Lab Units 18  0414 18  0414 18  0531   MAGNESIUM mg/dL 1.8 2.1 2.3           Results from last 7 days  Lab Units 18  0423 18  0414 18  0413 18  0430 18  0414   WBC 10*3/mm3 17.16* 27.98* 17.57* 14.36* 16.92*   HEMOGLOBIN g/dL 9.8* 12.7 11.6* 12.8 12.5   PLATELETS 10*3/mm3 242 456* 415* 405* 431*       diltiaZEM 5-15 mg/hr Last Rate: 5 " mg/hr (07/23/18 0510)   Pharmacy to dose vancomycin         ABG: Ordered.    Lab Results   Component Value Date    PHART 7.476 07/22/2018    SRP3UQX 37.5 07/22/2018    PO2ART 56.4 (L) 07/22/2018    HGBBG 11.2 (L) 07/22/2018    R4KWEXNK 90.0 (L) 07/22/2018    CARBOXYHGB 1.6 07/22/2018         CXRay: Reviewed personally.  Worsening noted.    Imaging Results (last 24 hours)     Procedure Component Value Units Date/Time    XR Chest 1 View [[id number]] Collected:  07/23/18 0731     Updated:  07/23/18 0735    Narrative:       PROCEDURE: XR CHEST 1 VW-     HISTORY: dyspnea, pneumonia compairison; J18.9-Pneumonia, unspecified  organism; J96.21-Acute and chronic respiratory failure with hypoxia;  Z74.09-Other reduced mobility; Z74.09-Other reduced mobility     COMPARISON: 7/22/2018.     FINDINGS: A right PICC is in place with the tip at the cavoatrial  junction. The heart is normal in size. The mediastinum is unremarkable.  There is diffuse bilateral airspace disease which has worsened slightly  compared to prior exam. There is no pneumothorax.  There are no acute  osseous abnormalities.           Impression:       Diffuse bilateral airspace disease which has worsened  slightly compared to the prior exam.     Continued followup is recommended.     This report was finalized on 7/23/2018 7:32 AM by Joseph Ba M.D..            Assessment & Recommendations/Plan:   1.  Acute hypoxemic respiratory failure  - Currently on BiPAP.   - I asked the nursing staff to keep her oxygen saturation more than 89% and to titrate FiO2 down as much as tolerated.    2.  Multifocal pneumonia  - Sputum cultures have revealed MRSA  - On IV vancomycin.  - Will d/c Vanc, once her clinical situation improves.    3.  Likely ARDS versus hypersensitivity pneumonitis  - We'll continue IV steroids.  - I will not change the dose of steroids, as she continues to have hypoxia and is on 100% BiPAP.  - We'll recommend continuation of steroids for 7-10  days at least and only slow de-escalation of therapy.    4.  Diabetes  - Be monitored closely    5.  Lymphadenopathy  - Likely reactive  - We will need outpatient follow-up    6.  COPD  - On nebulized treatments    7.  Possible component of pulmonary edema/Fluid overload?  - We will administer Diuril today.  - Will also start Lasix Drip.  - Will monitor strict I's and O's  - Will order BNP level.    8. AFib with RVR.  - Is on Cardizem drip.  - Asked the RN to call Dr. Farias and inform him of poorly controlled HR.   - May need Amio drip.    Her condition is critical and prognosis guarded at this time.    Critical Care time spent in direct patient care: 40 minutes (excluding procedure time, if applicable) including high complexity decision making to assess, manipulate, and support vital organ system failure in this individual who has impairment of one or more vital organ systems such that there is a high probability of imminent or life threatening deterioration in the patient’s condition.    We have reviewed patient's current orders and changes, if any, have been suggested to primary care team. Plan was also discussed with nursing staff, as necessary.     D/W Dr. Mcleod.      Yamilex Tse MD  07/23/18  8:53 AM    Dictated utilizing Dragon dictation.

## 2018-07-23 NOTE — SIGNIFICANT NOTE
07/23/18 0935   Rehab Treatment   Discipline occupational therapist   Reason Treatment Not Performed unable to treat, medical status change  (Pt on hold for therapy per Dr. Guzman.  Will follow up with pt tomorrow.)

## 2018-07-23 NOTE — PROGRESS NOTES
[facility]    Name:  Breann Gallegos   Age:  68 y.o.  Sex:  female  :  1950  MRN:  [id number]   Visit Number:  [id number]  Admission Date:  2018  Date Of Service:  18  Primary Care Physician:  Harry Avery MD     LOS: 10 days :  Patient Care Team:  Harry Avery MD as PCP - General  Harry Avery MD as PCP - Family Medicine  Harry Avery MD as PCP - Claims Attributed  Tereza Bateman RN as Care Coordinator ([facility]):    History taken from:     patient    Chief Complaint:      Dyspnea    Subjective     Interval History:     Patient seen today.  Reviewed history and physical, x-rays, chart, lab work, consults on this case.  Discussed the case with Dr. Tse as well as Dr. Farias.    Patient is a 68-year-old female who was admitted on 2018 with multifocal pneumonia and a sputum culture growing MRSA.  She is being treated with vancomycin.  She also has continued to have sepsis.  She is diabetic, has chronic essential hypertension, COPD.  She had severe hypoxia requiring ventilatory management.  Dr. Tse from pulmonary has been following.  She also has question about aspiration pneumonia after extubation.  She has had intermittent A. fib with RVR.  Yesterday her diltiazem drip was restarted.  She is also suspected to have ARDS.  Yesterday Diuril  was given.  She has required continuous BiPAP.    Again this morning she was in A. fib with RVR.  She is extremely hypoxic.  Cardiology has placed her on IV digoxin, Cardizem, amiodarone.  Dr. Tse feels the patient may need to be intubated, but at this point we'll put on a Lasix drip and give another dose of Diuril.  Patient was seen after she had converted with those medications.  She did feel somewhat better.  She did have chest pressure when she had A. fib with RVR, this is improved now that she is in sinus rhythm.  She is still hypoxic.  She denies any nausea or vomiting.  She  denies any pain at present.    Review of Systems:     All systems were reviewed and negative except for:  Respiratory: positive for  cough, productive and shortness of air  Cardiovascular: positive for  chest pressure / pain, at rest    Objective     Vital Signs:    Temp:  [97.9 °F (36.6 °C)-98.6 °F (37 °C)] 98.4 °F (36.9 °C)  Heart Rate:  [] 93  Resp:  [16-31] 19  BP: (119-168)/(55-82) 128/65  FiO2 (%):  [90 %-100 %] 100 %    Physical Exam:      General Appearance:    Alert, cooperative, in no acute distress   Head:    Normocephalic, without obvious abnormality, atraumatic   Eyes:            Lids and lashes normal, conjunctivae and sclerae normal, no   icterus, no pallor, corneas clear, PERRLA   Ears:    Ears appear intact with no abnormalities noted   Throat:   No oral lesions, no thrush, oral mucosa moist   Neck:   No adenopathy, supple, trachea midline, no thyromegaly, no     carotid bruit, no JVD   Back:     No kyphosis present, no scoliosis present, no skin lesions,       erythema or scars, no tenderness to percussion or                   palpation,   range of motion normal   Lungs:     Rhonchi bilaterally with some use of accessory muscles respiration.      Heart:    Regular rhythm and normal rate, normal S1 and S2, no            murmur, no gallop, no rub, no click   Breast Exam:    Deferred   Abdomen:     Normal bowel sounds, no masses, no organomegaly, soft        non-tender, non-distended, no guarding, no rebound                 tenderness   Genitalia:    Deferred   Extremities:  4/5 strength in upper/lower extremities bilaterally, no edema, no cyanosis, no   redness   Pulses:   Pulses palpable and equal bilaterally   Skin:   No bleeding, bruising or rash   Lymph nodes:   No palpable adenopathy   Neurologic:   Cranial nerves 2 - 12 grossly intact, sensation intact, DTR        present and equal bilaterally        Results Review:      I reviewed the patient's new clinical results.    Labs:    Lab  Results (last 24 hours)     Procedure Component Value Units Date/Time    POC Glucose Once [[id number]]  (Abnormal) Collected:  07/23/18 1130    Specimen:  Blood Updated:  07/23/18 1151     Glucose 227 (H) mg/dL      Comment: Serial Number: [id number]:  [id number]       BNP [[id number]]  (Abnormal) Collected:  07/23/18 0423    Specimen:  Blood Updated:  07/23/18 0921     proBNP 1,580.0 (C) pg/mL     Blood Gas, Arterial With Co-Ox [[id number]]  (Abnormal) Collected:  07/23/18 0910    Specimen:  Arterial Blood Updated:  07/23/18 0911     Site Left Radial     Toy's Test Positive     pH, Arterial 7.490 pH units      pCO2, Arterial 37.6 mm Hg      pO2, Arterial 48.1 (C) mm Hg      HCO3, Arterial 28.6 (H) mmol/L      Base Excess, Arterial 5.0 (H) mmol/L      O2 Saturation, Arterial 84.5 (L) %      Hemoglobin, Blood Gas 10.9 (L) g/dL      Hematocrit, Blood Gas 33.5 %      Oxyhemoglobin 83.0 (L) %      Methemoglobin 0.30 %      Carboxyhemoglobin 1.5 %      Barometric Pressure for Blood Gas 730 mmHg      Modality BiPap     FIO2 100 %      Ventilator Mode BiPAP     Collected by mb     pH, Temp Corrected -- pH Units      pCO2, Temperature Corrected -- mm Hg      pO2, Temperature Corrected -- mm Hg     POC Glucose Once [[id number]]  (Abnormal) Collected:  07/23/18 0625    Specimen:  Blood Updated:  07/23/18 0635     Glucose 205 (H) mg/dL      Comment: Serial Number: [id number]:  [id number]       Basic Metabolic Panel [[id number]]  (Abnormal) Collected:  07/23/18 0423    Specimen:  Blood Updated:  07/23/18 0553     Glucose 183 (H) mg/dL      BUN 22 (H) mg/dL      Creatinine 0.50 (L) mg/dL      Sodium 139 mmol/L      Potassium 4.2 mmol/L      Chloride 104 mmol/L      CO2 30.0 mmol/L      Calcium 8.5 mg/dL      eGFR Non African Amer 123 mL/min/1.73      BUN/Creatinine Ratio 44.0 (H)     Anion Gap 9.2 (L) mmol/L     Narrative:       GFR Normal >60  Chronic Kidney Disease <60  Kidney Failure <15    Troponin [[id number]]  (Normal)  Collected:  07/23/18 0423    Specimen:  Blood Updated:  07/23/18 0553     Troponin I 0.028 ng/mL     Narrative:       Normal Patient Upper Reference Limit (URL) (99th Percentile)=0.03 ng/mL   Non-AMI Illness Reference Limit=0.03-0.11 ng/mL   AMI Confirmation=0.12 ng/mL and above    CBC & Differential [[id number]] Collected:  07/23/18 0423    Specimen:  Blood Updated:  07/23/18 0510    Narrative:       The following orders were created for panel order CBC & Differential.  Procedure                               Abnormality         Status                     ---------                               -----------         ------                     CBC Auto Differential[[id number]]        Abnormal            Final result                 Please view results for these tests on the individual orders.    CBC Auto Differential [[id number]]  (Abnormal) Collected:  07/23/18 0423    Specimen:  Blood Updated:  07/23/18 0510     WBC 17.16 (H) 10*3/mm3      RBC 3.72 (L) 10*6/mm3      Hemoglobin 9.8 (L) g/dL      Hematocrit 30.7 (L) %      MCV 82.5 fL      MCH 26.3 (L) pg      MCHC 31.9 g/dL      RDW 14.9 (H) %      RDW-SD 44.6 fl      MPV 10.4 fL      Platelets 242 10*3/mm3      Neutrophil % 90.6 (H) %      Lymphocyte % 5.3 (L) %      Monocyte % 3.0 %      Eosinophil % 0.1 %      Basophil % 0.1 %      Immature Grans % 0.9 (H) %      Neutrophils, Absolute 15.55 (H) 10*3/mm3      Lymphocytes, Absolute 0.91 10*3/mm3      Monocytes, Absolute 0.52 10*3/mm3      Eosinophils, Absolute 0.01 10*3/mm3      Basophils, Absolute 0.02 10*3/mm3      Immature Grans, Absolute 0.15 (H) 10*3/mm3      nRBC 0.0 /100 WBC     POC Glucose Once [[id number]]  (Abnormal) Collected:  07/22/18 2040    Specimen:  Blood Updated:  07/22/18 2045     Glucose 198 (H) mg/dL      Comment: Serial Number: [id number]:  [id number]       Blood Gas, Arterial With Co-Ox [[id number]]  (Abnormal) Collected:  07/22/18 1937    Specimen:  Arterial Blood Updated:  07/22/18 1937      Site Left Radial     Tyo's Test Positive     pH, Arterial 7.476 pH units      pCO2, Arterial 37.5 mm Hg      pO2, Arterial 56.4 (L) mm Hg      HCO3, Arterial 27.7 mmol/L      Base Excess, Arterial 3.9 (H) mmol/L      O2 Saturation, Arterial 90.0 (L) %      Hemoglobin, Blood Gas 11.2 (L) g/dL      Hematocrit, Blood Gas 34.4 %      Oxyhemoglobin 87.9 (L) %      Methemoglobin 0.70 %      Carboxyhemoglobin 1.6 %      Barometric Pressure for Blood Gas 727 mmHg      Modality BiPap     FIO2 100 %      Ventilator Mode NIV     Set [facility] Resp Rate 16.0     IPAP 16     EPAP 8     Collected by yovani     pH, Temp Corrected -- pH Units      pCO2, Temperature Corrected -- mm Hg      pO2, Temperature Corrected -- mm Hg     POC Glucose Once [[id number]]  (Normal) Collected:  07/22/18 1649    Specimen:  Blood Updated:  07/22/18 1715     Glucose 111 mg/dL      Comment: Serial Number: [id number]:  [id number]              Radiology:    Imaging Results (last 24 hours)     Procedure Component Value Units Date/Time    XR Chest 1 View [[id number]] Collected:  07/23/18 1309     Updated:  07/23/18 1312    Narrative:       PROCEDURE: XR CHEST 1 VW-     HISTORY: follow up pneumonia, cough, dyphagia with suspected aspiration;  J18.9-Pneumonia, unspecified organism; J96.21-Acute and chronic  respiratory failure with hypoxia; Z74.09-Other reduced mobility;  Z74.09-Other reduced mobility     COMPARISON: July 22, 2018.     FINDINGS: A right PICC is in place with the tip at the cavoatrial  junction. The heart is normal in size. The mediastinum is unremarkable.  There has been some improvement in the patient's bilateral airspace  disease, however diffuse bilateral opacities persist. There is no  pneumothorax.  There are no acute osseous abnormalities.           Impression:       Mild improvement in the patient's bilateral airspace  disease.     Continued followup is recommended.     This report was finalized on 7/23/2018 1:10 PM by Joseph  AVILA Ba.    XR Chest 1 View [[id number]] Collected:  07/23/18 0923     Updated:  07/23/18 0946    Narrative:       PROCEDURE: XR CHEST 1 VW-     HISTORY: PNA/ARDS; J18.9-Pneumonia, unspecified organism; J96.21-Acute  and chronic respiratory failure with hypoxia; Z74.09-Other reduced  mobility; Z74.09-Other reduced mobility     COMPARISON: July 22, 2018.     FINDINGS: A right PICC is in place with the tip at the cavoatrial  junction. The heart is normal in size. The mediastinum is unremarkable.  There is continued worsening bilateral airspace disease. There is no  pneumothorax.  There are no acute osseous abnormalities.           Impression:       Worsening bilateral airspace disease.     Continued followup is recommended.     This report was finalized on 7/23/2018 9:24 AM by Joseph Ba M.D..    XR Chest 1 View [[id number]] Collected:  07/23/18 0731     Updated:  07/23/18 0735    Narrative:       PROCEDURE: XR CHEST 1 VW-     HISTORY: dyspnea, pneumonia compairison; J18.9-Pneumonia, unspecified  organism; J96.21-Acute and chronic respiratory failure with hypoxia;  Z74.09-Other reduced mobility; Z74.09-Other reduced mobility     COMPARISON: 7/22/2018.     FINDINGS: A right PICC is in place with the tip at the cavoatrial  junction. The heart is normal in size. The mediastinum is unremarkable.  There is diffuse bilateral airspace disease which has worsened slightly  compared to prior exam. There is no pneumothorax.  There are no acute  osseous abnormalities.           Impression:       Diffuse bilateral airspace disease which has worsened  slightly compared to the prior exam.     Continued followup is recommended.     This report was finalized on 7/23/2018 7:32 AM by Joseph Ba M.D..          Medication Review:       apixaban 5 mg Oral Q12H   arformoterol 15 mcg Nebulization BID - RT   atorvastatin 20 mg Oral Daily   budesonide 1 mg Nebulization BID   buPROPion 75 mg Oral Q12H   calcitonin  (salmon) 1 spray Alternating Nares Daily   clotrimazole-betamethasone 1 application Topical Q12H   famotidine 20 mg Oral BID   fluticasone 2 spray Each Nare Daily   furosemide (LASIX) IVPB 100 mg Intravenous Once   gabapentin 300 mg Oral BID   insulin aspart 0-7 Units Subcutaneous 4x Daily AC & at Bedtime   insulin detemir 10 Units Subcutaneous Q12H   ipratropium-albuterol 3 mL Nebulization Q4H - RT   lactobacillus acidophilus 1 capsule Oral BID   lisinopril 10 mg Oral Q24H   magnesium oxide 200 mg Oral Daily   methylPREDNISolone sodium succinate 40 mg Intravenous Q8H   mupirocin  Topical Q12H   nystatin 5 mL Oral 4x Daily   sertraline 100 mg Oral Daily   sodium chloride 250 mL Intravenous Once   vancomycin 1,250 mg Intravenous Q12H   Pharmacy Consult  Does not apply Once   cholecalciferol 2,000 Units Oral Daily         amiodarone 0.5 mg/min Last Rate: 0.5 mg/min (07/23/18 0928)   diltiaZEM 5-15 mg/hr Last Rate: 5 mg/hr (07/23/18 0510)   Pharmacy to dose vancomycin         Assessment/Plan     Problem List Items Addressed This Visit     Pneumonia of both lungs due to infectious organism - Primary    Relevant Medications    albuterol (PROAIR RESPICLICK) 108 (90 BASE) MCG/ACT inhaler    ipratropium-albuterol (DUO-NEB) 0.5-2.5 mg/mL nebulizer    flunisolide (NASALIDE) 25 MCG/ACT (0.025%) solution nasal spray    arformoterol (BROVANA) 15 MCG/2ML nebulizer solution    budesonide (PULMICORT) 0.5 MG/2ML nebulizer solution      Other Visit Diagnoses     Acute on chronic respiratory failure with hypoxia (CMS/HCC)        Impaired functional mobility, balance, gait, and endurance        Impaired mobility and ADLs              1. Multifocal bilateral pneumonia, prior to admission, with MRSA, Improved   2. Dysphagia with suspected aspiration pneumonitis  3. Acute hypoxic respiratory failure severe, Previously requiring intubation and placement on mechanical ventilator 7-14-18 with  ARDS, now on BiPAP  4. Recurrent Afib with RVR,  now on IV digoxin, diltiazem and amiodarone.  5. Sepsis, secondary to pneumonia, prior to admission, with MRSA, treated with vancomycin  6. Diabetes mellitus type 2 insulin dependent  7. Chronic essential hypertension  8. COPD moderate  9. Abnormal CT chest with lymphadenopathy, repeat outpatient CT scan chest   10. Hypomagnesemia, replaced    Plan:    Spoke to Dr. Farias as well as Dr. Tse regarding this case.  She will be given Diuril today, started on a Lasix drip.  She is also been placed on IV digoxin, diltiazem, amiodarone.  She is on continuous BiPAP.  Speech therapy has seen the patient and recommended nectar thickened liquids with puréed diet.  She has now converted to sinus rhythm.  We'll check lab work, ABG, chest x-ray in the a.m.  Reviewed echocardiogram which showed ejection fraction 58% with normal valve function.  Continue to monitor closely.  Once the patient stabilized, she could possibly go to an LTAC unit.  Spoke to case management regarding this.  Further recommendations will depend on the clinical course.    Rock Guzman DO  07/23/18  2:47 PM

## 2018-07-24 ENCOUNTER — APPOINTMENT (OUTPATIENT)
Dept: GENERAL RADIOLOGY | Facility: HOSPITAL | Age: 68
End: 2018-07-24

## 2018-07-24 LAB
ALBUMIN SERPL-MCNC: 3.1 G/DL (ref 3.5–5)
ANION GAP SERPL CALCULATED.3IONS-SCNC: 12.1 MMOL/L (ref 10–20)
ARTERIAL PATENCY WRIST A: POSITIVE
ATMOSPHERIC PRESS: 733 MMHG
BASE EXCESS BLDA CALC-SCNC: 6.2 MMOL/L (ref 0–2)
BASOPHILS # BLD AUTO: 0.03 10*3/MM3 (ref 0–0.2)
BASOPHILS NFR BLD AUTO: 0.1 % (ref 0–2.5)
BDY SITE: ABNORMAL
BUN BLD-MCNC: 32 MG/DL (ref 7–20)
BUN/CREAT SERPL: 53.3 (ref 7.1–23.5)
CALCIUM SPEC-SCNC: 8.7 MG/DL (ref 8.4–10.2)
CHLORIDE SERPL-SCNC: 101 MMOL/L (ref 98–107)
CO2 SERPL-SCNC: 30 MMOL/L (ref 26–30)
COHGB MFR BLD: 1.4 % (ref 0–2)
CREAT BLD-MCNC: 0.6 MG/DL (ref 0.6–1.3)
DEPRECATED RDW RBC AUTO: 45.6 FL (ref 37–54)
EOSINOPHIL # BLD AUTO: 0 10*3/MM3 (ref 0–0.7)
EOSINOPHIL NFR BLD AUTO: 0 % (ref 0–7)
ERYTHROCYTE [DISTWIDTH] IN BLOOD BY AUTOMATED COUNT: 15 % (ref 11.5–14.5)
GFR SERPL CREATININE-BSD FRML MDRD: 100 ML/MIN/1.73
GLUCOSE BLD-MCNC: 278 MG/DL (ref 74–98)
GLUCOSE BLDC GLUCOMTR-MCNC: 200 MG/DL (ref 70–130)
GLUCOSE BLDC GLUCOMTR-MCNC: 211 MG/DL (ref 70–130)
GLUCOSE BLDC GLUCOMTR-MCNC: 227 MG/DL (ref 70–130)
GLUCOSE BLDC GLUCOMTR-MCNC: 283 MG/DL (ref 70–130)
HCO3 BLDA-SCNC: 29.8 MMOL/L (ref 22–28)
HCT VFR BLD AUTO: 33.6 % (ref 37–47)
HCT VFR BLD CALC: 32 %
HGB BLD-MCNC: 10.9 G/DL (ref 12–16)
HGB BLDA-MCNC: 10.4 G/DL (ref 12–18)
HOROWITZ INDEX BLD+IHG-RTO: 100 %
IMM GRANULOCYTES # BLD: 0.19 10*3/MM3 (ref 0–0.06)
IMM GRANULOCYTES NFR BLD: 0.8 % (ref 0–0.6)
LYMPHOCYTES # BLD AUTO: 0.73 10*3/MM3 (ref 0.6–3.4)
LYMPHOCYTES NFR BLD AUTO: 3.2 % (ref 10–50)
Lab: ABNORMAL
MAGNESIUM SERPL-MCNC: 2 MG/DL (ref 1.6–2.3)
MCH RBC QN AUTO: 27.1 PG (ref 27–31)
MCHC RBC AUTO-ENTMCNC: 32.4 G/DL (ref 30–37)
MCV RBC AUTO: 83.6 FL (ref 81–99)
METHGB BLD QL: 0.6 % (ref 0–1.5)
MODALITY: ABNORMAL
MONOCYTES # BLD AUTO: 0.56 10*3/MM3 (ref 0–0.9)
MONOCYTES NFR BLD AUTO: 2.4 % (ref 0–12)
NEUTROPHILS # BLD AUTO: 21.47 10*3/MM3 (ref 2–6.9)
NEUTROPHILS NFR BLD AUTO: 93.5 % (ref 37–80)
NRBC BLD MANUAL-RTO: 0 /100 WBC (ref 0–0)
OXYHGB MFR BLDV: 88.4 % (ref 94–99)
PCO2 BLDA: 38.4 MM HG (ref 35–45)
PCO2 TEMP ADJ BLD: ABNORMAL MM HG (ref 35–45)
PH BLDA: 7.5 PH UNITS (ref 7.3–7.5)
PH, TEMP CORRECTED: ABNORMAL PH UNITS
PHOSPHATE SERPL-MCNC: 4.5 MG/DL (ref 2.5–4.5)
PLATELET # BLD AUTO: 281 10*3/MM3 (ref 130–400)
PMV BLD AUTO: 10.5 FL (ref 6–12)
PO2 BLDA: 57.3 MM HG (ref 75–100)
PO2 TEMP ADJ BLD: ABNORMAL MM HG (ref 83–108)
POTASSIUM BLD-SCNC: 4.1 MMOL/L (ref 3.5–5.1)
RBC # BLD AUTO: 4.02 10*6/MM3 (ref 4.2–5.4)
SAO2 % BLDCOA: 90.2 % (ref 94–100)
SODIUM BLD-SCNC: 139 MMOL/L (ref 137–145)
VENTILATOR MODE: ABNORMAL
WBC NRBC COR # BLD: 22.98 10*3/MM3 (ref 4.8–10.8)

## 2018-07-24 PROCEDURE — 25010000002 AMIODARONE IN DEXTROSE 5% 360-4.14 MG/200ML-% SOLUTION: Performed by: INTERNAL MEDICINE

## 2018-07-24 PROCEDURE — 25010000002 CHLOROTHIAZIDE PER 500 MG: Performed by: INTERNAL MEDICINE

## 2018-07-24 PROCEDURE — 25010000002 FUROSEMIDE PER 20 MG: Performed by: INTERNAL MEDICINE

## 2018-07-24 PROCEDURE — 83050 HGB METHEMOGLOBIN QUAN: CPT

## 2018-07-24 PROCEDURE — 94799 UNLISTED PULMONARY SVC/PX: CPT

## 2018-07-24 PROCEDURE — 83735 ASSAY OF MAGNESIUM: CPT | Performed by: INTERNAL MEDICINE

## 2018-07-24 PROCEDURE — 25010000002 VANCOMYCIN PER 500 MG: Performed by: FAMILY MEDICINE

## 2018-07-24 PROCEDURE — 63710000001 INSULIN ASPART PER 5 UNITS: Performed by: FAMILY MEDICINE

## 2018-07-24 PROCEDURE — 85025 COMPLETE CBC W/AUTO DIFF WBC: CPT | Performed by: INTERNAL MEDICINE

## 2018-07-24 PROCEDURE — 82805 BLOOD GASES W/O2 SATURATION: CPT

## 2018-07-24 PROCEDURE — 99232 SBSQ HOSP IP/OBS MODERATE 35: CPT | Performed by: INTERNAL MEDICINE

## 2018-07-24 PROCEDURE — 25010000002 METHYLPREDNISOLONE PER 40 MG: Performed by: FAMILY MEDICINE

## 2018-07-24 PROCEDURE — 94660 CPAP INITIATION&MGMT: CPT

## 2018-07-24 PROCEDURE — 25010000002 ACETAZOLAMIDE PER 500 MG: Performed by: INTERNAL MEDICINE

## 2018-07-24 PROCEDURE — 63710000001 INSULIN DETEMIR PER 5 UNITS: Performed by: FAMILY MEDICINE

## 2018-07-24 PROCEDURE — 36600 WITHDRAWAL OF ARTERIAL BLOOD: CPT

## 2018-07-24 PROCEDURE — 25010000002 METHYLPREDNISOLONE PER 40 MG: Performed by: INTERNAL MEDICINE

## 2018-07-24 PROCEDURE — 82375 ASSAY CARBOXYHB QUANT: CPT

## 2018-07-24 PROCEDURE — 80069 RENAL FUNCTION PANEL: CPT | Performed by: INTERNAL MEDICINE

## 2018-07-24 PROCEDURE — 82962 GLUCOSE BLOOD TEST: CPT

## 2018-07-24 PROCEDURE — 71045 X-RAY EXAM CHEST 1 VIEW: CPT

## 2018-07-24 PROCEDURE — 99291 CRITICAL CARE FIRST HOUR: CPT | Performed by: INTERNAL MEDICINE

## 2018-07-24 RX ORDER — ACETAZOLAMIDE 500 MG/5ML
250 INJECTION, POWDER, LYOPHILIZED, FOR SOLUTION INTRAVENOUS ONCE
Status: COMPLETED | OUTPATIENT
Start: 2018-07-24 | End: 2018-07-24

## 2018-07-24 RX ORDER — METHYLPREDNISOLONE SODIUM SUCCINATE 40 MG/ML
40 INJECTION, POWDER, LYOPHILIZED, FOR SOLUTION INTRAMUSCULAR; INTRAVENOUS EVERY 12 HOURS
Status: DISCONTINUED | OUTPATIENT
Start: 2018-07-24 | End: 2018-07-29 | Stop reason: HOSPADM

## 2018-07-24 RX ORDER — CHLOROTHIAZIDE SODIUM 500 MG/1
500 INJECTION INTRAVENOUS ONCE
Status: COMPLETED | OUTPATIENT
Start: 2018-07-24 | End: 2018-07-24

## 2018-07-24 RX ADMIN — CLOTRIMAZOLE AND BETAMETHASONE DIPROPIONATE 1 APPLICATION: 10; .5 CREAM TOPICAL at 10:59

## 2018-07-24 RX ADMIN — AMIODARONE HYDROCHLORIDE 0.5 MG/MIN: 1.8 INJECTION, SOLUTION INTRAVENOUS at 22:39

## 2018-07-24 RX ADMIN — INSULIN DETEMIR 10 UNITS: 100 INJECTION, SOLUTION SUBCUTANEOUS at 11:00

## 2018-07-24 RX ADMIN — CLOTRIMAZOLE AND BETAMETHASONE DIPROPIONATE 1 APPLICATION: 10; .5 CREAM TOPICAL at 21:02

## 2018-07-24 RX ADMIN — INSULIN ASPART 3 UNITS: 100 INJECTION, SOLUTION INTRAVENOUS; SUBCUTANEOUS at 17:51

## 2018-07-24 RX ADMIN — GABAPENTIN 300 MG: 300 CAPSULE ORAL at 10:58

## 2018-07-24 RX ADMIN — LISINOPRIL 10 MG: 10 TABLET ORAL at 11:05

## 2018-07-24 RX ADMIN — Medication 1 CAPSULE: at 10:58

## 2018-07-24 RX ADMIN — AMIODARONE HYDROCHLORIDE 0.5 MG/MIN: 1.8 INJECTION, SOLUTION INTRAVENOUS at 10:56

## 2018-07-24 RX ADMIN — ACETAZOLAMIDE 250 MG: 500 INJECTION, POWDER, LYOPHILIZED, FOR SOLUTION INTRAVENOUS at 20:52

## 2018-07-24 RX ADMIN — ACETAZOLAMIDE SODIUM 250 MG: 500 INJECTION, POWDER, LYOPHILIZED, FOR SOLUTION INTRAVENOUS at 14:34

## 2018-07-24 RX ADMIN — NYSTATIN 500000 UNITS: 100000 SUSPENSION ORAL at 22:32

## 2018-07-24 RX ADMIN — VANCOMYCIN HYDROCHLORIDE 1250 MG: 500 INJECTION, POWDER, LYOPHILIZED, FOR SOLUTION INTRAVENOUS at 05:31

## 2018-07-24 RX ADMIN — SERTRALINE HYDROCHLORIDE 100 MG: 50 TABLET ORAL at 10:59

## 2018-07-24 RX ADMIN — NYSTATIN 500000 UNITS: 100000 SUSPENSION ORAL at 10:57

## 2018-07-24 RX ADMIN — ATORVASTATIN CALCIUM 20 MG: 20 TABLET, FILM COATED ORAL at 10:57

## 2018-07-24 RX ADMIN — METHYLPREDNISOLONE SODIUM SUCCINATE 40 MG: 40 INJECTION, POWDER, FOR SOLUTION INTRAMUSCULAR; INTRAVENOUS at 05:30

## 2018-07-24 RX ADMIN — VANCOMYCIN HYDROCHLORIDE 1250 MG: 500 INJECTION, POWDER, LYOPHILIZED, FOR SOLUTION INTRAVENOUS at 17:51

## 2018-07-24 RX ADMIN — BUDESONIDE 1 MG: 0.5 INHALANT RESPIRATORY (INHALATION) at 19:02

## 2018-07-24 RX ADMIN — IPRATROPIUM BROMIDE AND ALBUTEROL SULFATE 3 ML: .5; 3 SOLUTION RESPIRATORY (INHALATION) at 06:36

## 2018-07-24 RX ADMIN — APIXABAN 5 MG: 2.5 TABLET, FILM COATED ORAL at 10:59

## 2018-07-24 RX ADMIN — IPRATROPIUM BROMIDE AND ALBUTEROL SULFATE 3 ML: .5; 3 SOLUTION RESPIRATORY (INHALATION) at 03:25

## 2018-07-24 RX ADMIN — MUPIROCIN: 20 OINTMENT TOPICAL at 21:02

## 2018-07-24 RX ADMIN — ARFORMOTEROL TARTRATE 15 MCG: 15 SOLUTION RESPIRATORY (INHALATION) at 06:36

## 2018-07-24 RX ADMIN — IPRATROPIUM BROMIDE AND ALBUTEROL SULFATE 3 ML: .5; 3 SOLUTION RESPIRATORY (INHALATION) at 23:40

## 2018-07-24 RX ADMIN — APIXABAN 5 MG: 2.5 TABLET, FILM COATED ORAL at 20:59

## 2018-07-24 RX ADMIN — CALCITONIN SALMON 1 SPRAY: 200 SPRAY, METERED NASAL at 10:59

## 2018-07-24 RX ADMIN — BUPROPION HYDROCHLORIDE 75 MG: 75 TABLET, FILM COATED ORAL at 21:00

## 2018-07-24 RX ADMIN — FAMOTIDINE 20 MG: 20 TABLET ORAL at 21:00

## 2018-07-24 RX ADMIN — FUROSEMIDE 100 MG: 10 INJECTION, SOLUTION INTRAMUSCULAR; INTRAVENOUS at 10:56

## 2018-07-24 RX ADMIN — INSULIN ASPART 3 UNITS: 100 INJECTION, SOLUTION INTRAVENOUS; SUBCUTANEOUS at 22:25

## 2018-07-24 RX ADMIN — VITAMIN D, TAB 1000IU (100/BT) 2000 UNITS: 25 TAB at 10:58

## 2018-07-24 RX ADMIN — CHLOROTHIAZIDE SODIUM 500 MG: 500 INJECTION, POWDER, LYOPHILIZED, FOR SOLUTION INTRAVENOUS at 10:57

## 2018-07-24 RX ADMIN — INSULIN DETEMIR 10 UNITS: 100 INJECTION, SOLUTION SUBCUTANEOUS at 22:33

## 2018-07-24 RX ADMIN — IPRATROPIUM BROMIDE AND ALBUTEROL SULFATE 3 ML: .5; 3 SOLUTION RESPIRATORY (INHALATION) at 15:27

## 2018-07-24 RX ADMIN — INSULIN ASPART 3 UNITS: 100 INJECTION, SOLUTION INTRAVENOUS; SUBCUTANEOUS at 11:55

## 2018-07-24 RX ADMIN — Medication 200 MG: at 10:57

## 2018-07-24 RX ADMIN — ARFORMOTEROL TARTRATE 15 MCG: 15 SOLUTION RESPIRATORY (INHALATION) at 19:02

## 2018-07-24 RX ADMIN — GABAPENTIN 300 MG: 300 CAPSULE ORAL at 21:00

## 2018-07-24 RX ADMIN — METHYLPREDNISOLONE SODIUM SUCCINATE 40 MG: 40 INJECTION, POWDER, FOR SOLUTION INTRAMUSCULAR; INTRAVENOUS at 17:51

## 2018-07-24 RX ADMIN — NYSTATIN 500000 UNITS: 100000 SUSPENSION ORAL at 17:51

## 2018-07-24 RX ADMIN — BUDESONIDE 1 MG: 0.5 INHALANT RESPIRATORY (INHALATION) at 06:36

## 2018-07-24 RX ADMIN — BUPROPION HYDROCHLORIDE 75 MG: 75 TABLET, FILM COATED ORAL at 10:58

## 2018-07-24 RX ADMIN — FAMOTIDINE 20 MG: 20 TABLET ORAL at 10:59

## 2018-07-24 RX ADMIN — IPRATROPIUM BROMIDE AND ALBUTEROL SULFATE 3 ML: .5; 3 SOLUTION RESPIRATORY (INHALATION) at 19:01

## 2018-07-24 RX ADMIN — Medication 1 CAPSULE: at 21:00

## 2018-07-24 RX ADMIN — IPRATROPIUM BROMIDE AND ALBUTEROL SULFATE 3 ML: .5; 3 SOLUTION RESPIRATORY (INHALATION) at 12:18

## 2018-07-24 RX ADMIN — MUPIROCIN 1 APPLICATION: 20 OINTMENT TOPICAL at 10:59

## 2018-07-24 RX ADMIN — INSULIN ASPART 4 UNITS: 100 INJECTION, SOLUTION INTRAVENOUS; SUBCUTANEOUS at 06:46

## 2018-07-24 NOTE — PLAN OF CARE
Problem: Patient Care Overview  Goal: Plan of Care Review  Outcome: Ongoing (interventions implemented as appropriate)   07/24/18 1341   Coping/Psychosocial   Plan of Care Reviewed With patient;spouse   Plan of Care Review   Progress improving       Problem: NPPV/CPAP (Adult)  Goal: Signs and Symptoms of Listed Potential Problems Will be Absent, Minimized or Managed (NPPV/CPAP)  Outcome: Ongoing (interventions implemented as appropriate)   07/24/18 1341   Goal/Outcome Evaluation   Problems Assessed (NPPV/CPAP) all   Problems Present (NPPV/CPAP) none

## 2018-07-24 NOTE — PLAN OF CARE
Problem: NPPV/CPAP (Adult)  Intervention: Monitor and Manage Anxiety Related to NPPV/CPAP   07/24/18 1905   Interventions   Trust Relationship/Rapport choices provided;care explained       Goal: Signs and Symptoms of Listed Potential Problems Will be Absent, Minimized or Managed (NPPV/CPAP)  Outcome: Ongoing (interventions implemented as appropriate)   07/24/18 1905   Goal/Outcome Evaluation   Problems Assessed (NPPV/CPAP) hypoxia/hypoxemia;dry mucous membranes;situational response;skin breakdown   Problems Present (NPPV/CPAP) none

## 2018-07-24 NOTE — PROGRESS NOTES
Adult Nutrition  Assessment/PES    Patient Name:  Breann Gallegos  YOB: 1950  MRN: [id number]  Admit Date:  7/13/2018    Assessment Date:  7/24/2018    Comments:  RD consulted to restart tube feeding. Spoke with MD who encouraged bolus feedings. RD to manage and control tube feeding: Rec #1: Bolus feedings through NG, once placed: Glucerna 1.2 @ 222 mL Q4 or six times daily. Rec #2: Free water flushes @92 mL Q4 or six times daily. Tube feeding to provide ~1600 kcals, ~78 gm protein and ~1056 mL tube feeding water. Rec #3: Continue to monitor/replace electrolytes. RD to follow pt and adjust tube feeding based on pt's tolerance and PO intake. Consult RD PRN.           Reason for Assessment     Row Name 07/24/18 1420          Reason for Assessment    Reason For Assessment follow-up protocol;physician consult     Diagnosis diabetes diagnosis/complications;cardiac disease;pulmonary disease;gastrointestinal disease   COPD, DM Type 2, Respiratory Failure, Intubated     Identified At Risk by Screening Criteria difficulty chewing/swallowing;tube feeding or parenteral nutrition                 Labs/Tests/Procedures/Meds     Row Name 07/24/18 1424          Labs/Procedures/Meds    Lab Results Reviewed reviewed, pertinent     Lab Results Comments Low: Alb  High: Glu, BUN        Medications    Pertinent Medications Reviewed reviewed             Physical Findings     Row Name 07/24/18 1431          Physical Findings    Tubes other (see comments)   NG tube to be placed this PM               Nutrition Prescription Ordered     Row Name 07/24/18 1435          Nutrition Prescription PO    Current PO Diet Pureed     Fluid Consistency Nectar/syrup thick     Common Modifiers Consistent Carbohydrate        Nutrition Prescription EN    Enteral Route --   TF d/c     Product Glucerna 1.2 cyrus   Per MD     TF Delivery Method Bolus     TF Bolus Goal Volume (mL) 240 mL     TF Bolus Current Volume (mL) 0 mL   On hold, to  start once NG placed     TF Bolus Frequency 4 times a day   Q6     Water flush (mL)  80 mL     Water Flush Frequency Other (comment)   Q6 hours             Evaluation of Received Nutrient/Fluid Intake     Row Name 07/24/18 1437          PO Evaluation    Number of Days PO Intake Evaluated Insufficient Data        EN Evaluation    Number of Days EN Intake Evaluated Insufficient Data     TF Changes Held   MD ordered TF to begin once NG placed             Problem/Interventions:        Problem 1     Row Name 07/24/18 1439          Nutrition Diagnoses Problem 1    Problem 1 Impaired Nutrient Utilization     Etiology (related to) Medical Diagnosis     Endocrine DM2     Signs/Symptoms (evidenced by) Biochemical     Specific Labs Noted Glucose             Problem 2     Row Name 07/24/18 1439          Nutrition Diagnoses Problem 2    Problem 2 Increased Nutrient Needs     Macronutrient Kcal;Fluid;Protein     Micronutrient Vitamin;Mineral     Etiology (related to) Medical Diagnosis     Pulmonary/Critical Care COPD;Acute respiratory failure;Other (comment)   hypoxia     Signs/Symptoms (evidenced by) Other (comment)   Pulmonary dysfunction                   Intervention Goal     Row Name 07/24/18 1445          Intervention Goal    General Meet nutritional needs for age/condition     PO Meet estimated needs;Establish PO     TF/PN Inititiate TF/PN     Transition TF to PO     Weight Maintain weight             Nutrition Intervention     Row Name 07/24/18 1446          Nutrition Intervention    RD/Tech Action Follow Tx progress;Care plan reviewd;Encourage intake;Recommend/ordered     Recommended/Ordered EN             Nutrition Prescription     Row Name 07/24/18 1447          Nutrition Prescription PO    PO Prescription Other (comment)   Continue current diet order        Nutrition Prescription EN    Enteral Prescription Enteral begin/change     Enteral Route NG   To be placed this PM     Product Glucerna 1.2 cyrus     TF Delivery  Method Bolus   Per MD     TF Bolus Goal Volume (mL) 236 mL     TF Bolus Starting Volume (mL) 236 mL     TF Bolus Frequency 6 times a day     TF Bolus Cycle Over Other (comment)   Pump     Water flush (mL)  95 mL     Water Flush Frequency Every feeding     New EN Prescription Ordered? Yes        Other Orders    Obtain Weight Daily     Obtain Weight Ordered? No, recommended     Supplement Vitamin mineral supplement     Supplement Ordered? No, recommended     Labs Mg++;Na+;K+;Phos     Labs Ordered? No, recommended             Education/Evaluation     Row Name 07/24/18 1503          Education    Education Education not appropriate at this time     Please explain Defer until post ICU        Monitor/Evaluation    Monitor Per protocol;I&O;PO intake;Pertinent labs;TF delivery/tolerance;Weight         Electronically signed by:  Alia Cruz RD  07/24/18 3:04 PM

## 2018-07-24 NOTE — PROGRESS NOTES
[facility]    Name:  Breann Gallegos   Age:  68 y.o.  Sex:  female  :  1950  MRN:  [id number]   Visit Number:  [id number]  Admission Date:  2018  Date Of Service:  18  Primary Care Physician:  Haryr Avery MD     LOS: 11 days :  Patient Care Team:  Harry Avery MD as PCP - General  Harry Avery MD as PCP - Family Medicine  Harry Avery MD as PCP - Claims Attributed  Tereza Bateman RN as Care Coordinator (Population Health):    History taken from:     patient    Chief Complaint:      Dyspnea    Subjective     Interval History:     Patient seen again today.    Patient is a 68-year-old female who was admitted on 2018 with multifocal pneumonia and a sputum culture growing MRSA.  She is being treated with vancomycin.  She also has continued to have sepsis.  She is diabetic, has chronic essential hypertension, COPD.  She had severe hypoxia requiring ventilatory management.  Dr. Tse from pulmonary has been following.  She also has question about aspiration pneumonia after extubation.  She has had intermittent A. fib with RVR.  Yesterday he was on IV digoxin, amiodarone, diltiazem .  She is also suspected to have ARDS.  Today, Diuril  was given again.  She is also on a Lasix drip.  Cardizem and digoxin have been discontinued.  She has required continuous BiPAP.    She continues to be hypoxic, she is on BiPAP with FiO2 of 100%.  Her O2 sat is in the mid 90s.  .  She did feel somewhat better.  She did have chest pressure when she had A. fib with RVR, this is improved now that she is in sinus rhythm.  She is still hypoxic.  She denies any nausea or vomiting.  She denies any pain at present.  She is nothing by mouth, NG tube is to be placed due to aspiration.  She will need tube feeds.  Anticipate this patient will need LTAC as well.    Review of Systems:     All systems were reviewed and negative except for:  Respiratory: positive for   cough, productive and shortness of air  Cardiovascular: positive for  chest pressure / pain, at rest    Objective     Vital Signs:    Temp:  [97.1 °F (36.2 °C)-98.2 °F (36.8 °C)] 97.1 °F (36.2 °C)  Heart Rate:  [] 96  Resp:  [17-30] 24  BP: (114-162)/(54-95) 137/79  FiO2 (%):  [100 %] 100 %    Physical Exam:      General Appearance:    Alert, cooperative, in Mild distress   Head:    Normocephalic, without obvious abnormality, atraumatic   Eyes:            Lids and lashes normal, conjunctivae and sclerae normal, no   icterus, no pallor, corneas clear, PERRLA   Ears:    Ears appear intact with no abnormalities noted   Throat:   No oral lesions, no thrush, oral mucosa moist   Neck:   No adenopathy, supple, trachea midline, no thyromegaly, no     carotid bruit, no JVD   Back:     No kyphosis present, no scoliosis present, no skin lesions,       erythema or scars, no tenderness to percussion or                   palpation,   range of motion normal   Lungs:     Rhonchi bilaterally with some use of accessory muscles respiration.      Heart:    Regular rhythm and normal rate, normal S1 and S2, no            murmur, no gallop, no rub, no click   Breast Exam:    Deferred   Abdomen:     Normal bowel sounds, no masses, no organomegaly, soft        non-tender, non-distended, no guarding, no rebound                 tenderness   Genitalia:    Deferred   Extremities:  4/5 strength in upper/lower extremities bilaterally, no edema, no cyanosis, no   redness   Pulses:   Pulses palpable and equal bilaterally   Skin:   No bleeding, bruising or rash   Lymph nodes:   No palpable adenopathy   Neurologic:   Cranial nerves 2 - 12 grossly intact, sensation intact, DTR        present and equal bilaterally        Results Review:      I reviewed the patient's new clinical results.    Labs:    Lab Results (last 24 hours)     Procedure Component Value Units Date/Time    POC Glucose Once [[id number]]  (Abnormal) Collected:  07/24/18 1122     Specimen:  Blood Updated:  07/24/18 1136     Glucose 200 (H) mg/dL      Comment: Serial Number: [id number]:  [id number]       Blood Gas, Arterial With Co-Ox [[id number]]  (Abnormal) Collected:  07/24/18 0639    Specimen:  Arterial Blood Updated:  07/24/18 0640     Site Left Radial     Toy's Test Positive     pH, Arterial 7.499 pH units      pCO2, Arterial 38.4 mm Hg      pO2, Arterial 57.3 (L) mm Hg      HCO3, Arterial 29.8 (H) mmol/L      Base Excess, Arterial 6.2 (H) mmol/L      O2 Saturation, Arterial 90.2 (L) %      Hemoglobin, Blood Gas 10.4 (L) g/dL      Hematocrit, Blood Gas 32.0 %      Oxyhemoglobin 88.4 (L) %      Methemoglobin 0.60 %      Carboxyhemoglobin 1.4 %      Barometric Pressure for Blood Gas 733 mmHg      Modality BiPap     FIO2 100 %      Ventilator Mode NA     Collected by [id number]     pH, Temp Corrected -- pH Units      pCO2, Temperature Corrected -- mm Hg      pO2, Temperature Corrected -- mm Hg     POC Glucose Once [[id number]]  (Abnormal) Collected:  07/24/18 0626    Specimen:  Blood Updated:  07/24/18 0635     Glucose 283 (H) mg/dL      Comment: Serial Number: [id number]:  [id number]       CBC & Differential [[id number]] Collected:  07/24/18 0503    Specimen:  Blood Updated:  07/24/18 0557    Narrative:       The following orders were created for panel order CBC & Differential.  Procedure                               Abnormality         Status                     ---------                               -----------         ------                     CBC Auto Differential[[id number]]        Abnormal            Final result                 Please view results for these tests on the individual orders.    CBC Auto Differential [[id number]]  (Abnormal) Collected:  07/24/18 0503    Specimen:  Blood Updated:  07/24/18 0557     WBC 22.98 (H) 10*3/mm3      RBC 4.02 (L) 10*6/mm3      Hemoglobin 10.9 (L) g/dL      Hematocrit 33.6 (L) %      MCV 83.6 fL      MCH 27.1 pg      MCHC 32.4 g/dL      RDW  15.0 (H) %      RDW-SD 45.6 fl      MPV 10.5 fL      Platelets 281 10*3/mm3      Neutrophil % 93.5 (H) %      Lymphocyte % 3.2 (L) %      Monocyte % 2.4 %      Eosinophil % 0.0 %      Basophil % 0.1 %      Immature Grans % 0.8 (H) %      Neutrophils, Absolute 21.47 (H) 10*3/mm3      Lymphocytes, Absolute 0.73 10*3/mm3      Monocytes, Absolute 0.56 10*3/mm3      Eosinophils, Absolute 0.00 10*3/mm3      Basophils, Absolute 0.03 10*3/mm3      Immature Grans, Absolute 0.19 (H) 10*3/mm3      nRBC 0.0 /100 WBC     Renal Function Panel [[id number]]  (Abnormal) Collected:  07/24/18 0503    Specimen:  Blood Updated:  07/24/18 0539     Glucose 278 (H) mg/dL      BUN 32 (H) mg/dL      Creatinine 0.60 mg/dL      Sodium 139 mmol/L      Potassium 4.1 mmol/L      Chloride 101 mmol/L      CO2 30.0 mmol/L      Calcium 8.7 mg/dL      Albumin 3.10 (L) g/dL      Phosphorus 4.5 mg/dL      Anion Gap 12.1 mmol/L      BUN/Creatinine Ratio 53.3 (H)     eGFR Non African Amer 100 mL/min/1.73     Narrative:       GFR Normal >60  Chronic Kidney Disease <60  Kidney Failure <15    Magnesium [[id number]]  (Normal) Collected:  07/24/18 0503    Specimen:  Blood Updated:  07/24/18 0538     Magnesium 2.0 mg/dL     POC Glucose Once [[id number]]  (Abnormal) Collected:  07/23/18 2154    Specimen:  Blood Updated:  07/23/18 2205     Glucose 201 (H) mg/dL      Comment: Serial Number: [id number]:  [id number]       Vancomycin, Trough [[id number]]  (Normal) Collected:  07/23/18 1723    Specimen:  Blood Updated:  07/23/18 1813     Vancomycin Trough 14.25 mcg/mL     POC Glucose Once [[id number]]  (Abnormal) Collected:  07/23/18 1743    Specimen:  Blood Updated:  07/23/18 1746     Glucose 218 (H) mg/dL      Comment: Serial Number: [id number]:  [id number]              Radiology:    Imaging Results (last 24 hours)     Procedure Component Value Units Date/Time    XR Chest 1 View [[id number]] Collected:  07/24/18 0921     Updated:  07/24/18 0923    Narrative:        PROCEDURE: XR CHEST 1 VW-     HISTORY: PNA/ARDS; J18.9-Pneumonia, unspecified organism; J96.21-Acute  and chronic respiratory failure with hypoxia; Z74.09-Other reduced  mobility; Z74.09-Other reduced mobility     COMPARISON: July 23, 2018.     FINDINGS: A right PICC is in place with the tip in the distal SVC. The  heart is normal in size. The mediastinum is unremarkable. There has been  no significant change in the patients diffuse bilateral airspace  disease. There is no pneumothorax.  There are no acute osseous  abnormalities.           Impression:       No change in the patients diffuse bilateral airspace  disease.     Continued followup is recommended.     This report was finalized on 7/24/2018 9:21 AM by Joseph Ba M.D..          Medication Review:       apixaban 5 mg Oral Q12H   arformoterol 15 mcg Nebulization BID - RT   atorvastatin 20 mg Oral Daily   budesonide 1 mg Nebulization BID   buPROPion 75 mg Oral Q12H   calcitonin (salmon) 1 spray Alternating Nares Daily   clotrimazole-betamethasone 1 application Topical Q12H   famotidine 20 mg Oral BID   fluticasone 2 spray Each Nare Daily   furosemide (LASIX) IVPB 100 mg Intravenous Once   gabapentin 300 mg Oral BID   insulin aspart 0-7 Units Subcutaneous 4x Daily AC & at Bedtime   insulin detemir 10 Units Subcutaneous Q12H   ipratropium-albuterol 3 mL Nebulization Q4H - RT   lactobacillus acidophilus 1 capsule Oral BID   lisinopril 10 mg Oral Q24H   magnesium oxide 200 mg Oral Daily   methylPREDNISolone sodium succinate 40 mg Intravenous Q12H   mupirocin  Topical Q12H   nystatin 5 mL Oral 4x Daily   sertraline 100 mg Oral Daily   sodium chloride 250 mL Intravenous Once   vancomycin 1,250 mg Intravenous Q12H   cholecalciferol 2,000 Units Oral Daily         amiodarone 0.5 mg/min Last Rate: 0.5 mg/min (07/24/18 1056)   Pharmacy to dose vancomycin         Assessment/Plan     Problem List Items Addressed This Visit     Pneumonia of both lungs due to  infectious organism - Primary    Relevant Medications    albuterol (PROAIR RESPICLICK) 108 (90 BASE) MCG/ACT inhaler    ipratropium-albuterol (DUO-NEB) 0.5-2.5 mg/mL nebulizer    flunisolide (NASALIDE) 25 MCG/ACT (0.025%) solution nasal spray    arformoterol (BROVANA) 15 MCG/2ML nebulizer solution    budesonide (PULMICORT) 0.5 MG/2ML nebulizer solution      Other Visit Diagnoses     Acute on chronic respiratory failure with hypoxia (CMS/HCC)        Impaired functional mobility, balance, gait, and endurance        Impaired mobility and ADLs              1. Multifocal bilateral pneumonia, prior to admission, with MRSA, Improved   2. Dysphagia with suspected aspiration pneumonitis  3. Acute hypoxic respiratory failure severe, Previously requiring intubation and placement on mechanical ventilator 7-14-18 with  ARDS, now on BiPAP  4. Recurrent Afib with RVR, now on IV  amiodarone.  5. Sepsis, secondary to pneumonia, prior to admission, with MRSA, treated with vancomycin  6. Diabetes mellitus type 2 insulin dependent  7. Chronic essential hypertension  8. COPD moderate  9. Abnormal CT chest with lymphadenopathy, repeat outpatient CT scan chest   10. Hypomagnesemia, replaced    Plan:    She is continued on Lasix drip.  She continues on IV amiodarone.  She is nothing by mouth due to recurrent aspiration.  She is on continuous BiPAP.   We'll check lab work, ABG, chest x-ray in the a.m.   Continue to monitor closely.  Once the patient stabilized, she could possibly go to an LTAC unit.  Spoke to case management regarding this. Patient had multiple attempts to have NG/OG tube placed without success. Patient refused any further attempts today.  Spoke to radiologist, he can place under fluoro in am.  Further recommendations will depend on the clinical course.    Rock Guzman,   07/24/18  3:02 PM

## 2018-07-24 NOTE — PROGRESS NOTES
"  CC: Acute respiratory failure    S: Currently on BiPAP. Is awake and alert.     O:Vital signs reviewed. O2Sat: 92% on 100%.  Picc Line. Day # 10  /58   Pulse 79   Temp 97.8 °F (36.6 °C) (Axillary)   Resp 21   Ht 162.6 cm (64\")   Wt 75.3 kg (166 lb 1.6 oz)   LMP  (LMP Unknown)   SpO2 95%   BMI 28.51 kg/m²     Temp (24hrs), Av.9 °F (36.6 °C), Min:97.2 °F (36.2 °C), Max:98.4 °F (36.9 °C)      I & Os reviewed.   Intake/Output       18 0700 - 18 0659    Intake (ml) 3532    Output (ml) 3000    Net (ml) 532    Last Weight  75.3 kg (166 lb 1.6 oz)          General: On BiPAP.  Eyes: PERRL.   Neck: Supple. +ve JVD  Cardiovascular: S1 + S2. Irregular.   Respiratory: Transmitted Breath sounds noted. No wheezing heard. Bilateral crackles noted  Abdomen: Soft. Bowel sounds positive   Extremities: No extremity edema noted.  Neurologic: Awake. Alert. Somewhat limited due to being on BiPAP   Skin: Appeared without any overt rashes    Labs: Reviewed.     Results from last 7 days  Lab Units 18  0503 18  0423 18  0414 18  0413 18  0430 18  0414   SODIUM mmol/L 139 139 144 145 141 144   POTASSIUM mmol/L 4.1 4.2 2.7* 3.6 4.2 4.1   CHLORIDE mmol/L 101 104 109* 109* 101 101   CO2 mmol/L 30.0 30.0 31.0* 30.0 35.0* 36.0*   BUN mg/dL 32* 22* 22* 37* 35* 35*   CREATININE mg/dL 0.60 0.50* 0.50* 0.50* 0.50* 0.60   CALCIUM mg/dL 8.7 8.5 8.5 8.8 8.6 9.0   BILIRUBIN mg/dL  --   --   --  0.5 0.8 0.8   ALK PHOS U/L  --   --   --  90 101 117   ALT (SGPT) U/L  --   --   --  55 64 64   AST (SGOT) U/L  --   --   --  30 42 56*   GLUCOSE mg/dL 278* 183* 57* 188* 212* 98         Results from last 7 days  Lab Units 18  0503 18  0414 18  0414   MAGNESIUM mg/dL 2.0 1.8 2.1   PHOSPHORUS mg/dL 4.5  --   --            Results from last 7 days  Lab Units 18  0503 18  0423 18  0414 18  0413 18  0430   WBC 10*3/mm3 22.98* 17.16* 27.98* 17.57* 14.36* "   HEMOGLOBIN g/dL 10.9* 9.8* 12.7 11.6* 12.8   PLATELETS 10*3/mm3 281 242 456* 415* 405*       amiodarone 0.5 mg/min Last Rate: 0.5 mg/min (07/23/18 4527)   diltiaZEM 5-15 mg/hr Last Rate: 12.5 mg/hr (07/24/18 0500)   Pharmacy to dose vancomycin         ABG: Ordered.    Lab Results   Component Value Date    PHART 7.499 07/24/2018    WRZ9ZHH 38.4 07/24/2018    PO2ART 57.3 (L) 07/24/2018    HGBBG 10.4 (L) 07/24/2018    R9OKXMIB 90.2 (L) 07/24/2018    CARBOXYHGB 1.4 07/24/2018         CXRay: Reviewed personally.  Slight improvement noted.    Imaging Results (last 24 hours)     Procedure Component Value Units Date/Time    XR Chest 1 View [[id number]] Updated:  07/24/18 0714    XR Chest 1 View [[id number]] Collected:  07/23/18 1309     Updated:  07/23/18 1312    Narrative:       PROCEDURE: XR CHEST 1 VW-     HISTORY: follow up pneumonia, cough, dyphagia with suspected aspiration;  J18.9-Pneumonia, unspecified organism; J96.21-Acute and chronic  respiratory failure with hypoxia; Z74.09-Other reduced mobility;  Z74.09-Other reduced mobility     COMPARISON: July 22, 2018.     FINDINGS: A right PICC is in place with the tip at the cavoatrial  junction. The heart is normal in size. The mediastinum is unremarkable.  There has been some improvement in the patient's bilateral airspace  disease, however diffuse bilateral opacities persist. There is no  pneumothorax.  There are no acute osseous abnormalities.           Impression:       Mild improvement in the patient's bilateral airspace  disease.     Continued followup is recommended.     This report was finalized on 7/23/2018 1:10 PM by Joseph Ba M.D..    XR Chest 1 View [[id number]] Collected:  07/23/18 0923     Updated:  07/23/18 0946    Narrative:       PROCEDURE: XR CHEST 1 VW-     HISTORY: PNA/ARDS; J18.9-Pneumonia, unspecified organism; J96.21-Acute  and chronic respiratory failure with hypoxia; Z74.09-Other reduced  mobility; Z74.09-Other reduced mobility      COMPARISON: July 22, 2018.     FINDINGS: A right PICC is in place with the tip at the cavoatrial  junction. The heart is normal in size. The mediastinum is unremarkable.  There is continued worsening bilateral airspace disease. There is no  pneumothorax.  There are no acute osseous abnormalities.           Impression:       Worsening bilateral airspace disease.     Continued followup is recommended.     This report was finalized on 7/23/2018 9:24 AM by Joseph Ba M.D..            Assessment & Recommendations/Plan:   1.  Acute hypoxemic respiratory failure  - Currently on BiPAP.   - Will try Hi Adonis NC.    2.  Multifocal pneumonia  - Sputum cultures have revealed MRSA  - On IV vancomycin.  - Will d/c Vanc, once her clinical situation improves.    3.  Likely ARDS versus hypersensitivity pneumonitis  - We'll continue IV steroids.  - I will decrease the dose of steroids today.    4.  Lymphadenopathy  - Likely reactive  - We will need outpatient follow-up    5.  COPD  - On nebulized treatments    6.  Possible component of pulmonary edema/Fluid overload?  - Continue to monitor strict I's and O's  - BNP on 7/23 was 1580.  - Diuril 500 IVP x 1  - Will once again do Lasix drip today.    7. AFib with RVR.  - Remains on Cardizem drip.    CXRay will be ordered for tomorrow AM.    Her condition is critical and prognosis guarded at this time.    NG will be attempted.     Glucerna 1.2 Remberto 240 ml bolus Q6 hourly, although this can be managed by dietician as well.     Critical Care time spent in direct patient care: 40 minutes (excluding procedure time, if applicable) including high complexity decision making to assess, manipulate, and support vital organ system failure in this individual who has impairment of one or more vital organ systems such that there is a high probability of imminent or life threatening deterioration in the patient’s condition.    We have reviewed patient's current orders and changes, if any, have  been suggested to primary care team. Plan was also discussed with nursing staff, as necessary.     D/W Dr. Mcleod.      Yamilex Tse MD  07/24/18  8:17 AM    Dictated utilizing Dragon dictation.

## 2018-07-24 NOTE — PLAN OF CARE
Problem: Patient Care Overview  Goal: Individualization and Mutuality  Outcome: Ongoing (interventions implemented as appropriate)      Problem: Pneumonia (Adult)  Goal: Signs and Symptoms of Listed Potential Problems Will be Absent, Minimized or Managed (Pneumonia)  Outcome: Ongoing (interventions implemented as appropriate)      Problem: NPPV/CPAP (Adult)  Goal: Signs and Symptoms of Listed Potential Problems Will be Absent, Minimized or Managed (NPPV/CPAP)  Outcome: Ongoing (interventions implemented as appropriate)      Problem: Skin Injury Risk (Adult)  Goal: Skin Health and Integrity  Outcome: Ongoing (interventions implemented as appropriate)      Problem: Arrhythmia/Dysrhythmia (Symptomatic) (Adult)  Goal: Signs and Symptoms of Listed Potential Problems Will be Absent, Minimized or Managed (Arrhythmia/Dysrhythmia)  Outcome: Ongoing (interventions implemented as appropriate)      Problem: Fall Risk (Adult)  Goal: Absence of Fall  Outcome: Ongoing (interventions implemented as appropriate)

## 2018-07-24 NOTE — SIGNIFICANT NOTE
07/24/18 0913   Rehab Treatment   Discipline occupational therapist   Reason Treatment Not Performed unavailable for treatment  (Pt on hold for therapy d/t being on continuous bi-pap.)

## 2018-07-24 NOTE — NURSING NOTE
Attempted to place ng several times, Dr Tse came to the unit and attempted himself with no success. We are going to allow the pt to rest and attempt a duo tube this evening.

## 2018-07-24 NOTE — CONSULTS
Adult Nutrition  Assessment/PES    Patient Name:  Breann Gallegos  YOB: 1950  MRN: [id number]  Admit Date:  7/13/2018    Assessment Date:  7/24/2018    Comments:  RD consulted to restart tube feeding. Spoke with MD who encouraged bolus feedings. RD to manage and control tube feeding: Rec #1: Bolus feedings through NG, once placed: Glucerna 1.2 @ 222 mL Q4 or six times daily. Rec #2: Free water flushes @92 mL Q4 or six times daily. Tube feeding to provide ~1600 kcals, ~78 gm protein and ~1056 mL tube feeding water. Rec #3: Continue to monitor/replace electrolytes. RD to follow pt and adjust tube feeding based on pt's tolerance and PO intake. Consult RD PRN.             Reason for Assessment     Row Name 07/24/18 1426          Reason for Assessment    Reason For Assessment follow-up protocol;physician consult     Diagnosis diabetes diagnosis/complications;cardiac disease;pulmonary disease;gastrointestinal disease   COPD, DM Type 2, Respiratory Failure, Intubated     Identified At Risk by Screening Criteria difficulty chewing/swallowing;tube feeding or parenteral nutrition                 Labs/Tests/Procedures/Meds     Row Name 07/24/18 1424          Labs/Procedures/Meds    Lab Results Reviewed reviewed, pertinent     Lab Results Comments Low: Alb  High: Glu, BUN        Medications    Pertinent Medications Reviewed reviewed             Physical Findings     Row Name 07/24/18 1431          Physical Findings    Tubes other (see comments)   NG tube to be placed this PM               Nutrition Prescription Ordered     Row Name 07/24/18 143          Nutrition Prescription PO    Current PO Diet Pureed     Fluid Consistency Nectar/syrup thick     Common Modifiers Consistent Carbohydrate        Nutrition Prescription EN    Enteral Route --   TF d/c     Product Glucerna 1.2 cyrus   Per MD     TF Delivery Method Bolus     TF Bolus Goal Volume (mL) 240 mL     TF Bolus Current Volume (mL) 0 mL   On hold, to  start once NG placed     TF Bolus Frequency 4 times a day   Q6     Water flush (mL)  80 mL     Water Flush Frequency Other (comment)   Q6 hours             Evaluation of Received Nutrient/Fluid Intake     Row Name 07/24/18 1437          PO Evaluation    Number of Days PO Intake Evaluated Insufficient Data        EN Evaluation    Number of Days EN Intake Evaluated Insufficient Data     TF Changes Held   MD ordered TF to begin once NG placed             Problem/Interventions:        Problem 1     Row Name 07/24/18 1439          Nutrition Diagnoses Problem 1    Problem 1 Impaired Nutrient Utilization     Etiology (related to) Medical Diagnosis     Endocrine DM2     Signs/Symptoms (evidenced by) Biochemical     Specific Labs Noted Glucose             Problem 2     Row Name 07/24/18 1439          Nutrition Diagnoses Problem 2    Problem 2 Increased Nutrient Needs     Macronutrient Kcal;Fluid;Protein     Micronutrient Vitamin;Mineral     Etiology (related to) Medical Diagnosis     Pulmonary/Critical Care COPD;Acute respiratory failure;Other (comment)   hypoxia     Signs/Symptoms (evidenced by) Other (comment)   Pulmonary dysfunction                   Intervention Goal     Row Name 07/24/18 1445          Intervention Goal    General Meet nutritional needs for age/condition     PO Meet estimated needs;Establish PO     TF/PN Inititiate TF/PN     Transition TF to PO     Weight Maintain weight             Nutrition Intervention     Row Name 07/24/18 1446          Nutrition Intervention    RD/Tech Action Follow Tx progress;Care plan reviewd;Encourage intake;Recommend/ordered     Recommended/Ordered EN             Nutrition Prescription     Row Name 07/24/18 1447          Nutrition Prescription PO    PO Prescription Other (comment)   Continue current diet order        Nutrition Prescription EN    Enteral Prescription Enteral begin/change     Enteral Route NG   To be placed this PM     Product Glucerna 1.2 cyrus     TF Delivery  Method Bolus   Per MD     TF Bolus Goal Volume (mL) 236 mL     TF Bolus Starting Volume (mL) 236 mL     TF Bolus Frequency 6 times a day     TF Bolus Cycle Over Other (comment)   Pump     Water flush (mL)  95 mL     Water Flush Frequency Every feeding     New EN Prescription Ordered? Yes        Other Orders    Obtain Weight Daily     Obtain Weight Ordered? No, recommended     Supplement Vitamin mineral supplement     Supplement Ordered? No, recommended     Labs Mg++;Na+;K+;Phos     Labs Ordered? No, recommended             Education/Evaluation     Row Name 07/24/18 1503          Education    Education Education not appropriate at this time     Please explain Defer until post ICU        Monitor/Evaluation    Monitor Per protocol;I&O;PO intake;Pertinent labs;TF delivery/tolerance;Weight         Electronically signed by:  Alia Cruz RD  07/24/18 3:12 PM

## 2018-07-25 ENCOUNTER — APPOINTMENT (OUTPATIENT)
Dept: GENERAL RADIOLOGY | Facility: HOSPITAL | Age: 68
End: 2018-07-25

## 2018-07-25 LAB
ALBUMIN SERPL-MCNC: 3.1 G/DL (ref 3.5–5)
ANION GAP SERPL CALCULATED.3IONS-SCNC: 10.5 MMOL/L (ref 10–20)
ARTERIAL PATENCY WRIST A: POSITIVE
ATMOSPHERIC PRESS: 734 MMHG
BACTERIA SPEC AEROBE CULT: ABNORMAL
BASE EXCESS BLDA CALC-SCNC: 2.5 MMOL/L (ref 0–2)
BASOPHILS # BLD AUTO: 0.03 10*3/MM3 (ref 0–0.2)
BASOPHILS NFR BLD AUTO: 0.1 % (ref 0–2.5)
BDY SITE: ABNORMAL
BUN BLD-MCNC: 28 MG/DL (ref 7–20)
BUN/CREAT SERPL: 35 (ref 7.1–23.5)
CALCIUM SPEC-SCNC: 8.9 MG/DL (ref 8.4–10.2)
CHLORIDE SERPL-SCNC: 104 MMOL/L (ref 98–107)
CO2 SERPL-SCNC: 29 MMOL/L (ref 26–30)
COHGB MFR BLD: 1.2 % (ref 0–2)
CREAT BLD-MCNC: 0.8 MG/DL (ref 0.6–1.3)
CRP SERPL-MCNC: 3.6 MG/DL (ref 0–1)
DEPRECATED RDW RBC AUTO: 46.2 FL (ref 37–54)
EOSINOPHIL # BLD AUTO: 0.02 10*3/MM3 (ref 0–0.7)
EOSINOPHIL NFR BLD AUTO: 0.1 % (ref 0–7)
ERYTHROCYTE [DISTWIDTH] IN BLOOD BY AUTOMATED COUNT: 15.4 % (ref 11.5–14.5)
GFR SERPL CREATININE-BSD FRML MDRD: 72 ML/MIN/1.73
GLUCOSE BLD-MCNC: 237 MG/DL (ref 74–98)
GLUCOSE BLDC GLUCOMTR-MCNC: 197 MG/DL (ref 70–130)
GLUCOSE BLDC GLUCOMTR-MCNC: 254 MG/DL (ref 70–130)
GLUCOSE BLDC GLUCOMTR-MCNC: 272 MG/DL (ref 70–130)
GLUCOSE BLDC GLUCOMTR-MCNC: 337 MG/DL (ref 70–130)
GLUCOSE BLDC GLUCOMTR-MCNC: 449 MG/DL (ref 70–130)
HCO3 BLDA-SCNC: 26.7 MMOL/L (ref 22–28)
HCT VFR BLD AUTO: 36.9 % (ref 37–47)
HCT VFR BLD CALC: 34.7 %
HGB BLD-MCNC: 11.7 G/DL (ref 12–16)
HGB BLDA-MCNC: 11.3 G/DL (ref 12–18)
HOROWITZ INDEX BLD+IHG-RTO: 90 %
IMM GRANULOCYTES # BLD: 0.18 10*3/MM3 (ref 0–0.06)
IMM GRANULOCYTES NFR BLD: 0.7 % (ref 0–0.6)
LYMPHOCYTES # BLD AUTO: 0.99 10*3/MM3 (ref 0.6–3.4)
LYMPHOCYTES NFR BLD AUTO: 3.9 % (ref 10–50)
Lab: ABNORMAL
MAGNESIUM SERPL-MCNC: 2.1 MG/DL (ref 1.6–2.3)
MCH RBC QN AUTO: 26.4 PG (ref 27–31)
MCHC RBC AUTO-ENTMCNC: 31.7 G/DL (ref 30–37)
MCV RBC AUTO: 83.3 FL (ref 81–99)
METHGB BLD QL: 0.8 % (ref 0–1.5)
MODALITY: ABNORMAL
MONOCYTES # BLD AUTO: 1.48 10*3/MM3 (ref 0–0.9)
MONOCYTES NFR BLD AUTO: 5.8 % (ref 0–12)
NEUTROPHILS # BLD AUTO: 22.67 10*3/MM3 (ref 2–6.9)
NEUTROPHILS NFR BLD AUTO: 89.4 % (ref 37–80)
NRBC BLD MANUAL-RTO: 0 /100 WBC (ref 0–0)
OXYHGB MFR BLDV: 94.5 % (ref 94–99)
PCO2 BLDA: 39 MM HG (ref 35–45)
PCO2 TEMP ADJ BLD: ABNORMAL MM HG (ref 35–45)
PH BLDA: 7.44 PH UNITS (ref 7.3–7.5)
PH, TEMP CORRECTED: ABNORMAL PH UNITS
PHOSPHATE SERPL-MCNC: 5.2 MG/DL (ref 2.5–4.5)
PLATELET # BLD AUTO: 377 10*3/MM3 (ref 130–400)
PMV BLD AUTO: 10.3 FL (ref 6–12)
PO2 BLDA: 82.2 MM HG (ref 75–100)
PO2 TEMP ADJ BLD: ABNORMAL MM HG (ref 83–108)
POTASSIUM BLD-SCNC: 3.5 MMOL/L (ref 3.5–5.1)
RBC # BLD AUTO: 4.43 10*6/MM3 (ref 4.2–5.4)
SAO2 % BLDCOA: 96.4 % (ref 94–100)
SODIUM BLD-SCNC: 140 MMOL/L (ref 137–145)
VENTILATOR MODE: ABNORMAL
WBC NRBC COR # BLD: 25.37 10*3/MM3 (ref 4.8–10.8)

## 2018-07-25 PROCEDURE — 36600 WITHDRAWAL OF ARTERIAL BLOOD: CPT

## 2018-07-25 PROCEDURE — 82805 BLOOD GASES W/O2 SATURATION: CPT

## 2018-07-25 PROCEDURE — 83735 ASSAY OF MAGNESIUM: CPT | Performed by: INTERNAL MEDICINE

## 2018-07-25 PROCEDURE — 25010000002 MORPHINE PER 10 MG: Performed by: INTERNAL MEDICINE

## 2018-07-25 PROCEDURE — 82962 GLUCOSE BLOOD TEST: CPT

## 2018-07-25 PROCEDURE — 71045 X-RAY EXAM CHEST 1 VIEW: CPT

## 2018-07-25 PROCEDURE — 63710000001 INSULIN DETEMIR PER 5 UNITS: Performed by: FAMILY MEDICINE

## 2018-07-25 PROCEDURE — 86140 C-REACTIVE PROTEIN: CPT | Performed by: INTERNAL MEDICINE

## 2018-07-25 PROCEDURE — 82375 ASSAY CARBOXYHB QUANT: CPT

## 2018-07-25 PROCEDURE — 99233 SBSQ HOSP IP/OBS HIGH 50: CPT | Performed by: INTERNAL MEDICINE

## 2018-07-25 PROCEDURE — 83050 HGB METHEMOGLOBIN QUAN: CPT

## 2018-07-25 PROCEDURE — 63710000001 INSULIN ASPART PER 5 UNITS: Performed by: FAMILY MEDICINE

## 2018-07-25 PROCEDURE — 94660 CPAP INITIATION&MGMT: CPT

## 2018-07-25 PROCEDURE — 94799 UNLISTED PULMONARY SVC/PX: CPT

## 2018-07-25 PROCEDURE — 25010000002 VANCOMYCIN PER 500 MG: Performed by: FAMILY MEDICINE

## 2018-07-25 PROCEDURE — 25010000002 FUROSEMIDE PER 20 MG: Performed by: INTERNAL MEDICINE

## 2018-07-25 PROCEDURE — 85025 COMPLETE CBC W/AUTO DIFF WBC: CPT | Performed by: INTERNAL MEDICINE

## 2018-07-25 PROCEDURE — 25010000002 METHYLPREDNISOLONE PER 40 MG: Performed by: INTERNAL MEDICINE

## 2018-07-25 PROCEDURE — 99232 SBSQ HOSP IP/OBS MODERATE 35: CPT | Performed by: INTERNAL MEDICINE

## 2018-07-25 PROCEDURE — 25010000002 ACETAZOLAMIDE PER 500 MG: Performed by: INTERNAL MEDICINE

## 2018-07-25 PROCEDURE — 80069 RENAL FUNCTION PANEL: CPT | Performed by: INTERNAL MEDICINE

## 2018-07-25 PROCEDURE — 87040 BLOOD CULTURE FOR BACTERIA: CPT | Performed by: INTERNAL MEDICINE

## 2018-07-25 RX ORDER — ACETAZOLAMIDE 500 MG/5ML
250 INJECTION, POWDER, LYOPHILIZED, FOR SOLUTION INTRAVENOUS ONCE
Status: COMPLETED | OUTPATIENT
Start: 2018-07-25 | End: 2018-07-25

## 2018-07-25 RX ORDER — AMIODARONE HYDROCHLORIDE 200 MG/1
200 TABLET ORAL
Status: DISCONTINUED | OUTPATIENT
Start: 2018-07-25 | End: 2018-07-26

## 2018-07-25 RX ADMIN — GABAPENTIN 300 MG: 300 CAPSULE ORAL at 20:08

## 2018-07-25 RX ADMIN — ARFORMOTEROL TARTRATE 15 MCG: 15 SOLUTION RESPIRATORY (INHALATION) at 11:10

## 2018-07-25 RX ADMIN — ACETAZOLAMIDE 250 MG: 500 INJECTION, POWDER, LYOPHILIZED, FOR SOLUTION INTRAVENOUS at 15:02

## 2018-07-25 RX ADMIN — MUPIROCIN: 20 OINTMENT TOPICAL at 20:09

## 2018-07-25 RX ADMIN — INSULIN ASPART 4 UNITS: 100 INJECTION, SOLUTION INTRAVENOUS; SUBCUTANEOUS at 17:15

## 2018-07-25 RX ADMIN — METOPROLOL TARTRATE 5 MG: 1 INJECTION, SOLUTION INTRAVENOUS at 22:27

## 2018-07-25 RX ADMIN — Medication 1 CAPSULE: at 20:10

## 2018-07-25 RX ADMIN — MORPHINE SULFATE 4 MG: 2 INJECTION, SOLUTION INTRAMUSCULAR; INTRAVENOUS at 17:05

## 2018-07-25 RX ADMIN — IPRATROPIUM BROMIDE AND ALBUTEROL SULFATE 3 ML: .5; 3 SOLUTION RESPIRATORY (INHALATION) at 15:36

## 2018-07-25 RX ADMIN — ARFORMOTEROL TARTRATE 15 MCG: 15 SOLUTION RESPIRATORY (INHALATION) at 19:08

## 2018-07-25 RX ADMIN — NYSTATIN 500000 UNITS: 100000 SUSPENSION ORAL at 10:09

## 2018-07-25 RX ADMIN — NYSTATIN 500000 UNITS: 100000 SUSPENSION ORAL at 22:27

## 2018-07-25 RX ADMIN — LISINOPRIL 10 MG: 10 TABLET ORAL at 10:05

## 2018-07-25 RX ADMIN — BUDESONIDE 1 MG: 0.5 INHALANT RESPIRATORY (INHALATION) at 07:09

## 2018-07-25 RX ADMIN — FUROSEMIDE 100 MG: 10 INJECTION, SOLUTION INTRAMUSCULAR; INTRAVENOUS at 15:03

## 2018-07-25 RX ADMIN — FAMOTIDINE 20 MG: 20 TABLET ORAL at 20:09

## 2018-07-25 RX ADMIN — INSULIN ASPART 4 UNITS: 100 INJECTION, SOLUTION INTRAVENOUS; SUBCUTANEOUS at 11:57

## 2018-07-25 RX ADMIN — BUDESONIDE 1 MG: 0.5 INHALANT RESPIRATORY (INHALATION) at 19:08

## 2018-07-25 RX ADMIN — FAMOTIDINE 20 MG: 20 TABLET ORAL at 10:05

## 2018-07-25 RX ADMIN — INSULIN DETEMIR 10 UNITS: 100 INJECTION, SOLUTION SUBCUTANEOUS at 10:05

## 2018-07-25 RX ADMIN — INSULIN DETEMIR 10 UNITS: 100 INJECTION, SOLUTION SUBCUTANEOUS at 20:12

## 2018-07-25 RX ADMIN — IPRATROPIUM BROMIDE AND ALBUTEROL SULFATE 3 ML: .5; 3 SOLUTION RESPIRATORY (INHALATION) at 11:10

## 2018-07-25 RX ADMIN — CLOTRIMAZOLE AND BETAMETHASONE DIPROPIONATE 1 APPLICATION: 10; .5 CREAM TOPICAL at 20:08

## 2018-07-25 RX ADMIN — INSULIN ASPART 2 UNITS: 100 INJECTION, SOLUTION INTRAVENOUS; SUBCUTANEOUS at 07:35

## 2018-07-25 RX ADMIN — IPRATROPIUM BROMIDE AND ALBUTEROL SULFATE 3 ML: .5; 3 SOLUTION RESPIRATORY (INHALATION) at 07:09

## 2018-07-25 RX ADMIN — VANCOMYCIN HYDROCHLORIDE 1250 MG: 500 INJECTION, POWDER, LYOPHILIZED, FOR SOLUTION INTRAVENOUS at 05:26

## 2018-07-25 RX ADMIN — MUPIROCIN: 20 OINTMENT TOPICAL at 10:09

## 2018-07-25 RX ADMIN — IPRATROPIUM BROMIDE AND ALBUTEROL SULFATE 3 ML: .5; 3 SOLUTION RESPIRATORY (INHALATION) at 03:31

## 2018-07-25 RX ADMIN — IPRATROPIUM BROMIDE AND ALBUTEROL SULFATE 3 ML: .5; 3 SOLUTION RESPIRATORY (INHALATION) at 19:08

## 2018-07-25 RX ADMIN — IPRATROPIUM BROMIDE AND ALBUTEROL SULFATE 3 ML: .5; 3 SOLUTION RESPIRATORY (INHALATION) at 23:11

## 2018-07-25 RX ADMIN — CLOTRIMAZOLE AND BETAMETHASONE DIPROPIONATE 1 APPLICATION: 10; .5 CREAM TOPICAL at 10:09

## 2018-07-25 RX ADMIN — AMIODARONE HYDROCHLORIDE 200 MG: 200 TABLET ORAL at 10:41

## 2018-07-25 RX ADMIN — METOPROLOL TARTRATE 5 MG: 1 INJECTION, SOLUTION INTRAVENOUS at 11:40

## 2018-07-25 RX ADMIN — METHYLPREDNISOLONE SODIUM SUCCINATE 40 MG: 40 INJECTION, POWDER, FOR SOLUTION INTRAMUSCULAR; INTRAVENOUS at 17:15

## 2018-07-25 RX ADMIN — METHYLPREDNISOLONE SODIUM SUCCINATE 40 MG: 40 INJECTION, POWDER, FOR SOLUTION INTRAMUSCULAR; INTRAVENOUS at 05:26

## 2018-07-25 RX ADMIN — BUPROPION HYDROCHLORIDE 75 MG: 75 TABLET, FILM COATED ORAL at 20:10

## 2018-07-25 RX ADMIN — INSULIN ASPART 5 UNITS: 100 INJECTION, SOLUTION INTRAVENOUS; SUBCUTANEOUS at 20:47

## 2018-07-25 RX ADMIN — APIXABAN 5 MG: 2.5 TABLET, FILM COATED ORAL at 20:10

## 2018-07-25 RX ADMIN — GABAPENTIN 300 MG: 300 CAPSULE ORAL at 10:05

## 2018-07-25 RX ADMIN — APIXABAN 5 MG: 2.5 TABLET, FILM COATED ORAL at 10:04

## 2018-07-25 NOTE — PLAN OF CARE
Problem: Patient Care Overview  Goal: Plan of Care Review  Outcome: Ongoing (interventions implemented as appropriate)   07/25/18 6878   Coping/Psychosocial   Plan of Care Reviewed With patient   Plan of Care Review   Progress improving   OTHER   Outcome Summary Spoke with patient at her bedside. Patient was alert and talkative. She stated that she was feeling much better and looking forward to going home. Patient also stated that she feels positive about moving to a care facility to regain strength before going home.

## 2018-07-25 NOTE — PROGRESS NOTES
Case Management/Social Work    Patient Name:  Breann Gallegos  YOB: 1950  MRN: [id number]  Admit Date:  7/13/2018      SLIME spoke with Yeni from [facility] and she advised that based on pt's current oxygen requirements, they do not feel pt is a candidate to be transferred to their facility today and will revisit pt's progress in the morning. Will inform hospitalist of this as well. CM will continue to follow and assist.      Electronically signed by:  RODNEY Gaitan  07/25/18 11:00 AM

## 2018-07-25 NOTE — PLAN OF CARE
Problem: Patient Care Overview  Goal: Plan of Care Review  Outcome: Ongoing (interventions implemented as appropriate)      Problem: NPPV/CPAP (Adult)  Goal: Signs and Symptoms of Listed Potential Problems Will be Absent, Minimized or Managed (NPPV/CPAP)  Outcome: Ongoing (interventions implemented as appropriate)   07/25/18 0641   Goal/Outcome Evaluation   Problems Assessed (NPPV/CPAP) all   Problems Present (NPPV/CPAP) dry mucous membranes  (Wore all shift except few minutes with PO meds)       Problem: Pain, Acute (Adult)  Goal: Acceptable Pain Control/Comfort Level  Outcome: Ongoing (interventions implemented as appropriate)      Problem: Arrhythmia/Dysrhythmia (Symptomatic) (Adult)  Goal: Signs and Symptoms of Listed Potential Problems Will be Absent, Minimized or Managed (Arrhythmia/Dysrhythmia)  Outcome: Ongoing (interventions implemented as appropriate)

## 2018-07-25 NOTE — PROGRESS NOTES
[facility]    Name:  Breann Gallegos   Age:  68 y.o.  Sex:  female  :  1950  MRN:  [id number]   Visit Number:  [id number]  Admission Date:  2018  Date Of Service:  18  Primary Care Physician:  Harry Avery MD     LOS: 12 days :  Patient Care Team:  Harry Avery MD as PCP - General  Harry Avery MD as PCP - Family Medicine  Harry Avery MD as PCP - Claims Attributed  Tereza Bateman RN as Care Coordinator (Population Health):    History taken from:     patient    Chief Complaint:      Dyspnea    Subjective     Interval History:     Patient seen again today.     Patient is a 68-year-old female who was admitted on 2018 with multifocal pneumonia and a sputum culture growing MRSA.  She is being treated with vancomycin.  She also has continued to have sepsis.  She is diabetic, has chronic essential hypertension, COPD.  She had severe hypoxia requiring ventilatory management.  Dr. Tse from pulmonary has been following.  She also has question about aspiration pneumonia after extubation.  She has had intermittent A. fib with RVR.  She was placed on IV amiodarone, she has been ordered to oral at this point.   She is also suspected to have ARDS.   yesterday diuril was given and she was placed on a Lasix drip.  She is off these today, and seems somewhat improved.     She has required continuous BiPAP.     She is mildly better today.  She is being transitioned over to high flow oxygen.  She denies any chest pressure, nausea, vomiting, or pain.  She is short of breath though.  She is also very weak.  She is to participate with PT and OT.  She has had recurrent aspiration as well, speech therapy to see her again.  Chest x-ray was reviewed and is improving.      Review of Systems:     All systems were reviewed and negative except for:  Respiratory: positive for  shortness of air    Objective     Vital Signs:    Temp:  [97.1 °F (36.2 °C)-98 °F  (36.7 °C)] 97.7 °F (36.5 °C)  Heart Rate:  [] 82  Resp:  [18-31] 22  BP: (117-162)/(53-95) 132/60  FiO2 (%):  [90 %-100 %] 90 %    Physical Exam:      General Appearance:    Alert, cooperative, in no acute distress   Head:    Normocephalic, without obvious abnormality, atraumatic   Eyes:            Lids and lashes normal, conjunctivae and sclerae normal, no   icterus, no pallor, corneas clear, PERRLA   Ears:    Ears appear intact with no abnormalities noted   Throat:   No oral lesions, no thrush, oral mucosa moist   Neck:   No adenopathy, supple, trachea midline, no thyromegaly, no     carotid bruit, no JVD   Back:     No kyphosis present, no scoliosis present, no skin lesions,       erythema or scars, no tenderness to percussion or                   palpation,   range of motion normal   Lungs:     Rhonchi bilaterally without uses accessory muscles respiration.      Heart:    Regular rhythm and normal rate, normal S1 and S2, no            murmur, no gallop, no rub, no click   Breast Exam:    Deferred   Abdomen:     Normal bowel sounds, no masses, no organomegaly, soft        non-tender, non-distended, no guarding, no rebound                 tenderness   Genitalia:    Deferred   Extremities:   Moves all extremities with 4/5 strength, no edema, no cyanosis, no redness   Pulses:   Pulses palpable and equal bilaterally   Skin:   No bleeding, bruising or rash   Lymph nodes:   No palpable adenopathy   Neurologic:   Cranial nerves 2 - 12 grossly intact, sensation intact, DTR        present and equal bilaterally        Results Review:      I reviewed the patient's new clinical results.    Labs:    Lab Results (last 24 hours)     Procedure Component Value Units Date/Time    Blood Culture With OSMEL - Blood, [[id number]] Collected:  07/25/18 0747    Specimen:  Blood from Arm, Left Updated:  07/25/18 0753    Blood Culture With OSMEL - Blood, [[id number]] Collected:  07/25/18 0740    Specimen:  Blood from Arm, Left Updated:   07/25/18 0753    C-reactive Protein [[id number]]  (Abnormal) Collected:  07/25/18 0431    Specimen:  Blood Updated:  07/25/18 0741     C-Reactive Protein 3.60 (H) mg/dL     Blood Gas, Arterial With Co-Ox [[id number]]  (Abnormal) Collected:  07/25/18 0718    Specimen:  Arterial Blood Updated:  07/25/18 0718     Site Right Radial     Toy's Test Positive     pH, Arterial 7.444 pH units      pCO2, Arterial 39.0 mm Hg      pO2, Arterial 82.2 mm Hg      HCO3, Arterial 26.7 mmol/L      Base Excess, Arterial 2.5 (H) mmol/L      O2 Saturation, Arterial 96.4 %      Hemoglobin, Blood Gas 11.3 (L) g/dL      Hematocrit, Blood Gas 34.7 %      Oxyhemoglobin 94.5 %      Methemoglobin 0.80 %      Carboxyhemoglobin 1.2 %      Barometric Pressure for Blood Gas 734 mmHg      Modality BiPap     FIO2 90 %      Ventilator Mode NA     Collected by bg     pH, Temp Corrected -- pH Units      pCO2, Temperature Corrected -- mm Hg      pO2, Temperature Corrected -- mm Hg     POC Glucose Once [[id number]]  (Abnormal) Collected:  07/25/18 0631    Specimen:  Blood Updated:  07/25/18 0635     Glucose 197 (H) mg/dL      Comment: Serial Number: [id number]:  [id number]       Urine Culture - Urine, [[id number]]  (Abnormal) Collected:  07/22/18 0806    Specimen:  Urine from Urine, Clean Catch Updated:  07/25/18 0614     Urine Culture >100,000 CFU/mL Yeast, Not Candida albicans (A)    Renal Function Panel [[id number]]  (Abnormal) Collected:  07/25/18 0431    Specimen:  Blood Updated:  07/25/18 0537     Glucose 237 (H) mg/dL      BUN 28 (H) mg/dL      Creatinine 0.80 mg/dL      Sodium 140 mmol/L      Potassium 3.5 mmol/L      Chloride 104 mmol/L      CO2 29.0 mmol/L      Calcium 8.9 mg/dL      Albumin 3.10 (L) g/dL      Phosphorus 5.2 (H) mg/dL      Anion Gap 10.5 mmol/L      BUN/Creatinine Ratio 35.0 (H)     eGFR Non African Amer 72 mL/min/1.73     Narrative:       GFR Normal >60  Chronic Kidney Disease <60  Kidney Failure <15    Magnesium [[id number]]   (Normal) Collected:  07/25/18 0431    Specimen:  Blood Updated:  07/25/18 0537     Magnesium 2.1 mg/dL     CBC & Differential [[id number]] Collected:  07/25/18 0431    Specimen:  Blood Updated:  07/25/18 0506    Narrative:       The following orders were created for panel order CBC & Differential.  Procedure                               Abnormality         Status                     ---------                               -----------         ------                     CBC Auto Differential[[id number]]        Abnormal            Edited Result - FINAL        Please view results for these tests on the individual orders.    CBC Auto Differential [[id number]]  (Abnormal) Collected:  07/25/18 0431    Specimen:  Blood Updated:  07/25/18 0506     WBC 25.37 (C) 10*3/mm3      Comment: Corrected result. Previous result was 24.85 10*3/mm3 on 7/25/2018 at [facility] EDT.        RBC 4.43 10*6/mm3      Comment: Corrected result. Previous result was 4.37 10*6/mm3 on 7/25/2018 at [facility] EDT.        Hemoglobin 11.7 (L) g/dL      Comment: Corrected result. Previous result was 11.5 g/dL on 7/25/2018 at [facility] EDT.        Hematocrit 36.9 (L) %      Comment: Corrected result. Previous result was 37.0 % on 7/25/2018 at [facility] EDT.        MCV 83.3 fL      Comment: Corrected result. Previous result was 84.7 fL on 7/25/2018 at [facility] EDT.        MCH 26.4 (L) pg      Comment: Corrected result. Previous result was 26.3 pg on 7/25/2018 at [facility] EDT.        MCHC 31.7 g/dL      Comment: Corrected result. Previous result was 31.1 g/dL on 7/25/2018 at [facility] EDT.        RDW 15.4 (H) %      Comment: Corrected result. Previous result was 15.2 % on 7/25/2018 at [facility] EDT.        RDW-SD 46.2 fl      Comment: Corrected result. Previous result was 46.0 fl on 7/25/2018 at [facility] EDT.        MPV 10.3 fL      Comment: Corrected result. Previous result was 10.0 fL on 7/25/2018 at [facility] EDT.        Platelets 377 10*3/mm3      Comment: Corrected result. Previous result was 343  10*3/mm3 on 7/25/2018 at 0457 EDT.        Neutrophil % 89.4 (H) %      Comment: Corrected result. Previous result was 89.7 % on 7/25/2018 at 0457 EDT.        Lymphocyte % 3.9 (L) %      Comment: Corrected result. Previous result was 3.8 % on 7/25/2018 at 0457 EDT.        Monocyte % 5.8 %      Comment: Corrected result. Previous result was 5.5 % on 7/25/2018 at 0457 EDT.        Eosinophil % 0.1 %      Comment: Corrected result. Previous result was 0.0 % on 7/25/2018 at 0457 EDT.        Basophil % 0.1 %      Comment: Corrected result. Previous result was 0.2 % on 7/25/2018 at 0457 EDT.        Immature Grans % 0.7 (H) %      Comment: Corrected result. Previous result was 0.8 % on 7/25/2018 at 0457 EDT.        Neutrophils, Absolute 22.67 (H) 10*3/mm3      Comment: Corrected result. Previous result was 22.29 10*3/mm3 on 7/25/2018 at 0457 EDT.        Lymphocytes, Absolute 0.99 10*3/mm3      Comment: Corrected result. Previous result was 0.95 10*3/mm3 on 7/25/2018 at 0457 EDT.        Monocytes, Absolute 1.48 (H) 10*3/mm3      Comment: Corrected result. Previous result was 1.36 10*3/mm3 on 7/25/2018 at 0457 EDT.        Eosinophils, Absolute 0.02 10*3/mm3      Comment: Corrected result. Previous result was 0.01 10*3/mm3 on 7/25/2018 at 0457 EDT.        Basophils, Absolute 0.03 10*3/mm3      Comment: Corrected result. Previous result was 0.05 10*3/mm3 on 7/25/2018 at 0457 EDT.        Immature Grans, Absolute 0.18 (H) 10*3/mm3      Comment: Corrected result. Previous result was 0.19 10*3/mm3 on 7/25/2018 at 0457 EDT.        nRBC 0.0 /100 WBC     POC Glucose Once [[id number]]  (Abnormal) Collected:  07/24/18 2208    Specimen:  Blood Updated:  07/24/18 2210     Glucose 211 (H) mg/dL      Comment: Serial Number: [id number]:  [id number]       POC Glucose Once [[id number]]  (Abnormal) Collected:  07/24/18 1727    Specimen:  Blood Updated:  07/24/18 1740     Glucose 227 (H) mg/dL      Comment: Serial Number: [id number]:   [id number]       POC Glucose Once [[id number]]  (Abnormal) Collected:  07/24/18 1122    Specimen:  Blood Updated:  07/24/18 1136     Glucose 200 (H) mg/dL      Comment: Serial Number: [id number]:  [id number]              Radiology:    Imaging Results (last 24 hours)     Procedure Component Value Units Date/Time    XR Chest 1 View [[id number]] Collected:  07/25/18 0833     Updated:  07/25/18 0835    Narrative:       PROCEDURE: XR CHEST 1 VW-     HISTORY: Ac Resp Failure.; J18.9-Pneumonia, unspecified organism;  J96.21-Acute and chronic respiratory failure with hypoxia; Z74.09-Other  reduced mobility; Z74.09-Other reduced mobility     COMPARISON: July 24, 2018.     FINDINGS: A right PICC is in place with the tip at the caval atrial  junction. The heart is normal in size. The mediastinum is unremarkable.  There is diffuse bilateral airspace disease which is essentially  unchanged. There is no pneumothorax.  There are no acute osseous  abnormalities.           Impression:       No significant change in the appearance of the chest.     Continued followup is recommended.     This report was finalized on 7/25/2018 8:33 AM by Joseph Ba M.D..    XR Chest 1 View [[id number]] Collected:  07/24/18 1816     Updated:  07/24/18 1817    Narrative:       FINAL REPORT    CLINICAL HISTORY:  SOA/CHEST PAIN    COMPARISON:  July 24, 2018 at 0657  hours    FINDINGS:  the lungs are underinflated. Heart size is within normal limits.  Stable right PICC with the tip in the upper right atrium. There  is improvement in the extensive bilateral interstitial edema. No  evidence of a concerning effusion. No pneumothorax. No acute  bony abnormality.      Impression:       Improved aeration of the lungs. There continues to be extensive  parenchymal disease bilaterally. Followup is recommended.    Authenticated by Isidoro Daily MD on 07/24/2018 06:16:30 PM          Medication Review:       amiodarone 200 mg Oral Q24H   apixaban 5 mg Oral  Q12H   arformoterol 15 mcg Nebulization BID - RT   atorvastatin 20 mg Oral Daily   budesonide 1 mg Nebulization BID   buPROPion 75 mg Oral Q12H   calcitonin (salmon) 1 spray Alternating Nares Daily   clotrimazole-betamethasone 1 application Topical Q12H   famotidine 20 mg Oral BID   fluticasone 2 spray Each Nare Daily   gabapentin 300 mg Oral BID   insulin aspart 0-7 Units Subcutaneous 4x Daily AC & at Bedtime   insulin detemir 10 Units Subcutaneous Q12H   ipratropium-albuterol 3 mL Nebulization Q4H - RT   lactobacillus acidophilus 1 capsule Oral BID   lisinopril 10 mg Oral Q24H   magnesium oxide 200 mg Oral Daily   methylPREDNISolone sodium succinate 40 mg Intravenous Q12H   mupirocin  Topical Q12H   nystatin 5 mL Oral 4x Daily   sertraline 100 mg Oral Daily   sodium chloride 250 mL Intravenous Once   vancomycin 1,250 mg Intravenous Q12H   cholecalciferol 2,000 Units Oral Daily         Pharmacy to dose vancomycin        Assessment/Plan     Problem List Items Addressed This Visit     Pneumonia of both lungs due to infectious organism - Primary    Relevant Medications    albuterol (PROAIR RESPICLICK) 108 (90 BASE) MCG/ACT inhaler    ipratropium-albuterol (DUO-NEB) 0.5-2.5 mg/mL nebulizer    flunisolide (NASALIDE) 25 MCG/ACT (0.025%) solution nasal spray    arformoterol (BROVANA) 15 MCG/2ML nebulizer solution    budesonide (PULMICORT) 0.5 MG/2ML nebulizer solution      Other Visit Diagnoses     Acute on chronic respiratory failure with hypoxia (CMS/HCC)        Impaired functional mobility, balance, gait, and endurance        Impaired mobility and ADLs              1. Multifocal bilateral pneumonia, prior to admission, with MRSA, Improved   2. Dysphagia with suspected aspiration pneumonitis  3. Acute hypoxic respiratory failure severe, Previously requiring intubation and placement on mechanical ventilator 7-14-18 with  ARDS, now on BiPAP  4. Recurrent Afib with RVR, now on IV  amiodarone.  5. Sepsis, secondary to  pneumonia, prior to admission, with MRSA, treated with vancomycin  6. Diabetes mellitus type 2 insulin dependent  7. Chronic essential hypertension  8. COPD moderate  9. Abnormal CT chest with lymphadenopathy, repeat outpatient CT scan chest   10. Hypomagnesemia, replaced    Plan:    Patient has been fully treated for the MRSA pneumonia. Vancomycin will be discontinued.  Attempts are being made to wean her to high flow oxygen.  She is still on 90% FiO2.  She has been transitioned over to oral amiodarone.  Monitor her blood sugar, and her blood pressure.  Patient is now off Lasix drip.  She could attempt oral intake and she remains off BiPAP.  We'll check lab work in the a.m.  [facility] is evaluating her, hopefully she can go there in the next few days.  Discussed the case with Dr. Farias as well as Dr. Tse.  Further recommendations will depend on clinical course.    Rock Guzman DO  07/25/18  11:11 AM

## 2018-07-25 NOTE — PROGRESS NOTES
Adult Nutrition  Assessment/PES    Patient Name:  Breann Gallegos  YOB: 1950  MRN: [id number]  Admit Date:  7/13/2018    Assessment Date:  7/25/2018    Comments:    Recommend:  1. Continue current diet order as medically appropriate/tolerated based on SLP eval.  2. Encourage PO intake. PO intake 50% x 1 meal.  3. RD ordered Ensure Pudding BID.  4. Consider MVI with minerals daily.  5. Monitor and replace electrolytes as necessary.     RD available PRN.        Reason for Assessment     Row Name 07/25/18 1404          Reason for Assessment    Reason For Assessment follow-up protocol;physician consult     Diagnosis diabetes diagnosis/complications;cardiac disease;pulmonary disease;gastrointestinal disease   COPD, DM Type 2, Respiratory Failure, Intubated     Identified At Risk by Screening Criteria difficulty chewing/swallowing             Nutrition/Diet History     Row Name 07/25/18 1410          Nutrition/Diet History    Food Allergies fish/shellfish   Fish-derived products               Labs/Tests/Procedures/Meds     Row Name 07/25/18 1404          Labs/Procedures/Meds    Lab Results Reviewed reviewed, pertinent     Lab Results Comments Low: Alb High: Gluc, BUN, Phos, CRP        Medications    Pertinent Medications Reviewed reviewed             Physical Findings             Estimated/Assessed Needs     Row Name 07/25/18 1406          Calculation Measurements    Weight Used For Calculations 73.2 kg (161 lb 6 oz)        KCAL/KG    14 Kcal/Kg (kcal) 1024.8     15 Kcal/Kg (kcal) 1098     18 Kcal/Kg (kcal) 1317.6     20 Kcal/Kg (kcal) 1464     25 Kcal/Kg (kcal) 1830     30 Kcal/Kg (kcal) 2196     35 Kcal/Kg (kcal) 2562     40 Kcal/Kg (kcal) 2928     45 Kcal/Kg (kcal) 3294     50 Kcal/Kg (kcal) 3660        Gerlaw-St. Jeor Equation    RMR (Gerlaw-St. Jeor Equation) 1252        Fluid Requirements    Kasie-Segar Method (over 20 kg) 2964             Nutrition Prescription Ordered     Row Name  07/25/18 1405          Nutrition Prescription PO    Current PO Diet Pureed     Fluid Consistency Nectar/syrup thick     Common Modifiers Consistent Carbohydrate        Nutrition Prescription EN    Enteral Route --     Product --             Evaluation of Received Nutrient/Fluid Intake     Row Name 07/25/18 1406          Calculation Measurements    Weight Used For Calculations 73.2 kg (161 lb 6 oz)        PO Evaluation    Number of Days PO Intake Evaluated 1 day     Number of Meals 1     % PO Intake 50             Evaluation of Prescribed Nutrient/Fluid Intake     Row Name 07/25/18 1406          Calculation Measurements    Weight Used For Calculations 73.2 kg (161 lb 6 oz)           Problem/Interventions:        Problem 1     Row Name 07/25/18 1407          Nutrition Diagnoses Problem 1    Problem 1 Impaired Nutrient Utilization     Etiology (related to) Medical Diagnosis     Endocrine DM2     Signs/Symptoms (evidenced by) Biochemical     Specific Labs Noted Glucose             Problem 2     Row Name 07/25/18 1407          Nutrition Diagnoses Problem 2    Problem 2 Increased Nutrient Needs     Macronutrient Kcal;Fluid;Protein     Micronutrient Vitamin;Mineral     Etiology (related to) Medical Diagnosis     Pulmonary/Critical Care COPD;Acute respiratory failure;Other (comment)   hypoxia     Signs/Symptoms (evidenced by) Other (comment)   Pulmonary dysfunction                   Intervention Goal     Row Name 07/25/18 1408          Intervention Goal    General Meet nutritional needs for age/condition     PO Meet estimated needs;PO intake (%)     PO Intake % 75 %     Weight Maintain weight             Nutrition Intervention     Row Name 07/25/18 1408          Nutrition Intervention    RD/Tech Action Follow Tx progress;Encourage intake;Recommend/ordered     Recommended/Ordered Supplement             Nutrition Prescription     Row Name 07/25/18 1408          Nutrition Prescription PO    PO Prescription Other  (comment);Begin/change supplement   Continue current diet order     Supplement Ensure Pudding     Supplement Frequency 2 times a day     New PO Prescription Ordered? Yes        Other Orders    Obtain Weight Daily     Obtain Weight Ordered? No, recommended     Supplement Vitamin mineral supplement     Supplement Ordered? No, recommended             Education/Evaluation     Row Name 07/25/18 1409          Education    Education Education not appropriate at this time     Please explain Other (comment)   Pt to discharge to LT[facility]        Monitor/Evaluation    Monitor Per protocol;I&O;PO intake;Supplement intake;Pertinent labs;Weight;Skin status         Electronically signed by:  Shannan Dahl RD  07/25/18 2:11 PM

## 2018-07-25 NOTE — PROGRESS NOTES
"  CC: Acute respiratory failure    S: Currently on BiPAP. Is awake and alert.     O:Vital signs reviewed. O2Sat: 97% on 90%.  Picc Line. Day # 11  /70   Pulse 76   Temp 97.7 °F (36.5 °C) (Axillary)   Resp 22   Ht 162.6 cm (64\")   Wt 75.3 kg (166 lb 1.6 oz)   LMP  (LMP Unknown)   SpO2 97%   BMI 28.51 kg/m²     Temp (24hrs), Av.7 °F (36.5 °C), Min:97.1 °F (36.2 °C), Max:98 °F (36.7 °C)      I & Os reviewed.   Intake/Output       18 0700 - 18 0659    Intake (ml) 1647    Output (ml) 3625    Net (ml) -          General: On BiPAP.  Eyes: PERRL.   Neck: Supple. +ve JVD  Cardiovascular: S1 + S2. Irregular but not tachycardic at this time.   Respiratory: Transmitted Breath sounds noted. No wheezing heard. Bilateral crackles noted  Abdomen: Soft. Bowel sounds positive   Extremities: No extremity edema noted.  Neurologic: Awake. Alert. Oriented. Able to follow simple commands.   Skin: Appeared without any overt rashes    Labs: Reviewed.     Results from last 7 days  Lab Units 18  0431 18  0503 18  0423  18  0413 18  0430 18  0414   SODIUM mmol/L 140 139 139  < > 145 141 144   POTASSIUM mmol/L 3.5 4.1 4.2  < > 3.6 4.2 4.1   CHLORIDE mmol/L 104 101 104  < > 109* 101 101   CO2 mmol/L 29.0 30.0 30.0  < > 30.0 35.0* 36.0*   BUN mg/dL 28* 32* 22*  < > 37* 35* 35*   CREATININE mg/dL 0.80 0.60 0.50*  < > 0.50* 0.50* 0.60   CALCIUM mg/dL 8.9 8.7 8.5  < > 8.8 8.6 9.0   BILIRUBIN mg/dL  --   --   --   --  0.5 0.8 0.8   ALK PHOS U/L  --   --   --   --  90 101 117   ALT (SGPT) U/L  --   --   --   --  55 64 64   AST (SGOT) U/L  --   --   --   --  30 42 56*   GLUCOSE mg/dL 237* 278* 183*  < > 188* 212* 98   < > = values in this interval not displayed.      Results from last 7 days  Lab Units 18  04318  0503 07/22/18  0414   MAGNESIUM mg/dL 2.1 2.0 1.8   PHOSPHORUS mg/dL 5.2* 4.5  --            Results from last 7 days  Lab Units 18  0503 " 07/23/18  0423 07/22/18  0414 07/21/18  0413   WBC 10*3/mm3 25.37* 22.98* 17.16* 27.98* 17.57*   HEMOGLOBIN g/dL 11.7* 10.9* 9.8* 12.7 11.6*   PLATELETS 10*3/mm3 377 281 242 456* 415*       amiodarone 0.5 mg/min Last Rate: 0.5 mg/min (07/24/18 2239)   Pharmacy to dose vancomycin         ABG: Ordered.    Lab Results   Component Value Date    PHART 7.444 07/25/2018    TWF2ALB 39.0 07/25/2018    PO2ART 82.2 07/25/2018    HGBBG 11.3 (L) 07/25/2018    K3UIHAIX 96.4 07/25/2018    CARBOXYHGB 1.2 07/25/2018         CXRay: Reviewed personally.  Continued improvement noted.    Imaging Results (last 24 hours)     Procedure Component Value Units Date/Time    XR Chest 1 View [[id number]] Updated:  07/25/18 0527    XR Chest 1 View [[id number]] Collected:  07/24/18 1816     Updated:  07/24/18 1817    Narrative:       FINAL REPORT    CLINICAL HISTORY:  SOA/CHEST PAIN    COMPARISON:  July 24, 2018 at 0657  hours    FINDINGS:  the lungs are underinflated. Heart size is within normal limits.  Stable right PICC with the tip in the upper right atrium. There  is improvement in the extensive bilateral interstitial edema. No  evidence of a concerning effusion. No pneumothorax. No acute  bony abnormality.      Impression:       Improved aeration of the lungs. There continues to be extensive  parenchymal disease bilaterally. Followup is recommended.    Authenticated by Isidoro Daily MD on 07/24/2018 06:16:30 PM    XR Chest 1 View [[id number]] Collected:  07/24/18 0921     Updated:  07/24/18 0923    Narrative:       PROCEDURE: XR CHEST 1 VW-     HISTORY: PNA/ARDS; J18.9-Pneumonia, unspecified organism; J96.21-Acute  and chronic respiratory failure with hypoxia; Z74.09-Other reduced  mobility; Z74.09-Other reduced mobility     COMPARISON: July 23, 2018.     FINDINGS: A right PICC is in place with the tip in the distal SVC. The  heart is normal in size. The mediastinum is unremarkable. There has been  no significant change in the patients  diffuse bilateral airspace  disease. There is no pneumothorax.  There are no acute osseous  abnormalities.           Impression:       No change in the patients diffuse bilateral airspace  disease.     Continued followup is recommended.     This report was finalized on 7/24/2018 9:21 AM by Joseph Ba M.D..            Assessment & Recommendations/Plan:   1.  Acute hypoxemic respiratory failure  - Currently on BiPAP.   - Since ABG shows some improvement in P/F ratio, we will try Hi Adonis NC.    2.  Multifocal pneumonia  - Sputum cultures have revealed MRSA  - On IV vancomycin.  - Will d/c Vanc, once her clinical situation improves.  - Leukocytosis is likely due to continued steroids.    3.  Likely ARDS versus hypersensitivity pneumonitis  - We'll continue IV steroids.  - the dose of steroids was decreased on 7/24/18    4.  Lymphadenopathy  - Likely reactive.  - We will need outpatient follow-up    5.  COPD  - On nebulized treatments    6.  Possible component of pulmonary edema/Fluid overload?  - Continue to monitor strict I's and O's  - BNP on 7/23 was 1580.  - Will consider Lasix drip again today.  - Also will consider Diuril.    7. AFib with RVR.  - Cardio on board.     CXRay will be ordered for tomorrow AM.    Her condition is critical and prognosis guarded at this time.    NG was attempted multiple times yesterday. Will recommend trying OG or NG under Flouro guidance.     TF to be managed by dietician. Will recommend Bolus over continous feeds, as this patient is not on ventilator and is not on sedation.     We have reviewed patient's current orders and changes, if any, have been suggested to primary care team. Plan was also discussed with nursing staff, as necessary.     D/W Dr. Mcleod.      Yamilex Tse MD  07/25/18  7:46 AM    Dictated utilizing Dragon dictation.

## 2018-07-25 NOTE — SIGNIFICANT NOTE
07/25/18 1119   Rehab Treatment   Discipline occupational therapist   Reason Treatment Not Performed other (see comments)  (Pt on hold per RN d/t O2 sats dropping with minimal activity.  Pt is being d/c to LTAC today.)

## 2018-07-25 NOTE — PLAN OF CARE
Problem: NPPV/CPAP (Adult)  Intervention: Monitor and Manage Anxiety Related to NPPV/CPAP   07/25/18 1912   Interventions   Trust Relationship/Rapport care explained;choices provided       Goal: Signs and Symptoms of Listed Potential Problems Will be Absent, Minimized or Managed (NPPV/CPAP)  Outcome: Ongoing (interventions implemented as appropriate)   07/25/18 1912   Goal/Outcome Evaluation   Problems Assessed (NPPV/CPAP) hypoxia/hypoxemia;skin breakdown;situational response;dry mucous membranes   Problems Present (NPPV/CPAP) none

## 2018-07-25 NOTE — PLAN OF CARE
Problem: Patient Care Overview  Goal: Plan of Care Review  Outcome: Ongoing (interventions implemented as appropriate)   07/25/18 3242   Coping/Psychosocial   Plan of Care Reviewed With patient   Plan of Care Review   Progress improving       Problem: NPPV/CPAP (Adult)  Goal: Signs and Symptoms of Listed Potential Problems Will be Absent, Minimized or Managed (NPPV/CPAP)  Outcome: Ongoing (interventions implemented as appropriate)   07/25/18 1889   Goal/Outcome Evaluation   Problems Assessed (NPPV/CPAP) situational response;skin breakdown;dry mucous membranes   Problems Present (NPPV/CPAP) none

## 2018-07-26 ENCOUNTER — APPOINTMENT (OUTPATIENT)
Dept: GENERAL RADIOLOGY | Facility: HOSPITAL | Age: 68
End: 2018-07-26

## 2018-07-26 ENCOUNTER — ANESTHESIA EVENT (OUTPATIENT)
Dept: ICU | Facility: HOSPITAL | Age: 68
End: 2018-07-26

## 2018-07-26 ENCOUNTER — ANESTHESIA (OUTPATIENT)
Dept: ICU | Facility: HOSPITAL | Age: 68
End: 2018-07-26

## 2018-07-26 LAB
ALBUMIN SERPL-MCNC: 3.3 G/DL (ref 3.5–5)
ALBUMIN/GLOB SERPL: 1.1 G/DL (ref 1–2)
ALP SERPL-CCNC: 131 U/L (ref 38–126)
ALT SERPL W P-5'-P-CCNC: 42 U/L (ref 13–69)
ANION GAP SERPL CALCULATED.3IONS-SCNC: 12 MMOL/L (ref 10–20)
ARTERIAL PATENCY WRIST A: POSITIVE
ARTERIAL PATENCY WRIST A: POSITIVE
AST SERPL-CCNC: 31 U/L (ref 15–46)
ATMOSPHERIC PRESS: 733 MMHG
ATMOSPHERIC PRESS: 734 MMHG
BASE EXCESS BLDA CALC-SCNC: 3 MMOL/L (ref 0–2)
BASE EXCESS BLDA CALC-SCNC: 3.1 MMOL/L (ref 0–2)
BASOPHILS # BLD AUTO: 0.04 10*3/MM3 (ref 0–0.2)
BASOPHILS NFR BLD AUTO: 0.2 % (ref 0–2.5)
BDY SITE: ABNORMAL
BDY SITE: ABNORMAL
BILIRUB SERPL-MCNC: 1.2 MG/DL (ref 0.2–1.3)
BUN BLD-MCNC: 27 MG/DL (ref 7–20)
BUN/CREAT SERPL: 30 (ref 7.1–23.5)
CALCIUM SPEC-SCNC: 8.7 MG/DL (ref 8.4–10.2)
CHLORIDE SERPL-SCNC: 105 MMOL/L (ref 98–107)
CO2 SERPL-SCNC: 28 MMOL/L (ref 26–30)
COHGB MFR BLD: 1.3 % (ref 0–2)
COHGB MFR BLD: 1.5 % (ref 0–2)
CREAT BLD-MCNC: 0.9 MG/DL (ref 0.6–1.3)
DEPRECATED RDW RBC AUTO: 48.8 FL (ref 37–54)
EOSINOPHIL # BLD AUTO: 0.19 10*3/MM3 (ref 0–0.7)
EOSINOPHIL NFR BLD AUTO: 0.8 % (ref 0–7)
EPAP: 12
ERYTHROCYTE [DISTWIDTH] IN BLOOD BY AUTOMATED COUNT: 15.7 % (ref 11.5–14.5)
GFR SERPL CREATININE-BSD FRML MDRD: 62 ML/MIN/1.73
GLOBULIN UR ELPH-MCNC: 3 GM/DL
GLUCOSE BLD-MCNC: 165 MG/DL (ref 74–98)
GLUCOSE BLDC GLUCOMTR-MCNC: 126 MG/DL (ref 70–130)
GLUCOSE BLDC GLUCOMTR-MCNC: 215 MG/DL (ref 70–130)
GLUCOSE BLDC GLUCOMTR-MCNC: 244 MG/DL (ref 70–130)
HCO3 BLDA-SCNC: 26.3 MMOL/L (ref 22–28)
HCO3 BLDA-SCNC: 26.9 MMOL/L (ref 22–28)
HCT VFR BLD AUTO: 41.5 % (ref 37–47)
HCT VFR BLD CALC: 39.5 %
HCT VFR BLD CALC: 39.6 %
HGB BLD-MCNC: 12.8 G/DL (ref 12–16)
HGB BLDA-MCNC: 12.9 G/DL (ref 12–18)
HGB BLDA-MCNC: 12.9 G/DL (ref 12–18)
HOROWITZ INDEX BLD+IHG-RTO: 100 %
HOROWITZ INDEX BLD+IHG-RTO: 100 %
IMM GRANULOCYTES # BLD: 0.26 10*3/MM3 (ref 0–0.06)
IMM GRANULOCYTES NFR BLD: 1.1 % (ref 0–0.6)
IPAP: 16
LYMPHOCYTES # BLD AUTO: 2.41 10*3/MM3 (ref 0.6–3.4)
LYMPHOCYTES NFR BLD AUTO: 10.3 % (ref 10–50)
Lab: ABNORMAL
Lab: ABNORMAL
MCH RBC QN AUTO: 26.7 PG (ref 27–31)
MCHC RBC AUTO-ENTMCNC: 30.8 G/DL (ref 30–37)
MCV RBC AUTO: 86.5 FL (ref 81–99)
METHGB BLD QL: 0.5 % (ref 0–1.5)
METHGB BLD QL: 0.7 % (ref 0–1.5)
MODALITY: ABNORMAL
MODALITY: ABNORMAL
MONOCYTES # BLD AUTO: 1.48 10*3/MM3 (ref 0–0.9)
MONOCYTES NFR BLD AUTO: 6.3 % (ref 0–12)
NEUTROPHILS # BLD AUTO: 19.13 10*3/MM3 (ref 2–6.9)
NEUTROPHILS NFR BLD AUTO: 81.3 % (ref 37–80)
NRBC BLD MANUAL-RTO: 0 /100 WBC (ref 0–0)
OXYHGB MFR BLDV: 86.1 % (ref 94–99)
OXYHGB MFR BLDV: 88.6 % (ref 94–99)
PCO2 BLDA: 35.4 MM HG (ref 35–45)
PCO2 BLDA: 37.2 MM HG (ref 35–45)
PCO2 TEMP ADJ BLD: ABNORMAL MM HG (ref 35–45)
PCO2 TEMP ADJ BLD: ABNORMAL MM HG (ref 35–45)
PEEP RESPIRATORY: 0 CM[H2O]
PH BLDA: 7.47 PH UNITS (ref 7.3–7.5)
PH BLDA: 7.48 PH UNITS (ref 7.3–7.5)
PH, TEMP CORRECTED: ABNORMAL PH UNITS
PH, TEMP CORRECTED: ABNORMAL PH UNITS
PHOSPHATE SERPL-MCNC: 6.1 MG/DL (ref 2.5–4.5)
PLATELET # BLD AUTO: 312 10*3/MM3 (ref 130–400)
PMV BLD AUTO: 10.5 FL (ref 6–12)
PO2 BLDA: 54.1 MM HG (ref 75–100)
PO2 BLDA: 57.4 MM HG (ref 75–100)
PO2 TEMP ADJ BLD: ABNORMAL MM HG (ref 83–108)
PO2 TEMP ADJ BLD: ABNORMAL MM HG (ref 83–108)
POTASSIUM BLD-SCNC: 4 MMOL/L (ref 3.5–5.1)
PROT SERPL-MCNC: 6.3 G/DL (ref 6.3–8.2)
RBC # BLD AUTO: 4.8 10*6/MM3 (ref 4.2–5.4)
SAO2 % BLDCOA: 87.7 % (ref 94–100)
SAO2 % BLDCOA: 90.6 % (ref 94–100)
SODIUM BLD-SCNC: 141 MMOL/L (ref 137–145)
VENTILATOR MODE: ABNORMAL
VENTILATOR MODE: ABNORMAL
WBC NRBC COR # BLD: 23.51 10*3/MM3 (ref 4.8–10.8)

## 2018-07-26 PROCEDURE — 25010000002 SUCCINYLCHOLINE PER 20 MG: Performed by: NURSE ANESTHETIST, CERTIFIED REGISTERED

## 2018-07-26 PROCEDURE — 84100 ASSAY OF PHOSPHORUS: CPT | Performed by: INTERNAL MEDICINE

## 2018-07-26 PROCEDURE — 82962 GLUCOSE BLOOD TEST: CPT

## 2018-07-26 PROCEDURE — 82375 ASSAY CARBOXYHB QUANT: CPT

## 2018-07-26 PROCEDURE — 25010000002 LORAZEPAM PER 2 MG: Performed by: INTERNAL MEDICINE

## 2018-07-26 PROCEDURE — 25010000002 PROPOFOL 10 MG/ML EMULSION: Performed by: NURSE ANESTHETIST, CERTIFIED REGISTERED

## 2018-07-26 PROCEDURE — 36600 WITHDRAWAL OF ARTERIAL BLOOD: CPT

## 2018-07-26 PROCEDURE — 63710000001 INSULIN ASPART PER 5 UNITS: Performed by: FAMILY MEDICINE

## 2018-07-26 PROCEDURE — 85025 COMPLETE CBC W/AUTO DIFF WBC: CPT | Performed by: INTERNAL MEDICINE

## 2018-07-26 PROCEDURE — 25010000002 AMIODARONE IN DEXTROSE 5% 360-4.14 MG/200ML-% SOLUTION: Performed by: INTERNAL MEDICINE

## 2018-07-26 PROCEDURE — 71045 X-RAY EXAM CHEST 1 VIEW: CPT

## 2018-07-26 PROCEDURE — 82805 BLOOD GASES W/O2 SATURATION: CPT

## 2018-07-26 PROCEDURE — 74018 RADEX ABDOMEN 1 VIEW: CPT

## 2018-07-26 PROCEDURE — 80053 COMPREHEN METABOLIC PANEL: CPT | Performed by: INTERNAL MEDICINE

## 2018-07-26 PROCEDURE — 94660 CPAP INITIATION&MGMT: CPT

## 2018-07-26 PROCEDURE — 25010000002 METHYLPREDNISOLONE PER 40 MG: Performed by: INTERNAL MEDICINE

## 2018-07-26 PROCEDURE — 94799 UNLISTED PULMONARY SVC/PX: CPT

## 2018-07-26 PROCEDURE — 83050 HGB METHEMOGLOBIN QUAN: CPT

## 2018-07-26 PROCEDURE — 99233 SBSQ HOSP IP/OBS HIGH 50: CPT | Performed by: INTERNAL MEDICINE

## 2018-07-26 PROCEDURE — 25010000002 FUROSEMIDE PER 20 MG: Performed by: INTERNAL MEDICINE

## 2018-07-26 PROCEDURE — 63710000001 INSULIN DETEMIR PER 5 UNITS: Performed by: FAMILY MEDICINE

## 2018-07-26 PROCEDURE — 25010000002 PROPOFOL 1000 MG/ML EMULSION: Performed by: INTERNAL MEDICINE

## 2018-07-26 PROCEDURE — 25010000002 AMIODARONE IN DEXTROSE 5% 150-4.21 MG/100ML-% SOLUTION: Performed by: INTERNAL MEDICINE

## 2018-07-26 PROCEDURE — 25010000002 CHLOROTHIAZIDE PER 500 MG: Performed by: INTERNAL MEDICINE

## 2018-07-26 PROCEDURE — 25010000002 ACETAZOLAMIDE PER 500 MG: Performed by: INTERNAL MEDICINE

## 2018-07-26 PROCEDURE — 94002 VENT MGMT INPAT INIT DAY: CPT

## 2018-07-26 PROCEDURE — 25010000002 MORPHINE PER 10 MG: Performed by: INTERNAL MEDICINE

## 2018-07-26 PROCEDURE — 99291 CRITICAL CARE FIRST HOUR: CPT | Performed by: INTERNAL MEDICINE

## 2018-07-26 RX ORDER — ACETAZOLAMIDE 500 MG/5ML
500 INJECTION, POWDER, LYOPHILIZED, FOR SOLUTION INTRAVENOUS ONCE
Status: COMPLETED | OUTPATIENT
Start: 2018-07-26 | End: 2018-07-26

## 2018-07-26 RX ORDER — CHLOROTHIAZIDE SODIUM 500 MG/1
500 INJECTION INTRAVENOUS ONCE
Status: COMPLETED | OUTPATIENT
Start: 2018-07-26 | End: 2018-07-26

## 2018-07-26 RX ORDER — PROPOFOL 10 MG/ML
VIAL (ML) INTRAVENOUS AS NEEDED
Status: DISCONTINUED | OUTPATIENT
Start: 2018-07-26 | End: 2018-07-26 | Stop reason: SURG

## 2018-07-26 RX ORDER — SUCCINYLCHOLINE CHLORIDE 20 MG/ML
INJECTION INTRAMUSCULAR; INTRAVENOUS AS NEEDED
Status: DISCONTINUED | OUTPATIENT
Start: 2018-07-26 | End: 2018-07-26 | Stop reason: SURG

## 2018-07-26 RX ADMIN — FAMOTIDINE 20 MG: 20 TABLET ORAL at 21:44

## 2018-07-26 RX ADMIN — IPRATROPIUM BROMIDE AND ALBUTEROL SULFATE 3 ML: .5; 3 SOLUTION RESPIRATORY (INHALATION) at 20:11

## 2018-07-26 RX ADMIN — AMIODARONE HYDROCHLORIDE 0.5 MG/MIN: 1.8 INJECTION, SOLUTION INTRAVENOUS at 18:21

## 2018-07-26 RX ADMIN — SUCCINYLCHOLINE CHLORIDE 120 MG: 20 INJECTION, SOLUTION INTRAMUSCULAR; INTRAVENOUS at 19:49

## 2018-07-26 RX ADMIN — CLOTRIMAZOLE AND BETAMETHASONE DIPROPIONATE 1 APPLICATION: 10; .5 CREAM TOPICAL at 08:31

## 2018-07-26 RX ADMIN — ACETAZOLAMIDE 500 MG: 500 INJECTION, POWDER, LYOPHILIZED, FOR SOLUTION INTRAVENOUS at 12:22

## 2018-07-26 RX ADMIN — LISINOPRIL 10 MG: 10 TABLET ORAL at 08:32

## 2018-07-26 RX ADMIN — GABAPENTIN 300 MG: 300 CAPSULE ORAL at 08:34

## 2018-07-26 RX ADMIN — ATORVASTATIN CALCIUM 20 MG: 20 TABLET, FILM COATED ORAL at 08:32

## 2018-07-26 RX ADMIN — IPRATROPIUM BROMIDE AND ALBUTEROL SULFATE 3 ML: .5; 3 SOLUTION RESPIRATORY (INHALATION) at 12:45

## 2018-07-26 RX ADMIN — FLUTICASONE PROPIONATE 2 SPRAY: 50 SPRAY, METERED NASAL at 08:31

## 2018-07-26 RX ADMIN — BUPROPION HYDROCHLORIDE 75 MG: 75 TABLET, FILM COATED ORAL at 08:34

## 2018-07-26 RX ADMIN — GABAPENTIN 300 MG: 300 CAPSULE ORAL at 21:44

## 2018-07-26 RX ADMIN — Medication 1 CAPSULE: at 08:32

## 2018-07-26 RX ADMIN — ARFORMOTEROL TARTRATE 15 MCG: 15 SOLUTION RESPIRATORY (INHALATION) at 06:37

## 2018-07-26 RX ADMIN — IPRATROPIUM BROMIDE AND ALBUTEROL SULFATE 3 ML: .5; 3 SOLUTION RESPIRATORY (INHALATION) at 03:21

## 2018-07-26 RX ADMIN — APIXABAN 5 MG: 2.5 TABLET, FILM COATED ORAL at 08:33

## 2018-07-26 RX ADMIN — INSULIN ASPART 3 UNITS: 100 INJECTION, SOLUTION INTRAVENOUS; SUBCUTANEOUS at 17:11

## 2018-07-26 RX ADMIN — BUDESONIDE 1 MG: 0.5 INHALANT RESPIRATORY (INHALATION) at 20:11

## 2018-07-26 RX ADMIN — CALCITONIN SALMON 1 SPRAY: 200 SPRAY, METERED NASAL at 08:31

## 2018-07-26 RX ADMIN — INSULIN DETEMIR 10 UNITS: 100 INJECTION, SOLUTION SUBCUTANEOUS at 21:52

## 2018-07-26 RX ADMIN — AMIODARONE HYDROCHLORIDE 200 MG: 200 TABLET ORAL at 08:33

## 2018-07-26 RX ADMIN — NYSTATIN 500000 UNITS: 100000 SUSPENSION ORAL at 12:21

## 2018-07-26 RX ADMIN — LORAZEPAM 2 MG: 2 INJECTION INTRAMUSCULAR; INTRAVENOUS at 12:21

## 2018-07-26 RX ADMIN — INSULIN ASPART 3 UNITS: 100 INJECTION, SOLUTION INTRAVENOUS; SUBCUTANEOUS at 12:21

## 2018-07-26 RX ADMIN — METHYLPREDNISOLONE SODIUM SUCCINATE 40 MG: 40 INJECTION, POWDER, FOR SOLUTION INTRAMUSCULAR; INTRAVENOUS at 06:09

## 2018-07-26 RX ADMIN — IPRATROPIUM BROMIDE AND ALBUTEROL SULFATE 3 ML: .5; 3 SOLUTION RESPIRATORY (INHALATION) at 15:46

## 2018-07-26 RX ADMIN — APIXABAN 5 MG: 2.5 TABLET, FILM COATED ORAL at 21:44

## 2018-07-26 RX ADMIN — METHYLPREDNISOLONE SODIUM SUCCINATE 40 MG: 40 INJECTION, POWDER, FOR SOLUTION INTRAMUSCULAR; INTRAVENOUS at 17:11

## 2018-07-26 RX ADMIN — NYSTATIN 500000 UNITS: 100000 SUSPENSION ORAL at 08:32

## 2018-07-26 RX ADMIN — BUPROPION HYDROCHLORIDE 75 MG: 75 TABLET, FILM COATED ORAL at 21:44

## 2018-07-26 RX ADMIN — INSULIN DETEMIR 10 UNITS: 100 INJECTION, SOLUTION SUBCUTANEOUS at 08:33

## 2018-07-26 RX ADMIN — FAMOTIDINE 20 MG: 20 TABLET ORAL at 08:34

## 2018-07-26 RX ADMIN — MORPHINE SULFATE 4 MG: 2 INJECTION, SOLUTION INTRAMUSCULAR; INTRAVENOUS at 22:30

## 2018-07-26 RX ADMIN — FUROSEMIDE 100 MG: 10 INJECTION, SOLUTION INTRAMUSCULAR; INTRAVENOUS at 08:31

## 2018-07-26 RX ADMIN — PROPOFOL 100 MG: 10 INJECTION, EMULSION INTRAVENOUS at 19:49

## 2018-07-26 RX ADMIN — VITAMIN D, TAB 1000IU (100/BT) 2000 UNITS: 25 TAB at 08:33

## 2018-07-26 RX ADMIN — AMIODARONE HYDROCHLORIDE 150 MG: 1.5 INJECTION, SOLUTION INTRAVENOUS at 12:21

## 2018-07-26 RX ADMIN — PROPOFOL 5 MCG/KG/MIN: 10 INJECTION, EMULSION INTRAVENOUS at 20:00

## 2018-07-26 RX ADMIN — BUDESONIDE 1 MG: 0.5 INHALANT RESPIRATORY (INHALATION) at 06:37

## 2018-07-26 RX ADMIN — METOPROLOL TARTRATE 25 MG: 25 TABLET ORAL at 10:40

## 2018-07-26 RX ADMIN — METOPROLOL TARTRATE 5 MG: 1 INJECTION, SOLUTION INTRAVENOUS at 07:17

## 2018-07-26 RX ADMIN — Medication 200 MG: at 08:34

## 2018-07-26 RX ADMIN — CHLOROTHIAZIDE SODIUM 500 MG: 500 INJECTION, POWDER, LYOPHILIZED, FOR SOLUTION INTRAVENOUS at 08:32

## 2018-07-26 RX ADMIN — IPRATROPIUM BROMIDE AND ALBUTEROL SULFATE 3 ML: .5; 3 SOLUTION RESPIRATORY (INHALATION) at 06:37

## 2018-07-26 RX ADMIN — SERTRALINE HYDROCHLORIDE 100 MG: 50 TABLET ORAL at 08:32

## 2018-07-26 RX ADMIN — AMIODARONE HYDROCHLORIDE 1 MG/MIN: 1.8 INJECTION, SOLUTION INTRAVENOUS at 12:43

## 2018-07-26 RX ADMIN — Medication 1 CAPSULE: at 21:44

## 2018-07-26 NOTE — PLAN OF CARE
Problem: Pneumonia (Adult)  Intervention: Maximize Oxygenation/Ventilation/Perfusion   07/26/18 1527 07/26/18 1539   Positioning   Head of Bed (HOB) HOB at 30-45 degrees --    Respiratory Interventions   Airway/Ventilation Management --  airway patency maintained;calming measures promoted;pulmonary hygiene promoted;humidification applied     Intervention: Prevent/Manage Infection Progression   07/26/18 1400 07/26/18 1539   Safety Interventions   Infection Management --  aseptic technique maintained   Prevent/Manage Colorectal Surgical Infection   Fever Reduction/Comfort Measures --  lightweight clothing;lightweight bedding   Safety Management   Infection Prevention environmental surveillance performed;equipment surfaces disinfected;personal protective equipment utilized;rest/sleep promoted;single patient room provided --    Promote Perineal Hygiene/Elimination Safety   Isolation Precautions contact precautions maintained --      Intervention: Monitor/Manage Fluid Electrolyte Balance   07/26/18 0802   Nutrition Interventions   Fluid/Electrolyte Management oral rehydration therapy initiated       Goal: Signs and Symptoms of Listed Potential Problems Will be Absent, Minimized or Managed (Pneumonia)  Outcome: Ongoing (interventions implemented as appropriate)   07/26/18 1539   Goal/Outcome Evaluation   Problems Assessed (Pneumonia) all   Problems Present (Pneumonia) respiratory compromise       Problem: NPPV/CPAP (Adult)  Intervention: Monitor and Manage Anxiety Related to NPPV/CPAP   07/26/18 0802 07/26/18 1527   Interventions   Trust Relationship/Rapport care explained;choices provided;emotional support provided;empathic listening provided;questions answered;questions encouraged;reassurance provided;thoughts/feelings acknowledged --    Safety Management   Medication Review/Management --  medications reviewed;infusion initiated;infusion titrated;high risk medications identified;provider consulted       Goal: Signs and  Symptoms of Listed Potential Problems Will be Absent, Minimized or Managed (NPPV/CPAP)  Outcome: Ongoing (interventions implemented as appropriate)   07/26/18 0733   Goal/Outcome Evaluation   Problems Assessed (NPPV/CPAP) all   Problems Present (NPPV/CPAP) dry mucous membranes;hypoxia/hypoxemia;situational response

## 2018-07-26 NOTE — PROGRESS NOTES
Adult Nutrition  Assessment/PES    Patient Name:  Breann Gallegos  YOB: 1950  MRN: [id number]  Admit Date:  7/13/2018    Assessment Date:  7/26/2018    Comments:  RD received consult for TPN recommendations. Rec.#1: Start TPN Dextrose 25%, AA 4.25% at 25 ml/hr. TPN to provide: ~612 Kcal, 25.5 gm Protein. Rec. #2: Consider holding lipids. Rec. #3: Consider adding TPN Lytes. Rec.#4: Checking AM labs: K+, Phosphorus, Mg and TG Q 3rd day. Rec. #5: Continue to encourage p.o. Intake. Continue with current diet and supplements as ordered. RD to follow pt. And recommend TPN rate changes based on p.o. Intake. Thank you for the consult.           Reason for Assessment     Row Name 07/26/18 1605 07/26/18 1232       Reason for Assessment    Reason For Assessment follow-up protocol;TF/PN follow-up protocol;physician consult;TF/PN    Diagnosis diabetes diagnosis/complications;cardiac disease;pulmonary disease;gastrointestinal disease   COPD, DM Type 2, Respiratory Failure diabetes diagnosis/complications;cardiac disease;pulmonary disease;gastrointestinal disease   COPD, DM Type 2, Respiratory Failure, Intubated    Identified At Risk by Screening Criteria difficulty chewing/swallowing difficulty chewing/swallowing                Labs/Tests/Procedures/Meds     Row Name 07/26/18 1609 07/26/18 1233       Labs/Procedures/Meds    Lab Results Reviewed reviewed, pertinent reviewed, pertinent    Lab Results Comments High: Glucose, BUN  Low: Albumin Low: Alb   High: Glu, BUN, CRP       Medications    Pertinent Medications Reviewed reviewed reviewed            Physical Findings     Row Name 07/26/18 1609 07/26/18 1233       Physical Findings    Tubes  -- nasogastric tube   NG to be placed today              Nutrition Prescription Ordered     Row Name 07/26/18 1617 07/26/18 1610       Nutrition Prescription PO    Current PO Diet Pureed Pureed    Fluid Consistency Nectar/syrup thick Nectar/syrup thick    Supplement  Ensure Pudding Ensure Pudding    Supplement Frequency 2 times a day  --    Common Modifiers Consistent Carbohydrate Consistent Carbohydrate       Nutrition Prescription PN    PN Route PICC  --    Row Name 07/26/18 1234          Nutrition Prescription PO    Current PO Diet Pureed     Fluid Consistency Nectar/syrup thick     Supplement Ensure Pudding     Supplement Frequency 2 times a day     Common Modifiers Consistent Carbohydrate             Evaluation of Received Nutrient/Fluid Intake     Row Name 07/26/18 1617 07/26/18 1237       PO Evaluation    Number of Days PO Intake Evaluated 2 days 2 days    Number of Meals  -- 5    % PO Intake 50 50, plus 1 snack @100%            Problem/Interventions:        Problem 1     Row Name 07/26/18 1618 07/26/18 1238       Nutrition Diagnoses Problem 1    Problem 1 Impaired Nutrient Utilization Impaired Nutrient Utilization    Etiology (related to) Medical Diagnosis Medical Diagnosis    Endocrine DM2 DM2    Signs/Symptoms (evidenced by) Biochemical Biochemical    Specific Labs Noted Glucose Glucose            Problem 2     Row Name 07/26/18 1618 07/26/18 1238       Nutrition Diagnoses Problem 2    Problem 2 Increased Nutrient Needs Increased Nutrient Needs    Macronutrient Kcal;Fluid;Protein Kcal;Fluid;Protein    Micronutrient Vitamin;Mineral Vitamin;Mineral    Etiology (related to) Medical Diagnosis Medical Diagnosis    Pulmonary/Critical Care COPD;Acute respiratory failure;Other (comment)   hypoxia COPD;Acute respiratory failure;Other (comment)   hypoxia    Signs/Symptoms (evidenced by) Other (comment)   Pulmonary dysfunction Other (comment)   Pulmonary dysfunction            Problem 3     Row Name 07/26/18 1618          Nutrition Diagnoses Problem 3    Problem 3 Needs Alternate Route     Etiology (related to) MNT for Treatment/Condition     Signs/Symptoms (evidenced by) No EN Route Available   continued need for PN             Problem 4     Row Name 07/26/18 1238           Nutrition Diagnoses Problem 4    Problem 4 Biting/Chewing Difficulty     Etiology (related to) Functional Diagnosis     Functional Diagnosis Dysphagia     Signs/Symptoms (evidenced by) SLP/Swallow eval     Percent (%) of EN goal --   To begin TF again per pt aspiration     Swallow eval status Done     Type of SLP Evaluation Bedside               Intervention Goal     Row Name 07/26/18 1619 07/26/18 1239       Intervention Goal    General Meet nutritional needs for age/condition Meet nutritional needs for age/condition    PO Meet estimated needs Meet estimated needs;Tolerate PO;Increase intake;PO intake (%)    PO Intake % 75 % 75 %    TF/PN Inititiate TF/PN Inititiate TF/PN    Transition PN to PO TF to PO    Weight Maintain weight Maintain weight            Nutrition Intervention     Row Name 07/26/18 1619 07/26/18 1239       Nutrition Intervention    RD/Tech Action Follow Tx progress;Recommend/ordered Follow Tx progress;Care plan reviewd;Encourage intake;Recommend/ordered    Recommended/Ordered PN EN            Nutrition Prescription     Row Name 07/26/18 1620 07/26/18 1239       Nutrition Prescription PO    PO Prescription --   continue current diet order Other (comment)   Continue current diet order while pt able to tolerate PO       Nutrition Prescription EN    Enteral Prescription  -- Enteral begin/change    Enteral Route  -- NG   tube to be placed today    Product  -- Glucerna 1.2 cyrus    TF Delivery Method  -- Bolus    TF Bolus Goal Volume (mL)  -- 236 mL    TF Bolus Starting Volume (mL)  -- 236 mL    TF Bolus Frequency  -- 6 times a day    TF Bolus Cycle Over  -- Other (comment)   Pump    Water flush (mL)   -- 95 mL    Water Flush Frequency  -- Every feeding    New EN Prescription Ordered?  -- Yes       Nutrition Prescription PN    Parenteral Prescription PN begin/change  --    PN Route PICC  --    Dextrose Concentration (%) 25 %  --    Dextrose (Kcal) 510  --    Amino Acid Concentration (%) 4.25%  --    Amino  Acid (gm) 25.5  --    PN Goal Rate (mL/hr) 25 mL/hr  --    PN Starting Rate (mL/hr) 25 mL/hr  --    PN Goal Volume (mL) 600 mL  --    PN Starting Volume (mL) 600 mL  --    Lipid Concentration (%) --   Lipids held  --    New PN Prescription Ordered? Yes  --       Other Orders    Obtain Weight Daily Daily    Obtain Weight Ordered? No, recommended No, recommended    Supplement Vitamin mineral supplement Vitamin mineral supplement    Supplement Ordered? No, recommended No, recommended    Labs Mg++;K+;Phos;Lipid Profile Phos;Na+;Mg++;K+    Labs Ordered? No, recommended No, recommended            Education/Evaluation     Row Name 07/26/18 1622 07/26/18 1242       Education    Education Education not appropriate at this time Education not appropriate at this time    Please explain --   Pt. discharge plan is to LTAC and pt. receiving TPN --   Pt to be d/c to LTAC       Monitor/Evaluation    Monitor Per protocol;I&O;PO intake;Pertinent labs;PN delivery/tolerance;Skin status;Weight Per protocol;I&O;PO intake;Supplement intake;Pertinent labs;Weight;TF delivery/tolerance        Electronically signed by:  Aleta Watts RD  07/26/18 4:22 PM

## 2018-07-26 NOTE — CONSULTS
Adult Nutrition  Assessment/PES    Patient Name:  Breann Gallegos  YOB: 1950  MRN: [id number]  Admit Date:  7/13/2018    Assessment Date:  7/26/2018    Comments:  RD consulted to restart tube feeding. Spoke with MD who encouraged bolus feedings. RD to manage and control tube feeding: Rec #1: Bolus feedings through NG, once placed: Glucerna 1.2 @ 222 mL Q4 or six times daily. Rec #2: Free water flushes @92 mL Q4 or six times daily. Tube feeding to provide ~1600 kcals, ~78 gm protein and ~1056 mL tube feeding water. Rec #3: Continue to monitor/replace electrolytes. RD to follow pt and adjust tube feeding based on pt's tolerance and PO intake. Consult RD PRN.           Reason for Assessment     Row Name 07/26/18 1232          Reason for Assessment    Reason For Assessment follow-up protocol;physician consult;TF/PN     Diagnosis diabetes diagnosis/complications;cardiac disease;pulmonary disease;gastrointestinal disease   COPD, DM Type 2, Respiratory Failure, Intubated     Identified At Risk by Screening Criteria difficulty chewing/swallowing               Anthropometrics     Row Name 07/26/18 0600          Anthropometrics    Weight 71.8 kg (158 lb 4.8 oz)             Labs/Tests/Procedures/Meds     Row Name 07/26/18 1233          Labs/Procedures/Meds    Lab Results Reviewed reviewed, pertinent     Lab Results Comments Low: Alb   High: Glu, BUN, CRP        Medications    Pertinent Medications Reviewed reviewed             Physical Findings     Row Name 07/26/18 1233          Physical Findings    Tubes nasogastric tube   NG to be placed today               Nutrition Prescription Ordered     Row Name 07/26/18 1234          Nutrition Prescription PO    Current PO Diet Pureed     Fluid Consistency Nectar/syrup thick     Supplement Ensure Pudding     Supplement Frequency 2 times a day     Common Modifiers Consistent Carbohydrate             Evaluation of Received Nutrient/Fluid Intake     Row Name  07/26/18 1237          PO Evaluation    Number of Days PO Intake Evaluated 2 days     Number of Meals 5     % PO Intake 50, plus 1 snack @100%             Problem/Interventions:        Problem 1     Row Name 07/26/18 1238          Nutrition Diagnoses Problem 1    Problem 1 Impaired Nutrient Utilization     Etiology (related to) Medical Diagnosis     Endocrine DM2     Signs/Symptoms (evidenced by) Biochemical     Specific Labs Noted Glucose             Problem 2     Row Name 07/26/18 1238          Nutrition Diagnoses Problem 2    Problem 2 Increased Nutrient Needs     Macronutrient Kcal;Fluid;Protein     Micronutrient Vitamin;Mineral     Etiology (related to) Medical Diagnosis     Pulmonary/Critical Care COPD;Acute respiratory failure;Other (comment)   hypoxia     Signs/Symptoms (evidenced by) Other (comment)   Pulmonary dysfunction               Problem 4     Row Name 07/26/18 1238          Nutrition Diagnoses Problem 4    Problem 4 Biting/Chewing Difficulty     Etiology (related to) Functional Diagnosis     Functional Diagnosis Dysphagia     Signs/Symptoms (evidenced by) SLP/Swallow eval     Percent (%) of EN goal --   To begin TF again per pt aspiration     Swallow eval status Done     Type of SLP Evaluation Bedside               Intervention Goal     Row Name 07/26/18 1239          Intervention Goal    General Meet nutritional needs for age/condition     PO Meet estimated needs;Tolerate PO;Increase intake;PO intake (%)     PO Intake % 75 %     TF/PN Inititiate TF/PN     Transition TF to PO     Weight Maintain weight             Nutrition Intervention     Row Name 07/26/18 1239          Nutrition Intervention    RD/Tech Action Follow Tx progress;Care plan reviewd;Encourage intake;Recommend/ordered     Recommended/Ordered EN             Nutrition Prescription     Row Name 07/26/18 1239          Nutrition Prescription PO    PO Prescription Other (comment)   Continue current diet order while pt able to tolerate PO         Nutrition Prescription EN    Enteral Prescription Enteral begin/change     Enteral Route NG   tube to be placed today     Product Glucerna 1.2 cyrus     TF Delivery Method Bolus     TF Bolus Goal Volume (mL) 236 mL     TF Bolus Starting Volume (mL) 236 mL     TF Bolus Frequency 6 times a day     TF Bolus Cycle Over Other (comment)   Pump     Water flush (mL)  95 mL     Water Flush Frequency Every feeding     New EN Prescription Ordered? Yes        Other Orders    Obtain Weight Daily     Obtain Weight Ordered? No, recommended     Supplement Vitamin mineral supplement     Supplement Ordered? No, recommended     Labs Phos;Na+;Mg++;K+     Labs Ordered? No, recommended             Education/Evaluation     Row Name 07/26/18 1242          Education    Education Education not appropriate at this time     Please explain --   Pt to be d/c to LTAC        Monitor/Evaluation    Monitor Per protocol;I&O;PO intake;Supplement intake;Pertinent labs;Weight;TF delivery/tolerance         Electronically signed by:  Alia Cruz RD  07/26/18 12:45 PM

## 2018-07-26 NOTE — PROGRESS NOTES
I was asked by patient's RN to evaluate her as she was noted to be persistently hypoxic despite being on maximum BiPAP settings and she also appeared more fatigued.   I evaluated the patient at the bedside. She does wake up to verbal and tactile stimuli and answers questions with one to two words. Has no specific complaints and does not feel any different. Her  at the bedside does feel that she looks more fatigued than she did all day.   I obtained a repeat ABG - pH 7.46, CO2 37 and O2 53 on 100% on FiO2.   I spoke with Dr. Tse, who felt that it would be best to mechanically ventilate the patient at this point. He requests a larger tube, preferably an 8 so that he can perform a bronchoscopy on the patient if needed.     Addendum:   Patient successfully intubated by anesthesia. Will get stat CXR to ensure tube placement.

## 2018-07-26 NOTE — PROGRESS NOTES
Case Management/Social Work    Patient Name:  Breann Gallegos  YOB: 1950  MRN: [id number]  Admit Date:  7/13/2018      [facility] with [facility] did call for an update. According to Dr. Guzman, pt is now not medically stable for transfer today and I did explain this to Yeni. Yeni advised that if ready, they will plan on receiving pt on Monday. CM will continue to follow and assist.      Electronically signed by:  RODNEY Gaitan  07/26/18 9:48 AM

## 2018-07-26 NOTE — SIGNIFICANT NOTE
07/26/18 [facility]   Rehab Treatment   Discipline occupational therapist   Reason Treatment Not Performed unable to treat, medical status change  (Patient is a hold per nursing due to can't come off BiPap and will be getting an NG tube later. Will follow up tomorrow. )

## 2018-07-26 NOTE — PLAN OF CARE
Problem: Patient Care Overview  Goal: Plan of Care Review  Outcome: Ongoing (interventions implemented as appropriate)      Problem: Pneumonia (Adult)  Goal: Signs and Symptoms of Listed Potential Problems Will be Absent, Minimized or Managed (Pneumonia)  Outcome: Ongoing (interventions implemented as appropriate)   07/25/18 2149   Goal/Outcome Evaluation   Problems Assessed (Pneumonia) all   Problems Present (Pneumonia) respiratory compromise  (wbc elevated)       Problem: NPPV/CPAP (Adult)  Goal: Signs and Symptoms of Listed Potential Problems Will be Absent, Minimized or Managed (NPPV/CPAP)  Outcome: Ongoing (interventions implemented as appropriate)   07/25/18 2149   Goal/Outcome Evaluation   Problems Assessed (NPPV/CPAP) all   Problems Present (NPPV/CPAP) dry mucous membranes;hypoxia/hypoxemia  (Breath sounds clear, diminished, sats decreased when on hi f)       Problem: Pain, Acute (Adult)  Goal: Acceptable Pain Control/Comfort Level  Outcome: Ongoing (interventions implemented as appropriate)   07/25/18 2149   Pain, Acute (Adult)   Acceptable Pain Control/Comfort Level making progress toward outcome       Problem: Skin Injury Risk (Adult)  Goal: Skin Health and Integrity  Outcome: Ongoing (interventions implemented as appropriate)   07/25/18 2149   Skin Injury Risk (Adult)   Skin Health and Integrity making progress toward outcome       Problem: Arrhythmia/Dysrhythmia (Symptomatic) (Adult)  Goal: Signs and Symptoms of Listed Potential Problems Will be Absent, Minimized or Managed (Arrhythmia/Dysrhythmia)  Outcome: Ongoing (interventions implemented as appropriate)   07/25/18 2149   Goal/Outcome Evaluation   Problems Assessed (Arrhythmia/Dysrhythmia) all   Problems Present (Dysrhythmia) other (see comments)  (RHYTHM CHANGES SINUS TO FIB)       Problem: Fall Risk (Adult)  Goal: Absence of Fall  Outcome: Ongoing (interventions implemented as appropriate)   07/25/18 2149   Fall Risk (Adult)   Absence of Fall (NO  FALLS)

## 2018-07-26 NOTE — PROGRESS NOTES
"  CC: Acute respiratory failure    S: Currently on BiPAP. Is awake and alert. Seems to be requiring a higher FiO2 today.    O:Vital signs reviewed. O2Sat: 90% on 100%.  Picc Line. Day # 12  /71   Pulse (!) 170   Temp 97.7 °F (36.5 °C) (Axillary)   Resp 23   Ht 162.6 cm (64\")   Wt 71.8 kg (158 lb 4.8 oz)   LMP  (LMP Unknown)   SpO2 95%   BMI 27.17 kg/m²     Temp (24hrs), Av.3 °F (36.8 °C), Min:97.7 °F (36.5 °C), Max:99.3 °F (37.4 °C)      I & Os reviewed.   Intake/Output       18 0700 - 18 0659    Intake (ml) 1265    Output (ml) 2400    Net (ml) -1135    Last Weight  71.8 kg (158 lb 4.8 oz)          General: On BiPAP.  Eyes: PERRL.   Neck: Supple. +ve JVD  Cardiovascular: S1 + S2. Irregular and tachycardic at this time.   Respiratory: Transmitted Breath sounds noted. No wheezing heard. Bilateral crackles noted  Abdomen: Soft. Bowel sounds positive.   Extremities: No extremity edema noted.  Neurologic: Awake. Alert. Oriented. Able to follow simple commands.   Skin: Appeared without any overt rashes    Labs: Reviewed.     Results from last 7 days  Lab Units 18  0423 18  0431 18  0503  18  0413 18  0430   SODIUM mmol/L 141 140 139  < > 145 141   POTASSIUM mmol/L 4.0 3.5 4.1  < > 3.6 4.2   CHLORIDE mmol/L 105 104 101  < > 109* 101   CO2 mmol/L 28.0 29.0 30.0  < > 30.0 35.0*   BUN mg/dL 27* 28* 32*  < > 37* 35*   CREATININE mg/dL 0.90 0.80 0.60  < > 0.50* 0.50*   CALCIUM mg/dL 8.7 8.9 8.7  < > 8.8 8.6   BILIRUBIN mg/dL 1.2  --   --   --  0.5 0.8   ALK PHOS U/L 131*  --   --   --  90 101   ALT (SGPT) U/L 42  --   --   --  55 64   AST (SGOT) U/L 31  --   --   --  30 42   GLUCOSE mg/dL 165* 237* 278*  < > 188* 212*   < > = values in this interval not displayed.      Results from last 7 days  Lab Units 18  0431 18  0503 18  0414   MAGNESIUM mg/dL 2.1 2.0 1.8   PHOSPHORUS mg/dL 5.2* 4.5  --            Results from last 7 days  Lab Units " 07/26/18  0423 07/25/18  0431 07/24/18  0503 07/23/18  0423 07/22/18  0414   WBC 10*3/mm3 23.51* 25.37* 22.98* 17.16* 27.98*   HEMOGLOBIN g/dL 12.8 11.7* 10.9* 9.8* 12.7   PLATELETS 10*3/mm3 312 377 281 242 456*          ABG: Ordered.    Lab Results   Component Value Date    PHART 7.444 07/25/2018    NRB3MHT 39.0 07/25/2018    PO2ART 82.2 07/25/2018    HGBBG 11.3 (L) 07/25/2018    G4EZTIIL 96.4 07/25/2018    CARBOXYHGB 1.2 07/25/2018         CXRay: Will order Chest XRay for today.    Assessment & Recommendations/Plan:   1.  Acute hypoxemic respiratory failure  - Currently on BiPAP.   - Some clinical worsening.  - Will try Hi Adonis NC but unlikely that she will be able to handle it.    2.  Multifocal pneumonia  - Sputum cultures have revealed MRSA  - Vancomycin d/guy.  - Will follow her clinically since she has completed 7-10 days of antibiotics.  - Leukocytosis is likely due to continued steroids.    3.  Likely ARDS versus hypersensitivity pneumonitis  - We'll continue IV steroids.  - The dose of steroids was decreased on 7/24/18    4.  Lymphadenopathy  - Likely reactive.  - We will need outpatient follow-up    5.  COPD  - On nebulized treatments    6.  Possible component of pulmonary edema/Fluid overload?  - Continue to monitor strict I's and O's  - BNP on 7/23 was 1580.  - Will repeat BNP today.  - Will consider Lasix drip again today.  - Also will order Diuril and Diamox.    7. AFib with RVR.  - There definitely seems to be a component of pulmonary edema, as her oxygenation worsens significantly whenever she has any significant duration of rapid ventricular rate of her underlying atrial fibrillation.  - Cardio on board.   - Will start Metoprolol 25 mg PO BID.  - Continue Metoprolol PRN.    I told the patient that she is on 100% FiO2 and if there is any worsening in oxygenation or she develops respiratory distress, then she will definitely need to be intubated.    Will attempt NG under floro, since she is unable to  tolerate off BiPAP.     TF to be managed by dietician. Will recommend Bolus over continous feeds, as this patient is not on ventilator and is not on sedation.     Critical Care time spent in direct patient care: 40 minutes (excluding procedure time, if applicable) including high complexity decision making to assess, manipulate, and support vital organ system failure in this individual who has impairment of one or more vital organ systems such that there is a high probability of imminent or life threatening deterioration in the patient’s condition.    Her condition is critical and prognosis guarded at this time.    We have reviewed patient's current orders and changes, if any, have been suggested to primary care team. Plan was also discussed with nursing staff, as necessary.     D/W Dr. Mcleod again.      Yamilex Tse MD  07/26/18  7:28 AM    Dictated utilizing Dragon dictation.

## 2018-07-26 NOTE — ANESTHESIA PROCEDURE NOTES
Airway  Urgency: elective    Airway not difficult    General Information and Staff    Patient location during procedure: OR  CRNA: NICK HARDWICK    Indications and Patient Condition  Indications for airway management: airway protection and respiratory distress/failure    Preoxygenated: yes  Mask difficulty assessment: 0 - not attempted    Final Airway Details  Final airway type: endotracheal airway      Successful airway: ETT  Cuffed: yes   Successful intubation technique: direct laryngoscopy  Endotracheal tube insertion site: oral  Blade: Rushing  Blade size: #2  ETT size: 7.5 mm  Cormack-Lehane Classification: grade I - full view of glottis  Placement verified by: chest auscultation, capnometry and palpation of cuff   Number of attempts at approach: 1

## 2018-07-26 NOTE — PLAN OF CARE
Problem: Pain, Acute (Adult)  Intervention: Support/Optimize Psychosocial Response to Acute Pain   07/23/18 2000 07/26/18 0802 07/26/18 1527   Interventions   Trust Relationship/Rapport --  care explained;choices provided;emotional support provided;empathic listening provided;questions answered;questions encouraged;reassurance provided;thoughts/feelings acknowledged --    Psychosocial Support   Diversional Activities --  television --    Family/Support System Care involvement promoted;support provided --  --    Coping/Psychosocial Interventions   Supportive Measures --  --  active listening utilized;decision-making supported;positive reinforcement provided;relaxation techniques promoted;self-care encouraged;self-responsibility promoted;verbalization of feelings encouraged         Problem: Skin Injury Risk (Adult)  Intervention: Promote/Optimize Nutrition   07/26/18 0802   Nutrition Interventions   Oral Nutrition Promotion calorie dense foods provided;calorie dense liquids provided;nutritional therapy counseling provided;physical activity promoted;rest periods promoted;safe use of adaptive equipment encouraged     Intervention: Prevent/Manage Excess Moisture   07/26/18 0802 07/26/18 1527   Skin Interventions   Skin Protection adhesive use limited;drying agents applied;electrode sites changed;incontinence pads utilized --    Hygiene Care   Perineal Care --  absorbent pad changed;catheter care provided;perineum cleansed   Bathing/Skin Care --  incontinence care     Intervention: Maintain Head of Bed Elevation Less Than 30 Degrees as Tolerated   07/26/18 1527   Positioning   Head of Bed (HOB) HOB at 30-45 degrees     Intervention: Prevent/Minimize Shear/Friction Injuries   07/26/18 0802 07/26/18 1400   Positioning   Positioning/Transfer Devices --  pillows   Skin Interventions   Pressure Reduction Devices foam padding utilized;heel offloading device utilized;positioning supports utilized;pressure-redistributing mattress  utilized;specialty bed utilized --      Intervention: Prevent or Minimize Pressure   07/26/18 0802 07/26/18 1400   Positioning   Body Position --  turned;side-lying, left   Skin Interventions   Pressure Reduction Techniques frequent weight shift encouraged;heels elevated off bed;positioned off wounds;pressure points protected;rest period provided between sit times;sit time limited to 1 hour;weight shift assistance provided --        Goal: Skin Health and Integrity  Outcome: Ongoing (interventions implemented as appropriate)   07/26/18 1527   Skin Injury Risk (Adult)   Skin Health and Integrity making progress toward outcome       Problem: Arrhythmia/Dysrhythmia (Symptomatic) (Adult)  Intervention: Prevent/Manage Thrombi/Emboli   07/26/18 0802 07/26/18 1527   Interventions   VTE Prevention/Management --  bilateral;dorsiflexion/plantar flexion performed;sequential compression devices on   Safety Interventions   Bleeding Precautions blood pressure closely monitored;coagulation study results reviewed;foot protection facilitated;gentle oral care promoted;monitored for signs of bleeding --      Intervention: Promote Hemodynamic Stability   07/26/18 0802 07/26/18 1527   Positioning   Head of Bed (HOB) --  HOB at 30-45 degrees   Nutrition Interventions   Fluid/Electrolyte Management oral rehydration therapy initiated --    Cognitive Interventions   Sensory Stimulation Regulation care clustered;lighting decreased;music/television provided for relaxation;quiet environment promoted --      Intervention: Monitor/Manage Cardiac Rhythm Disturbance   07/26/18 1527   Cardiac Interventions   Dysrhythmia Management forceful cough encouraged;other (see comments)  (amio drip started)   Safety Interventions   Stabilization Measures respiratory support increased;provider notified       Goal: Signs and Symptoms of Listed Potential Problems Will be Absent, Minimized or Managed (Arrhythmia/Dysrhythmia)  Outcome: Ongoing (interventions  implemented as appropriate)   18   Goal/Outcome Evaluation   Problems Assessed (Arrhythmia/Dysrhythmia) all   Problems Present (Dysrhythmia) electrophysiologic conduction defect;hypoxia/hypoxemia;situational response       Problem: Fall Risk (Adult)  Intervention: Monitor/Assist with Self Care   18   Activity   Activity Assistance Provided assistance, 2 people   Daily Care Interventions   Self-Care Promotion independence encouraged;BADL personal objects within reach;BADL personal routines maintained;meal setup provided;safe use of adaptive equipment encouraged     Intervention: Reduce Risk/Promote Restraint Free Environment   18 1400   Safety Management   Environmental Safety Modification assistive device/personal items within reach;clutter free environment maintained;lighting adjusted;room near unit station;room organization consistent   Safety Promotion/Fall Prevention activity supervised;fall prevention program maintained;muscle strengthening facilitated;nonskid shoes/slippers when out of bed;safety round/check completed;toileting scheduled   Prevent [place] Drop/Fall   Safety/Security Measures bed alarm set;family to remain at bedside     Intervention: Review Medications/Identify Contributors to Fall Risk   18   Safety Management   Medication Review/Management medications reviewed;infusion initiated;infusion titrated;high risk medications identified;provider consulted       Goal: Absence of Fall  Outcome: Ongoing (interventions implemented as appropriate)   18   Fall Risk (Adult)   Absence of Fall achieves outcome

## 2018-07-26 NOTE — PROGRESS NOTES
[facility]    Name:  Breann Gallegos   Age:  68 y.o.  Sex:  female  :  1950  MRN:  [id number]   Visit Number:  [id number]  Admission Date:  2018  Date Of Service:  18  Primary Care Physician:  Harry Avery MD     LOS: 13 days :  Patient Care Team:  Harry Avery MD as PCP - General  Harry Avery MD as PCP - Family Medicine  Harry Avery MD as PCP - Claims Attributed  Tereza Bateman RN as Care Coordinator ([facility]):    History taken from:     patient    Chief Complaint:      A. fib with RVR    Subjective     Interval History:     Patient seen again today.    Patient is a 68-year-old female who was admitted on 2018 with multifocal pneumonia and a sputum culture growing MRSA.  She is being treated with vancomycin.  She also has continued to have sepsis.  She is diabetic, has chronic essential hypertension, COPD.  She had severe hypoxia requiring ventilatory management.  Dr. Tse from pulmonary has been following.  She also has question about aspiration pneumonia after extubation.  She has had intermittent A. fib with RVR.  She was placed on IV amiodarone, she is continued on this at this point.   She is also suspected to have ARDS.    Again today diuril was given and she was placed on a Lasix drip.   She is again in A. fib with RVR.      She has required continuous BiPAP.     She denies any chest pain or pressure, nausea, vomiting, or pain.  She continues to be short of breath.  She was tempted to have NG tube placement in radiology today, however this was unsuccessful.  We will start PPN.  Discussed the case with Dr. Tse.        Review of Systems:     All systems were reviewed and negative except for:  Respiratory: positive for  shortness of air    Objective     Vital Signs:    Temp:  [97.5 °F (36.4 °C)-99.3 °F (37.4 °C)] 97.5 °F (36.4 °C)  Heart Rate:  [] 70  Resp:  [17-30] 22  BP: ()/() 87/56  FiO2  (%):  [100 %] 100 %    Physical Exam:      General Appearance:    Alert, cooperative, in no acute distress   Head:    Normocephalic, without obvious abnormality, atraumatic   Eyes:            Lids and lashes normal, conjunctivae and sclerae normal, no   icterus, no pallor, corneas clear, PERRLA   Ears:    Ears appear intact with no abnormalities noted   Throat:   No oral lesions, no thrush, oral mucosa moist   Neck:   No adenopathy, supple, trachea midline, no thyromegaly, no     carotid bruit, no JVD   Back:     No kyphosis present, no scoliosis present, no skin lesions,       erythema or scars, no tenderness to percussion or                   palpation,   range of motion normal   Lungs:     Crackles noted bilaterally with some use of accessory muscles respiration.      Heart:    Increased and irregular rate,  no  murmur, no gallop, no rub, no click   Breast Exam:    Deferred   Abdomen:     Normal bowel sounds, no masses, no organomegaly, soft        non-tender, non-distended, no guarding, no rebound                 tenderness   Genitalia:    Deferred   Extremities:   Moves all extremities With 4/5 strength, no edema, no cyanosis, no   redness   Pulses:   Pulses palpable and equal bilaterally   Skin:   No bleeding, bruising or rash   Lymph nodes:   No palpable adenopathy   Neurologic:   Cranial nerves 2 - 12 grossly intact, sensation intact, DTR        present and equal bilaterally        Results Review:      I reviewed the patient's new clinical results.    Labs:    Lab Results (last 24 hours)     Procedure Component Value Units Date/Time    Blood Gas, Arterial With Co-Ox [[id number]]  (Abnormal) Collected:  07/26/18 1229    Specimen:  Arterial Blood Updated:  07/26/18 1229     Site Right Radial     Toy's Test Positive     pH, Arterial 7.479 pH units      pCO2, Arterial 35.4 mm Hg      pO2, Arterial 57.4 (L) mm Hg      HCO3, Arterial 26.3 mmol/L      Base Excess, Arterial 3.0 (H) mmol/L      O2 Saturation,  Arterial 90.6 (L) %      Hemoglobin, Blood Gas 12.9 g/dL      Hematocrit, Blood Gas 39.5 %      Oxyhemoglobin 88.6 (L) %      Methemoglobin 0.70 %      Carboxyhemoglobin 1.5 %      Barometric Pressure for Blood Gas 734 mmHg      Modality BiPap     FIO2 100 %      Ventilator Mode NA     Collected by      pH, Temp Corrected -- pH Units      pCO2, Temperature Corrected -- mm Hg      pO2, Temperature Corrected -- mm Hg     POC Glucose Once [[id number]]  (Abnormal) Collected:  07/26/18 1059    Specimen:  Blood Updated:  07/26/18 1120     Glucose 244 (H) mg/dL      Comment: Serial Number: [id number]:  [id number]       Blood Culture With OSMEL - Blood, [[id number]]  (Normal) Collected:  07/25/18 0740    Specimen:  Blood from Arm, Left Updated:  07/26/18 0800     Blood Culture No growth at 24 hours    Blood Culture With OSMEL - Blood, [[id number]]  (Normal) Collected:  07/25/18 0747    Specimen:  Blood from Arm, Left Updated:  07/26/18 0800     Blood Culture No growth at 24 hours    POC Glucose Once [[id number]]  (Normal) Collected:  07/26/18 0545    Specimen:  Blood Updated:  07/26/18 0550     Glucose 126 mg/dL      Comment: Serial Number: [id number]:  [id number]       Comprehensive Metabolic Panel [[id number]]  (Abnormal) Collected:  07/26/18 0423    Specimen:  Blood Updated:  07/26/18 0523     Glucose 165 (H) mg/dL      BUN 27 (H) mg/dL      Creatinine 0.90 mg/dL      Sodium 141 mmol/L      Potassium 4.0 mmol/L      Chloride 105 mmol/L      CO2 28.0 mmol/L      Calcium 8.7 mg/dL      Total Protein 6.3 g/dL      Albumin 3.30 (L) g/dL      ALT (SGPT) 42 U/L      AST (SGOT) 31 U/L      Alkaline Phosphatase 131 (H) U/L      Total Bilirubin 1.2 mg/dL      eGFR Non African Amer 62 mL/min/1.73      Globulin 3.0 gm/dL      A/G Ratio 1.1 g/dL      BUN/Creatinine Ratio 30.0 (H)     Anion Gap 12.0 mmol/L     Narrative:       GFR Normal >60  Chronic Kidney Disease <60  Kidney Failure <15    CBC & Differential [[id number]] Collected:   07/26/18 0423    Specimen:  Blood Updated:  07/26/18 0519    Narrative:       The following orders were created for panel order CBC & Differential.  Procedure                               Abnormality         Status                     ---------                               -----------         ------                     CBC Auto Differential[[id number]]        Abnormal            Final result                 Please view results for these tests on the individual orders.    CBC Auto Differential [[id number]]  (Abnormal) Collected:  07/26/18 0423    Specimen:  Blood Updated:  07/26/18 0519     WBC 23.51 (H) 10*3/mm3      RBC 4.80 10*6/mm3      Hemoglobin 12.8 g/dL      Hematocrit 41.5 %      MCV 86.5 fL      MCH 26.7 (L) pg      MCHC 30.8 g/dL      RDW 15.7 (H) %      RDW-SD 48.8 fl      MPV 10.5 fL      Platelets 312 10*3/mm3      Neutrophil % 81.3 (H) %      Lymphocyte % 10.3 %      Monocyte % 6.3 %      Eosinophil % 0.8 %      Basophil % 0.2 %      Immature Grans % 1.1 (H) %      Neutrophils, Absolute 19.13 (H) 10*3/mm3      Lymphocytes, Absolute 2.41 10*3/mm3      Monocytes, Absolute 1.48 (H) 10*3/mm3      Eosinophils, Absolute 0.19 10*3/mm3      Basophils, Absolute 0.04 10*3/mm3      Immature Grans, Absolute 0.26 (H) 10*3/mm3      nRBC 0.0 /100 WBC     POC Glucose Once [[id number]]  (Abnormal) Collected:  07/25/18 2045    Specimen:  Blood Updated:  07/25/18 2101     Glucose 337 (H) mg/dL      Comment: Serial Number: [id number]:  [id number]       POC Glucose Once [[id number]]  (Abnormal) Collected:  07/25/18 1710    Specimen:  Blood Updated:  07/25/18 2100     Glucose 254 (H) mg/dL      Comment: Serial Number: [id number]:  [id number]       POC Glucose Once [[id number]]  (Abnormal) Collected:  07/25/18 1528    Specimen:  Blood Updated:  07/25/18 2100     Glucose 449 (H) mg/dL      Comment: Serial Number: [id number]:  [id number]              Radiology:    Imaging Results (last 24 hours)     Procedure  Component Value Units Date/Time    XR Chest 1 View [[id number]] Collected:  07/26/18 0810     Updated:  07/26/18 0813    Narrative:       PROCEDURE: XR CHEST 1 VW-     HISTORY: PNA; J18.9-Pneumonia, unspecified organism; J96.21-Acute and  chronic respiratory failure with hypoxia; Z74.09-Other reduced mobility;  Z74.09-Other reduced mobility     COMPARISON: July 25, 2018 and July 23, 2018.     FINDINGS: A right PICC is in place with the tip in the distal SVC. The  heart is normal in size. The mediastinum is unremarkable. There is  diffuse bilateral airspace disease which has improved slightly compared  to the prior exams. There is no pneumothorax.  There are no acute  osseous abnormalities.           Impression:       Slight interval improvement in the patients diffuse  bilateral airspace disease.     Continued followup is recommended.     This report was finalized on 7/26/2018 8:11 AM by Joseph Ba M.D..          Medication Review:       apixaban 5 mg Oral Q12H   atorvastatin 20 mg Oral Daily   budesonide 1 mg Nebulization BID   buPROPion 75 mg Oral Q12H   calcitonin (salmon) 1 spray Alternating Nares Daily   clotrimazole-betamethasone 1 application Topical Q12H   famotidine 20 mg Oral BID   fluticasone 2 spray Each Nare Daily   furosemide (LASIX) IVPB 100 mg Intravenous Once   gabapentin 300 mg Oral BID   insulin aspart 0-7 Units Subcutaneous 4x Daily AC & at Bedtime   insulin detemir 10 Units Subcutaneous Q12H   ipratropium-albuterol 3 mL Nebulization Q4H - RT   lactobacillus acidophilus 1 capsule Oral BID   lisinopril 10 mg Oral Q24H   magnesium oxide 200 mg Oral Daily   methylPREDNISolone sodium succinate 40 mg Intravenous Q12H   metoprolol tartrate 25 mg Nasogastric Q12H   nystatin 5 mL Oral 4x Daily   sertraline 100 mg Oral Daily   sodium chloride 250 mL Intravenous Once   cholecalciferol 2,000 Units Oral Daily         amiodarone 1 mg/min Last Rate: 1 mg/min (07/26/18 1243)   Followed by     amiodarone  0.5 mg/min        Assessment/Plan     Problem List Items Addressed This Visit     Pneumonia of both lungs due to infectious organism - Primary    Relevant Medications    albuterol (PROAIR RESPICLICK) 108 (90 BASE) MCG/ACT inhaler    ipratropium-albuterol (DUO-NEB) 0.5-2.5 mg/mL nebulizer    flunisolide (NASALIDE) 25 MCG/ACT (0.025%) solution nasal spray    arformoterol (BROVANA) 15 MCG/2ML nebulizer solution    budesonide (PULMICORT) 0.5 MG/2ML nebulizer solution      Other Visit Diagnoses     Acute on chronic respiratory failure with hypoxia (CMS/HCC)        Impaired functional mobility, balance, gait, and endurance        Impaired mobility and ADLs              1. Multifocal bilateral pneumonia, prior to admission, with MRSA, Fully treated with vancomycin.  2. Dysphagia with suspected aspiration pneumonitis  3. Acute hypoxic respiratory failure severe, Previously requiring intubation and placement on mechanical ventilator 7-14-18 with  ARDS, now on BiPAP with FiO2 100%  4. Recurrent Afib with RVR, now on IV  amiodarone as well as oral Lopressor.  5. Sepsis, secondary to pneumonia, prior to admission, with MRSA, treated with vancomycin  6. Diabetes mellitus type 2 insulin dependent  7. Chronic essential hypertension  8. COPD moderate, on IV Solu-Medrol  9. Abnormal CT chest with lymphadenopathy, repeat outpatient CT scan chest   10. Hypomagnesemia, replaced  11.  Elevated WBCs, suspect due to Solu-Medrol.    Plan:    Dr. Tse has again ordered Lasix drip as well as Diuril.   Breeding following as well.  Patient's heart rate has come down this afternoon.  Will check ABG, lab work in the a.m.  Recheck chest x-ray in the a.m.  Patient has been fully treated for MRSA pneumonia.  NG Was unsuccessful and radiology, we will start PPN.  [facility] is following, once patient is stable she could be sent there.  Further recommendations will depend on the clinical course.    Rock Guzman,   07/26/18  2:02 PM           absent

## 2018-07-26 NOTE — SIGNIFICANT NOTE
07/26/18 0935   PT Deferred Reason   PT Deferred Reason Patient unavailable for treatment  (hold per nursing)

## 2018-07-26 NOTE — ANESTHESIA POSTPROCEDURE EVALUATION
Patient: Breann Gallegos    Procedure Summary     Date:  07/26/18 Room / Location:      Anesthesia Start:  1944 Anesthesia Stop:      Procedure:  ANESTHESIA INTUBATION Diagnosis:      Scheduled Providers:   Provider:  Jhony Murcia CRNA    Anesthesia Type:  MAC ASA Status:  3          Anesthesia Type: MAC  Last vitals  BP   111/74 (07/26/18 1932)   Temp   97.4 °F (36.3 °C) (07/26/18 1601)   Pulse   66 (07/26/18 1932)   Resp   25 (07/26/18 1932)     SpO2   92 % (07/26/18 1932)     Post Anesthesia Care and Evaluation    Patient location during evaluation: ICU  Patient participation: complete - patient cannot participate  Level of consciousness: responsive to light touch  Pain management: adequate  Anesthetic complications: No anesthetic complications  PONV Status: none  Cardiovascular status: acceptable  Respiratory status: ETT

## 2018-07-26 NOTE — PLAN OF CARE
Problem: Patient Care Overview  Goal: Plan of Care Review  Outcome: Ongoing (interventions implemented as appropriate)   07/26/18 1300   Coping/Psychosocial   Plan of Care Reviewed With patient   Plan of Care Review   Progress no change       Problem: NPPV/CPAP (Adult)  Goal: Signs and Symptoms of Listed Potential Problems Will be Absent, Minimized or Managed (NPPV/CPAP)  Outcome: Ongoing (interventions implemented as appropriate)   07/26/18 1300   Goal/Outcome Evaluation   Problems Assessed (NPPV/CPAP) all   Problems Present (NPPV/CPAP) none

## 2018-07-26 NOTE — SIGNIFICANT NOTE
Pt.'s means of nutrition/hydration d/w MD and care team this a.m.  Pt. Is planning to get ng tube via fluoro today (prior attempt in past couple of days was unsuccessful).  Continue to recommend pt. be NPO due to suspected silent aspiration as her respiratory status has been shown to worsen when a modified po diet is resumed indicating clinical intolerance of po.  As previously recommended from SLP note on 07/20, pt. is strongly recommended to have an instrumental swallowing assessment prior to resuming po intake.

## 2018-07-26 NOTE — PROGRESS NOTES
Adult Nutrition  Assessment/PES    Patient Name:  Breann Gallegos  YOB: 1950  MRN: [id number]  Admit Date:  7/13/2018    Assessment Date:  7/26/2018    Comments:  RD consulted to restart tube feeding. Spoke with MD who encouraged bolus feedings. RD to manage and control tube feeding: Rec #1: Bolus feedings through NG, once placed: Glucerna 1.2 @ 222 mL Q4 or six times daily. Rec #2: Free water flushes @92 mL Q4 or six times daily. Tube feeding to provide ~1600 kcals, ~78 gm protein and ~1056 mL tube feeding water. Rec #3: Continue to monitor/replace electrolytes. RD to follow pt and adjust tube feeding based on pt's tolerance and PO intake. Consult RD PRN.           Reason for Assessment     Row Name 07/26/18 1232          Reason for Assessment    Reason For Assessment follow-up protocol;physician consult;TF/PN     Diagnosis diabetes diagnosis/complications;cardiac disease;pulmonary disease;gastrointestinal disease   COPD, DM Type 2, Respiratory Failure, Intubated     Identified At Risk by Screening Criteria difficulty chewing/swallowing               Anthropometrics     Row Name 07/26/18 0600          Anthropometrics    Weight 71.8 kg (158 lb 4.8 oz)             Labs/Tests/Procedures/Meds     Row Name 07/26/18 1233          Labs/Procedures/Meds    Lab Results Reviewed reviewed, pertinent     Lab Results Comments Low: Alb   High: Glu, BUN, CRP        Medications    Pertinent Medications Reviewed reviewed             Physical Findings     Row Name 07/26/18 1233          Physical Findings    Tubes nasogastric tube   NG to be placed today               Nutrition Prescription Ordered     Row Name 07/26/18 1234          Nutrition Prescription PO    Current PO Diet Pureed     Fluid Consistency Nectar/syrup thick     Supplement Ensure Pudding     Supplement Frequency 2 times a day     Common Modifiers Consistent Carbohydrate             Evaluation of Received Nutrient/Fluid Intake     Row Name  07/26/18 1237          PO Evaluation    Number of Days PO Intake Evaluated 2 days     Number of Meals 5     % PO Intake 50, plus 1 snack @100%             Problem/Interventions:        Problem 1     Row Name 07/26/18 1238          Nutrition Diagnoses Problem 1    Problem 1 Impaired Nutrient Utilization     Etiology (related to) Medical Diagnosis     Endocrine DM2     Signs/Symptoms (evidenced by) Biochemical     Specific Labs Noted Glucose             Problem 2     Row Name 07/26/18 1238          Nutrition Diagnoses Problem 2    Problem 2 Increased Nutrient Needs     Macronutrient Kcal;Fluid;Protein     Micronutrient Vitamin;Mineral     Etiology (related to) Medical Diagnosis     Pulmonary/Critical Care COPD;Acute respiratory failure;Other (comment)   hypoxia     Signs/Symptoms (evidenced by) Other (comment)   Pulmonary dysfunction               Problem 4     Row Name 07/26/18 1238          Nutrition Diagnoses Problem 4    Problem 4 Biting/Chewing Difficulty     Etiology (related to) Functional Diagnosis     Functional Diagnosis Dysphagia     Signs/Symptoms (evidenced by) SLP/Swallow eval     Percent (%) of EN goal --   To begin TF again per pt aspiration     Swallow eval status Done     Type of SLP Evaluation Bedside               Intervention Goal     Row Name 07/26/18 1239          Intervention Goal    General Meet nutritional needs for age/condition     PO Meet estimated needs;Tolerate PO;Increase intake;PO intake (%)     PO Intake % 75 %     TF/PN Inititiate TF/PN     Transition TF to PO     Weight Maintain weight             Nutrition Intervention     Row Name 07/26/18 1239          Nutrition Intervention    RD/Tech Action Follow Tx progress;Care plan reviewd;Encourage intake;Recommend/ordered     Recommended/Ordered EN             Nutrition Prescription     Row Name 07/26/18 1239          Nutrition Prescription PO    PO Prescription Other (comment)   Continue current diet order while pt able to tolerate PO         Nutrition Prescription EN    Enteral Prescription Enteral begin/change     Enteral Route NG   tube to be placed today     Product Glucerna 1.2 cyrus     TF Delivery Method Bolus     TF Bolus Goal Volume (mL) 236 mL     TF Bolus Starting Volume (mL) 236 mL     TF Bolus Frequency 6 times a day     TF Bolus Cycle Over Other (comment)   Pump     Water flush (mL)  95 mL     Water Flush Frequency Every feeding     New EN Prescription Ordered? Yes        Other Orders    Obtain Weight Daily     Obtain Weight Ordered? No, recommended     Supplement Vitamin mineral supplement     Supplement Ordered? No, recommended     Labs Phos;Na+;Mg++;K+     Labs Ordered? No, recommended             Education/Evaluation     Row Name 07/26/18 1242          Education    Education Education not appropriate at this time     Please explain --   Pt to be d/c to [facility]        Monitor/Evaluation    Monitor Per protocol;I&O;PO intake;Supplement intake;Pertinent labs;Weight;TF delivery/tolerance         Electronically signed by:  Alia Cruz RD  07/26/18 12:43 PM

## 2018-07-27 ENCOUNTER — APPOINTMENT (OUTPATIENT)
Dept: GENERAL RADIOLOGY | Facility: HOSPITAL | Age: 68
End: 2018-07-27

## 2018-07-27 LAB
ALBUMIN SERPL-MCNC: 2.9 G/DL (ref 3.5–5)
ALBUMIN/GLOB SERPL: 1 G/DL (ref 1–2)
ALP SERPL-CCNC: 114 U/L (ref 38–126)
ALT SERPL W P-5'-P-CCNC: 45 U/L (ref 13–69)
ANION GAP SERPL CALCULATED.3IONS-SCNC: 14.8 MMOL/L (ref 10–20)
ANION GAP SERPL CALCULATED.3IONS-SCNC: 18 MMOL/L (ref 10–20)
ARTERIAL PATENCY WRIST A: ABNORMAL
ARTERIAL PATENCY WRIST A: ABNORMAL
AST SERPL-CCNC: 31 U/L (ref 15–46)
ATMOSPHERIC PRESS: 733 MMHG
ATMOSPHERIC PRESS: 736 MMHG
BASE EXCESS BLDA CALC-SCNC: -0.9 MMOL/L (ref 0–2)
BASE EXCESS BLDA CALC-SCNC: -1.5 MMOL/L (ref 0–2)
BASOPHILS # BLD AUTO: 0.02 10*3/MM3 (ref 0–0.2)
BASOPHILS NFR BLD AUTO: 0.1 % (ref 0–2.5)
BDY SITE: ABNORMAL
BDY SITE: ABNORMAL
BILIRUB SERPL-MCNC: 0.6 MG/DL (ref 0.2–1.3)
BUN BLD-MCNC: 61 MG/DL (ref 7–20)
BUN BLD-MCNC: 68 MG/DL (ref 7–20)
BUN/CREAT SERPL: 29 (ref 7.1–23.5)
BUN/CREAT SERPL: 42.5 (ref 7.1–23.5)
CALCIUM SPEC-SCNC: 8 MG/DL (ref 8.4–10.2)
CALCIUM SPEC-SCNC: 8.5 MG/DL (ref 8.4–10.2)
CHLORIDE SERPL-SCNC: 100 MMOL/L (ref 98–107)
CHLORIDE SERPL-SCNC: 101 MMOL/L (ref 98–107)
CO2 SERPL-SCNC: 26 MMOL/L (ref 26–30)
CO2 SERPL-SCNC: 27 MMOL/L (ref 26–30)
COHGB MFR BLD: 1.2 % (ref 0–2)
COHGB MFR BLD: 1.3 % (ref 0–2)
CREAT BLD-MCNC: 1.6 MG/DL (ref 0.6–1.3)
CREAT BLD-MCNC: 2.1 MG/DL (ref 0.6–1.3)
DEPRECATED RDW RBC AUTO: 47.8 FL (ref 37–54)
EOSINOPHIL # BLD AUTO: 0.14 10*3/MM3 (ref 0–0.7)
EOSINOPHIL NFR BLD AUTO: 0.6 % (ref 0–7)
ERYTHROCYTE [DISTWIDTH] IN BLOOD BY AUTOMATED COUNT: 15.9 % (ref 11.5–14.5)
GFR SERPL CREATININE-BSD FRML MDRD: 23 ML/MIN/1.73
GFR SERPL CREATININE-BSD FRML MDRD: 32 ML/MIN/1.73
GLOBULIN UR ELPH-MCNC: 2.9 GM/DL
GLUCOSE BLD-MCNC: 216 MG/DL (ref 74–98)
GLUCOSE BLD-MCNC: 285 MG/DL (ref 74–98)
GLUCOSE BLDC GLUCOMTR-MCNC: 128 MG/DL (ref 70–130)
GLUCOSE BLDC GLUCOMTR-MCNC: 188 MG/DL (ref 70–130)
GLUCOSE BLDC GLUCOMTR-MCNC: 258 MG/DL (ref 70–130)
GLUCOSE BLDC GLUCOMTR-MCNC: 291 MG/DL (ref 70–130)
GLUCOSE BLDC GLUCOMTR-MCNC: 346 MG/DL (ref 70–130)
HCO3 BLDA-SCNC: 23.4 MMOL/L (ref 22–28)
HCO3 BLDA-SCNC: 24.3 MMOL/L (ref 22–28)
HCT VFR BLD AUTO: 35.1 % (ref 37–47)
HCT VFR BLD CALC: 32.4 %
HCT VFR BLD CALC: 33.9 %
HGB BLD-MCNC: 10.9 G/DL (ref 12–16)
HGB BLDA-MCNC: 10.6 G/DL (ref 12–18)
HGB BLDA-MCNC: 11 G/DL (ref 12–18)
HOROWITZ INDEX BLD+IHG-RTO: 100 %
HOROWITZ INDEX BLD+IHG-RTO: 100 %
IMM GRANULOCYTES # BLD: 0.19 10*3/MM3 (ref 0–0.06)
IMM GRANULOCYTES NFR BLD: 0.9 % (ref 0–0.6)
LYMPHOCYTES # BLD AUTO: 2.09 10*3/MM3 (ref 0.6–3.4)
LYMPHOCYTES NFR BLD AUTO: 9.4 % (ref 10–50)
Lab: ABNORMAL
Lab: ABNORMAL
MAGNESIUM SERPL-MCNC: 2.2 MG/DL (ref 1.6–2.3)
MCH RBC QN AUTO: 26.1 PG (ref 27–31)
MCHC RBC AUTO-ENTMCNC: 31.1 G/DL (ref 30–37)
MCV RBC AUTO: 84.2 FL (ref 81–99)
METHGB BLD QL: 0.7 % (ref 0–1.5)
METHGB BLD QL: 0.7 % (ref 0–1.5)
MODALITY: ABNORMAL
MODALITY: ABNORMAL
MONOCYTES # BLD AUTO: 1.36 10*3/MM3 (ref 0–0.9)
MONOCYTES NFR BLD AUTO: 6.1 % (ref 0–12)
NEUTROPHILS # BLD AUTO: 18.44 10*3/MM3 (ref 2–6.9)
NEUTROPHILS NFR BLD AUTO: 82.9 % (ref 37–80)
NRBC BLD MANUAL-RTO: 0 /100 WBC (ref 0–0)
OXYHGB MFR BLDV: 90.4 % (ref 94–99)
OXYHGB MFR BLDV: 90.7 % (ref 94–99)
PCO2 BLDA: 39.2 MM HG (ref 35–45)
PCO2 BLDA: 41.5 MM HG (ref 35–45)
PCO2 TEMP ADJ BLD: ABNORMAL MM HG (ref 35–45)
PCO2 TEMP ADJ BLD: ABNORMAL MM HG (ref 35–45)
PH BLDA: 7.38 PH UNITS (ref 7.3–7.5)
PH BLDA: 7.38 PH UNITS (ref 7.3–7.5)
PH, TEMP CORRECTED: ABNORMAL PH UNITS
PH, TEMP CORRECTED: ABNORMAL PH UNITS
PHOSPHATE SERPL-MCNC: 9.2 MG/DL (ref 2.5–4.5)
PLATELET # BLD AUTO: 369 10*3/MM3 (ref 130–400)
PMV BLD AUTO: 11 FL (ref 6–12)
PO2 BLDA: 67.1 MM HG (ref 75–100)
PO2 BLDA: 67.5 MM HG (ref 75–100)
PO2 TEMP ADJ BLD: ABNORMAL MM HG (ref 83–108)
PO2 TEMP ADJ BLD: ABNORMAL MM HG (ref 83–108)
POTASSIUM BLD-SCNC: 2.8 MMOL/L (ref 3.5–5.1)
POTASSIUM BLD-SCNC: 3 MMOL/L (ref 3.5–5.1)
PROT SERPL-MCNC: 5.8 G/DL (ref 6.3–8.2)
RBC # BLD AUTO: 4.17 10*6/MM3 (ref 4.2–5.4)
SAO2 % BLDCOA: 92.2 % (ref 94–100)
SAO2 % BLDCOA: 92.6 % (ref 94–100)
SODIUM BLD-SCNC: 139 MMOL/L (ref 137–145)
SODIUM BLD-SCNC: 142 MMOL/L (ref 137–145)
TRIGL SERPL-MCNC: 149 MG/DL
VENTILATOR MODE: ABNORMAL
VENTILATOR MODE: ABNORMAL
WBC NRBC COR # BLD: 22.24 10*3/MM3 (ref 4.8–10.8)

## 2018-07-27 PROCEDURE — 5A1935Z RESPIRATORY VENTILATION, LESS THAN 24 CONSECUTIVE HOURS: ICD-10-PCS | Performed by: INTERNAL MEDICINE

## 2018-07-27 PROCEDURE — 99291 CRITICAL CARE FIRST HOUR: CPT | Performed by: INTERNAL MEDICINE

## 2018-07-27 PROCEDURE — 71045 X-RAY EXAM CHEST 1 VIEW: CPT

## 2018-07-27 PROCEDURE — 94799 UNLISTED PULMONARY SVC/PX: CPT

## 2018-07-27 PROCEDURE — 63710000001 INSULIN ASPART PER 5 UNITS: Performed by: FAMILY MEDICINE

## 2018-07-27 PROCEDURE — 82962 GLUCOSE BLOOD TEST: CPT

## 2018-07-27 PROCEDURE — 63710000001 INSULIN DETEMIR PER 5 UNITS: Performed by: FAMILY MEDICINE

## 2018-07-27 PROCEDURE — 85025 COMPLETE CBC W/AUTO DIFF WBC: CPT | Performed by: INTERNAL MEDICINE

## 2018-07-27 PROCEDURE — 80053 COMPREHEN METABOLIC PANEL: CPT | Performed by: INTERNAL MEDICINE

## 2018-07-27 PROCEDURE — 36600 WITHDRAWAL OF ARTERIAL BLOOD: CPT

## 2018-07-27 PROCEDURE — 99233 SBSQ HOSP IP/OBS HIGH 50: CPT | Performed by: INTERNAL MEDICINE

## 2018-07-27 PROCEDURE — 25010000002 METHYLPREDNISOLONE PER 40 MG: Performed by: INTERNAL MEDICINE

## 2018-07-27 PROCEDURE — 99232 SBSQ HOSP IP/OBS MODERATE 35: CPT | Performed by: INTERNAL MEDICINE

## 2018-07-27 PROCEDURE — 25010000003 POTASSIUM CHLORIDE 10 MEQ/100ML SOLUTION: Performed by: INTERNAL MEDICINE

## 2018-07-27 PROCEDURE — 25010000002 AMIODARONE IN DEXTROSE 5% 360-4.14 MG/200ML-% SOLUTION: Performed by: INTERNAL MEDICINE

## 2018-07-27 PROCEDURE — 83050 HGB METHEMOGLOBIN QUAN: CPT

## 2018-07-27 PROCEDURE — 25010000002 LORAZEPAM PER 2 MG: Performed by: INTERNAL MEDICINE

## 2018-07-27 PROCEDURE — 25010000002 PROPOFOL 1000 MG/ML EMULSION: Performed by: INTERNAL MEDICINE

## 2018-07-27 PROCEDURE — 0BH18EZ INSERTION OF ENDOTRACHEAL AIRWAY INTO TRACHEA, VIA NATURAL OR ARTIFICIAL OPENING ENDOSCOPIC: ICD-10-PCS | Performed by: INTERNAL MEDICINE

## 2018-07-27 PROCEDURE — 94770: CPT

## 2018-07-27 PROCEDURE — 82375 ASSAY CARBOXYHB QUANT: CPT

## 2018-07-27 PROCEDURE — 84478 ASSAY OF TRIGLYCERIDES: CPT | Performed by: INTERNAL MEDICINE

## 2018-07-27 PROCEDURE — 25010000002 MORPHINE PER 10 MG: Performed by: INTERNAL MEDICINE

## 2018-07-27 PROCEDURE — 83735 ASSAY OF MAGNESIUM: CPT | Performed by: INTERNAL MEDICINE

## 2018-07-27 PROCEDURE — 82805 BLOOD GASES W/O2 SATURATION: CPT

## 2018-07-27 PROCEDURE — 84100 ASSAY OF PHOSPHORUS: CPT | Performed by: INTERNAL MEDICINE

## 2018-07-27 PROCEDURE — 63710000001 INSULIN REGULAR HUMAN PER 5 UNITS: Performed by: FAMILY MEDICINE

## 2018-07-27 RX ORDER — POTASSIUM CHLORIDE 7.45 MG/ML
10 INJECTION INTRAVENOUS
Status: COMPLETED | OUTPATIENT
Start: 2018-07-27 | End: 2018-07-27

## 2018-07-27 RX ORDER — CHLORHEXIDINE GLUCONATE 0.12 MG/ML
15 RINSE ORAL EVERY 12 HOURS SCHEDULED
Status: DISCONTINUED | OUTPATIENT
Start: 2018-07-27 | End: 2018-07-29 | Stop reason: HOSPADM

## 2018-07-27 RX ORDER — POTASSIUM CHLORIDE 1.5 G/1.77G
40 POWDER, FOR SOLUTION ORAL ONCE
Status: COMPLETED | OUTPATIENT
Start: 2018-07-27 | End: 2018-07-27

## 2018-07-27 RX ORDER — LORAZEPAM 2 MG/ML
2 INJECTION INTRAMUSCULAR
Status: DISCONTINUED | OUTPATIENT
Start: 2018-07-27 | End: 2018-07-29 | Stop reason: HOSPADM

## 2018-07-27 RX ORDER — SODIUM CHLORIDE 9 MG/ML
100 INJECTION, SOLUTION INTRAVENOUS CONTINUOUS
Status: DISCONTINUED | OUTPATIENT
Start: 2018-07-27 | End: 2018-07-28

## 2018-07-27 RX ADMIN — POTASSIUM CHLORIDE 10 MEQ: 7.46 INJECTION, SOLUTION INTRAVENOUS at 17:41

## 2018-07-27 RX ADMIN — APIXABAN 5 MG: 2.5 TABLET, FILM COATED ORAL at 08:17

## 2018-07-27 RX ADMIN — IPRATROPIUM BROMIDE AND ALBUTEROL SULFATE 3 ML: .5; 3 SOLUTION RESPIRATORY (INHALATION) at 04:50

## 2018-07-27 RX ADMIN — ATORVASTATIN CALCIUM 20 MG: 20 TABLET, FILM COATED ORAL at 08:17

## 2018-07-27 RX ADMIN — INSULIN ASPART 4 UNITS: 100 INJECTION, SOLUTION INTRAVENOUS; SUBCUTANEOUS at 06:33

## 2018-07-27 RX ADMIN — METHYLPREDNISOLONE SODIUM SUCCINATE 40 MG: 40 INJECTION, POWDER, FOR SOLUTION INTRAMUSCULAR; INTRAVENOUS at 05:27

## 2018-07-27 RX ADMIN — CHLORHEXIDINE GLUCONATE 0.12% ORAL RINSE 15 ML: 1.2 LIQUID ORAL at 21:45

## 2018-07-27 RX ADMIN — IPRATROPIUM BROMIDE AND ALBUTEROL SULFATE 3 ML: .5; 3 SOLUTION RESPIRATORY (INHALATION) at 07:06

## 2018-07-27 RX ADMIN — MORPHINE SULFATE 4 MG: 2 INJECTION, SOLUTION INTRAMUSCULAR; INTRAVENOUS at 08:11

## 2018-07-27 RX ADMIN — Medication 1 SPRAY: at 23:56

## 2018-07-27 RX ADMIN — BUDESONIDE 1 MG: 0.5 INHALANT RESPIRATORY (INHALATION) at 19:33

## 2018-07-27 RX ADMIN — HUMAN INSULIN 4 UNITS: 100 INJECTION, SOLUTION SUBCUTANEOUS at 11:53

## 2018-07-27 RX ADMIN — FAMOTIDINE 20 MG: 20 TABLET ORAL at 08:21

## 2018-07-27 RX ADMIN — GABAPENTIN 300 MG: 300 CAPSULE ORAL at 08:24

## 2018-07-27 RX ADMIN — PROPOFOL 15 MCG/KG/MIN: 10 INJECTION, EMULSION INTRAVENOUS at 08:37

## 2018-07-27 RX ADMIN — APIXABAN 5 MG: 2.5 TABLET, FILM COATED ORAL at 21:45

## 2018-07-27 RX ADMIN — BUDESONIDE 1 MG: 0.5 INHALANT RESPIRATORY (INHALATION) at 07:06

## 2018-07-27 RX ADMIN — Medication 1 CAPSULE: at 21:45

## 2018-07-27 RX ADMIN — Medication 1 CAPSULE: at 08:21

## 2018-07-27 RX ADMIN — SODIUM CHLORIDE 500 ML: 9 INJECTION, SOLUTION INTRAVENOUS at 10:04

## 2018-07-27 RX ADMIN — POTASSIUM CHLORIDE 10 MEQ: 7.46 INJECTION, SOLUTION INTRAVENOUS at 20:11

## 2018-07-27 RX ADMIN — AMIODARONE HYDROCHLORIDE 0.5 MG/MIN: 1.8 INJECTION, SOLUTION INTRAVENOUS at 20:11

## 2018-07-27 RX ADMIN — IPRATROPIUM BROMIDE AND ALBUTEROL SULFATE 3 ML: .5; 3 SOLUTION RESPIRATORY (INHALATION) at 15:41

## 2018-07-27 RX ADMIN — VITAMIN D, TAB 1000IU (100/BT) 2000 UNITS: 25 TAB at 08:21

## 2018-07-27 RX ADMIN — LORAZEPAM 2 MG: 2 INJECTION INTRAMUSCULAR; INTRAVENOUS at 01:22

## 2018-07-27 RX ADMIN — CLOTRIMAZOLE AND BETAMETHASONE DIPROPIONATE 1 APPLICATION: 10; .5 CREAM TOPICAL at 08:26

## 2018-07-27 RX ADMIN — PROPOFOL 10 MCG/KG/MIN: 10 INJECTION, EMULSION INTRAVENOUS at 20:11

## 2018-07-27 RX ADMIN — INSULIN ASPART 5 UNITS: 100 INJECTION, SOLUTION INTRAVENOUS; SUBCUTANEOUS at 00:06

## 2018-07-27 RX ADMIN — IPRATROPIUM BROMIDE AND ALBUTEROL SULFATE 3 ML: .5; 3 SOLUTION RESPIRATORY (INHALATION) at 00:35

## 2018-07-27 RX ADMIN — CLOTRIMAZOLE AND BETAMETHASONE DIPROPIONATE 1 APPLICATION: 10; .5 CREAM TOPICAL at 00:08

## 2018-07-27 RX ADMIN — SODIUM CHLORIDE 50 ML/HR: 9 INJECTION, SOLUTION INTRAVENOUS at 08:29

## 2018-07-27 RX ADMIN — SERTRALINE HYDROCHLORIDE 100 MG: 50 TABLET ORAL at 08:18

## 2018-07-27 RX ADMIN — METOPROLOL TARTRATE 25 MG: 25 TABLET ORAL at 08:25

## 2018-07-27 RX ADMIN — CLOTRIMAZOLE AND BETAMETHASONE DIPROPIONATE 1 APPLICATION: 10; .5 CREAM TOPICAL at 21:49

## 2018-07-27 RX ADMIN — IPRATROPIUM BROMIDE AND ALBUTEROL SULFATE 3 ML: .5; 3 SOLUTION RESPIRATORY (INHALATION) at 19:33

## 2018-07-27 RX ADMIN — INSULIN DETEMIR 10 UNITS: 100 INJECTION, SOLUTION SUBCUTANEOUS at 21:45

## 2018-07-27 RX ADMIN — POTASSIUM CHLORIDE 40 MEQ: 1.5 POWDER, FOR SOLUTION ORAL at 16:04

## 2018-07-27 RX ADMIN — SODIUM CHLORIDE 250 ML: 9 INJECTION, SOLUTION INTRAVENOUS at 01:31

## 2018-07-27 RX ADMIN — FAMOTIDINE 20 MG: 20 TABLET ORAL at 21:45

## 2018-07-27 RX ADMIN — Medication 200 MG: at 08:18

## 2018-07-27 RX ADMIN — INSULIN DETEMIR 10 UNITS: 100 INJECTION, SOLUTION SUBCUTANEOUS at 08:27

## 2018-07-27 RX ADMIN — BUPROPION HYDROCHLORIDE 75 MG: 75 TABLET, FILM COATED ORAL at 08:17

## 2018-07-27 RX ADMIN — HUMAN INSULIN 2 UNITS: 100 INJECTION, SOLUTION SUBCUTANEOUS at 23:56

## 2018-07-27 RX ADMIN — IPRATROPIUM BROMIDE AND ALBUTEROL SULFATE 3 ML: .5; 3 SOLUTION RESPIRATORY (INHALATION) at 12:54

## 2018-07-27 RX ADMIN — METHYLPREDNISOLONE SODIUM SUCCINATE 40 MG: 40 INJECTION, POWDER, FOR SOLUTION INTRAMUSCULAR; INTRAVENOUS at 17:54

## 2018-07-27 RX ADMIN — BUPROPION HYDROCHLORIDE 75 MG: 75 TABLET, FILM COATED ORAL at 21:45

## 2018-07-27 RX ADMIN — POTASSIUM CHLORIDE 10 MEQ: 7.46 INJECTION, SOLUTION INTRAVENOUS at 18:55

## 2018-07-27 RX ADMIN — METOPROLOL TARTRATE 25 MG: 25 TABLET ORAL at 21:45

## 2018-07-27 RX ADMIN — GABAPENTIN 300 MG: 300 CAPSULE ORAL at 21:45

## 2018-07-27 RX ADMIN — CHLORHEXIDINE GLUCONATE 0.12% ORAL RINSE 15 ML: 1.2 LIQUID ORAL at 03:59

## 2018-07-27 RX ADMIN — LORAZEPAM 2 MG: 2 INJECTION INTRAMUSCULAR; INTRAVENOUS at 16:41

## 2018-07-27 NOTE — SIGNIFICANT NOTE
07/27/18 0942   Rehab Treatment   Discipline occupational therapist   Reason Treatment Not Performed unable to treat, medical status change  (Pt on hold for therapy today d/t being re-intubated.  Will follow up with pt another day.)

## 2018-07-27 NOTE — PLAN OF CARE
Problem: Ventilation, Mechanical Invasive (Adult)  Intervention: Prevent Airway Displacement/Mechanical Dysfunction   07/27/18 1715   Prevent Airway Displacement/Mechanical Dysfunction   Airway Safety Measures manual resuscitator/mask/valve in room;other (see comments)  (PEEP valve in room)     Intervention: Prevent Ventilator-Induced Lung Injury   07/27/18 1715   Respiratory Interventions   Lung Protection Measures plateau/inspiratory pressure monitored;lung compliance monitored;ventilator synchrony promoted;ventilator waveforms monitored     Intervention: Prevent Ventilator-Associated Pneumonia (VAP)   07/27/18 1715   Positioning   Head of Bed (HOB) HOB elevated     Intervention: Optimize Oxygenation/Ventilation   07/27/18 1715   Respiratory Interventions   Airway/Ventilation Management airway patency maintained;pulmonary hygiene promoted       Goal: Signs and Symptoms of Listed Potential Problems Will be Absent, Minimized or Managed (Ventilation, Mechanical Invasive)  Outcome: Ongoing (interventions implemented as appropriate)   07/27/18 1715   Goal/Outcome Evaluation   Problems Assessed (Mechanical Ventilation, Invasive) artificial airway-induced skin/tissue breakdown;inability to wean;mechanical dysfunction;situational response;ventilator-induced lung injury   Problems Present (Mech Vent, Invasive) inability to wean

## 2018-07-27 NOTE — PROGRESS NOTES
[facility]    Name:  Breann Gallegos   Age:  68 y.o.  Sex:  female  :  1950  MRN:  [id number]   Visit Number:  [id number]  Admission Date:  2018  Date Of Service:  18  Primary Care Physician:  Harry Avery MD     LOS: 14 days :  Patient Care Team:  Harry Avery MD as PCP - General  Harry Avery MD as PCP - Family Medicine  Harry Avery MD as PCP - Claims Attributed  Tereza Bateman RN as Care Coordinator ([facility]):    History taken from:     chart    Chief Complaint:      Hypoxic failure    Subjective     Interval History:     Patient seen again today.    Patient is a 68-year-old female who was admitted on 2018 with multifocal pneumonia and a sputum culture growing MRSA.  She has been fully treated with vancomycin.  She also has continued to have sepsis.  She is diabetic, has chronic essential hypertension, COPD.  She had severe hypoxia requiring ventilatory management.  Dr. Tse from pulmonary has been following.  She also has probable aspiration pneumonia after extubation.  She has had intermittent A. fib with RVR.  She was placed on IV amiodarone, she is continued on this at this point.   She is also suspected to have ARDS.  She has been treated with Lasix drip and Diuril several times this week.      She has required continuous BiPAP, however decompensated last night and was again intubated.    He is noted to be in renal failure now with a creatinine of 2.10.  She will be given some gentle hydration, and diuresis will be held.  She has been placed, tube feeding will be restarted.  She is noted to have elevated WBC count, suspect this is due to Solu-Medrol.  Monitor for any other signs or symptoms of infection.  Chest x-ray is actually improved.  Continue with ventilatory support at this time, and we'll try to wean as tolerated.  Patient is sedated on the ventilator and unable to answer any questions.        Review of  Systems:     Unable to obtain due to the fact the patient is sedated and on the ventilator    Objective     Vital Signs:    Temp:  [97.4 °F (36.3 °C)-98.3 °F (36.8 °C)] 97.8 °F (36.6 °C)  Heart Rate:  [57-85] 70  Resp:  [14-30] 16  BP: ()/(42-74) 100/48  FiO2 (%):  [100 %] 100 %    Physical Exam:      General Appearance:    Sedated on the ventilator    Head:    Normocephalic, without obvious abnormality, atraumatic   Eyes:            Lids and lashes normal, conjunctivae and sclerae normal, no   icterus, no pallor, corneas clear, PERRLA   Ears:    Ears appear intact with no abnormalities noted   Throat:   No oral lesions, no thrush, oral mucosa moist   Neck:   No adenopathy, supple, trachea midline, no thyromegaly, no     carotid bruit, no JVD   Back:     No kyphosis present, no scoliosis present, no skin lesions,       erythema or scars, no tenderness to percussion or                   palpation,   range of motion normal   Lungs:     Rhonchi bilaterally without uses accessory muscles respiration.      Heart:    Regular rhythm and normal rate, normal S1 and S2, no            murmur, no gallop, no rub, no click   Breast Exam:    Deferred   Abdomen:     Normal bowel sounds, no masses, no organomegaly, soft        non-tender, non-distended, no guarding, no rebound                 tenderness   Genitalia:    Deferred   Extremities:    no edema, no cyanosis, no    redness   Pulses:   Pulses palpable and equal bilaterally   Skin:   No bleeding, bruising or rash   Lymph nodes:   No palpable adenopathy   Neurologic:    DTRs are present and equal bilaterally        Results Review:      I reviewed the patient's new clinical results.    Labs:    Lab Results (last 24 hours)     Procedure Component Value Units Date/Time    POC Glucose Once [[id number]]  (Abnormal) Collected:  07/27/18 1138    Specimen:  Blood Updated:  07/27/18 1300     Glucose 291 (H) mg/dL      Comment: Serial Number: [id number]:  [id number]       Blood  Culture With OSMEL - Blood, [[id number]]  (Normal) Collected:  07/25/18 0747    Specimen:  Blood from Arm, Left Updated:  07/27/18 0800     Blood Culture No growth at 2 days    Blood Culture With OSMEL - Blood, [[id number]]  (Normal) Collected:  07/25/18 0740    Specimen:  Blood from Arm, Left Updated:  07/27/18 0800     Blood Culture No growth at 2 days    POC Glucose Once [[id number]]  (Abnormal) Collected:  07/27/18 0557    Specimen:  Blood Updated:  07/27/18 0610     Glucose 258 (H) mg/dL      Comment: Serial Number: [id number]:  [id number]       Phosphorus [[id number]]  (Abnormal) Collected:  07/27/18 0445    Specimen:  Blood Updated:  07/27/18 0603     Phosphorus 9.2 (C) mg/dL     Comprehensive Metabolic Panel [[id number]]  (Abnormal) Collected:  07/27/18 0445    Specimen:  Blood Updated:  07/27/18 0556     Glucose 216 (H) mg/dL      Comment: Glucose >180, Hemoglobin A1C recommended.        BUN 61 (H) mg/dL      Creatinine 2.10 (H) mg/dL      Sodium 142 mmol/L      Potassium 3.0 (L) mmol/L      Chloride 101 mmol/L      CO2 26.0 mmol/L      Calcium 8.5 mg/dL      Total Protein 5.8 (L) g/dL      Albumin 2.90 (L) g/dL      ALT (SGPT) 45 U/L      AST (SGOT) 31 U/L      Alkaline Phosphatase 114 U/L      Total Bilirubin 0.6 mg/dL      eGFR Non African Amer 23 (L) mL/min/1.73      Globulin 2.9 gm/dL      A/G Ratio 1.0 g/dL      BUN/Creatinine Ratio 29.0 (H)     Anion Gap 18.0 mmol/L     Narrative:       GFR Normal >60  Chronic Kidney Disease <60  Kidney Failure <15    Magnesium [[id number]]  (Normal) Collected:  07/27/18 0445    Specimen:  Blood Updated:  07/27/18 0554     Magnesium 2.2 mg/dL     Triglycerides [[id number]]  (Normal) Collected:  07/27/18 0445    Specimen:  Blood Updated:  07/27/18 0554     Triglycerides 149 mg/dL     CBC & Differential [[id number]] Collected:  07/27/18 0445    Specimen:  Blood Updated:  07/27/18 0522    Narrative:       The following orders were created for panel order CBC &  Differential.  Procedure                               Abnormality         Status                     ---------                               -----------         ------                     CBC Auto Differential[[id number]]        Abnormal            Final result                 Please view results for these tests on the individual orders.    CBC Auto Differential [[id number]]  (Abnormal) Collected:  07/27/18 0445    Specimen:  Blood Updated:  07/27/18 0522     WBC 22.24 (H) 10*3/mm3      RBC 4.17 (L) 10*6/mm3      Hemoglobin 10.9 (L) g/dL      Hematocrit 35.1 (L) %      MCV 84.2 fL      MCH 26.1 (L) pg      MCHC 31.1 g/dL      RDW 15.9 (H) %      RDW-SD 47.8 fl      MPV 11.0 fL      Platelets 369 10*3/mm3      Neutrophil % 82.9 (H) %      Lymphocyte % 9.4 (L) %      Monocyte % 6.1 %      Eosinophil % 0.6 %      Basophil % 0.1 %      Immature Grans % 0.9 (H) %      Neutrophils, Absolute 18.44 (H) 10*3/mm3      Lymphocytes, Absolute 2.09 10*3/mm3      Monocytes, Absolute 1.36 (H) 10*3/mm3      Eosinophils, Absolute 0.14 10*3/mm3      Basophils, Absolute 0.02 10*3/mm3      Immature Grans, Absolute 0.19 (H) 10*3/mm3      nRBC 0.0 /100 WBC     Blood Gas, Arterial With Co-Ox [[id number]]  (Abnormal) Collected:  07/27/18 0458    Specimen:  Arterial Blood Updated:  07/27/18 0458     Site Left Brachial     Toy's Test N/A     pH, Arterial 7.377 pH units      pCO2, Arterial 41.5 mm Hg      pO2, Arterial 67.5 (L) mm Hg      HCO3, Arterial 24.3 mmol/L      Base Excess, Arterial -0.9 (L) mmol/L      O2 Saturation, Arterial 92.2 (L) %      Hemoglobin, Blood Gas 11.0 (L) g/dL      Hematocrit, Blood Gas 33.9 %      Oxyhemoglobin 90.4 (L) %      Methemoglobin 0.70 %      Carboxyhemoglobin 1.2 %      Barometric Pressure for Blood Gas 733 mmHg      Modality Ventilator     FIO2 100 %      Ventilator Mode NA     Collected by andriy     pH, Temp Corrected -- pH Units      pCO2, Temperature Corrected -- mm Hg      pO2, Temperature Corrected --  mm Hg     POC Glucose Once [[id number]]  (Abnormal) Collected:  07/26/18 2357    Specimen:  Blood Updated:  07/27/18 0015     Glucose 346 (H) mg/dL      Comment: Serial Number: [id number]:  [id number]       Blood Gas, Arterial With Co-Ox [[id number]]  (Abnormal) Collected:  07/26/18 1856    Specimen:  Arterial Blood Updated:  07/26/18 1855     Site Left Radial     Toy's Test Positive     pH, Arterial 7.467 pH units      pCO2, Arterial 37.2 mm Hg      pO2, Arterial 54.1 (L) mm Hg      HCO3, Arterial 26.9 mmol/L      Base Excess, Arterial 3.1 (H) mmol/L      O2 Saturation, Arterial 87.7 (L) %      Hemoglobin, Blood Gas 12.9 g/dL      Hematocrit, Blood Gas 39.6 %      Oxyhemoglobin 86.1 (L) %      Methemoglobin 0.50 %      Carboxyhemoglobin 1.3 %      Barometric Pressure for Blood Gas 733 mmHg      Modality BiPap     FIO2 100 %      Ventilator Mode NA     PEEP 0.0     IPAP 16     EPAP 12     Collected by yovani     pH, Temp Corrected -- pH Units      pCO2, Temperature Corrected -- mm Hg      pO2, Temperature Corrected -- mm Hg     Phosphorus [[id number]]  (Abnormal) Collected:  07/26/18 1658    Specimen:  Blood Updated:  07/26/18 1734     Phosphorus 6.1 (C) mg/dL     POC Glucose Once [[id number]]  (Abnormal) Collected:  07/26/18 1550    Specimen:  Blood Updated:  07/26/18 1620     Glucose 215 (H) mg/dL      Comment: Serial Number: [id number]:  [id number]              Radiology:    Imaging Results (last 24 hours)     Procedure Component Value Units Date/Time    XR Chest 1 View [[id number]] Collected:  07/27/18 0838     Updated:  07/27/18 0840    Narrative:       PROCEDURE: XR CHEST 1 VW-     HISTORY: PNA; J18.9-Pneumonia, unspecified organism; J96.21-Acute and  chronic respiratory failure with hypoxia; Z74.09-Other reduced mobility;  Z74.09-Other reduced mobility     COMPARISON: 7/26/2018.     FINDINGS: A right PICC remains in place. Endotracheal and nasogastric  tubes are well-positioned. The heart is normal in size.  The mediastinum  is unremarkable. There is improved aeration of the lungs compared to  prior exam, however bilateral airspace disease persists. There is no  pneumothorax.  There are no acute osseous abnormalities.           Impression:       Interval improvement in the patient's bilateral airspace  disease.     Continued followup is recommended.     This report was finalized on 7/27/2018 8:38 AM by Joseph Ba M.D..    XR Abdomen 1 View [[id number]] Collected:  07/26/18 2037     Updated:  07/26/18 2038    Narrative:       FINAL REPORT    TECHNIQUE:  A single view of the abdomen was obtained.    CLINICAL HISTORY:  OG tube placement.    FINDINGS:  The OG tube is in the mid stomach.  There is no apparent bowel  gas pattern abnormality.      Impression:       OG tube in mid stomach.    Authenticated by Juan Luis Jolley M.D. on 07/26/2018 08:37:21 PM    XR Chest 1 View [[id number]] Collected:  07/26/18 2036     Updated:  07/26/18 2037    Narrative:       FINAL REPORT    TECHNIQUE:  An AP portable view of the chest was obtained.    CLINICAL HISTORY:  ET Tube placement.    COMPARISON:  June 24, 2018.    FINDINGS:  The endotracheal tube is appropriately positioned at the level  of the clavicles.  A right PICC remains in the SVC.  There is  worsening bilateral airspace consolidation consistent with  pneumonia.  Small pleural effusions are also likely.  There is  subcutaneous emphysema in the left chest wall and in both sides  of the neck.  But, no pneumothorax is identified.      Impression:       Endotracheal tube appropriately positioned.  Worsening bilateral  airspace opacities.    Authenticated by Juan Luis Jolley M.D. on 07/26/2018 08:36:53 PM    FL GI Tube Placement [[id number]] Collected:  07/26/18 1458     Updated:  07/26/18 1505    Narrative:       PROCEDURE: FL GI TUBE PLACEMENT-     HISTORY: Ng tube placement under flouro; J18.9-Pneumonia, unspecified  organism; J96.21-Acute and chronic respiratory failure with  hypoxia;  Z74.09-Other reduced mobility; Z74.09-Other reduced mobility     PROCEDURE: Fluoroscopic guidance was utilized during NG tube placement.  Fluoroscopy time was 0.5 minutes. 3 images were obtained.        FINDINGS: An attempt was made at fluoroscopic feeding tube placement,  however I was unable to pass the tube through the nasopharynx. The  patient had desaturations during the attempt as well.       Impression:       Failed attempt at fluoroscopic guided feeding tube  placement.                   This report was finalized on 7/26/2018 3:03 PM by Joseph Ba M.D..          Medication Review:       apixaban 5 mg Oral Q12H   atorvastatin 20 mg Oral Daily   budesonide 1 mg Nebulization BID   buPROPion 75 mg Oral Q12H   calcitonin (salmon) 1 spray Alternating Nares Daily   chlorhexidine 15 mL Mouth/Throat Q12H   clotrimazole-betamethasone 1 application Topical Q12H   famotidine 20 mg Oral BID   fluticasone 2 spray Each Nare Daily   gabapentin 300 mg Oral BID   insulin detemir 10 Units Subcutaneous Q12H   insulin regular 0-7 Units Subcutaneous Q6H   ipratropium-albuterol 3 mL Nebulization Q4H - RT   lactobacillus acidophilus 1 capsule Oral BID   magnesium oxide 200 mg Oral Daily   methylPREDNISolone sodium succinate 40 mg Intravenous Q12H   metoprolol tartrate 25 mg Nasogastric Q12H   sertraline 100 mg Oral Daily   sodium chloride 250 mL Intravenous Once   cholecalciferol 2,000 Units Oral Daily         Adult [facility] Clinimix TPN     amiodarone 0.5 mg/min Last Rate: 0.5 mg/min (07/26/18 1821)   Pharmacy Consult - Pharmacy to dose     propofol 5-50 mcg/kg/min Last Rate: 15 mcg/kg/min (07/27/18 0837)   sodium chloride 100 mL/hr Last Rate: 100 mL/hr (07/27/18 0907)       Assessment/Plan     Problem List Items Addressed This Visit     Pneumonia of both lungs due to infectious organism - Primary    Relevant Medications    albuterol (PROAIR RESPICLICK) 108 (90 BASE) MCG/ACT inhaler    ipratropium-albuterol  (DUO-NEB) 0.5-2.5 mg/mL nebulizer    flunisolide (NASALIDE) 25 MCG/ACT (0.025%) solution nasal spray    arformoterol (BROVANA) 15 MCG/2ML nebulizer solution    budesonide (PULMICORT) 0.5 MG/2ML nebulizer solution      Other Visit Diagnoses     Acute on chronic respiratory failure with hypoxia (CMS/HCC)        Impaired functional mobility, balance, gait, and endurance        Impaired mobility and ADLs              1. Multifocal bilateral pneumonia, prior to admission, with MRSA, Fully treated with vancomycin.  2. Dysphagia with suspected aspiration pneumonitis  3. Acute hypoxic respiratory failure severe, Previously requiring intubation and placement on mechanical ventilator 7-14-18 with  ARDS, Was on BiPAP, however failed and has been placed back on mechanical ventilation 7/27/2018  4. Recurrent paroxysmal Afib with RVR, 10 use on IV  amiodarone as well as oral Lopressor.  5. Sepsis, secondary to pneumonia, prior to admission, with MRSA, fully treated with vancomycin  6. Diabetes mellitus type 2 insulin dependent  7. Chronic essential hypertension  8. COPD moderate, on IV Solu-Medrol  9. Abnormal CT chest with lymphadenopathy, repeat outpatient CT scan chest   10. Hypomagnesemia, replaced  11.   persistently Elevated WBCs, suspect due to Solu-Medrol.  12.  Acute renal failure, likely due to combination of hypotension and diuresis.    Plan:    We'll continue on ventilator at this time.  Check lab work, x-ray, ABG in the a.m.  Reviewed case with Dr. Tse.  She has been placed, will discontinue TPN, and placed on tube feeds, spoke to nutrition regarding this.  Keep nothing by mouth due to aspiration.  Hold on diuresis at this point.  We'll give 50 cc of normal saline per hour.  She continues on Eliquis for stroke prophylaxis.  Reviewed note of Dr. Farias.  Patient continues to be critical.  Patient may need tracheostomy and PEG tube.  Patient will also need LTAC placement once stabilized.  Further recommendations  will depend on the clinical course.        Rock Guzman DO  07/27/18  1:59 PM

## 2018-07-27 NOTE — PLAN OF CARE
Problem: Patient Care Overview  Goal: Plan of Care Review  Outcome: Ongoing (interventions implemented as appropriate)      Problem: Pain, Acute (Adult)  Goal: Acceptable Pain Control/Comfort Level  Outcome: Ongoing (interventions implemented as appropriate)   07/27/18 0155   Pain, Acute (Adult)   Acceptable Pain Control/Comfort Level (no signs of pain seen)       Problem: Ventilation, Mechanical Invasive (Adult)  Goal: Signs and Symptoms of Listed Potential Problems Will be Absent, Minimized or Managed (Ventilation, Mechanical Invasive)  Outcome: Ongoing (interventions implemented as appropriate)   07/27/18 0155   Goal/Outcome Evaluation   Problems Assessed (Mechanical Ventilation, Invasive) all   Problems Present (Mech Vent, Invasive) immobility;inability to wean;malnutrition;situational response;other (see comments)  (100 % FIO2 in use Sats @ 90 - 95 %)       Problem: Arrhythmia/Dysrhythmia (Symptomatic) (Adult)  Goal: Signs and Symptoms of Listed Potential Problems Will be Absent, Minimized or Managed (Arrhythmia/Dysrhythmia)  Outcome: Ongoing (interventions implemented as appropriate)   07/27/18 0155   Goal/Outcome Evaluation   Problems Assessed (Arrhythmia/Dysrhythmia) all   Problems Present (Dysrhythmia) hypoxia/hypoxemia       Problem: Fall Risk (Adult)  Goal: Absence of Fall  Outcome: Ongoing (interventions implemented as appropriate)   07/27/18 0155   Fall Risk (Adult)   Absence of Fall making progress toward outcome  (bed rest maintained)       Problem: Nutrition, Parenteral (Adult)  Goal: Signs and Symptoms of Listed Potential Problems Will be Absent, Minimized or Managed (Nutrition, Parenteral)  Outcome: Ongoing (interventions implemented as appropriate)

## 2018-07-27 NOTE — SIGNIFICANT NOTE
07/27/18 0941   Rehab Treatment   Discipline physical therapist   Reason Treatment Not Performed unable to treat, medical status change  (PT on hold today due to re-intubation.  PT to attempt again tomorrow)

## 2018-07-27 NOTE — NURSING NOTE
Anesthesia into room at 742 PM.  Medicated per   WILD Murcia CRNA. He then intubated with 7.5 ETT ,   20 cm at gum line. Vent settings  SCMV- rate 16,   TV -500, PEEP- 12, FIO2 -100 %. Verified per capnography and auscultation. OG tube placed down to 52 cm.  Xray in for radiographic confirmation.   and granddaughter at bedside.

## 2018-07-27 NOTE — PROGRESS NOTES
"[facility]iology Progress note     LOS: 14 days   Patient Care Team:  Harry Avery MD as PCP - General  Harry Avery MD as PCP - Family Medicine  Harry Avery MD as PCP - Claims Attributed  Tereza Bateman RN as Care Coordinator ([facility])    Chief Complaint:  Shortness of breath    Subjective     Interval History:     Patient Complaints: N/A  Patient Denies:  N/A  History taken from: RN    Review of Systems:   N/A      Objective     Vital Sign Min/Max for last 24 hours  Temp  Min: 97.4 °F (36.3 °C)  Max: 98.1 °F (36.7 °C)   BP  Min: 80/47  Max: 157/91   Pulse  Min: 57  Max: 124   Resp  Min: 14  Max: 30   SpO2  Min: 83 %  Max: 98 %   Flow (L/min)  Min: 40  Max: 100   Weight  Min: 70.9 kg (156 lb 3.2 oz)  Max: 70.9 kg (156 lb 3.2 oz)     Flowsheet Rows      First Filed Value   Admission Height  162.6 cm (64\") Documented at 07/13/2018 0205   Admission Weight  68.7 kg (151 lb 8 oz) Documented at 07/13/2018 0441          Physical Exam:     General Appearance:    Intubated and sedated, in no acute distress   Head:    Normocephalic, without obvious abnormality, atraumatic   Eyes:            Lids and lashes normal, conjunctivae and sclerae normal, no   icterus, no pallor, corneas clear, PERRLA   Ears:    Ears appear intact with no abnormalities noted   Throat:   No oral lesions, no thrush, oral mucosa moist.  Oral intubation.  Trachea midline.     Neck:   No adenopathy, supple, trachea midline, no thyromegaly, no     carotid bruit, no JVD   Back:     No kyphosis present, no scoliosis present, no skin lesions,       erythema or scars, no tenderness to percussion or                   palpation,   range of motion normal   Lungs:     Clear to auscultation,respirations regular, even and                   unlabored    Heart:    Regular rhythm and normal rate, normal S1 and S2, no            murmur, no gallop, no rub, no click   Breast Exam:    Deferred   Abdomen:     Normal bowel sounds, no masses, no organomegaly, " soft        non-tender, non-distended, no guarding, no rebound                 tenderness   Genitalia:    Deferred   Extremities:   Moves all extremities well, no edema, no cyanosis, no              redness   Pulses:   Pulses palpable and equal bilaterally   Skin:   No bleeding, bruising or rash   Lymph nodes:   No palpable adenopathy   Neurologic:   Cranial nerves 2 - 12 grossly intact, sensation intact, DTR        present and equal bilaterally        Results Review:     I reviewed the patient's new clinical results.        Results from last 7 days  Lab Units 07/27/18  0445   SODIUM mmol/L 142   POTASSIUM mmol/L 3.0*   CHLORIDE mmol/L 101   CO2 mmol/L 26.0   BUN mg/dL 61*   CREATININE mg/dL 2.10*   GLUCOSE mg/dL 216*   CALCIUM mg/dL 8.5       Results from last 7 days  Lab Units 07/27/18  0445 07/26/18  0423 07/25/18  0431   WBC 10*3/mm3 22.24* 23.51* 25.37*   HEMOGLOBIN g/dL 10.9* 12.8 11.7*   HEMATOCRIT % 35.1* 41.5 36.9*   PLATELETS 10*3/mm3 369 312 377       Echo EF Estimated  Lab Results   Component Value Date    ECHOEFEST 58 07/16/2018         Physical Exam    Medication Review: yes    Assessment/Plan     Principal Problem:    Acute respiratory failure with hypoxia (CMS/HCC)  Active Problems:    Diabetes mellitus (CMS/HCC)    Pneumonia of both lungs due to infectious organism    Sinus tachycardia      Atrial fibrillation tends to reoccur and pharmacologically cardiovert in concordance with deterioration in her respiratory status.  Her heart rate is expected to be accelerated anytime she develops hypoxemia.  This is true for a sinus mechanism as well as atrial fibrillation.  Given her deteriorating respiratory status she has been reintubated.  She is now developing acute renal failure.  She remains hemodynamically stable.  She has subsequently cardioverted back to normal sinus rhythm early this morning with IV amiodarone.  For the present time we will continue IV amiodarone at maintenance dose.  There is no  point to switching back and forth between IV and oral therapy.  I do not anticipate her atrial arrhythmia resolving until after her respiratory status is back to baseline.  For this reason there is a certain degree of futility involved in continuing to switch between oral and IV therapy.        Gulshan Farias MD  07/27/18  8:40 AM

## 2018-07-27 NOTE — PROGRESS NOTES
"  CC: Acute Respiratory Failure.     S: Intubated and sedated. Events noted. Had hypotension after intubation with sedation.     Patient is unable to provide history as she is intubated and sedated.    ROS: Could not be obtained as the patient is on mechanical ventilator.    O:Vital signs reviewed. O2Sat: 91-95 % on 100%. Picc Line Day # 13. Reintubated on 2018  /49   Pulse 77   Temp 97.9 °F (36.6 °C) (Oral)   Resp 27   Ht 162.6 cm (64\")   Wt 70.9 kg (156 lb 3.2 oz)   LMP  (LMP Unknown)   SpO2 91%   BMI 26.81 kg/m²     Temp (24hrs), Av.7 °F (36.5 °C), Min:97.4 °F (36.3 °C), Max:98.1 °F (36.7 °C)      I & Os reviewed.   Intake/Output       18 0700 - 18 0659    Intake (ml) 3160    Output (ml) 180    Net (ml) 2980    Last Weight  70.9 kg (156 lb 3.2 oz)          General: Intubated. Sedated  Eyes: PERRL. Opened eyes to loud verbal stimulus.  Neck: Supple with no JVD. No obvious masses noted.   Cardiovascular: S1 + S2. Irregular.    Respiratory: Transmitted Breath sounds noted. No wheezing heard. B/L crackles noted  GI: Soft. Bowel sounds hypoactive. No organomegaly.  Extremities: No edema noted.  Neurologic: Sedated. Was able to follow commands on loud verbal commands.   Skin: Appeared somewhat dry     Labs: Reviewed.     Results from last 7 days  Lab Units 18  0445 18  0423 18  0431  18  0413   SODIUM mmol/L 142 141 140  < > 145   POTASSIUM mmol/L 3.0* 4.0 3.5  < > 3.6   CHLORIDE mmol/L 101 105 104  < > 109*   CO2 mmol/L 26.0 28.0 29.0  < > 30.0   BUN mg/dL 61* 27* 28*  < > 37*   CREATININE mg/dL 2.10* 0.90 0.80  < > 0.50*   CALCIUM mg/dL 8.5 8.7 8.9  < > 8.8   BILIRUBIN mg/dL 0.6 1.2  --   --  0.5   ALK PHOS U/L 114 131*  --   --  90   ALT (SGPT) U/L 45 42  --   --  55   AST (SGOT) U/L 31 31  --   --  30   GLUCOSE mg/dL 216* 165* 237*  < > 188*   < > = values in this interval not displayed.      Results from last 7 days  Lab Units 18  0975 18  7297 " 07/25/18  0431 07/24/18  0503   MAGNESIUM mg/dL 2.2  --  2.1 2.0   PHOSPHORUS mg/dL 9.2* 6.1* 5.2* 4.5         Results from last 7 days  Lab Units 07/27/18  0445 07/26/18  0423 07/25/18  0431 07/24/18  0503 07/23/18  0423   WBC 10*3/mm3 22.24* 23.51* 25.37* 22.98* 17.16*   HEMOGLOBIN g/dL 10.9* 12.8 11.7* 10.9* 9.8*   PLATELETS 10*3/mm3 369 312 377 281 242       Adult [facility] TPN     amiodarone 0.5 mg/min Last Rate: 0.5 mg/min (07/26/18 1821)   Pharmacy Consult - Pharmacy to dose     propofol 5-50 mcg/kg/min Last Rate: 15 mcg/kg/min (07/27/18 0837)   sodium chloride 50 mL/hr Last Rate: 50 mL/hr (07/27/18 0829)       ABG: Reviewed.  Lab Results   Component Value Date    PHART 7.377 07/27/2018    KMR1YOM 41.5 07/27/2018    PO2ART 67.5 (L) 07/27/2018    HGBBG 11.0 (L) 07/27/2018    G6FYOHTB 92.2 (L) 07/27/2018    CARBOXYHGB 1.2 07/27/2018         CXRay: Reviewed personally. Improved overall but with persistent infiltrates.   Imaging Results (last 24 hours)     Procedure Component Value Units Date/Time    XR Chest 1 View [[id number]] Collected:  07/27/18 0838     Updated:  07/27/18 0840    Narrative:       PROCEDURE: XR CHEST 1 VW-     HISTORY: PNA; J18.9-Pneumonia, unspecified organism; J96.21-Acute and  chronic respiratory failure with hypoxia; Z74.09-Other reduced mobility;  Z74.09-Other reduced mobility     COMPARISON: 7/26/2018.     FINDINGS: A right PICC remains in place. Endotracheal and nasogastric  tubes are well-positioned. The heart is normal in size. The mediastinum  is unremarkable. There is improved aeration of the lungs compared to  prior exam, however bilateral airspace disease persists. There is no  pneumothorax.  There are no acute osseous abnormalities.           Impression:       Interval improvement in the patient's bilateral airspace  disease.     Continued followup is recommended.     This report was finalized on 7/27/2018 8:38 AM by Joseph Ba M.D..    XR Abdomen 1 View  [[id number]] Collected:  07/26/18 2037     Updated:  07/26/18 2038    Narrative:       FINAL REPORT    TECHNIQUE:  A single view of the abdomen was obtained.    CLINICAL HISTORY:  OG tube placement.    FINDINGS:  The OG tube is in the mid stomach.  There is no apparent bowel  gas pattern abnormality.      Impression:       OG tube in mid stomach.    Authenticated by Juan Luis Jolley M.D. on 07/26/2018 08:37:21 PM    XR Chest 1 View [[id number]] Collected:  07/26/18 2036     Updated:  07/26/18 2037    Narrative:       FINAL REPORT    TECHNIQUE:  An AP portable view of the chest was obtained.    CLINICAL HISTORY:  ET Tube placement.    COMPARISON:  June 24, 2018.    FINDINGS:  The endotracheal tube is appropriately positioned at the level  of the clavicles.  A right PICC remains in the SVC.  There is  worsening bilateral airspace consolidation consistent with  pneumonia.  Small pleural effusions are also likely.  There is  subcutaneous emphysema in the left chest wall and in both sides  of the neck.  But, no pneumothorax is identified.      Impression:       Endotracheal tube appropriately positioned.  Worsening bilateral  airspace opacities.    Authenticated by Juan Luis Jolley M.D. on 07/26/2018 08:36:53 PM    FL GI Tube Placement [[id number]] Collected:  07/26/18 1458     Updated:  07/26/18 1505    Narrative:       PROCEDURE: FL GI TUBE PLACEMENT-     HISTORY: Ng tube placement under flouro; J18.9-Pneumonia, unspecified  organism; J96.21-Acute and chronic respiratory failure with hypoxia;  Z74.09-Other reduced mobility; Z74.09-Other reduced mobility     PROCEDURE: Fluoroscopic guidance was utilized during NG tube placement.  Fluoroscopy time was 0.5 minutes. 3 images were obtained.        FINDINGS: An attempt was made at fluoroscopic feeding tube placement,  however I was unable to pass the tube through the nasopharynx. The  patient had desaturations during the attempt as well.       Impression:       Failed attempt at fluoroscopic  guided feeding tube  placement.                   This report was finalized on 7/26/2018 3:03 PM by Joseph Ba M.D..            Assessment & Recommendations/Plan:   1.  Acute hypoxemic respiratory failure  - Continue mechanical ventilation   - We will adjust the ventilator settings. May need higher PEEP.  - I will ask the nursing staff to keep her oxygen saturation more than 90% and to titrate FiO2 down as much as tolerated.     2.  Multifocal pneumonia  - Has been on broad-spectrum antibiotics.  - Sputum cultures had revealed MRSA  - Finished Vancomycin.  - Her CXRay improves with diuresis, making an infectious process less likely.   - We will follow her clinically and if there is any worsening clinical status, then I will consider restarting broad-spectrum antibiotics although infectious etiology seems to be less likely in this patient at this stage.       3.  Likely ARDS versus hypersensitivity pneumonitis  - We'll continue IV steroids for now.     4.  Diabetes  - Continue close monitoring     5.  Lymphadenopathy  - Likely reactive.  - We will need outpatient follow-up.     6.  COPD  - On nebulized treatments.     7.  Possible component of pulmonary edema/Fluid overload?  - Has received Lasix and Diuril.  - Echo pending.     8. Renal Failure.  - Hemodynamically mediated.  - Will give IV fluid Bolus.   - Will increase IV fluid rate.   - Will repeat BMP again today.  - Asked the RN to flush the timmons catheter.     Due to continued respiratory failure, underlying COPD and continued issues with Pulmonary edema/ARDS, she may need to have tracheostomy performed.     This can be discussed with her , when he is available.      Critical Care time spent in direct patient care: 40 minutes (excluding procedure time, if applicable) including high complexity decision making to assess, manipulate, and support vital organ system failure in this individual who has impairment of one or more vital organ systems such  that there is a high probability of imminent or life threatening deterioration in the patient’s condition.    We have reviewed patient's current orders and changes, if any, have been suggested to primary care team. Plan was also discussed with nursing staff, as necessary.       Yamilex Tse MD  07/27/18  8:52 AM    Dictated utilizing Dragon dictation.

## 2018-07-27 NOTE — CONSULTS
Adult Nutrition  Assessment/PES    Patient Name:  Breann Gallegos  YOB: 1950  MRN: [id number]  Admit Date:  7/13/2018    Assessment Date:  7/27/2018    Comments:   RD consulted for TF recs. Initiate TF once medically feasible. Rec #1: Glucerna @25mL/hr goal rate. Do not advance. TF to provide 660 kcals, 33 grams protein, and 442 mL tube feeding water. Rec #2: Free water flushes 55mL Q6 or four times daily. Pt receiving Propofol @ 6.54 mL/hr, providing ~170 kcals. RD to follow pt and adjust tube feeding rate based on tolerance. Consult RD PRN.           Reason for Assessment     Row Name 07/27/18 0955          Reason for Assessment    Reason For Assessment follow-up protocol;TF/PN;nurse/nurse practitioner consult     Diagnosis diabetes diagnosis/complications;cardiac disease;pulmonary disease;gastrointestinal disease   COPD, DM Type 2, Respiratory Failure     Identified At Risk by Screening Criteria difficulty chewing/swallowing               Anthropometrics     Row Name 07/27/18 0529          Anthropometrics    Weight 70.9 kg (156 lb 3.2 oz)             Labs/Tests/Procedures/Meds     Row Name 07/27/18 0957          Labs/Procedures/Meds    Lab Results Reviewed reviewed, pertinent     Lab Results Comments Low: K, Alb  High: Glu, BUN, Phos, Creat        Medications    Pertinent Medications Reviewed reviewed             Physical Findings     Row Name 07/27/18 0958          Physical Findings    Tubes orogastric tube               Nutrition Prescription Ordered     Row Name 07/27/18 0958          Nutrition Prescription PO    Current PO Diet Pureed     Fluid Consistency Nectar/syrup thick     Common Modifiers Consistent Carbohydrate        Propofol Considerations    Propofol (mL/hr) 6.46 mL/hr     Propofol (Kcal/day) 170 Kcal/day             Evaluation of Received Nutrient/Fluid Intake     Row Name 07/27/18 1000          PO Evaluation    Number of Days PO Intake Evaluated Insufficient Data        EN  Evaluation    Number of Days EN Intake Evaluated Insufficient Data             Problem/Interventions:        Problem 1     Row Name 07/27/18 1000          Nutrition Diagnoses Problem 1    Problem 1 Impaired Nutrient Utilization     Etiology (related to) Medical Diagnosis     Endocrine DM2     Infectious Disease Sepsis     Signs/Symptoms (evidenced by) Biochemical     Specific Labs Noted Glucose;BUN;Creatinine;Phosphorus             Problem 2     Row Name 07/27/18 1009          Nutrition Diagnoses Problem 2    Problem 2 Increased Nutrient Needs     Macronutrient Kcal;Fluid;Protein     Micronutrient Vitamin;Mineral     Etiology (related to) Medical Diagnosis     Pulmonary/Critical Care COPD;Acute respiratory failure;Other (comment)   hypoxia     Signs/Symptoms (evidenced by) Other (comment)   Pulmonary dysfunction             Problem 3     Row Name 07/27/18 1009          Nutrition Diagnoses Problem 3    Problem 3 Needs Alternate Route     Etiology (related to) MNT for Treatment/Condition     Pulmonary/Critical Care Ventilator     Signs/Symptoms (evidenced by) PO diet not tolerated     Resolved? --             Problem 4     Row Name 07/27/18 1010          Nutrition Diagnoses Problem 4    Problem 4 Biting/Chewing Difficulty     Etiology (related to) Functional Diagnosis     Functional Diagnosis Dysphagia     Signs/Symptoms (evidenced by) SLP/Swallow eval     Percent (%) of EN goal --   To begin TF again per pt intubated     Swallow eval status Done     Type of SLP Evaluation Bedside               Intervention Goal     Row Name 07/27/18 1010          Intervention Goal    General Meet nutritional needs for age/condition     PO Tolerate PO   Once medically appropriate to introduce PO     TF/PN Inititiate TF/PN     Transition TF to PO     Weight Maintain weight             Nutrition Intervention     Row Name 07/27/18 1013          Nutrition Intervention    RD/Tech Action Follow Tx progress;Recommend/ordered;Care plan  reviewd     Recommended/Ordered EN             Nutrition Prescription     Row Name 07/27/18 1013          Nutrition Prescription PO    PO Prescription Other (comment)   Consider NPO r/t pt on ventilator     New PO Prescription Ordered? No, recommended        Nutrition Prescription EN    Enteral Prescription Enteral begin/change     Enteral Route OG     Product Glucerna 1.2 cyrus     TF Delivery Method Continuous     Continuous TF Goal Rate (mL/hr) 25 mL/hr     Continuous TF Starting Rate (mL/hr) 25 mL/hr     Continuous TF Goal Volume (mL) 550 mL     Continuous TF Starting Volume (mL) 550 mL     Water flush (mL)  55 mL     Water Flush Frequency Other (comment)   Q6 or 4x daily     New EN Prescription Ordered? Yes        Other Orders    Obtain Weight Daily     Obtain Weight Ordered? No, recommended     Supplement Vitamin mineral supplement     Supplement Ordered? No, recommended     Labs Lipid Profile;Mg++;K+;Phos;Na+     Labs Ordered? No, recommended             Education/Evaluation     Row Name 07/27/18 1021          Education    Education Education not appropriate at this time     Please explain Patient intubated        Monitor/Evaluation    Monitor Per protocol;I&O;Pertinent labs;TF delivery/tolerance;Weight         Electronically signed by:  Alia Cruz RD  07/27/18 10:29 AM

## 2018-07-27 NOTE — PROGRESS NOTES
Adult Nutrition  Assessment/PES    Patient Name:  Breann Gallegos  YOB: 1950  MRN: [id number]  Admit Date:  7/13/2018    Assessment Date:  7/27/2018    Comments: RD consulted for TF recs. Initiate TF once medically feasible. Rec #1: Glucerna @25mL/hr goal rate. Do not advance. TF to provide 660 kcals, 33 grams protein, and 442 mL tube feeding water. Rec #2: Free water flushes 55mL Q6 or four times daily. Pt receiving Propofol @ 6.54 mL/hr, providing ~170 kcals. RD to follow pt and adjust tube feeding rate based on tolerance. Consult RD PRN.             Reason for Assessment     Row Name 07/27/18 0955          Reason for Assessment    Reason For Assessment follow-up protocol;TF/PN;nurse/nurse practitioner consult     Diagnosis diabetes diagnosis/complications;cardiac disease;pulmonary disease;gastrointestinal disease   COPD, DM Type 2, Respiratory Failure     Identified At Risk by Screening Criteria difficulty chewing/swallowing               Anthropometrics     Row Name 07/27/18 0529          Anthropometrics    Weight 70.9 kg (156 lb 3.2 oz)             Labs/Tests/Procedures/Meds     Row Name 07/27/18 0957          Labs/Procedures/Meds    Lab Results Reviewed reviewed, pertinent     Lab Results Comments Low: K, Alb  High: Glu, BUN, Phos, Creat        Medications    Pertinent Medications Reviewed reviewed             Physical Findings     Row Name 07/27/18 0958          Physical Findings    Tubes orogastric tube               Nutrition Prescription Ordered     Row Name 07/27/18 0958          Nutrition Prescription PO    Current PO Diet Pureed     Fluid Consistency Nectar/syrup thick     Common Modifiers Consistent Carbohydrate        Propofol Considerations    Propofol (mL/hr) 6.46 mL/hr     Propofol (Kcal/day) 170 Kcal/day             Evaluation of Received Nutrient/Fluid Intake     Row Name 07/27/18 1000          PO Evaluation    Number of Days PO Intake Evaluated Insufficient Data        EN  Evaluation    Number of Days EN Intake Evaluated Insufficient Data             Problem/Interventions:        Problem 1     Row Name 07/27/18 1000          Nutrition Diagnoses Problem 1    Problem 1 Impaired Nutrient Utilization     Etiology (related to) Medical Diagnosis     Endocrine DM2     Infectious Disease Sepsis     Signs/Symptoms (evidenced by) Biochemical     Specific Labs Noted Glucose;BUN;Creatinine;Phosphorus             Problem 2     Row Name 07/27/18 1009          Nutrition Diagnoses Problem 2    Problem 2 Increased Nutrient Needs     Macronutrient Kcal;Fluid;Protein     Micronutrient Vitamin;Mineral     Etiology (related to) Medical Diagnosis     Pulmonary/Critical Care COPD;Acute respiratory failure;Other (comment)   hypoxia     Signs/Symptoms (evidenced by) Other (comment)   Pulmonary dysfunction             Problem 3     Row Name 07/27/18 1009          Nutrition Diagnoses Problem 3    Problem 3 Needs Alternate Route     Etiology (related to) MNT for Treatment/Condition     Pulmonary/Critical Care Ventilator     Signs/Symptoms (evidenced by) PO diet not tolerated     Resolved? --             Problem 4     Row Name 07/27/18 1010          Nutrition Diagnoses Problem 4    Problem 4 Biting/Chewing Difficulty     Etiology (related to) Functional Diagnosis     Functional Diagnosis Dysphagia     Signs/Symptoms (evidenced by) SLP/Swallow eval     Percent (%) of EN goal --   To begin TF again per pt intubated     Swallow eval status Done     Type of SLP Evaluation Bedside               Intervention Goal     Row Name 07/27/18 1010          Intervention Goal    General Meet nutritional needs for age/condition     PO Tolerate PO   Once medically appropriate to introduce PO     TF/PN Inititiate TF/PN     Transition TF to PO     Weight Maintain weight             Nutrition Intervention     Row Name 07/27/18 1013          Nutrition Intervention    RD/Tech Action Follow Tx progress;Recommend/ordered;Care plan  reviewd     Recommended/Ordered EN             Nutrition Prescription     Row Name 07/27/18 1013          Nutrition Prescription PO    PO Prescription Other (comment)   Consider NPO r/t pt on ventilator     New PO Prescription Ordered? No, recommended        Nutrition Prescription EN    Enteral Prescription Enteral begin/change     Enteral Route OG     Product Glucerna 1.2 cyrus     TF Delivery Method Continuous     Continuous TF Goal Rate (mL/hr) 25 mL/hr     Continuous TF Starting Rate (mL/hr) 25 mL/hr     Continuous TF Goal Volume (mL) 550 mL     Continuous TF Starting Volume (mL) 550 mL     Water flush (mL)  55 mL     Water Flush Frequency Other (comment)   Q6 or 4x daily     New EN Prescription Ordered? Yes        Other Orders    Obtain Weight Daily     Obtain Weight Ordered? No, recommended     Supplement Vitamin mineral supplement     Supplement Ordered? No, recommended     Labs Lipid Profile;Mg++;K+;Phos;Na+     Labs Ordered? No, recommended             Education/Evaluation     Row Name 07/27/18 1021          Education    Education Education not appropriate at this time     Please explain Patient intubated        Monitor/Evaluation    Monitor Per protocol;I&O;Pertinent labs;TF delivery/tolerance;Weight         Electronically signed by:  Alia Cruz RD  07/27/18 10:22 AM

## 2018-07-27 NOTE — PLAN OF CARE
Problem: Patient Care Overview  Goal: Plan of Care Review  Outcome: Ongoing (interventions implemented as appropriate)      Problem: Pneumonia (Adult)  Goal: Signs and Symptoms of Listed Potential Problems Will be Absent, Minimized or Managed (Pneumonia)  Outcome: Ongoing (interventions implemented as appropriate)      Problem: Pain, Acute (Adult)  Goal: Acceptable Pain Control/Comfort Level  Outcome: Ongoing (interventions implemented as appropriate)      Problem: Ventilation, Mechanical Invasive (Adult)  Goal: Signs and Symptoms of Listed Potential Problems Will be Absent, Minimized or Managed (Ventilation, Mechanical Invasive)  Outcome: Ongoing (interventions implemented as appropriate)      Problem: Skin Injury Risk (Adult)  Goal: Identify Related Risk Factors and Signs and Symptoms  Outcome: Ongoing (interventions implemented as appropriate)      Problem: Arrhythmia/Dysrhythmia (Symptomatic) (Adult)  Goal: Signs and Symptoms of Listed Potential Problems Will be Absent, Minimized or Managed (Arrhythmia/Dysrhythmia)  Outcome: Ongoing (interventions implemented as appropriate)      Problem: Fall Risk (Adult)  Goal: Absence of Fall  Outcome: Ongoing (interventions implemented as appropriate)      Problem: Nutrition, Parenteral (Adult)  Goal: Signs and Symptoms of Listed Potential Problems Will be Absent, Minimized or Managed (Nutrition, Parenteral)  Outcome: Ongoing (interventions implemented as appropriate)

## 2018-07-28 ENCOUNTER — APPOINTMENT (OUTPATIENT)
Dept: GENERAL RADIOLOGY | Facility: HOSPITAL | Age: 68
End: 2018-07-28

## 2018-07-28 VITALS
SYSTOLIC BLOOD PRESSURE: 118 MMHG | HEART RATE: 63 BPM | WEIGHT: 160.2 LBS | TEMPERATURE: 98 F | BODY MASS INDEX: 27.35 KG/M2 | DIASTOLIC BLOOD PRESSURE: 60 MMHG | HEIGHT: 64 IN | OXYGEN SATURATION: 94 % | RESPIRATION RATE: 20 BRPM

## 2018-07-28 LAB
ALBUMIN SERPL-MCNC: 2.6 G/DL (ref 3.5–5)
ALBUMIN/GLOB SERPL: 1 G/DL (ref 1–2)
ALP SERPL-CCNC: 119 U/L (ref 38–126)
ALT SERPL W P-5'-P-CCNC: 66 U/L (ref 13–69)
ANION GAP SERPL CALCULATED.3IONS-SCNC: 11.9 MMOL/L (ref 10–20)
ARTERIAL PATENCY WRIST A: ABNORMAL
ARTERIAL PATENCY WRIST A: POSITIVE
AST SERPL-CCNC: 67 U/L (ref 15–46)
ATMOSPHERIC PRESS: 736 MMHG
ATMOSPHERIC PRESS: 737 MMHG
BASE EXCESS BLDA CALC-SCNC: -1.6 MMOL/L (ref 0–2)
BASE EXCESS BLDA CALC-SCNC: -2.4 MMOL/L (ref 0–2)
BASOPHILS # BLD AUTO: 0.03 10*3/MM3 (ref 0–0.2)
BASOPHILS NFR BLD AUTO: 0.2 % (ref 0–2.5)
BDY SITE: ABNORMAL
BDY SITE: ABNORMAL
BILIRUB SERPL-MCNC: 0.6 MG/DL (ref 0.2–1.3)
BUN BLD-MCNC: 61 MG/DL (ref 7–20)
BUN/CREAT SERPL: 50.8 (ref 7.1–23.5)
CALCIUM SPEC-SCNC: 8.5 MG/DL (ref 8.4–10.2)
CHLORIDE SERPL-SCNC: 108 MMOL/L (ref 98–107)
CO2 SERPL-SCNC: 25 MMOL/L (ref 26–30)
COHGB MFR BLD: 1.4 % (ref 0–2)
COHGB MFR BLD: 1.5 % (ref 0–2)
CREAT BLD-MCNC: 1.2 MG/DL (ref 0.6–1.3)
CRP SERPL-MCNC: 2.6 MG/DL (ref 0–1)
DEPRECATED RDW RBC AUTO: 49.8 FL (ref 37–54)
EOSINOPHIL # BLD AUTO: 0.17 10*3/MM3 (ref 0–0.7)
EOSINOPHIL NFR BLD AUTO: 0.9 % (ref 0–7)
ERYTHROCYTE [DISTWIDTH] IN BLOOD BY AUTOMATED COUNT: 15.7 % (ref 11.5–14.5)
GFR SERPL CREATININE-BSD FRML MDRD: 45 ML/MIN/1.73
GLOBULIN UR ELPH-MCNC: 2.7 GM/DL
GLUCOSE BLD-MCNC: 169 MG/DL (ref 74–98)
GLUCOSE BLDC GLUCOMTR-MCNC: 163 MG/DL (ref 70–130)
GLUCOSE BLDC GLUCOMTR-MCNC: 185 MG/DL (ref 70–130)
GLUCOSE BLDC GLUCOMTR-MCNC: 209 MG/DL (ref 70–130)
GLUCOSE BLDC GLUCOMTR-MCNC: 213 MG/DL (ref 70–130)
GRAM STN SPEC: NORMAL
HCO3 BLDA-SCNC: 22.7 MMOL/L (ref 22–28)
HCO3 BLDA-SCNC: 23.1 MMOL/L (ref 22–28)
HCT VFR BLD AUTO: 32.4 % (ref 37–47)
HCT VFR BLD CALC: 30.4 %
HCT VFR BLD CALC: 30.9 %
HGB BLD-MCNC: 9.9 G/DL (ref 12–16)
HGB BLDA-MCNC: 10.1 G/DL (ref 12–18)
HGB BLDA-MCNC: 9.9 G/DL (ref 12–18)
HOROWITZ INDEX BLD+IHG-RTO: 100 %
HOROWITZ INDEX BLD+IHG-RTO: 100 %
IMM GRANULOCYTES # BLD: 0.12 10*3/MM3 (ref 0–0.06)
IMM GRANULOCYTES NFR BLD: 0.6 % (ref 0–0.6)
LYMPHOCYTES # BLD AUTO: 1.48 10*3/MM3 (ref 0.6–3.4)
LYMPHOCYTES NFR BLD AUTO: 7.7 % (ref 10–50)
Lab: ABNORMAL
MAGNESIUM SERPL-MCNC: 2.4 MG/DL (ref 1.6–2.3)
MCH RBC QN AUTO: 26.6 PG (ref 27–31)
MCHC RBC AUTO-ENTMCNC: 30.6 G/DL (ref 30–37)
MCV RBC AUTO: 87.1 FL (ref 81–99)
METHGB BLD QL: 0.7 % (ref 0–1.5)
METHGB BLD QL: 0.8 % (ref 0–1.5)
MODALITY: ABNORMAL
MODALITY: ABNORMAL
MONOCYTES # BLD AUTO: 0.91 10*3/MM3 (ref 0–0.9)
MONOCYTES NFR BLD AUTO: 4.8 % (ref 0–12)
NEUTROPHILS # BLD AUTO: 16.42 10*3/MM3 (ref 2–6.9)
NEUTROPHILS NFR BLD AUTO: 85.8 % (ref 37–80)
NRBC BLD MANUAL-RTO: 0 /100 WBC (ref 0–0)
OXYHGB MFR BLDV: 82.9 % (ref 94–99)
OXYHGB MFR BLDV: 92.3 % (ref 94–99)
PCO2 BLDA: 38.1 MM HG (ref 35–45)
PCO2 BLDA: 39.1 MM HG (ref 35–45)
PCO2 TEMP ADJ BLD: ABNORMAL MM HG (ref 35–45)
PCO2 TEMP ADJ BLD: ABNORMAL MM HG (ref 35–45)
PEEP RESPIRATORY: 12 CM[H2O]
PH BLDA: 7.37 PH UNITS (ref 7.3–7.5)
PH BLDA: 7.39 PH UNITS (ref 7.3–7.5)
PH, TEMP CORRECTED: ABNORMAL PH UNITS
PH, TEMP CORRECTED: ABNORMAL PH UNITS
PHOSPHATE SERPL-MCNC: 4.3 MG/DL (ref 2.5–4.5)
PLATELET # BLD AUTO: 283 10*3/MM3 (ref 130–400)
PMV BLD AUTO: 10.5 FL (ref 6–12)
PO2 BLDA: 50.1 MM HG (ref 75–100)
PO2 BLDA: 72.3 MM HG (ref 75–100)
PO2 TEMP ADJ BLD: ABNORMAL MM HG (ref 83–108)
PO2 TEMP ADJ BLD: ABNORMAL MM HG (ref 83–108)
POTASSIUM BLD-SCNC: 3.9 MMOL/L (ref 3.5–5.1)
PROT SERPL-MCNC: 5.3 G/DL (ref 6.3–8.2)
RBC # BLD AUTO: 3.72 10*6/MM3 (ref 4.2–5.4)
SAO2 % BLDCOA: 84.9 % (ref 94–100)
SAO2 % BLDCOA: 94.3 % (ref 94–100)
SET MECH RESP RATE: 16
SODIUM BLD-SCNC: 141 MMOL/L (ref 137–145)
VENTILATOR MODE: ABNORMAL
VENTILATOR MODE: ABNORMAL
WBC NRBC COR # BLD: 19.13 10*3/MM3 (ref 4.8–10.8)

## 2018-07-28 PROCEDURE — 25010000002 PROPOFOL 1000 MG/ML EMULSION: Performed by: INTERNAL MEDICINE

## 2018-07-28 PROCEDURE — 25010000002 FLUCONAZOLE PER 200 MG: Performed by: INTERNAL MEDICINE

## 2018-07-28 PROCEDURE — 25010000002 AMIODARONE IN DEXTROSE 5% 360-4.14 MG/200ML-% SOLUTION: Performed by: INTERNAL MEDICINE

## 2018-07-28 PROCEDURE — 25010000002 MIDAZOLAM 50 MG/10ML SOLUTION 10 ML VIAL: Performed by: INTERNAL MEDICINE

## 2018-07-28 PROCEDURE — 94799 UNLISTED PULMONARY SVC/PX: CPT

## 2018-07-28 PROCEDURE — 85025 COMPLETE CBC W/AUTO DIFF WBC: CPT | Performed by: INTERNAL MEDICINE

## 2018-07-28 PROCEDURE — 99291 CRITICAL CARE FIRST HOUR: CPT | Performed by: INTERNAL MEDICINE

## 2018-07-28 PROCEDURE — 25010000002 LORAZEPAM PER 2 MG: Performed by: INTERNAL MEDICINE

## 2018-07-28 PROCEDURE — 84100 ASSAY OF PHOSPHORUS: CPT | Performed by: INTERNAL MEDICINE

## 2018-07-28 PROCEDURE — 87205 SMEAR GRAM STAIN: CPT | Performed by: INTERNAL MEDICINE

## 2018-07-28 PROCEDURE — 80053 COMPREHEN METABOLIC PANEL: CPT | Performed by: INTERNAL MEDICINE

## 2018-07-28 PROCEDURE — 0B9F8ZX DRAINAGE OF RIGHT LOWER LUNG LOBE, VIA NATURAL OR ARTIFICIAL OPENING ENDOSCOPIC, DIAGNOSTIC: ICD-10-PCS | Performed by: INTERNAL MEDICINE

## 2018-07-28 PROCEDURE — 83735 ASSAY OF MAGNESIUM: CPT | Performed by: INTERNAL MEDICINE

## 2018-07-28 PROCEDURE — 87102 FUNGUS ISOLATION CULTURE: CPT | Performed by: INTERNAL MEDICINE

## 2018-07-28 PROCEDURE — 83050 HGB METHEMOGLOBIN QUAN: CPT

## 2018-07-28 PROCEDURE — 36600 WITHDRAWAL OF ARTERIAL BLOOD: CPT

## 2018-07-28 PROCEDURE — 94002 VENT MGMT INPAT INIT DAY: CPT

## 2018-07-28 PROCEDURE — 82962 GLUCOSE BLOOD TEST: CPT

## 2018-07-28 PROCEDURE — 0BD68ZX EXTRACTION OF RIGHT LOWER LOBE BRONCHUS, VIA NATURAL OR ARTIFICIAL OPENING ENDOSCOPIC, DIAGNOSTIC: ICD-10-PCS | Performed by: INTERNAL MEDICINE

## 2018-07-28 PROCEDURE — 94003 VENT MGMT INPAT SUBQ DAY: CPT

## 2018-07-28 PROCEDURE — 82375 ASSAY CARBOXYHB QUANT: CPT

## 2018-07-28 PROCEDURE — 87186 SC STD MICRODIL/AGAR DIL: CPT | Performed by: INTERNAL MEDICINE

## 2018-07-28 PROCEDURE — 63710000001 INSULIN DETEMIR PER 5 UNITS: Performed by: FAMILY MEDICINE

## 2018-07-28 PROCEDURE — 87070 CULTURE OTHR SPECIMN AEROBIC: CPT | Performed by: INTERNAL MEDICINE

## 2018-07-28 PROCEDURE — 25010000002 METHYLPREDNISOLONE PER 40 MG: Performed by: INTERNAL MEDICINE

## 2018-07-28 PROCEDURE — 84145 PROCALCITONIN (PCT): CPT | Performed by: INTERNAL MEDICINE

## 2018-07-28 PROCEDURE — 82805 BLOOD GASES W/O2 SATURATION: CPT

## 2018-07-28 PROCEDURE — 63710000001 INSULIN REGULAR HUMAN PER 5 UNITS: Performed by: FAMILY MEDICINE

## 2018-07-28 PROCEDURE — 86140 C-REACTIVE PROTEIN: CPT | Performed by: INTERNAL MEDICINE

## 2018-07-28 PROCEDURE — 25010000002 LINEZOLID 600 MG/300ML SOLUTION: Performed by: INTERNAL MEDICINE

## 2018-07-28 PROCEDURE — 25010000002 PROPOFOL 10 MG/ML EMULSION: Performed by: INTERNAL MEDICINE

## 2018-07-28 PROCEDURE — 25010000002 METOCLOPRAMIDE PER 10 MG: Performed by: INTERNAL MEDICINE

## 2018-07-28 PROCEDURE — 31624 DX BRONCHOSCOPE/LAVAGE: CPT | Performed by: INTERNAL MEDICINE

## 2018-07-28 PROCEDURE — 71045 X-RAY EXAM CHEST 1 VIEW: CPT

## 2018-07-28 PROCEDURE — 99233 SBSQ HOSP IP/OBS HIGH 50: CPT | Performed by: INTERNAL MEDICINE

## 2018-07-28 RX ORDER — MAGNESIUM HYDROXIDE 1200 MG/15ML
LIQUID ORAL AS NEEDED
Status: DISCONTINUED | OUTPATIENT
Start: 2018-07-28 | End: 2018-07-29 | Stop reason: HOSPADM

## 2018-07-28 RX ORDER — METOCLOPRAMIDE HYDROCHLORIDE 5 MG/ML
5 INJECTION INTRAMUSCULAR; INTRAVENOUS EVERY 6 HOURS
Status: DISCONTINUED | OUTPATIENT
Start: 2018-07-28 | End: 2018-07-29 | Stop reason: HOSPADM

## 2018-07-28 RX ORDER — LINEZOLID 2 MG/ML
600 INJECTION, SOLUTION INTRAVENOUS EVERY 12 HOURS
Status: DISCONTINUED | OUTPATIENT
Start: 2018-07-28 | End: 2018-07-29 | Stop reason: HOSPADM

## 2018-07-28 RX ORDER — FLUCONAZOLE 2 MG/ML
200 INJECTION, SOLUTION INTRAVENOUS EVERY 24 HOURS
Status: DISCONTINUED | OUTPATIENT
Start: 2018-07-28 | End: 2018-07-29 | Stop reason: HOSPADM

## 2018-07-28 RX ORDER — PROPOFOL 10 MG/ML
VIAL (ML) INTRAVENOUS
Status: COMPLETED | OUTPATIENT
Start: 2018-07-28 | End: 2018-07-28

## 2018-07-28 RX ORDER — FLUCONAZOLE 2 MG/ML
400 INJECTION, SOLUTION INTRAVENOUS EVERY 24 HOURS
Status: DISCONTINUED | OUTPATIENT
Start: 2018-07-28 | End: 2018-07-28

## 2018-07-28 RX ADMIN — APIXABAN 5 MG: 2.5 TABLET, FILM COATED ORAL at 20:56

## 2018-07-28 RX ADMIN — Medication 1 SPRAY: at 03:52

## 2018-07-28 RX ADMIN — AMIODARONE HYDROCHLORIDE 0.5 MG/MIN: 1.8 INJECTION, SOLUTION INTRAVENOUS at 21:36

## 2018-07-28 RX ADMIN — CHLORHEXIDINE GLUCONATE 0.12% ORAL RINSE 15 ML: 1.2 LIQUID ORAL at 09:24

## 2018-07-28 RX ADMIN — METOCLOPRAMIDE 5 MG: 5 INJECTION, SOLUTION INTRAMUSCULAR; INTRAVENOUS at 20:56

## 2018-07-28 RX ADMIN — HUMAN INSULIN 2 UNITS: 100 INJECTION, SOLUTION SUBCUTANEOUS at 17:38

## 2018-07-28 RX ADMIN — Medication 20 ML: at 04:24

## 2018-07-28 RX ADMIN — FLUCONAZOLE 200 MG: 2 INJECTION, SOLUTION INTRAVENOUS at 14:02

## 2018-07-28 RX ADMIN — INSULIN DETEMIR 10 UNITS: 100 INJECTION, SOLUTION SUBCUTANEOUS at 21:11

## 2018-07-28 RX ADMIN — CLOTRIMAZOLE AND BETAMETHASONE DIPROPIONATE 1 APPLICATION: 10; .5 CREAM TOPICAL at 20:57

## 2018-07-28 RX ADMIN — VITAMIN D, TAB 1000IU (100/BT) 2000 UNITS: 25 TAB at 09:25

## 2018-07-28 RX ADMIN — PROPOFOL 50 MG: 10 INJECTION, EMULSION INTRAVENOUS at 11:13

## 2018-07-28 RX ADMIN — CLOTRIMAZOLE AND BETAMETHASONE DIPROPIONATE 1 APPLICATION: 10; .5 CREAM TOPICAL at 09:25

## 2018-07-28 RX ADMIN — HUMAN INSULIN 3 UNITS: 100 INJECTION, SOLUTION SUBCUTANEOUS at 11:58

## 2018-07-28 RX ADMIN — MIDAZOLAM 1 MG/HR: 5 INJECTION INTRAMUSCULAR; INTRAVENOUS at 20:55

## 2018-07-28 RX ADMIN — PROPOFOL 15 MCG/KG/MIN: 10 INJECTION, EMULSION INTRAVENOUS at 19:05

## 2018-07-28 RX ADMIN — LORAZEPAM 2 MG: 2 INJECTION INTRAMUSCULAR; INTRAVENOUS at 07:18

## 2018-07-28 RX ADMIN — FAMOTIDINE 20 MG: 20 TABLET ORAL at 09:24

## 2018-07-28 RX ADMIN — IPRATROPIUM BROMIDE AND ALBUTEROL SULFATE 3 ML: .5; 3 SOLUTION RESPIRATORY (INHALATION) at 12:42

## 2018-07-28 RX ADMIN — Medication 10 ML: at 03:52

## 2018-07-28 RX ADMIN — IPRATROPIUM BROMIDE AND ALBUTEROL SULFATE 3 ML: .5; 3 SOLUTION RESPIRATORY (INHALATION) at 00:43

## 2018-07-28 RX ADMIN — GABAPENTIN 300 MG: 300 CAPSULE ORAL at 09:25

## 2018-07-28 RX ADMIN — SERTRALINE HYDROCHLORIDE 100 MG: 50 TABLET ORAL at 09:24

## 2018-07-28 RX ADMIN — BUDESONIDE 1 MG: 0.5 INHALANT RESPIRATORY (INHALATION) at 18:58

## 2018-07-28 RX ADMIN — IPRATROPIUM BROMIDE AND ALBUTEROL SULFATE 3 ML: .5; 3 SOLUTION RESPIRATORY (INHALATION) at 16:19

## 2018-07-28 RX ADMIN — METOCLOPRAMIDE 5 MG: 5 INJECTION, SOLUTION INTRAMUSCULAR; INTRAVENOUS at 09:24

## 2018-07-28 RX ADMIN — AZTREONAM 1 G: 1 INJECTION, POWDER, LYOPHILIZED, FOR SOLUTION INTRAMUSCULAR; INTRAVENOUS at 13:58

## 2018-07-28 RX ADMIN — IPRATROPIUM BROMIDE AND ALBUTEROL SULFATE 3 ML: .5; 3 SOLUTION RESPIRATORY (INHALATION) at 07:09

## 2018-07-28 RX ADMIN — METHYLPREDNISOLONE SODIUM SUCCINATE 40 MG: 40 INJECTION, POWDER, FOR SOLUTION INTRAMUSCULAR; INTRAVENOUS at 17:38

## 2018-07-28 RX ADMIN — GABAPENTIN 300 MG: 300 CAPSULE ORAL at 20:56

## 2018-07-28 RX ADMIN — AZTREONAM 1 G: 1 INJECTION, POWDER, LYOPHILIZED, FOR SOLUTION INTRAMUSCULAR; INTRAVENOUS at 20:48

## 2018-07-28 RX ADMIN — LORAZEPAM 2 MG: 2 INJECTION INTRAMUSCULAR; INTRAVENOUS at 11:28

## 2018-07-28 RX ADMIN — ATORVASTATIN CALCIUM 20 MG: 20 TABLET, FILM COATED ORAL at 09:24

## 2018-07-28 RX ADMIN — LINEZOLID 600 MG: 600 INJECTION, SOLUTION INTRAVENOUS at 11:58

## 2018-07-28 RX ADMIN — Medication 1 CAPSULE: at 09:24

## 2018-07-28 RX ADMIN — BUPROPION HYDROCHLORIDE 75 MG: 75 TABLET, FILM COATED ORAL at 09:25

## 2018-07-28 RX ADMIN — HUMAN INSULIN 2 UNITS: 100 INJECTION, SOLUTION SUBCUTANEOUS at 05:47

## 2018-07-28 RX ADMIN — METHYLPREDNISOLONE SODIUM SUCCINATE 40 MG: 40 INJECTION, POWDER, FOR SOLUTION INTRAMUSCULAR; INTRAVENOUS at 05:47

## 2018-07-28 RX ADMIN — METOPROLOL TARTRATE 25 MG: 25 TABLET ORAL at 09:24

## 2018-07-28 RX ADMIN — Medication 200 MG: at 09:24

## 2018-07-28 RX ADMIN — IPRATROPIUM BROMIDE AND ALBUTEROL SULFATE 3 ML: .5; 3 SOLUTION RESPIRATORY (INHALATION) at 18:58

## 2018-07-28 RX ADMIN — APIXABAN 5 MG: 2.5 TABLET, FILM COATED ORAL at 09:24

## 2018-07-28 RX ADMIN — METOPROLOL TARTRATE 25 MG: 25 TABLET ORAL at 20:56

## 2018-07-28 RX ADMIN — INSULIN DETEMIR 10 UNITS: 100 INJECTION, SOLUTION SUBCUTANEOUS at 09:27

## 2018-07-28 RX ADMIN — IPRATROPIUM BROMIDE AND ALBUTEROL SULFATE 3 ML: .5; 3 SOLUTION RESPIRATORY (INHALATION) at 05:12

## 2018-07-28 RX ADMIN — Medication 1 CAPSULE: at 20:58

## 2018-07-28 RX ADMIN — BUDESONIDE 1 MG: 0.5 INHALANT RESPIRATORY (INHALATION) at 07:09

## 2018-07-28 RX ADMIN — METOCLOPRAMIDE 5 MG: 5 INJECTION, SOLUTION INTRAMUSCULAR; INTRAVENOUS at 16:27

## 2018-07-28 RX ADMIN — FAMOTIDINE 20 MG: 20 TABLET ORAL at 20:56

## 2018-07-28 RX ADMIN — CHLORHEXIDINE GLUCONATE 0.12% ORAL RINSE 15 ML: 1.2 LIQUID ORAL at 20:56

## 2018-07-28 NOTE — PLAN OF CARE
Problem: Patient Care Overview  Goal: Plan of Care Review  Outcome: Ongoing (interventions implemented as appropriate)   07/28/18 0311   Coping/Psychosocial   Plan of Care Reviewed With patient   Plan of Care Review   Progress no change   OTHER   Outcome Summary remains on 100% O2.       Problem: Pneumonia (Adult)  Goal: Signs and Symptoms of Listed Potential Problems Will be Absent, Minimized or Managed (Pneumonia)  Outcome: Ongoing (interventions implemented as appropriate)      Problem: Ventilation, Mechanical Invasive (Adult)  Goal: Signs and Symptoms of Listed Potential Problems Will be Absent, Minimized or Managed (Ventilation, Mechanical Invasive)  Outcome: Ongoing (interventions implemented as appropriate)      Problem: Arrhythmia/Dysrhythmia (Symptomatic) (Adult)  Goal: Signs and Symptoms of Listed Potential Problems Will be Absent, Minimized or Managed (Arrhythmia/Dysrhythmia)  Outcome: Ongoing (interventions implemented as appropriate)      Problem: Fall Risk (Adult)  Goal: Absence of Fall  Outcome: Ongoing (interventions implemented as appropriate)

## 2018-07-28 NOTE — PLAN OF CARE
Problem: Ventilation, Mechanical Invasive (Adult)  Goal: Signs and Symptoms of Listed Potential Problems Will be Absent, Minimized or Managed (Ventilation, Mechanical Invasive)  Outcome: Ongoing (interventions implemented as appropriate)   07/28/18 6265   Goal/Outcome Evaluation   Problems Assessed (Mechanical Ventilation, Invasive) all   Problems Present (Mech Vent, Invasive) none

## 2018-07-28 NOTE — PROGRESS NOTES
"  CC: Acute Respiratory Failure.     S: Intubated and sedated.  Events noted.  The patient is having excessive secretions through the ET tube and is having occasional episodes of desaturation even on 100% FiO2.  Continues to have occasional episodes of rapid ventricular rate    ROS: Could not be obtained as the patient is on mechanical ventilator.    O:Vital signs reviewed. O2Sat: 91-94 % on 100%. Picc Line Day # 13. Reintubated on 2018. Day # 2.  /47   Pulse 66   Temp 98.5 °F (36.9 °C) (Oral)   Resp 22   Ht 162.6 cm (64\")   Wt 72.7 kg (160 lb 3.2 oz)   LMP  (LMP Unknown)   SpO2 (!) 89%   BMI 27.50 kg/m²     Temp (24hrs), Av.9 °F (37.2 °C), Min:97.6 °F (36.4 °C), Max:101.4 °F (38.6 °C)    I & Os reviewed.   Intake/Output       18 0700 - 18 0659    Intake (ml) 2974    Output (ml) 1510    Net (ml) 1464    Last Weight  72.7 kg (160 lb 3.2 oz)          General: Intubated. Sedated.  Eyes: PERRL.   Neck: Supple with no JVD. No obvious masses noted.   Cardiovascular: S1 + S2. Appears to be regular at this time.    Respiratory: Transmitted Breath sounds noted. No wheezing heard. B/L crackles noted  GI: Soft. Bowel sounds hypoactive. No organomegaly.  Extremities: No edema noted.  Neurologic: Sedated. Was able to follow commands on loud verbal commands.   Skin: Appeared somewhat dry.  No obvious rashes noted      Labs: Reviewed.       Results from last 7 days  Lab Units 18  0421 18  1504 18  0445 18  0423   SODIUM mmol/L 141 139 142 141   POTASSIUM mmol/L 3.9 2.8* 3.0* 4.0   CHLORIDE mmol/L 108* 100 101 105   CO2 mmol/L 25.0* 27.0 26.0 28.0   BUN mg/dL 61* 68* 61* 27*   CREATININE mg/dL 1.20 1.60* 2.10* 0.90   CALCIUM mg/dL 8.5 8.0* 8.5 8.7   BILIRUBIN mg/dL 0.6  --  0.6 1.2   ALK PHOS U/L 119  --  114 131*   ALT (SGPT) U/L 66  --  45 42   AST (SGOT) U/L 67*  --  31 31   GLUCOSE mg/dL 169* 285* 216* 165*         Results from last 7 days  Lab Units 18  0421 " 07/27/18  0445 07/26/18  1658 07/25/18  0431   MAGNESIUM mg/dL 2.4* 2.2  --  2.1   PHOSPHORUS mg/dL 4.3 9.2* 6.1* 5.2*         Results from last 7 days  Lab Units 07/28/18  0421 07/27/18  0445 07/26/18  0423 07/25/18  0431 07/24/18  0503   WBC 10*3/mm3 19.13* 22.24* 23.51* 25.37* 22.98*   HEMOGLOBIN g/dL 9.9* 10.9* 12.8 11.7* 10.9*   PLATELETS 10*3/mm3 283 369 312 377 281               amiodarone 0.5 mg/min Last Rate: 0.5 mg/min (07/27/18 2011)   Pharmacy Consult - Pharmacy to dose     propofol 5-50 mcg/kg/min Last Rate: 10 mcg/kg/min (07/28/18 0107)         ABG: Reviewed  Lab Results   Component Value Date    PHART 7.372 07/28/2018    IQQ7GLM 39.1 07/28/2018    PO2ART 72.3 (L) 07/28/2018    HGBBG 10.1 (L) 07/28/2018    M5JAVZZC 94.3 07/28/2018    CARBOXYHGB 1.4 07/28/2018         CXRay: Reviewed personally. Some increase in Bilateral alveolar opacities.   Imaging Results (last 24 hours)     Procedure Component Value Units Date/Time    XR Chest 1 View [[id number]] Collected:  07/28/18 0846     Updated:  07/28/18 0850    Narrative:       PROCEDURE: XR CHEST 1 VW-     HISTORY: PNA/ARDS; J18.9-Pneumonia, unspecified organism; J96.21-Acute  and chronic respiratory failure with hypoxia; Z74.09-Other reduced  mobility; Z74.09-Other reduced mobility     COMPARISON: July 27, 2018.     FINDINGS: Endotracheal tube in appropriate position. Nasogastric tube  beneath the diaphragm. Right-sided PICC line in the SVC. The heart is  normal in size. The mediastinum is unremarkable. Increasing bilateral  opacities compared to prior exam. There is no pneumothorax.  There are  no acute osseous abnormalities.           Impression:       Increasing bilateral opacities compared to prior exam.     Continued followup is recommended.     This report was finalized on 7/28/2018 8:48 AM by Eris Holbrook DO.            Assessment & Recommendations/Plan:   1.  Acute hypoxemic respiratory failure  - Continue mechanical ventilation   - We adjusted the  "ventilator settings.   - She was placed on pressure control ventilation yesterday evening because of \"breath stacking\".   - We'll continue pressure control but will increase the PEEP to 12.  - I will ask the nursing staff to keep her oxygen saturation more than 90% and to titrate FiO2 down as much as tolerated.  - ABG and CXR will be ordered for tomorrow Am.      2.  Multifocal pneumonia   - Has been on broad-spectrum antibiotics.  - Sputum cultures had revealed MRSA  - Finished Vancomycin.  - Due to overnight fever, we will start antibiotics and send cultures.  - Will also send CRP and Procalcitonin.  - We'll start the patient on Zyvox, Azactam and Diflucan.  We decided to start these antibiotics in view of her recent exposure to vancomycin and other antibiotics.   - I have discontinued Wellbutrin and Zoloft as she will be started on Zyvox.   - If she has any EKG changes noted and Diflucan will need to be discontinued.  - Micafungin can be considered as an alternative if there are any issues with Diflucan.  - we will need to monitor liver enzymes on a regular basis over the next 1 week or so  - We will also send specimen after bronchoscopy.     3.  Likely ARDS versus hypersensitivity pneumonitis  - We'll continue IV steroids for now.     4.  Diabetes  - Continue close monitoring     5.  Lymphadenopathy  - Likely reactive.  - We will need outpatient follow-up.     6.  COPD  - On nebulized treatments.     7.  Possible component of pulmonary edema/Fluid overload?  - Has received Lasix and Diuril.      8. Renal Failure.  - Was Hemodynamically mediated.  - Resolved.     Due to increased secretions and continued respiratory failure, bronchoscopy will be considered in this patient.    The risks of Bronchoscopy including but not limited to damage to the overlying structures, pneumothorax, bleeding, worsening of respiratory failure, requirement for chest tube placement among others were explained.    Patient's family " understood the risks and benefits and agreed to proceed with the procedure.    The alternatives were also discussed with the patient's family.    Due to continued respiratory failure, underlying COPD and continued issues with Pulmonary edema/ARDS, she may need to have tracheostomy performed.     This was discussed with her .  He is willing to consider tracheostomy and will discuss this further with his family members.  I told the patient's  that we will try to have her clinically more stable, before tracheostomy can be attempted.     Critical Care time spent in direct patient care: 40 minutes (excluding procedure time, if applicable) including high complexity decision making to assess, manipulate, and support vital organ system failure in this individual who has impairment of one or more vital organ systems such that there is a high probability of imminent or life threatening deterioration in the patient’s condition.    We have updated the admitting attending and nursing staff, as appropriate, on the patient's current status and plan. I will be going off shift tonight and will be unavailable.       Yamilex Tse MD  07/28/18  9:14 AM    Dictated utilizing Dragon dictation.

## 2018-07-28 NOTE — SIGNIFICANT NOTE
07/28/18 1213   PT Deferred Reason   PT Deferred Reason Unable to treat, medical status change  (NS[facility] asks to withold tx d/t pt recieving procedure)

## 2018-07-28 NOTE — PROGRESS NOTES
Nutrition Services    Patient Name:  Breann Gallegos  YOB: 1950  MRN: [id number]  Admit Date:  7/13/2018    RD follow-up with Nursing. Pt tube feeding currently on hold for medication administration. Continue tube feeding regimen of Glucerna @25mL/hr. Do not advance. TF to provide 660 kcals, 33 grams protein, and 442 mL tube feeding water. Rec #2: Free water flushes 55mL Q6 or four times daily.Continue to keep bed elevated at 45 degrees. If pt is to be lower than 45 degrees, consider emptying stomach to reduce the risk of aspiration. RD to follow pt. Consult RD PRN.    Electronically signed by:  Alia Cruz RD  07/28/18 12:00 PM

## 2018-07-28 NOTE — OP NOTE
Date of Procedure: July 28, 2018       Type:   1. Bronchoscopy with BAL      Reason for procedure: Acute Respiratory Failure.  Pneumonia.  Excessive secretions.     Consent: Consent was obtained from the Patient's Family after explanation of the risks and benefits of the procedure. Risks including but not limited to damage to the overlying structures, pneumothorax, bleeding, infection, worsening of respiratory status, requirement for chest tube placement among others were explained.    Patient's Family understood the risks and benefits and agreed to proceed with the procedure.    Time Out: Time out was done with the RN & Bronch Technician at the bedside after confirmation of the site of the procedure and name and date of birth with the patient's ID band.    Details of the procedure:   The patient was already intubated and the bronchoscope was introduced through the endotracheal tube.  She was also sedated but required 5 mL propofol push during the procedure with you adequate sedation.    The trachea was central. The arelis was sharp but appeared to have thick secretions lining the right mainstem takeoff.     Left side was inspected first. The bronchoscope was introduced into the main stem.  The left upper lobe, lower lobe were all intact.  No significant abnormality noted.  There were very minimal secretions in the left lower lobe that were suctioned without any significant difficulty.    Next, the right side was evaluated. The bronchoscope was introduced into the main stem.  The right mainstem had a thick mucous plug which was suctioned without much difficulty although even after the mucous plug have been suctioned, there were significantly thick secretions mostly in the right lower lobe.  2-3 different passes had to be made and lavage was performed in the lower lobe as well.    Bronchial alveolar lavage was collected during the procedure as well, before and after the biopsies were obtained. A total of 80 mls was  instilled and return of approximately 20 mls was obtained.     The samples obtained during the procedure will be sent for cultures including fungal cultures and respiratory cultures.    Complications:  No immediate complications noted.  Blood loss of 0 mls.      Post Op Impression:  1.  Acute respiratory failure  2.  Possible mucous plug in the right mainstem with thick secretions mostly involving the right side.    Findings were discussed with the  after the procedure.         This document was electronically signed by Yamilex Tse MD July 28, 2018  11:47 AM

## 2018-07-28 NOTE — PLAN OF CARE
Problem: Patient Care Overview  Goal: Plan of Care Review  Outcome: Ongoing (interventions implemented as appropriate)   07/28/18 6527   Coping/Psychosocial   Plan of Care Reviewed With patient   Plan of Care Review   Progress improving       Problem: Ventilation, Mechanical Invasive (Adult)  Goal: Signs and Symptoms of Listed Potential Problems Will be Absent, Minimized or Managed (Ventilation, Mechanical Invasive)  Outcome: Ongoing (interventions implemented as appropriate)   07/28/18 8417   Goal/Outcome Evaluation   Problems Assessed (Mechanical Ventilation, Invasive) all   Problems Present (Mech Vent, Invasive) none

## 2018-07-28 NOTE — DISCHARGE SUMMARY
[facility]   DISCHARGE SUMMARY      Name:  Breann Gallegos   Age:  68 y.o.  Sex:  female  :  1950  MRN:  [id number]   Visit Number:  [id number]  Primary Care Physician:  Harry Avery MD  Date of Discharge:  2018  Admission Date:  2018      Discharge Diagnosis:     1. Multifocal bilateral pneumonia, prior to admission, with MRSA, Fully treated with vancomycin.  2. Dysphagia with suspected aspiration pneumonitis  3. Acute hypoxic respiratory failure severe, Previously requiring intubation and placement on mechanical ventilator 18 with  ARDS, Was on BiPAP, however failed and has been placed back on mechanical ventilation 2018  4. Recurrent paroxysmal Afib with RVR, currently on IV  amiodarone as well as oral Lopressor.  5. Sepsis, secondary to pneumonia, prior to admission, with MRSA, fully treated with vancomycin  6. Diabetes mellitus type 2 insulin dependent  7. Chronic essential hypertension  8. COPD moderate, on IV Solu-Medrol  9. Abnormal CT chest with lymphadenopathy, repeat outpatient CT scan chest   10. Hypomagnesemia, replaced  11.   persistently Elevated WBCs, suspect due to Solu-Medrol.  12.  Acute renal failure, likely due to combination of hypotension and diuresis. improved    Active Hospital Problems    Diagnosis Date Noted   • **Acute respiratory failure with hypoxia (CMS/[facility]) [J96.01] 2018   • Pneumonia of both lungs due to infectious organism [J18.9] 2018   • Sinus tachycardia [R00.0] 2018   • Diabetes mellitus (CMS/HCC) [E11.9] 2016      Resolved Hospital Problems    Diagnosis Date Noted Date Resolved   No resolved problems to display.         Presenting Problem/History of Present Illness:    Pneumonia of both lungs due to infectious organism, unspecified part of lung [J18.9]         Hospital Course:    Patient is a 68-year-old female who was admitted on 2018 with multifocal pneumonia and a sputum culture  growing MRSA.  She Had been initially treated with vancomycin, cefepime, doxycycline.  Strep and Legionella were negative.  Sputum culture did grow out MRSA.  Antibiotics were narrowed to just include vancomycin.  She completed a 12 day course of this.  She was noted to have sepsis.  She is diabetic, has chronic essential hypertension, COPD.  She had severe hypoxia requiring ventilatory management.  Dr. Tse from pulmonary critical care has been following.  She was extubated on 7/19/2018.  It was felt that she had ARDS at that time as well.  Was felt that she was aspirating.  Speech therapy saw the patient and noted pharyngeal phase dysphagia and recommended puréed diet.  She did appear to be aspirating as her chest x-ray did worsen.  She was again made nothing by mouth the next day.  OG tube was left in place, she was started on tube feeding.  She was also noted to be severely weak.  On 7/20/2018, she began to have intermittent atrial fibrillation.  This went into RVR, she was placed on metoprolol and diltiazem was started.  Dr. Farias from cardiology was consulted, the patient was placed on amiodarone drip.  Patient was also given oral doses of Lopressor.  Her atrial fibrillation with RVR was persistent, he felt this was due to her respiratory issues.    Dr. Tse from critical care continued to follow, x-rays continue to show ARDS with pulmonary edema.  She was placed on a Lasix drip and given Diuril on a daily basis.  She continued to require BiPAP with FiO2 from %.  Despite urine output > 3L daily, patient continued to worsen. Chest xray showed mild improvement. Patient continued to have intermittent afib, and IV amiodarone was continued.    Patient continued to deteriorate and was placed back on the ventilator on 7/27/2018.  His x-ray is unchanged.  She also was noted to be febrile with increased secretions.  She was placed on Zyvox and aztreonam, as well as Diflucan.  Bronchoscopy was done today by  Dr. Tse.  He noted a possible mucous plug in the right mainstem bronchus with thick secretions mostly involving the right side.  Cultures were sent.  Patient continues on pressure control with inspiratory pressure of 20, PEEP of 14 moderate respiratory rate of 16.  FiO2 is 100%.  Her saturations are in the high 80s.  ABG done today on the ventilator showed a pH of 7.392, PCO2 of 38.1, and a PO2 of 50.1.    Of note, patient had mild to moderate COPD as an outpatient, she was not on home O2.  She was on Brovana and Pulmicort as an outpatient.  She has been on IV Solu-Medrol during the course of this hospital stay.  She also has a history of diabetes mellitus type 2, essential hypertension, GERD and hyperlipidemia prior to admission.  She is sedated on the ventilator, and unable to give any history at all.    Discussed the case with Dr. Tse, he recommended evaluation by the ECMO  team at  for possible transfer there.    Dr Mayers from critical care has accepted the patient in transfer, patient is currently on a waiting list.  Also spoke to Dr. Griffin from the ECMO team.  He recommended transfer to  intensive care unit under the care of the intensivist, and he could be consulted there to evaluate the patient.  In the meantime, will continue care here until transfer has been arranged.  Discussed this with the .    Procedures Performed:    Procedure(s):  BRONCHOSCOPY at bedside. Patient on ventilator.     Results for orders placed during the hospital encounter of 07/13/18   Adult Transthoracic Echo Complete W/ Cont if Necessary Per Protocol    Narrative · Left ventricular wall thickness is consistent with mild concentric   hypertrophy.  · Left ventricular systolic function is normal. Estimated EF = 58%.  · Left atrial cavity size is moderately dilated.  · There is moderate calcification of the aortic valve mainly affecting the   non, left and right coronary cusp(s).  · Mild tricuspid valve regurgitation is  present.  · Calculated right ventricular systolic pressure from tricuspid   regurgitation is 48 mmHg.          Consults:     Consults     Date and Time Order Name Status Description    7/16/2018 0156 Inpatient Cardiology Consult Completed     7/15/2018 0754 Inpatient Pulmonology Consult Completed           Pertinent Test Results:     Lab Results (all)     Procedure Component Value Units Date/Time    Gram Stain [[id number]] Collected:  07/28/18 1115    Specimen:  Lavage from Lung, R Updated:  07/28/18 1312     Gram Stain Result Moderate (3+) Gram positive cocci in pairs, chains and clusters      Moderate (3+) Gram positive bacilli      Few (2+) Gram negative bacilli      Few (2+) WBCs seen    Blood Gas, Arterial With Co-Ox [[id number]]  (Abnormal) Collected:  07/28/18 1253    Specimen:  Arterial Blood Updated:  07/28/18 1252     Site Left Radial     Toy's Test Positive     pH, Arterial 7.392 pH units      pCO2, Arterial 38.1 mm Hg      pO2, Arterial 50.1 (L) mm Hg      HCO3, Arterial 23.1 mmol/L      Base Excess, Arterial -1.6 (L) mmol/L      O2 Saturation, Arterial 84.9 (L) %      Hemoglobin, Blood Gas 9.9 (L) g/dL      Hematocrit, Blood Gas 30.4 %      Oxyhemoglobin 82.9 (L) %      Methemoglobin 0.80 %      Carboxyhemoglobin 1.5 %      Barometric Pressure for Blood Gas 737 mmHg      Modality Ventilator     FIO2 100 %      Ventilator Mode PC     Set [facility] Resp Rate 16.0     PEEP 12.0     pH, Temp Corrected -- pH Units      pCO2, Temperature Corrected -- mm Hg      pO2, Temperature Corrected -- mm Hg     Fungus Culture - Lavage, Lung, R [[id number]] Collected:  07/28/18 1115    Specimen:  Lavage from Lung, R Updated:  07/28/18 1250    Respiratory Culture - Lavage, Lung, R [[id number]] Collected:  07/28/18 1115    Specimen:  Lavage from Lung, R Updated:  07/28/18 1249    POC Glucose Once [[id number]]  (Abnormal) Collected:  07/28/18 1144    Specimen:  Blood Updated:  07/28/18 1150     Glucose 209 (H) mg/dL      Comment:  Serial Number: [id number]:  [id number]       Respiratory Culture - Sputum, ET Suction [[id number]] Collected:  07/28/18 0929    Specimen:  Sputum from ET Suction Updated:  07/28/18 1055     Gram Stain Result Greater than 20 WBCs per low power field      Less than 10 Epithelial cells per low power field      Moderate (3+) Gram positive cocci in pairs, chains and clusters      Few (2+) Gram positive bacilli      Rare (1+) Gram negative cocci    C-reactive Protein [[id number]]  (Abnormal) Collected:  07/28/18 0421    Specimen:  Blood Updated:  07/28/18 1047     C-Reactive Protein 2.60 (H) mg/dL     Procalcitonin [[id number]] Collected:  07/28/18 0421    Specimen:  Blood Updated:  07/28/18 1034    Blood Culture With OSMEL - Blood, [[id number]]  (Normal) Collected:  07/25/18 0740    Specimen:  Blood from Arm, Left Updated:  07/28/18 0800     Blood Culture No growth at 3 days    Blood Culture With OSMEL - Blood, [[id number]]  (Normal) Collected:  07/25/18 0747    Specimen:  Blood from Arm, Left Updated:  07/28/18 0800     Blood Culture No growth at 3 days    POC Glucose Once [[id number]]  (Abnormal) Collected:  07/28/18 0540    Specimen:  Blood Updated:  07/28/18 0545     Glucose 163 (H) mg/dL      Comment: Serial Number: [id number]:  [id number]       Phosphorus [[id number]]  (Normal) Collected:  07/28/18 0421    Specimen:  Blood Updated:  07/28/18 0540     Phosphorus 4.3 mg/dL     Comprehensive Metabolic Panel [[id number]]  (Abnormal) Collected:  07/28/18 0421    Specimen:  Blood Updated:  07/28/18 0540     Glucose 169 (H) mg/dL      BUN 61 (H) mg/dL      Creatinine 1.20 mg/dL      Sodium 141 mmol/L      Potassium 3.9 mmol/L      Chloride 108 (H) mmol/L      CO2 25.0 (L) mmol/L      Calcium 8.5 mg/dL      Total Protein 5.3 (L) g/dL      Albumin 2.60 (L) g/dL      ALT (SGPT) 66 U/L      AST (SGOT) 67 (H) U/L      Alkaline Phosphatase 119 U/L      Total Bilirubin 0.6 mg/dL      eGFR Non African Amer 45 (L) mL/min/1.73       Globulin 2.7 gm/dL      A/G Ratio 1.0 g/dL      BUN/Creatinine Ratio 50.8 (H)     Anion Gap 11.9 mmol/L     Narrative:       GFR Normal >60  Chronic Kidney Disease <60  Kidney Failure <15    Magnesium [[id number]]  (Abnormal) Collected:  07/28/18 0421    Specimen:  Blood Updated:  07/28/18 0530     Magnesium 2.4 (H) mg/dL     Blood Gas, Arterial With Co-Ox [[id number]]  (Abnormal) Collected:  07/28/18 0522    Specimen:  Arterial Blood Updated:  07/28/18 0521     Site Left Brachial     Toy's Test N/A     pH, Arterial 7.372 pH units      pCO2, Arterial 39.1 mm Hg      pO2, Arterial 72.3 (L) mm Hg      HCO3, Arterial 22.7 mmol/L      Base Excess, Arterial -2.4 (L) mmol/L      O2 Saturation, Arterial 94.3 %      Hemoglobin, Blood Gas 10.1 (L) g/dL      Hematocrit, Blood Gas 30.9 %      Oxyhemoglobin 92.3 (L) %      Methemoglobin 0.70 %      Carboxyhemoglobin 1.4 %      Barometric Pressure for Blood Gas 736 mmHg      Modality N/A     FIO2 100 %      Ventilator Mode NA     Collected by jrb     pH, Temp Corrected -- pH Units      pCO2, Temperature Corrected -- mm Hg      pO2, Temperature Corrected -- mm Hg     CBC & Differential [[id number]] Collected:  07/28/18 0421    Specimen:  Blood Updated:  07/28/18 0508    Narrative:       The following orders were created for panel order CBC & Differential.  Procedure                               Abnormality         Status                     ---------                               -----------         ------                     CBC Auto Differential[[id number]]        Abnormal            Final result                 Please view results for these tests on the individual orders.    CBC Auto Differential [[id number]]  (Abnormal) Collected:  07/28/18 0421    Specimen:  Blood Updated:  07/28/18 0508     WBC 19.13 (H) 10*3/mm3      RBC 3.72 (L) 10*6/mm3      Hemoglobin 9.9 (L) g/dL      Hematocrit 32.4 (L) %      MCV 87.1 fL      MCH 26.6 (L) pg      MCHC 30.6 g/dL      RDW 15.7 (H) %       RDW-SD 49.8 fl      MPV 10.5 fL      Platelets 283 10*3/mm3      Neutrophil % 85.8 (H) %      Lymphocyte % 7.7 (L) %      Monocyte % 4.8 %      Eosinophil % 0.9 %      Basophil % 0.2 %      Immature Grans % 0.6 %      Neutrophils, Absolute 16.42 (H) 10*3/mm3      Lymphocytes, Absolute 1.48 10*3/mm3      Monocytes, Absolute 0.91 (H) 10*3/mm3      Eosinophils, Absolute 0.17 10*3/mm3      Basophils, Absolute 0.03 10*3/mm3      Immature Grans, Absolute 0.12 (H) 10*3/mm3      nRBC 0.0 /100 WBC     POC Glucose Once [[id number]]  (Abnormal) Collected:  07/27/18 2326    Specimen:  Blood Updated:  07/27/18 2330     Glucose 188 (H) mg/dL      Comment: Serial Number: [id number]:  [id number]       Blood Gas, Arterial With Co-Ox [[id number]]  (Abnormal) Collected:  07/27/18 2147    Specimen:  Arterial Blood Updated:  07/27/18 2147     Site Left Brachial     Toy's Test N/A     pH, Arterial 7.383 pH units      pCO2, Arterial 39.2 mm Hg      pO2, Arterial 67.1 (L) mm Hg      HCO3, Arterial 23.4 mmol/L      Base Excess, Arterial -1.5 (L) mmol/L      O2 Saturation, Arterial 92.6 (L) %      Hemoglobin, Blood Gas 10.6 (L) g/dL      Hematocrit, Blood Gas 32.4 %      Oxyhemoglobin 90.7 (L) %      Methemoglobin 0.70 %      Carboxyhemoglobin 1.3 %      Barometric Pressure for Blood Gas 736 mmHg      Modality N/A     FIO2 100 %      Ventilator Mode NA     Collected by jrb     pH, Temp Corrected -- pH Units      pCO2, Temperature Corrected -- mm Hg      pO2, Temperature Corrected -- mm Hg     POC Glucose Once [[id number]]  (Normal) Collected:  07/27/18 1734    Specimen:  Blood Updated:  07/27/18 1740     Glucose 128 mg/dL      Comment: Serial Number: [id number]:  [id number]       Basic Metabolic Panel [[id number]]  (Abnormal) Collected:  07/27/18 1504    Specimen:  Blood Updated:  07/27/18 1528     Glucose 285 (H) mg/dL      BUN 68 (H) mg/dL      Creatinine 1.60 (H) mg/dL      Sodium 139 mmol/L      Potassium 2.8 (C) mmol/L       Chloride 100 mmol/L      CO2 27.0 mmol/L      Calcium 8.0 (L) mg/dL      eGFR Non African Amer 32 (L) mL/min/1.73      BUN/Creatinine Ratio 42.5 (H)     Anion Gap 14.8 mmol/L     Narrative:       GFR Normal >60  Chronic Kidney Disease <60  Kidney Failure <15    POC Glucose Once [[id number]]  (Abnormal) Collected:  07/27/18 1138    Specimen:  Blood Updated:  07/27/18 1300     Glucose 291 (H) mg/dL      Comment: Serial Number: [id number]:  [id number]       POC Glucose Once [[id number]]  (Abnormal) Collected:  07/27/18 0557    Specimen:  Blood Updated:  07/27/18 0610     Glucose 258 (H) mg/dL      Comment: Serial Number: [id number]:  [id number]       Phosphorus [[id number]]  (Abnormal) Collected:  07/27/18 0445    Specimen:  Blood Updated:  07/27/18 0603     Phosphorus 9.2 (C) mg/dL     Comprehensive Metabolic Panel [[id number]]  (Abnormal) Collected:  07/27/18 0445    Specimen:  Blood Updated:  07/27/18 0556     Glucose 216 (H) mg/dL      Comment: Glucose >180, Hemoglobin A1C recommended.        BUN 61 (H) mg/dL      Creatinine 2.10 (H) mg/dL      Sodium 142 mmol/L      Potassium 3.0 (L) mmol/L      Chloride 101 mmol/L      CO2 26.0 mmol/L      Calcium 8.5 mg/dL      Total Protein 5.8 (L) g/dL      Albumin 2.90 (L) g/dL      ALT (SGPT) 45 U/L      AST (SGOT) 31 U/L      Alkaline Phosphatase 114 U/L      Total Bilirubin 0.6 mg/dL      eGFR Non African Amer 23 (L) mL/min/1.73      Globulin 2.9 gm/dL      A/G Ratio 1.0 g/dL      BUN/Creatinine Ratio 29.0 (H)     Anion Gap 18.0 mmol/L     Narrative:       GFR Normal >60  Chronic Kidney Disease <60  Kidney Failure <15    Magnesium [[id number]]  (Normal) Collected:  07/27/18 0445    Specimen:  Blood Updated:  07/27/18 0554     Magnesium 2.2 mg/dL     Triglycerides [[id number]]  (Normal) Collected:  07/27/18 0445    Specimen:  Blood Updated:  07/27/18 0554     Triglycerides 149 mg/dL     CBC & Differential [[id number]] Collected:  07/27/18 0445    Specimen:  Blood Updated:   07/27/18 0522    Narrative:       The following orders were created for panel order CBC & Differential.  Procedure                               Abnormality         Status                     ---------                               -----------         ------                     CBC Auto Differential[[id number]]        Abnormal            Final result                 Please view results for these tests on the individual orders.    CBC Auto Differential [[id number]]  (Abnormal) Collected:  07/27/18 0445    Specimen:  Blood Updated:  07/27/18 0522     WBC 22.24 (H) 10*3/mm3      RBC 4.17 (L) 10*6/mm3      Hemoglobin 10.9 (L) g/dL      Hematocrit 35.1 (L) %      MCV 84.2 fL      MCH 26.1 (L) pg      MCHC 31.1 g/dL      RDW 15.9 (H) %      RDW-SD 47.8 fl      MPV 11.0 fL      Platelets 369 10*3/mm3      Neutrophil % 82.9 (H) %      Lymphocyte % 9.4 (L) %      Monocyte % 6.1 %      Eosinophil % 0.6 %      Basophil % 0.1 %      Immature Grans % 0.9 (H) %      Neutrophils, Absolute 18.44 (H) 10*3/mm3      Lymphocytes, Absolute 2.09 10*3/mm3      Monocytes, Absolute 1.36 (H) 10*3/mm3      Eosinophils, Absolute 0.14 10*3/mm3      Basophils, Absolute 0.02 10*3/mm3      Immature Grans, Absolute 0.19 (H) 10*3/mm3      nRBC 0.0 /100 WBC     Blood Gas, Arterial With Co-Ox [[id number]]  (Abnormal) Collected:  07/27/18 0458    Specimen:  Arterial Blood Updated:  07/27/18 0458     Site Left Brachial     Toy's Test N/A     pH, Arterial 7.377 pH units      pCO2, Arterial 41.5 mm Hg      pO2, Arterial 67.5 (L) mm Hg      HCO3, Arterial 24.3 mmol/L      Base Excess, Arterial -0.9 (L) mmol/L      O2 Saturation, Arterial 92.2 (L) %      Hemoglobin, Blood Gas 11.0 (L) g/dL      Hematocrit, Blood Gas 33.9 %      Oxyhemoglobin 90.4 (L) %      Methemoglobin 0.70 %      Carboxyhemoglobin 1.2 %      Barometric Pressure for Blood Gas 733 mmHg      Modality Ventilator     FIO2 100 %      Ventilator Mode NA     Collected by jrb     pH, Temp  Corrected -- pH Units      pCO2, Temperature Corrected -- mm Hg      pO2, Temperature Corrected -- mm Hg     POC Glucose Once [[id number]]  (Abnormal) Collected:  07/26/18 2357    Specimen:  Blood Updated:  07/27/18 0015     Glucose 346 (H) mg/dL      Comment: Serial Number: [id number]:  [id number]       Blood Gas, Arterial With Co-Ox [[id number]]  (Abnormal) Collected:  07/26/18 1856    Specimen:  Arterial Blood Updated:  07/26/18 1855     Site Left Radial     Toy's Test Positive     pH, Arterial 7.467 pH units      pCO2, Arterial 37.2 mm Hg      pO2, Arterial 54.1 (L) mm Hg      HCO3, Arterial 26.9 mmol/L      Base Excess, Arterial 3.1 (H) mmol/L      O2 Saturation, Arterial 87.7 (L) %      Hemoglobin, Blood Gas 12.9 g/dL      Hematocrit, Blood Gas 39.6 %      Oxyhemoglobin 86.1 (L) %      Methemoglobin 0.50 %      Carboxyhemoglobin 1.3 %      Barometric Pressure for Blood Gas 733 mmHg      Modality BiPap     FIO2 100 %      Ventilator Mode NA     PEEP 0.0     IPAP 16     EPAP 12     Collected by yovani     pH, Temp Corrected -- pH Units      pCO2, Temperature Corrected -- mm Hg      pO2, Temperature Corrected -- mm Hg     Phosphorus [[id number]]  (Abnormal) Collected:  07/26/18 1658    Specimen:  Blood Updated:  07/26/18 1734     Phosphorus 6.1 (C) mg/dL     POC Glucose Once [[id number]]  (Abnormal) Collected:  07/26/18 1550    Specimen:  Blood Updated:  07/26/18 1620     Glucose 215 (H) mg/dL      Comment: Serial Number: [id number]:  [id number]       Blood Gas, Arterial With Co-Ox [[id number]]  (Abnormal) Collected:  07/26/18 1229    Specimen:  Arterial Blood Updated:  07/26/18 1229     Site Right Radial     Toy's Test Positive     pH, Arterial 7.479 pH units      pCO2, Arterial 35.4 mm Hg      pO2, Arterial 57.4 (L) mm Hg      HCO3, Arterial 26.3 mmol/L      Base Excess, Arterial 3.0 (H) mmol/L      O2 Saturation, Arterial 90.6 (L) %      Hemoglobin, Blood Gas 12.9 g/dL      Hematocrit, Blood Gas 39.5 %       Oxyhemoglobin 88.6 (L) %      Methemoglobin 0.70 %      Carboxyhemoglobin 1.5 %      Barometric Pressure for Blood Gas 734 mmHg      Modality BiPap     FIO2 100 %      Ventilator Mode NA     Collected by      pH, Temp Corrected -- pH Units      pCO2, Temperature Corrected -- mm Hg      pO2, Temperature Corrected -- mm Hg     POC Glucose Once [[id number]]  (Abnormal) Collected:  07/26/18 1059    Specimen:  Blood Updated:  07/26/18 1120     Glucose 244 (H) mg/dL      Comment: Serial Number: [id number]:  [id number]       POC Glucose Once [[id number]]  (Normal) Collected:  07/26/18 0545    Specimen:  Blood Updated:  07/26/18 0550     Glucose 126 mg/dL      Comment: Serial Number: [id number]:  [id number]       Comprehensive Metabolic Panel [[id number]]  (Abnormal) Collected:  07/26/18 0423    Specimen:  Blood Updated:  07/26/18 0523     Glucose 165 (H) mg/dL      BUN 27 (H) mg/dL      Creatinine 0.90 mg/dL      Sodium 141 mmol/L      Potassium 4.0 mmol/L      Chloride 105 mmol/L      CO2 28.0 mmol/L      Calcium 8.7 mg/dL      Total Protein 6.3 g/dL      Albumin 3.30 (L) g/dL      ALT (SGPT) 42 U/L      AST (SGOT) 31 U/L      Alkaline Phosphatase 131 (H) U/L      Total Bilirubin 1.2 mg/dL      eGFR Non African Amer 62 mL/min/1.73      Globulin 3.0 gm/dL      A/G Ratio 1.1 g/dL      BUN/Creatinine Ratio 30.0 (H)     Anion Gap 12.0 mmol/L     Narrative:       GFR Normal >60  Chronic Kidney Disease <60  Kidney Failure <15    CBC & Differential [[id number]] Collected:  07/26/18 0423    Specimen:  Blood Updated:  07/26/18 0519    Narrative:       The following orders were created for panel order CBC & Differential.  Procedure                               Abnormality         Status                     ---------                               -----------         ------                     CBC Auto Differential[[id number]]        Abnormal            Final result                 Please view results for these tests on the  individual orders.    CBC Auto Differential [[id number]]  (Abnormal) Collected:  07/26/18 0423    Specimen:  Blood Updated:  07/26/18 0519     WBC 23.51 (H) 10*3/mm3      RBC 4.80 10*6/mm3      Hemoglobin 12.8 g/dL      Hematocrit 41.5 %      MCV 86.5 fL      MCH 26.7 (L) pg      MCHC 30.8 g/dL      RDW 15.7 (H) %      RDW-SD 48.8 fl      MPV 10.5 fL      Platelets 312 10*3/mm3      Neutrophil % 81.3 (H) %      Lymphocyte % 10.3 %      Monocyte % 6.3 %      Eosinophil % 0.8 %      Basophil % 0.2 %      Immature Grans % 1.1 (H) %      Neutrophils, Absolute 19.13 (H) 10*3/mm3      Lymphocytes, Absolute 2.41 10*3/mm3      Monocytes, Absolute 1.48 (H) 10*3/mm3      Eosinophils, Absolute 0.19 10*3/mm3      Basophils, Absolute 0.04 10*3/mm3      Immature Grans, Absolute 0.26 (H) 10*3/mm3      nRBC 0.0 /100 WBC     POC Glucose Once [[id number]]  (Abnormal) Collected:  07/25/18 2045    Specimen:  Blood Updated:  07/25/18 2101     Glucose 337 (H) mg/dL      Comment: Serial Number: [id number]:  [id number]       POC Glucose Once [[id number]]  (Abnormal) Collected:  07/25/18 1710    Specimen:  Blood Updated:  07/25/18 2100     Glucose 254 (H) mg/dL      Comment: Serial Number: [id number]:  [id number]       POC Glucose Once [[id number]]  (Abnormal) Collected:  07/25/18 1528    Specimen:  Blood Updated:  07/25/18 2100     Glucose 449 (H) mg/dL      Comment: Serial Number: [id number]:  [id number]       POC Glucose Once [[id number]]  (Abnormal) Collected:  07/25/18 1053    Specimen:  Blood Updated:  07/25/18 1223     Glucose 272 (H) mg/dL      Comment: Serial Number: [id number]:  [id number]       C-reactive Protein [[id number]]  (Abnormal) Collected:  07/25/18 0431    Specimen:  Blood Updated:  07/25/18 0741     C-Reactive Protein 3.60 (H) mg/dL     Blood Gas, Arterial With Co-Ox [[id number]]  (Abnormal) Collected:  07/25/18 0718    Specimen:  Arterial Blood Updated:  07/25/18 0718     Site Right Radial     Toy's Test  Positive     pH, Arterial 7.444 pH units      pCO2, Arterial 39.0 mm Hg      pO2, Arterial 82.2 mm Hg      HCO3, Arterial 26.7 mmol/L      Base Excess, Arterial 2.5 (H) mmol/L      O2 Saturation, Arterial 96.4 %      Hemoglobin, Blood Gas 11.3 (L) g/dL      Hematocrit, Blood Gas 34.7 %      Oxyhemoglobin 94.5 %      Methemoglobin 0.80 %      Carboxyhemoglobin 1.2 %      Barometric Pressure for Blood Gas 734 mmHg      Modality BiPap     FIO2 90 %      Ventilator Mode NA     Collected by bg     pH, Temp Corrected -- pH Units      pCO2, Temperature Corrected -- mm Hg      pO2, Temperature Corrected -- mm Hg     POC Glucose Once [[id number]]  (Abnormal) Collected:  07/25/18 0631    Specimen:  Blood Updated:  07/25/18 0635     Glucose 197 (H) mg/dL      Comment: Serial Number: [id number]:  [id number]       Urine Culture - Urine, [[id number]]  (Abnormal) Collected:  07/22/18 0806    Specimen:  Urine from Urine, Clean Catch Updated:  07/25/18 0614     Urine Culture >100,000 CFU/mL Yeast, Not Candida albicans (A)    Renal Function Panel [[id number]]  (Abnormal) Collected:  07/25/18 0431    Specimen:  Blood Updated:  07/25/18 0537     Glucose 237 (H) mg/dL      BUN 28 (H) mg/dL      Creatinine 0.80 mg/dL      Sodium 140 mmol/L      Potassium 3.5 mmol/L      Chloride 104 mmol/L      CO2 29.0 mmol/L      Calcium 8.9 mg/dL      Albumin 3.10 (L) g/dL      Phosphorus 5.2 (H) mg/dL      Anion Gap 10.5 mmol/L      BUN/Creatinine Ratio 35.0 (H)     eGFR Non African Amer 72 mL/min/1.73     Narrative:       GFR Normal >60  Chronic Kidney Disease <60  Kidney Failure <15    Magnesium [[id number]]  (Normal) Collected:  07/25/18 0431    Specimen:  Blood Updated:  07/25/18 0537     Magnesium 2.1 mg/dL     CBC & Differential [[id number]] Collected:  07/25/18 0431    Specimen:  Blood Updated:  07/25/18 0506    Narrative:       The following orders were created for panel order CBC & Differential.  Procedure                                Abnormality         Status                     ---------                               -----------         ------                     CBC Auto Differential[[id number]]        Abnormal            Edited Result - FINAL        Please view results for these tests on the individual orders.    CBC Auto Differential [[id number]]  (Abnormal) Collected:  07/25/18 0431    Specimen:  Blood Updated:  07/25/18 0506     WBC 25.37 (C) 10*3/mm3      Comment: Corrected result. Previous result was 24.85 10*3/mm3 on 7/25/2018 at [facility] EDT.        RBC 4.43 10*6/mm3      Comment: Corrected result. Previous result was 4.37 10*6/mm3 on 7/25/2018 at [facility] EDT.        Hemoglobin 11.7 (L) g/dL      Comment: Corrected result. Previous result was 11.5 g/dL on 7/25/2018 at [facility] EDT.        Hematocrit 36.9 (L) %      Comment: Corrected result. Previous result was 37.0 % on 7/25/2018 at [facility] EDT.        MCV 83.3 fL      Comment: Corrected result. Previous result was 84.7 fL on 7/25/2018 at [facility] EDT.        MCH 26.4 (L) pg      Comment: Corrected result. Previous result was 26.3 pg on 7/25/2018 at [facility] EDT.        MCHC 31.7 g/dL      Comment: Corrected result. Previous result was 31.1 g/dL on 7/25/2018 at [facility] EDT.        RDW 15.4 (H) %      Comment: Corrected result. Previous result was 15.2 % on 7/25/2018 at [facility] EDT.        RDW-SD 46.2 fl      Comment: Corrected result. Previous result was 46.0 fl on 7/25/2018 at [facility] EDT.        MPV 10.3 fL      Comment: Corrected result. Previous result was 10.0 fL on 7/25/2018 at [facility] EDT.        Platelets 377 10*3/mm3      Comment: Corrected result. Previous result was 343 10*3/mm3 on 7/25/2018 at [facility] EDT.        Neutrophil % 89.4 (H) %      Comment: Corrected result. Previous result was 89.7 % on 7/25/2018 at [facility] EDT.        Lymphocyte % 3.9 (L) %      Comment: Corrected result. Previous result was 3.8 % on 7/25/2018 at 0457 EDT.        Monocyte % 5.8 %      Comment: Corrected result. Previous result was 5.5  % on 7/25/2018 at 0457 EDT.        Eosinophil % 0.1 %      Comment: Corrected result. Previous result was 0.0 % on 7/25/2018 at 0457 EDT.        Basophil % 0.1 %      Comment: Corrected result. Previous result was 0.2 % on 7/25/2018 at 0457 EDT.        Immature Grans % 0.7 (H) %      Comment: Corrected result. Previous result was 0.8 % on 7/25/2018 at 0457 EDT.        Neutrophils, Absolute 22.67 (H) 10*3/mm3      Comment: Corrected result. Previous result was 22.29 10*3/mm3 on 7/25/2018 at 0457 EDT.        Lymphocytes, Absolute 0.99 10*3/mm3      Comment: Corrected result. Previous result was 0.95 10*3/mm3 on 7/25/2018 at 0457 EDT.        Monocytes, Absolute 1.48 (H) 10*3/mm3      Comment: Corrected result. Previous result was 1.36 10*3/mm3 on 7/25/2018 at 0457 EDT.        Eosinophils, Absolute 0.02 10*3/mm3      Comment: Corrected result. Previous result was 0.01 10*3/mm3 on 7/25/2018 at 0457 EDT.        Basophils, Absolute 0.03 10*3/mm3      Comment: Corrected result. Previous result was 0.05 10*3/mm3 on 7/25/2018 at 0457 EDT.        Immature Grans, Absolute 0.18 (H) 10*3/mm3      Comment: Corrected result. Previous result was 0.19 10*3/mm3 on 7/25/2018 at 0457 EDT.        nRBC 0.0 /100 WBC     POC Glucose Once [[id number]]  (Abnormal) Collected:  07/24/18 2208    Specimen:  Blood Updated:  07/24/18 2210     Glucose 211 (H) mg/dL      Comment: Serial Number: [id number]:  [id number]       POC Glucose Once [[id number]]  (Abnormal) Collected:  07/24/18 1727    Specimen:  Blood Updated:  07/24/18 1740     Glucose 227 (H) mg/dL      Comment: Serial Number: [id number]:  [id number]       POC Glucose Once [[id number]]  (Abnormal) Collected:  07/24/18 1122    Specimen:  Blood Updated:  07/24/18 1136     Glucose 200 (H) mg/dL      Comment: Serial Number: [id number]:  [id number]       Blood Gas, Arterial With Co-Ox [[id number]]  (Abnormal) Collected:  07/24/18 0639    Specimen:  Arterial Blood Updated:  07/24/18 0640      Site Left Radial     Toy's Test Positive     pH, Arterial 7.499 pH units      pCO2, Arterial 38.4 mm Hg      pO2, Arterial 57.3 (L) mm Hg      HCO3, Arterial 29.8 (H) mmol/L      Base Excess, Arterial 6.2 (H) mmol/L      O2 Saturation, Arterial 90.2 (L) %      Hemoglobin, Blood Gas 10.4 (L) g/dL      Hematocrit, Blood Gas 32.0 %      Oxyhemoglobin 88.4 (L) %      Methemoglobin 0.60 %      Carboxyhemoglobin 1.4 %      Barometric Pressure for Blood Gas 733 mmHg      Modality BiPap     FIO2 100 %      Ventilator Mode NA     Collected by [id number]     pH, Temp Corrected -- pH Units      pCO2, Temperature Corrected -- mm Hg      pO2, Temperature Corrected -- mm Hg     POC Glucose Once [[id number]]  (Abnormal) Collected:  07/24/18 0626    Specimen:  Blood Updated:  07/24/18 0635     Glucose 283 (H) mg/dL      Comment: Serial Number: [id number]:  [id number]       CBC & Differential [[id number]] Collected:  07/24/18 0503    Specimen:  Blood Updated:  07/24/18 0557    Narrative:       The following orders were created for panel order CBC & Differential.  Procedure                               Abnormality         Status                     ---------                               -----------         ------                     CBC Auto Differential[[id number]]        Abnormal            Final result                 Please view results for these tests on the individual orders.    CBC Auto Differential [[id number]]  (Abnormal) Collected:  07/24/18 0503    Specimen:  Blood Updated:  07/24/18 0557     WBC 22.98 (H) 10*3/mm3      RBC 4.02 (L) 10*6/mm3      Hemoglobin 10.9 (L) g/dL      Hematocrit 33.6 (L) %      MCV 83.6 fL      MCH 27.1 pg      MCHC 32.4 g/dL      RDW 15.0 (H) %      RDW-SD 45.6 fl      MPV 10.5 fL      Platelets 281 10*3/mm3      Neutrophil % 93.5 (H) %      Lymphocyte % 3.2 (L) %      Monocyte % 2.4 %      Eosinophil % 0.0 %      Basophil % 0.1 %      Immature Grans % 0.8 (H) %      Neutrophils, Absolute  21.47 (H) 10*3/mm3      Lymphocytes, Absolute 0.73 10*3/mm3      Monocytes, Absolute 0.56 10*3/mm3      Eosinophils, Absolute 0.00 10*3/mm3      Basophils, Absolute 0.03 10*3/mm3      Immature Grans, Absolute 0.19 (H) 10*3/mm3      nRBC 0.0 /100 WBC     Renal Function Panel [[id number]]  (Abnormal) Collected:  07/24/18 0503    Specimen:  Blood Updated:  07/24/18 0539     Glucose 278 (H) mg/dL      BUN 32 (H) mg/dL      Creatinine 0.60 mg/dL      Sodium 139 mmol/L      Potassium 4.1 mmol/L      Chloride 101 mmol/L      CO2 30.0 mmol/L      Calcium 8.7 mg/dL      Albumin 3.10 (L) g/dL      Phosphorus 4.5 mg/dL      Anion Gap 12.1 mmol/L      BUN/Creatinine Ratio 53.3 (H)     eGFR Non African Amer 100 mL/min/1.73     Narrative:       GFR Normal >60  Chronic Kidney Disease <60  Kidney Failure <15    Magnesium [[id number]]  (Normal) Collected:  07/24/18 0503    Specimen:  Blood Updated:  07/24/18 0538     Magnesium 2.0 mg/dL     POC Glucose Once [[id number]]  (Abnormal) Collected:  07/23/18 2154    Specimen:  Blood Updated:  07/23/18 2205     Glucose 201 (H) mg/dL      Comment: Serial Number: [id number]:  [id number]       Vancomycin, Trough [[id number]]  (Normal) Collected:  07/23/18 1723    Specimen:  Blood Updated:  07/23/18 1813     Vancomycin Trough 14.25 mcg/mL     POC Glucose Once [[id number]]  (Abnormal) Collected:  07/23/18 1743    Specimen:  Blood Updated:  07/23/18 1746     Glucose 218 (H) mg/dL      Comment: Serial Number: [id number]:  [id number]       POC Glucose Once [[id number]]  (Abnormal) Collected:  07/23/18 1130    Specimen:  Blood Updated:  07/23/18 1151     Glucose 227 (H) mg/dL      Comment: Serial Number: [id number]:  [id number]       BNP [[id number]]  (Abnormal) Collected:  07/23/18 0423    Specimen:  Blood Updated:  07/23/18 0921     proBNP 1,580.0 (C) pg/mL     Blood Gas, Arterial With Co-Ox [[id number]]  (Abnormal) Collected:  07/23/18 0910    Specimen:  Arterial Blood Updated:  07/23/18  0911     Site Left Radial     Toy's Test Positive     pH, Arterial 7.490 pH units      pCO2, Arterial 37.6 mm Hg      pO2, Arterial 48.1 (C) mm Hg      HCO3, Arterial 28.6 (H) mmol/L      Base Excess, Arterial 5.0 (H) mmol/L      O2 Saturation, Arterial 84.5 (L) %      Hemoglobin, Blood Gas 10.9 (L) g/dL      Hematocrit, Blood Gas 33.5 %      Oxyhemoglobin 83.0 (L) %      Methemoglobin 0.30 %      Carboxyhemoglobin 1.5 %      Barometric Pressure for Blood Gas 730 mmHg      Modality BiPap     FIO2 100 %      Ventilator Mode BiPAP     Collected by mb     pH, Temp Corrected -- pH Units      pCO2, Temperature Corrected -- mm Hg      pO2, Temperature Corrected -- mm Hg     POC Glucose Once [[id number]]  (Abnormal) Collected:  07/23/18 0625    Specimen:  Blood Updated:  07/23/18 0635     Glucose 205 (H) mg/dL      Comment: Serial Number: [id number]:  [id number]       Basic Metabolic Panel [[id number]]  (Abnormal) Collected:  07/23/18 0423    Specimen:  Blood Updated:  07/23/18 0553     Glucose 183 (H) mg/dL      BUN 22 (H) mg/dL      Creatinine 0.50 (L) mg/dL      Sodium 139 mmol/L      Potassium 4.2 mmol/L      Chloride 104 mmol/L      CO2 30.0 mmol/L      Calcium 8.5 mg/dL      eGFR Non African Amer 123 mL/min/1.73      BUN/Creatinine Ratio 44.0 (H)     Anion Gap 9.2 (L) mmol/L     Narrative:       GFR Normal >60  Chronic Kidney Disease <60  Kidney Failure <15    Troponin [[id number]]  (Normal) Collected:  07/23/18 0423    Specimen:  Blood Updated:  07/23/18 0553     Troponin I 0.028 ng/mL     Narrative:       Normal Patient Upper Reference Limit (URL) (99th Percentile)=0.03 ng/mL   Non-AMI Illness Reference Limit=0.03-0.11 ng/mL   AMI Confirmation=0.12 ng/mL and above    CBC & Differential [[id number]] Collected:  07/23/18 0423    Specimen:  Blood Updated:  07/23/18 0510    Narrative:       The following orders were created for panel order CBC & Differential.  Procedure                               Abnormality          Status                     ---------                               -----------         ------                     CBC Auto Differential[[id number]]        Abnormal            Final result                 Please view results for these tests on the individual orders.    CBC Auto Differential [[id number]]  (Abnormal) Collected:  07/23/18 0423    Specimen:  Blood Updated:  07/23/18 0510     WBC 17.16 (H) 10*3/mm3      RBC 3.72 (L) 10*6/mm3      Hemoglobin 9.8 (L) g/dL      Hematocrit 30.7 (L) %      MCV 82.5 fL      MCH 26.3 (L) pg      MCHC 31.9 g/dL      RDW 14.9 (H) %      RDW-SD 44.6 fl      MPV 10.4 fL      Platelets 242 10*3/mm3      Neutrophil % 90.6 (H) %      Lymphocyte % 5.3 (L) %      Monocyte % 3.0 %      Eosinophil % 0.1 %      Basophil % 0.1 %      Immature Grans % 0.9 (H) %      Neutrophils, Absolute 15.55 (H) 10*3/mm3      Lymphocytes, Absolute 0.91 10*3/mm3      Monocytes, Absolute 0.52 10*3/mm3      Eosinophils, Absolute 0.01 10*3/mm3      Basophils, Absolute 0.02 10*3/mm3      Immature Grans, Absolute 0.15 (H) 10*3/mm3      nRBC 0.0 /100 WBC     POC Glucose Once [[id number]]  (Abnormal) Collected:  07/22/18 2040    Specimen:  Blood Updated:  07/22/18 2045     Glucose 198 (H) mg/dL      Comment: Serial Number: [id number]:  [id number]       Blood Gas, Arterial With Co-Ox [[id number]]  (Abnormal) Collected:  07/22/18 1937    Specimen:  Arterial Blood Updated:  07/22/18 1937     Site Left Radial     Toy's Test Positive     pH, Arterial 7.476 pH units      pCO2, Arterial 37.5 mm Hg      pO2, Arterial 56.4 (L) mm Hg      HCO3, Arterial 27.7 mmol/L      Base Excess, Arterial 3.9 (H) mmol/L      O2 Saturation, Arterial 90.0 (L) %      Hemoglobin, Blood Gas 11.2 (L) g/dL      Hematocrit, Blood Gas 34.4 %      Oxyhemoglobin 87.9 (L) %      Methemoglobin 0.70 %      Carboxyhemoglobin 1.6 %      Barometric Pressure for Blood Gas 727 mmHg      Modality BiPap     FIO2 100 %      Ventilator Mode NIV     Set [facility]  Resp Rate 16.0     IPAP 16     EPAP 8     Collected by yovani     pH, Temp Corrected -- pH Units      pCO2, Temperature Corrected -- mm Hg      pO2, Temperature Corrected -- mm Hg     POC Glucose Once [[id number]]  (Normal) Collected:  07/22/18 1649    Specimen:  Blood Updated:  07/22/18 1715     Glucose 111 mg/dL      Comment: Serial Number: [id number]:  [id number]       POC Glucose Once [[id number]]  (Normal) Collected:  07/22/18 1146    Specimen:  Blood Updated:  07/22/18 1155     Glucose 103 mg/dL      Comment: Serial Number: [id number]:  [id number]       Urinalysis With Culture If Indicated - Urine, Clean Catch [[id number]]  (Abnormal) Collected:  07/22/18 0806    Specimen:  Urine from Urine, Clean Catch Updated:  07/22/18 0910     Color, UA Yellow     Appearance, UA Clear     pH, UA 7.0     Specific Gravity, UA 1.025     Glucose,  mg/dL (Trace) (A)     Ketones, UA Negative     Bilirubin, UA Negative     Blood, UA Trace (A)     Protein, UA Negative     Leuk Esterase, UA Negative     Nitrite, UA Negative     Urobilinogen, UA 0.2 E.U./dL    Urinalysis, Microscopic Only - Urine, Clean Catch [[id number]]  (Abnormal) Collected:  07/22/18 0806    Specimen:  Urine from Urine, Clean Catch Updated:  07/22/18 0910     RBC, UA 0-2 (A) /HPF      WBC, UA 6-12 (A) /HPF      Bacteria, UA 1+ (A) /HPF      Squamous Epithelial Cells, UA 0-2 /HPF      Yeast, UA Large/3+ Budding Yeast /HPF      Hyaline Casts, UA None Seen /LPF      Methodology Manual Light Microscopy    POC Glucose Once [[id number]]  (Normal) Collected:  07/22/18 0555    Specimen:  Blood Updated:  07/22/18 0605     Glucose 103 mg/dL      Comment: Serial Number: [id number]:  [id number]       Basic Metabolic Panel [[id number]]  (Abnormal) Collected:  07/22/18 0414    Specimen:  Blood Updated:  07/22/18 0539     Glucose 57 (L) mg/dL      BUN 22 (H) mg/dL      Creatinine 0.50 (L) mg/dL      Sodium 144 mmol/L      Potassium 2.7 (C) mmol/L      Chloride 109 (H)  mmol/L      CO2 31.0 (H) mmol/L      Calcium 8.5 mg/dL      eGFR Non African Amer 123 mL/min/1.73      BUN/Creatinine Ratio 44.0 (H)     Anion Gap 6.7 (L) mmol/L     Narrative:       GFR Normal >60  Chronic Kidney Disease <60  Kidney Failure <15    Magnesium [[id number]]  (Normal) Collected:  07/22/18 0414    Specimen:  Blood Updated:  07/22/18 0522     Magnesium 1.8 mg/dL     CBC & Differential [[id number]] Collected:  07/22/18 0414    Specimen:  Blood Updated:  07/22/18 0521    Narrative:       The following orders were created for panel order CBC & Differential.  Procedure                               Abnormality         Status                     ---------                               -----------         ------                     CBC Auto Differential[[id number]]        Abnormal            Final result                 Please view results for these tests on the individual orders.    CBC Auto Differential [[id number]]  (Abnormal) Collected:  07/22/18 0414    Specimen:  Blood Updated:  07/22/18 0521     WBC 27.98 (C) 10*3/mm3      RBC 4.82 10*6/mm3      Hemoglobin 12.7 g/dL      Hematocrit 39.6 %      MCV 82.2 fL      MCH 26.3 (L) pg      MCHC 32.1 g/dL      RDW 14.8 (H) %      RDW-SD 44.1 fl      MPV 10.2 fL      Platelets 456 (H) 10*3/mm3      Neutrophil % 78.9 %      Lymphocyte % 11.0 %      Monocyte % 6.4 %      Eosinophil % 1.5 %      Basophil % 0.3 %      Immature Grans % 1.9 (H) %      Neutrophils, Absolute 22.08 (H) 10*3/mm3      Lymphocytes, Absolute 3.09 10*3/mm3      Monocytes, Absolute 1.79 (H) 10*3/mm3      Eosinophils, Absolute 0.41 10*3/mm3      Basophils, Absolute 0.07 10*3/mm3      Immature Grans, Absolute 0.54 (H) 10*3/mm3      nRBC 0.1 (H) /100 WBC     POC Glucose Once [[id number]]  (Abnormal) Collected:  07/21/18 2105    Specimen:  Blood Updated:  07/21/18 2110     Glucose 245 (H) mg/dL      Comment: Serial Number: [id number]:  [id number]       POC Glucose Once [[id number]]  (Abnormal)  Collected:  07/21/18 1546    Specimen:  Blood Updated:  07/21/18 1601     Glucose 278 (H) mg/dL      Comment: Serial Number: [id number]:  [id number]       POC Glucose Once [[id number]]  (Abnormal) Collected:  07/21/18 1119    Specimen:  Blood Updated:  07/21/18 1125     Glucose 251 (H) mg/dL      Comment: Serial Number: [id number]:  [id number]       POC Glucose Once [[id number]]  (Abnormal) Collected:  07/21/18 0613    Specimen:  Blood Updated:  07/21/18 0621     Glucose 174 (H) mg/dL      Comment: Serial Number: [id number]:  [id number]       Comprehensive Metabolic Panel [[id number]]  (Abnormal) Collected:  07/21/18 0413    Specimen:  Blood Updated:  07/21/18 0539     Glucose 188 (H) mg/dL      Comment: Glucose >180, Hemoglobin A1C recommended.        BUN 37 (H) mg/dL      Creatinine 0.50 (L) mg/dL      Sodium 145 mmol/L      Potassium 3.6 mmol/L      Chloride 109 (H) mmol/L      CO2 30.0 mmol/L      Calcium 8.8 mg/dL      Total Protein 5.9 (L) g/dL      Albumin 3.00 (L) g/dL      ALT (SGPT) 55 U/L      AST (SGOT) 30 U/L      Alkaline Phosphatase 90 U/L      Total Bilirubin 0.5 mg/dL      eGFR Non African Amer 123 mL/min/1.73      Globulin 2.9 gm/dL      A/G Ratio 1.0 g/dL      BUN/Creatinine Ratio 74.0 (H)     Anion Gap 9.6 (L) mmol/L     Narrative:       GFR Normal >60  Chronic Kidney Disease <60  Kidney Failure <15    CBC (No Diff) [[id number]]  (Abnormal) Collected:  07/21/18 0413    Specimen:  Blood Updated:  07/21/18 0457     WBC 17.57 (H) 10*3/mm3      RBC 4.28 10*6/mm3      Hemoglobin 11.6 (L) g/dL      Hematocrit 35.6 (L) %      MCV 83.2 fL      MCH 27.1 pg      MCHC 32.6 g/dL      RDW 14.6 (H) %      RDW-SD 43.8 fl      MPV 10.6 fL      Platelets 415 (H) 10*3/mm3     POC Glucose Once [[id number]]  (Abnormal) Collected:  07/20/18 2330    Specimen:  Blood Updated:  07/21/18 0001     Glucose 215 (H) mg/dL      Comment: Serial Number: [id number]:  [id number]       POC Glucose Once [[id number]]   (Abnormal) Collected:  07/20/18 1734    Specimen:  Blood Updated:  07/20/18 1750     Glucose 220 (H) mg/dL      Comment: Serial Number: [id number]:  [id number]       Legionella Antigen, Urine - Urine, Urine, Clean Catch [[id number]] Collected:  07/13/18 0925    Specimen:  Urine from Urine, Clean Catch Updated:  07/20/18 1615     L. pneumophila Serogp 1 Ur Ag Negative     Comment: Presumptive negative for L. pneumophila serogroup 1 antigen in urine,  suggesting no recent or current infection. Legionnaires' disease  cannot be ruled out since other serogroups and species may also cause  disease.       Narrative:       Performed at:  [address] [place] [address]  [id number]  : Nikhil Granger MD, Phone:  [phone]    POC Glucose Once [[id number]]  (Abnormal) Collected:  07/20/18 1131    Specimen:  Blood Updated:  07/20/18 1145     Glucose 294 (H) mg/dL      Comment: Serial Number: [id number]:  [id number]       POC Glucose Once [[id number]]  (Abnormal) Collected:  07/20/18 0614    Specimen:  Blood Updated:  07/20/18 0621     Glucose 214 (H) mg/dL      Comment: Serial Number: [id number]:  [id number]       Comprehensive Metabolic Panel [[id number]]  (Abnormal) Collected:  07/20/18 0430    Specimen:  Blood Updated:  07/20/18 0558     Glucose 212 (H) mg/dL      Comment: Glucose >180, Hemoglobin A1C recommended.        BUN 35 (H) mg/dL      Creatinine 0.50 (L) mg/dL      Sodium 141 mmol/L      Potassium 4.2 mmol/L      Chloride 101 mmol/L      CO2 35.0 (H) mmol/L      Calcium 8.6 mg/dL      Total Protein 6.5 g/dL      Albumin 3.30 (L) g/dL      ALT (SGPT) 64 U/L      AST (SGOT) 42 U/L      Alkaline Phosphatase 101 U/L      Total Bilirubin 0.8 mg/dL      eGFR Non African Amer 123 mL/min/1.73      Globulin 3.2 gm/dL      A/G Ratio 1.0 g/dL      BUN/Creatinine Ratio 70.0 (H)     Anion Gap 9.2 (L) mmol/L     Narrative:       GFR Normal >60  Chronic Kidney Disease <60  Kidney  Failure <15    CBC & Differential [[id number]] Collected:  07/20/18 0430    Specimen:  Blood Updated:  07/20/18 0500    Narrative:       The following orders were created for panel order CBC & Differential.  Procedure                               Abnormality         Status                     ---------                               -----------         ------                     CBC Auto Differential[[id number]]        Abnormal            Final result                 Please view results for these tests on the individual orders.    CBC Auto Differential [[id number]]  (Abnormal) Collected:  07/20/18 0430    Specimen:  Blood Updated:  07/20/18 0500     WBC 14.36 (H) 10*3/mm3      RBC 4.74 10*6/mm3      Hemoglobin 12.8 g/dL      Hematocrit 39.6 %      MCV 83.5 fL      MCH 27.0 pg      MCHC 32.3 g/dL      RDW 14.4 %      RDW-SD 43.9 fl      MPV 10.5 fL      Platelets 405 (H) 10*3/mm3      Neutrophil % 86.7 (H) %      Lymphocyte % 7.2 (L) %      Monocyte % 4.2 %      Eosinophil % 0.0 %      Basophil % 0.1 %      Immature Grans % 1.8 (H) %      Neutrophils, Absolute 12.44 (H) 10*3/mm3      Lymphocytes, Absolute 1.03 10*3/mm3      Monocytes, Absolute 0.61 10*3/mm3      Eosinophils, Absolute 0.00 10*3/mm3      Basophils, Absolute 0.02 10*3/mm3      Immature Grans, Absolute 0.26 (H) 10*3/mm3      nRBC 0.0 /100 WBC     POC Glucose Once [[id number]]  (Abnormal) Collected:  07/20/18 0005    Specimen:  Blood Updated:  07/20/18 0010     Glucose 204 (H) mg/dL      Comment: Serial Number: [id number]:  [id number]       POC Glucose Once [[id number]]  (Abnormal) Collected:  07/19/18 1729    Specimen:  Blood Updated:  07/19/18 1750     Glucose 208 (H) mg/dL      Comment: Serial Number: [id number]:  [id number]       POC Glucose Once [[id number]]  (Normal) Collected:  07/19/18 1126    Specimen:  Blood Updated:  07/19/18 1130     Glucose 118 mg/dL      Comment: Serial Number: [id number]:  [id number]       POC Glucose Once [[id number]]   (Abnormal) Collected:  07/19/18 0628    Specimen:  Blood Updated:  07/19/18 0631     Glucose 145 (H) mg/dL      Comment: Serial Number: [id number]:  [id number]       CBC & Differential [[id number]] Collected:  07/19/18 0414    Specimen:  Blood Updated:  07/19/18 0611    Narrative:       The following orders were created for panel order CBC & Differential.  Procedure                               Abnormality         Status                     ---------                               -----------         ------                     CBC Auto Differential[[id number]]        Abnormal            Final result                 Please view results for these tests on the individual orders.    CBC Auto Differential [[id number]]  (Abnormal) Collected:  07/19/18 0414    Specimen:  Blood Updated:  07/19/18 0611     WBC 16.92 (H) 10*3/mm3      RBC 4.62 10*6/mm3      Hemoglobin 12.5 g/dL      Hematocrit 39.1 %      MCV 84.6 fL      MCH 27.1 pg      MCHC 32.0 g/dL      RDW 15.1 (H) %      RDW-SD 46.0 fl      MPV 10.4 fL      Platelets 431 (H) 10*3/mm3      Neutrophil % 75.1 %      Lymphocyte % 13.9 %      Monocyte % 8.2 %      Eosinophil % 0.4 %      Basophil % 0.2 %      Immature Grans % 2.2 (H) %      Neutrophils, Absolute 12.70 (H) 10*3/mm3      Lymphocytes, Absolute 2.36 10*3/mm3      Monocytes, Absolute 1.38 (H) 10*3/mm3      Eosinophils, Absolute 0.07 10*3/mm3      Basophils, Absolute 0.04 10*3/mm3      Immature Grans, Absolute 0.37 (H) 10*3/mm3      nRBC 0.1 (H) /100 WBC     Blood Gas, Arterial With Co-Ox [[id number]]  (Abnormal) Collected:  07/19/18 0553    Specimen:  Arterial Blood Updated:  07/19/18 0555     Site Left Radial     Toy's Test Positive     pH, Arterial 7.519 (C) pH units      pCO2, Arterial 43.8 mm Hg      pO2, Arterial 56.9 (L) mm Hg      HCO3, Arterial 35.6 (H) mmol/L      Base Excess, Arterial 11.5 (H) mmol/L      O2 Saturation, Arterial 90.8 (L) %      Hemoglobin, Blood Gas 12.3 g/dL      Hematocrit, Blood  Gas 37.7 %      Oxyhemoglobin 89.1 (L) %      Methemoglobin 0.60 %      Carboxyhemoglobin 1.3 %      Barometric Pressure for Blood Gas 734 mmHg      Modality Aerosol Mask     FIO2 100 %      Ventilator Mode NA     Collected by [id number]     pH, Temp Corrected -- pH Units      pCO2, Temperature Corrected -- mm Hg      pO2, Temperature Corrected -- mm Hg     POC Glucose Once [[id number]]  (Normal) Collected:  07/19/18 0537    Specimen:  Blood Updated:  07/19/18 0550     Glucose 77 mg/dL      Comment: Serial Number: [id number]:  [id number]       Comprehensive Metabolic Panel [[id number]]  (Abnormal) Collected:  07/19/18 0414    Specimen:  Blood Updated:  07/19/18 0536     Glucose 98 mg/dL      BUN 35 (H) mg/dL      Creatinine 0.60 mg/dL      Sodium 144 mmol/L      Potassium 4.1 mmol/L      Chloride 101 mmol/L      CO2 36.0 (H) mmol/L      Calcium 9.0 mg/dL      Total Protein 6.9 g/dL      Albumin 3.60 g/dL      ALT (SGPT) 64 U/L      AST (SGOT) 56 (H) U/L      Alkaline Phosphatase 117 U/L      Total Bilirubin 0.8 mg/dL      eGFR Non African Amer 100 mL/min/1.73      Globulin 3.3 gm/dL      A/G Ratio 1.1 g/dL      BUN/Creatinine Ratio 58.3 (H)     Anion Gap 11.1 mmol/L     Narrative:       GFR Normal >60  Chronic Kidney Disease <60  Kidney Failure <15    Magnesium [[id number]]  (Normal) Collected:  07/19/18 0414    Specimen:  Blood Updated:  07/19/18 0533     Magnesium 2.1 mg/dL     POC Glucose Once [[id number]]  (Abnormal) Collected:  07/19/18 0012    Specimen:  Blood Updated:  07/19/18 0015     Glucose 151 (H) mg/dL      Comment: Serial Number: [id number]:  [id number]       POC Glucose Once [[id number]]  (Abnormal) Collected:  07/18/18 1711    Specimen:  Blood Updated:  07/18/18 1726     Glucose 177 (H) mg/dL      Comment: Serial Number: [id number]:  [id number]       Blood Gas, Arterial With Co-Ox [[id number]]  (Abnormal) Collected:  07/18/18 1538    Specimen:  Arterial Blood Updated:  07/18/18 1540     Site Right  Radial     Toy's Test Positive     pH, Arterial 7.537 (C) pH units      pCO2, Arterial 40.7 mm Hg      pO2, Arterial 81.3 mm Hg      HCO3, Arterial 34.6 (H) mmol/L      Base Excess, Arterial 11.0 (H) mmol/L      O2 Saturation, Arterial 97.3 %      Hemoglobin, Blood Gas 12.1 g/dL      Hematocrit, Blood Gas 37.2 %      Oxyhemoglobin 95.4 %      Methemoglobin 0.80 %      Carboxyhemoglobin 1.2 %      Barometric Pressure for Blood Gas 734 mmHg      Modality Ventilator     FIO2 40 %      Ventilator Mode NA     Collected by      pH, Temp Corrected -- pH Units      pCO2, Temperature Corrected -- mm Hg      pO2, Temperature Corrected -- mm Hg     POC Glucose Once [[id number]]  (Abnormal) Collected:  07/18/18 1111    Specimen:  Blood Updated:  07/18/18 1116     Glucose 191 (H) mg/dL      Comment: Serial Number: [id number]:  [id number]       Blood Culture With OSMEL - Blood, [[id number]]  (Normal) Collected:  07/13/18 0602    Specimen:  Blood from Hand, Right Updated:  07/18/18 0715     Blood Culture No growth at 5 days    Blood Culture With OSMEL - Blood, [[id number]]  (Normal) Collected:  07/13/18 0614    Specimen:  Blood from Arm, Left Updated:  07/18/18 0715     Blood Culture No growth at 5 days    Vancomycin, Trough [[id number]]  (Normal) Collected:  07/18/18 0531    Specimen:  Blood Updated:  07/18/18 0622     Vancomycin Trough 13.87 mcg/mL     POC Glucose Once [[id number]]  (Abnormal) Collected:  07/18/18 0607    Specimen:  Blood Updated:  07/18/18 0611     Glucose 218 (H) mg/dL      Comment: Serial Number: [id number]:  [id number]       Comprehensive Metabolic Panel [[id number]]  (Abnormal) Collected:  07/18/18 0531    Specimen:  Blood Updated:  07/18/18 0605     Glucose 216 (H) mg/dL      Comment: Glucose >180, Hemoglobin A1C recommended.        BUN 38 (H) mg/dL      Creatinine 0.80 mg/dL      Sodium 145 mmol/L      Potassium 4.5 mmol/L      Chloride 109 (H) mmol/L      CO2 30.0 mmol/L      Calcium 8.7 mg/dL       Total Protein 6.3 g/dL      Albumin 3.10 (L) g/dL      ALT (SGPT) 52 U/L      AST (SGOT) 41 U/L      Alkaline Phosphatase 122 U/L      Total Bilirubin 0.4 mg/dL      eGFR Non African Amer 72 mL/min/1.73      Globulin 3.2 gm/dL      A/G Ratio 1.0 g/dL      BUN/Creatinine Ratio 47.5 (H)     Anion Gap 10.5 mmol/L     Narrative:       GFR Normal >60  Chronic Kidney Disease <60  Kidney Failure <15    Magnesium [[id number]]  (Normal) Collected:  07/18/18 0531    Specimen:  Blood Updated:  07/18/18 0605     Magnesium 2.3 mg/dL     CBC & Differential [[id number]] Collected:  07/18/18 0531    Specimen:  Blood Updated:  07/18/18 0556    Narrative:       The following orders were created for panel order CBC & Differential.  Procedure                               Abnormality         Status                     ---------                               -----------         ------                     CBC Auto Differential[[id number]]        Abnormal            Final result                 Please view results for these tests on the individual orders.    CBC Auto Differential [[id number]]  (Abnormal) Collected:  07/18/18 0531    Specimen:  Blood Updated:  07/18/18 0556     WBC 14.04 (H) 10*3/mm3      RBC 3.86 (L) 10*6/mm3      Hemoglobin 10.3 (L) g/dL      Hematocrit 32.7 (L) %      MCV 84.7 fL      MCH 26.7 (L) pg      MCHC 31.5 g/dL      RDW 15.2 (H) %      RDW-SD 46.8 fl      MPV 10.5 fL      Platelets 370 10*3/mm3      Neutrophil % 78.8 %      Lymphocyte % 9.0 (L) %      Monocyte % 9.9 %      Eosinophil % 0.1 %      Basophil % 0.1 %      Immature Grans % 2.1 (H) %      Neutrophils, Absolute 11.06 (H) 10*3/mm3      Lymphocytes, Absolute 1.26 10*3/mm3      Monocytes, Absolute 1.39 (H) 10*3/mm3      Eosinophils, Absolute 0.01 10*3/mm3      Basophils, Absolute 0.02 10*3/mm3      Immature Grans, Absolute 0.30 (H) 10*3/mm3      nRBC 0.3 (H) /100 WBC     Blood Gas, Arterial With Co-Ox [[id number]]  (Abnormal) Collected:  07/18/18 0516     Specimen:  Arterial Blood Updated:  07/18/18 0516     Site Right Radial     Toy's Test Positive     pH, Arterial 7.454 pH units      pCO2, Arterial 40.6 mm Hg      pO2, Arterial 77.3 mm Hg      HCO3, Arterial 28.5 (H) mmol/L      Base Excess, Arterial 4.2 (H) mmol/L      O2 Saturation, Arterial 96.1 %      Hemoglobin, Blood Gas 10.4 (L) g/dL      Hematocrit, Blood Gas 32.0 %      Oxyhemoglobin 94.4 %      Methemoglobin 0.70 %      Carboxyhemoglobin 1.1 %      Barometric Pressure for Blood Gas 734 mmHg      Modality Ventilator     FIO2 60 %      Ventilator Mode PC     Set [facility] Resp Rate 20.0     PEEP 8.0     PIP 20 cmH2O      Collected by [id number]     pH, Temp Corrected -- pH Units      pCO2, Temperature Corrected -- mm Hg      pO2, Temperature Corrected -- mm Hg     POC Glucose Once [[id number]]  (Abnormal) Collected:  07/17/18 2348    Specimen:  Blood Updated:  07/17/18 2355     Glucose 272 (H) mg/dL      Comment: Serial Number: [id number]:  [id number]       POC Glucose Once [[id number]]  (Abnormal) Collected:  07/17/18 1657    Specimen:  Blood Updated:  07/17/18 1705     Glucose 258 (H) mg/dL      Comment: Serial Number: [id number]:  [id number]       S. Pneumo Ag Urine or CSF - Urine, Urine, Clean Catch [[id number]] Collected:  07/13/18 0925    Specimen:  Urine from Urine, Clean Catch Updated:  07/17/18 1412     Specimen Source Urine     STREP PNEUMONIAE ANTIGEN Negative     Body Fluid Culture, Sterile Not Indicated     Organism ID Not indicated.     Please note Comment     Comment: [employer] standards require a culture to be  performed on CSF specimens submitted for bacterial antigen testing.  (CAP SERGO.17349) Urine specimens will not be cultured.       Narrative:       Performed at:  [address] [place] [address]  [id number]  : Nikhil Granger MD, Phone:  [phone]    POC Glucose Once [[id number]]  (Abnormal) Collected:  07/17/18 1130     Specimen:  Blood Updated:  07/17/18 1136     Glucose 321 (H) mg/dL      Comment: Serial Number: [id number]:  [id number]       POC Glucose Once [[id number]]  (Abnormal) Collected:  07/17/18 0505    Specimen:  Blood Updated:  07/17/18 0601     Glucose 229 (H) mg/dL      Comment: Serial Number: [id number]:  [id number]       CBC (No Diff) [[id number]]  (Abnormal) Collected:  07/17/18 0354    Specimen:  Blood Updated:  07/17/18 0519     WBC 14.00 (H) 10*3/mm3      RBC 3.79 (L) 10*6/mm3      Hemoglobin 10.2 (L) g/dL      Hematocrit 33.5 (L) %      MCV 88.4 fL      MCH 26.9 (L) pg      MCHC 30.4 g/dL      RDW 15.7 (H) %      RDW-SD 50.9 fl      MPV 10.9 fL      Platelets 307 10*3/mm3     Blood Gas, Arterial With Co-Ox [[id number]]  (Abnormal) Collected:  07/17/18 0504    Specimen:  Arterial Blood Updated:  07/17/18 0504     Site Right Radial     Toy's Test Positive     pH, Arterial 7.371 pH units      pCO2, Arterial 40.7 mm Hg      pO2, Arterial 267.0 (H) mm Hg      HCO3, Arterial 23.6 mmol/L      Base Excess, Arterial -1.6 (L) mmol/L      O2 Saturation, Arterial >99.6 %      Hemoglobin, Blood Gas 10.3 (L) g/dL      Hematocrit, Blood Gas 31.7 %      Oxyhemoglobin 98.2 %      Methemoglobin 1.00 %      Carboxyhemoglobin 1.0 %      Barometric Pressure for Blood Gas 732 mmHg      Modality Ventilator     FIO2 100 %      Ventilator Mode PC     Set [facility] Resp Rate 20.0     PEEP 10.0     Collected by yovani     pH, Temp Corrected -- pH Units      pCO2, Temperature Corrected -- mm Hg      pO2, Temperature Corrected -- mm Hg     Comprehensive Metabolic Panel [[id number]]  (Abnormal) Collected:  07/17/18 0354    Specimen:  Blood Updated:  07/17/18 0449     Glucose 231 (H) mg/dL      Comment: Glucose >180, Hemoglobin A1C recommended.        BUN 37 (H) mg/dL      Creatinine 0.80 mg/dL      Sodium 143 mmol/L      Potassium 5.0 mmol/L      Chloride 113 (H) mmol/L         CT Chest Pulmonary Embolism With Contrast [[id number]] Collected:   "07/13/18 1048     Updated:  07/13/18 1131    Narrative:       CT CHEST PULMONARY EMBOLISM WITH CONTRAST     HISTORY: SOB, hypoxia.      TECHNIQUE: Thin section axial CT with IV contrast supplemented with3D  reconstructed MIP images.     COMPARISON: 04/19/2018     FINDINGS:     Pulmonary vessels enhance in a normal fashion without evidence of  embolic disease. Thoracic aorta is normal in caliber without evidence of  aneurysm or dissection.     Multifocal areas of alveolar disease are noted in a multilobar  distribution, left slightly worse from right. Findings are most  compatible with multifocal pneumonia..  Tiny bilateral pleural effusions  are present.. Moderate adenopathy is seen with abnormal soft tissue in  the AP window measuring 4.3 x 3.5 cm. Mild subcarinal adenopathy is  present.           Impression:       1. No evidence of pulmonary embolism.  2. Extensive multifocal pneumonia.  3. Significant adenopathy, particularly in the AP window.  4. Recommend short-term chest CT follow-up in 2-3 months, if adenopathy  is persistent consider PET/CT.            This study was performed with techniques to keep radiation doses as low  as reasonably achievable (ALARA). Individualized dose reduction  techniques using automated exposure control or adjustment of vA and/or  kV according to the patient size were employed.      This report was finalized on 7/13/2018 11:28 AM by Nish Smith MD.          Condition on Discharge:      Critical    Vital Signs:    /51   Pulse 67   Temp 98.5 °F (36.9 °C) (Oral)   Resp 22   Ht 162.6 cm (64\")   Wt 72.7 kg (160 lb 3.2 oz)   LMP  (LMP Unknown)   SpO2 (!) 87%   BMI 27.50 kg/m²     Physical Exam:      General Appearance:    Sedated, on the ventilator.  In mild distress.     Head:    Normocephalic, without obvious abnormality, atraumatic   Eyes:            Lids and lashes normal, conjunctivae and sclerae normal, no   icterus, no pallor, corneas clear, PERRLA   Ears:    Ears " "appear intact with no abnormalities noted   Throat:   No oral lesions, no thrush, oral mucosa moist   Neck:   No adenopathy, supple, trachea midline, no thyromegaly, no     carotid bruit, no JVD   Back:     No kyphosis present, no scoliosis present, no skin lesions,       erythema or scars, no tenderness to percussion or                   palpation,   range of motion normal   Lungs:     Rhonchi bilaterally with some use of accessory muscles of respiration.      Heart:    Regular rhythm and normal rate, normal S1 and S2, no            murmur, no gallop, no rub, no click   Breast Exam:    Deferred   Abdomen:     Normal bowel sounds, no masses, no organomegaly, soft        non-tender, non-distended, no guarding, no rebound                 tenderness   Genitalia:    Deferred   Extremities:    no edema, no cyanosis, no  redness   Pulses:   Pulses palpable and equal bilaterally   Skin:   No bleeding, bruising or rash   Lymph nodes:   No palpable adenopathy   Neurologic:   DTR present and equal bilaterally       Discharge Disposition:     to     Discharge Medication:       Discharge Medications      Continue These Medications      Instructions Start Date   B-D INS SYR ULTRAFINE 1CC/30G 30G X 1/2\" 1 ML misc  Generic drug:  Insulin Syringe-Needle U-100   Does not apply      B-D INS SYR ULTRAFINE .5CC/30G 30G X 1/2\" 0.5 ML misc  Generic drug:  Insulin Syringe-Needle U-100   Does not apply      B-D INS SYR ULTRAFINE 1CC/31G 31G X 5/16\" 1 ML misc  Generic drug:  Insulin Syringe-Needle U-100   use as directed      onetouch ultrasoft lancets   USE AS DIRECTED TWICE A DAY         ASK your doctor about these medications      Instructions Start Date   albuterol 108 (90 Base) MCG/ACT inhaler  Commonly known as:  PROAIR RESPICLICK   1 puff, Inhalation, Every 4 Hours PRN      amLODIPine 5 MG tablet  Commonly known as:  NORVASC   TAKE 1 TABLET BY MOUTH AT BEDTIME      arformoterol 15 MCG/2ML nebulizer solution  Commonly known as:  " BROVANA   15 mcg, Nebulization, 2 Times Daily - RT      budesonide 0.5 MG/2ML nebulizer solution  Commonly known as:  PULMICORT   0.5 mg, Nebulization, 2 Times Daily      buPROPion  MG 24 hr tablet  Commonly known as:  WELLBUTRIN XL   take 1 tablet by mouth once daily      calcitonin (salmon) 200 UNIT/ACT nasal spray  Commonly known as:  MIACALCIN   1 spray, Nasal, Daily      cholecalciferol 1000 units tablet  Commonly known as:  VITAMIN D3   2,000 Units, Oral, Daily      clopidogrel 75 MG tablet  Commonly known as:  PLAVIX   take 1 tablet by mouth once daily      clotrimazole-betamethasone 1-0.05 % cream  Commonly known as:  LOTRISONE   1 application, Topical, 2 Times Daily, Apply topically twice daily as direted.      colestipol 1 g tablet  Commonly known as:  COLESTID   take 2 tablets by mouth twice a day      diphenoxylate-atropine 2.5-0.025 MG per tablet  Commonly known as:  LOMOTIL   patient states she takes one tablet every day      famotidine 20 MG tablet  Commonly known as:  PEPCID   20 mg, Oral, 2 Times Daily      flunisolide 25 MCG/ACT (0.025%) solution nasal spray  Commonly known as:  NASALIDE   2 sprays, Inhalation, Every 12 Hours      gabapentin 300 MG capsule  Commonly known as:  NEURONTIN   take 1 capsule by mouth twice a day      glucose blood test strip  Commonly known as:  ONE TOUCH ULTRA TEST   USE TO TEST TWICE DAILY. E11.9      insulin glargine 100 UNIT/ML injection  Commonly known as:  LANTUS   45 Units, Subcutaneous, 2 Times Daily      ipratropium-albuterol 0.5-2.5 mg/3 ml nebulizer  Commonly known as:  DUO-NEB   3 mL, Nebulization, Every 4 Hours PRN      lisinopril 20 MG tablet  Commonly known as:  PRINIVIL,ZESTRIL   take 1 tablet by mouth once daily      magnesium 30 MG tablet   30 mg, Oral, Daily      metFORMIN 500 MG tablet  Commonly known as:  GLUCOPHAGE   TAKE 2 TABLETS BY MOUTH IN THE MORNING, 1 TABLET AT LUNCH, AND 2 TABLETS IN THE EVENING      omeprazole 40 MG capsule  Commonly  known as:  priLOSEC   TAKE 1 CAPSULE BY MOUTH IN THE AM 30 MINUTES BEFORE BREAKFAST      ondansetron 4 MG tablet  Commonly known as:  ZOFRAN   4 mg, Oral, Every 8 Hours PRN      sertraline 100 MG tablet  Commonly known as:  ZOLOFT   TAKE 1 1/2 TABLETS BY MOUTH ONCE DAILY      simvastatin 40 MG tablet  Commonly known as:  ZOCOR   TAKE 1 TABLET BY MOUTH ONCE DAILY      traZODone 50 MG tablet  Commonly known as:  DESYREL   TAKE 1 1/2 TABLETS BY MOUTH AT BEDTIME             Discharge Diet:      nothing by mouth except tube feeding    tube feeding regimen of Glucerna @25mL/hr.TF to provide 660 kcals, 33 grams protein, and 442 mL tube feeding water.  Free water flushes 55mL Q6 or four times daily.    Activity at Discharge:      bed rest    Follow-up Appointments:    Future Appointments  Date Time Provider Department Center   8/17/2018 11:30 AM Yamilex Tse MD [facility] None   12/10/2018 2:30 PM Molina Dumont MD [facility] None         Test Results Pending at Discharge:     Order Current Status    Fungus Culture - Lavage, Lung, R In process    Procalcitonin In process    Respiratory Culture - Lavage, Lung, R In process    Blood Culture With OSMEL - Blood, Preliminary result    Blood Culture With OSMEL - Blood, Preliminary result    Respiratory Culture - Sputum, ET Suction Preliminary result             Rock Guzman DO  07/28/18  1:14 PM

## 2018-07-29 NOTE — NURSING NOTE
Patient transferred to  per EMS at this time.  Report given to MARK Medley.  Report given to EMS.  Patient off unit at 2158.

## 2018-07-30 LAB
BACTERIA SPEC AEROBE CULT: NORMAL
BACTERIA SPEC AEROBE CULT: NORMAL
PROCALCITONIN SERPL-MCNC: 0.09 NG/ML (ref 0–0.08)

## 2018-07-30 NOTE — PROGRESS NOTES
Case Management Discharge Note    Final Note: Pt transferred to [facility]    Destination     No service has been selected for the patient.      Durable Medical Equipment     No service has been selected for the patient.      Dialysis/Infusion     No service has been selected for the patient.      Home Medical Care     No service has been selected for the patient.      Social Care     No service has been selected for the patient.             Final Discharge Disposition Code: 66 - critical [facility]

## 2018-07-31 LAB
BACTERIA SPEC RESP CULT: ABNORMAL
BACTERIA SPEC RESP CULT: ABNORMAL
GRAM STN SPEC: ABNORMAL

## 2018-08-28 LAB
BACTERIA SPEC AEROBE CULT: NORMAL
FUNGUS SPEC CULT: NORMAL
FUNGUS SPEC CULT: NORMAL
FUNGUS SPEC FUNGUS STN: NORMAL

## 2022-09-14 NOTE — TELEPHONE ENCOUNTER
I spoke with patient spouse Galindo, to remind them patient need's to have ct done before she comes in for her apt.  Central Scheduling phone# was given.   No

## 2022-10-30 NOTE — PROGRESS NOTES
Case Management/Social Work    Patient Name:  Breann Gallegos  YOB: 1950  MRN: [id number]  Admit Date:  7/13/2018      Pt accepted by [facility] at [facility] but will likely not be ready until next week due to current medical status. [facility] will f/u on pt Monday. CM will continue to follow and assist.      Electronically signed by:  RODNEY Gaitan  07/27/18 12:18 PM   soft/nondistended/nontender

## 2023-09-19 NOTE — PLAN OF CARE
Problem: Patient Care Overview  Goal: Plan of Care Review  Outcome: Ongoing (interventions implemented as appropriate)      Problem: Pneumonia (Adult)  Goal: Signs and Symptoms of Listed Potential Problems Will be Absent, Minimized or Managed (Pneumonia)  Outcome: Ongoing (interventions implemented as appropriate)      Problem: Ventilation, Mechanical Invasive (Adult)  Goal: Signs and Symptoms of Listed Potential Problems Will be Absent, Minimized or Managed (Ventilation, Mechanical Invasive)  Outcome: Ongoing (interventions implemented as appropriate)      Problem: Skin Injury Risk (Adult)  Goal: Identify Related Risk Factors and Signs and Symptoms  Outcome: Ongoing (interventions implemented as appropriate)      Problem: Fall Risk (Adult)  Goal: Absence of Fall  Outcome: Ongoing (interventions implemented as appropriate)      Problem: Nutrition, Parenteral (Adult)  Goal: Signs and Symptoms of Listed Potential Problems Will be Absent, Minimized or Managed (Nutrition, Parenteral)  Outcome: Ongoing (interventions implemented as appropriate)         skill demonstration/verbal instruction

## 2024-04-12 NOTE — PLAN OF CARE
Problem: Pain, Acute (Adult)  Goal: Acceptable Pain Control/Comfort Level  Outcome: Ongoing (interventions implemented as appropriate)   05/02/18 4027   Pain, Acute (Adult)   Acceptable Pain Control/Comfort Level making progress toward outcome          Patient/Caregiver provided printed discharge information.

## (undated) DEVICE — ESOPHAGEAL BALLOON DILATATION CATHETER: Brand: CRE FIXED WIRE

## (undated) DEVICE — 1860S HEALTH CARE RESPIRATOR N95 120EA/C: Brand: 3M™

## (undated) DEVICE — DEV INFL ALLIANCE2 SYS

## (undated) DEVICE — FRCP BIOP COLD ENDOJAW ALLGTR W/NDL 2.8X2300MM BLU

## (undated) DEVICE — CONMED SCOPE SAVER BITE BLOCK, 20X27 MM: Brand: SCOPE SAVER

## (undated) DEVICE — 2000CC GUARDIAN II: Brand: GUARDIAN

## (undated) DEVICE — Device

## (undated) DEVICE — ENDOGATOR AUXILIARY WATER JET CONNECTOR: Brand: ENDOGATOR

## (undated) DEVICE — GLV SURG SENSICARE W/ALOE PF LF 7.5 STRL